# Patient Record
Sex: FEMALE | Race: WHITE | NOT HISPANIC OR LATINO | Employment: UNEMPLOYED | URBAN - METROPOLITAN AREA
[De-identification: names, ages, dates, MRNs, and addresses within clinical notes are randomized per-mention and may not be internally consistent; named-entity substitution may affect disease eponyms.]

---

## 2020-02-02 ENCOUNTER — HOSPITAL ENCOUNTER (OUTPATIENT)
Dept: LABOR AND DELIVERY | Facility: HOSPITAL | Age: 25
Discharge: HOME | End: 2020-02-02
Attending: SPECIALIST

## 2020-02-08 ENCOUNTER — HOSPITAL ENCOUNTER (OUTPATIENT)
Dept: URGENT CARE | Facility: CLINIC | Age: 25
Discharge: HOME | End: 2020-02-08
Attending: PHYSICIAN ASSISTANT

## 2020-02-10 LAB — BACTERIA SPEC AEROBE CULT: NORMAL

## 2020-02-20 ENCOUNTER — HOSPITAL ENCOUNTER (OUTPATIENT)
Dept: LABOR AND DELIVERY | Facility: HOSPITAL | Age: 25
Discharge: HOME | End: 2020-02-20
Attending: OBSTETRICS & GYNECOLOGY

## 2020-02-20 LAB — CONV ALPHA-1-MICROGLOBULIN PLACENTAL (VAGINAL FLUID): NEGATIVE

## 2020-05-11 ENCOUNTER — HOSPITAL ENCOUNTER (OUTPATIENT)
Dept: OTHER | Facility: HOSPITAL | Age: 25
Discharge: HOME | End: 2020-05-11
Attending: INTERNAL MEDICINE

## 2020-05-11 ENCOUNTER — OFFICE VISIT CONVERTED (OUTPATIENT)
Dept: INTERNAL MEDICINE | Facility: CLINIC | Age: 25
End: 2020-05-11
Attending: INTERNAL MEDICINE

## 2020-05-11 LAB
ALBUMIN SERPL-MCNC: 4.3 G/DL (ref 3.5–5)
ALBUMIN/GLOB SERPL: 1.2 {RATIO} (ref 1.4–2.6)
ALP SERPL-CCNC: 261 U/L (ref 42–98)
ALT SERPL-CCNC: 104 U/L (ref 10–40)
ANION GAP SERPL CALC-SCNC: 20 MMOL/L (ref 8–19)
AST SERPL-CCNC: 20 U/L (ref 15–50)
BASOPHILS # BLD AUTO: 0.06 10*3/UL (ref 0–0.2)
BASOPHILS NFR BLD AUTO: 0.6 % (ref 0–3)
BILIRUB SERPL-MCNC: 0.29 MG/DL (ref 0.2–1.3)
BUN SERPL-MCNC: 9 MG/DL (ref 5–25)
BUN/CREAT SERPL: 17 {RATIO} (ref 6–20)
CALCIUM SERPL-MCNC: 9.9 MG/DL (ref 8.7–10.4)
CHLORIDE SERPL-SCNC: 95 MMOL/L (ref 99–111)
CHOLEST SERPL-MCNC: 219 MG/DL (ref 107–200)
CHOLEST/HDLC SERPL: 8.1 {RATIO} (ref 3–6)
CONV ABS IMM GRAN: 0.05 10*3/UL (ref 0–0.2)
CONV CO2: 23 MMOL/L (ref 22–32)
CONV IMMATURE GRAN: 0.5 % (ref 0–1.8)
CONV TOTAL PROTEIN: 7.9 G/DL (ref 6.3–8.2)
CREAT UR-MCNC: 0.53 MG/DL (ref 0.5–0.9)
DEPRECATED RDW RBC AUTO: 38.5 FL (ref 36.4–46.3)
EOSINOPHIL # BLD AUTO: 0.13 10*3/UL (ref 0–0.7)
EOSINOPHIL # BLD AUTO: 1.4 % (ref 0–7)
ERYTHROCYTE [DISTWIDTH] IN BLOOD BY AUTOMATED COUNT: 13.7 % (ref 11.7–14.4)
EST. AVERAGE GLUCOSE BLD GHB EST-MCNC: 192 MG/DL
GFR SERPLBLD BASED ON 1.73 SQ M-ARVRAT: >60 ML/MIN/{1.73_M2}
GLOBULIN UR ELPH-MCNC: 3.6 G/DL (ref 2–3.5)
GLUCOSE SERPL-MCNC: 307 MG/DL (ref 65–99)
HBA1C MFR BLD: 8.3 % (ref 3.5–5.7)
HCT VFR BLD AUTO: 39.6 % (ref 37–47)
HDLC SERPL-MCNC: 27 MG/DL (ref 40–60)
HGB BLD-MCNC: 12.6 G/DL (ref 12–16)
LDLC SERPL CALC-MCNC: 153 MG/DL (ref 70–100)
LYMPHOCYTES # BLD AUTO: 3.48 10*3/UL (ref 1–5)
LYMPHOCYTES NFR BLD AUTO: 36.6 % (ref 20–45)
MCH RBC QN AUTO: 24.7 PG (ref 27–31)
MCHC RBC AUTO-ENTMCNC: 31.8 G/DL (ref 33–37)
MCV RBC AUTO: 77.6 FL (ref 81–99)
MONOCYTES # BLD AUTO: 0.58 10*3/UL (ref 0.2–1.2)
MONOCYTES NFR BLD AUTO: 6.1 % (ref 3–10)
NEUTROPHILS # BLD AUTO: 5.22 10*3/UL (ref 2–8)
NEUTROPHILS NFR BLD AUTO: 54.8 % (ref 30–85)
NRBC CBCN: 0 % (ref 0–0.7)
OSMOLALITY SERPL CALC.SUM OF ELEC: 286 MOSM/KG (ref 273–304)
PLATELET # BLD AUTO: 332 10*3/UL (ref 130–400)
PMV BLD AUTO: 11.5 FL (ref 9.4–12.3)
POTASSIUM SERPL-SCNC: 4.5 MMOL/L (ref 3.5–5.3)
RBC # BLD AUTO: 5.1 10*6/UL (ref 4.2–5.4)
SODIUM SERPL-SCNC: 133 MMOL/L (ref 135–147)
TRIGL SERPL-MCNC: 413 MG/DL (ref 40–150)
WBC # BLD AUTO: 9.52 10*3/UL (ref 4.8–10.8)

## 2020-05-22 ENCOUNTER — HOSPITAL ENCOUNTER (OUTPATIENT)
Dept: GENERAL RADIOLOGY | Facility: HOSPITAL | Age: 25
Discharge: HOME | End: 2020-05-22
Attending: INTERNAL MEDICINE

## 2020-06-04 ENCOUNTER — OFFICE VISIT CONVERTED (OUTPATIENT)
Dept: INTERNAL MEDICINE | Facility: CLINIC | Age: 25
End: 2020-06-04
Attending: PHYSICIAN ASSISTANT

## 2020-06-04 ENCOUNTER — HOSPITAL ENCOUNTER (OUTPATIENT)
Dept: OTHER | Facility: HOSPITAL | Age: 25
Discharge: HOME | End: 2020-06-04
Attending: PHYSICIAN ASSISTANT

## 2020-06-05 LAB
C TRACH RRNA CVX QL NAA+PROBE: NOT DETECTED
CONV HIV-1/ HIV-2: NONREACTIVE
HSV I/II IGM: <0.91 RATIO (ref 0–0.9)
HSV1 IGG SER IA-ACNC: 49.7 INDEX (ref 0–0.9)
HSV2 IGG SER IA-ACNC: <0.91 INDEX (ref 0–0.9)
N GONORRHOEA DNA SPEC QL NAA+PROBE: NOT DETECTED
RPR SER QL: ABNORMAL

## 2020-06-06 LAB — BACTERIA UR CULT: NORMAL

## 2020-06-18 ENCOUNTER — TELEMEDICINE CONVERTED (OUTPATIENT)
Dept: INTERNAL MEDICINE | Facility: CLINIC | Age: 25
End: 2020-06-18
Attending: INTERNAL MEDICINE

## 2020-08-05 ENCOUNTER — TELEMEDICINE CONVERTED (OUTPATIENT)
Dept: INTERNAL MEDICINE | Facility: CLINIC | Age: 25
End: 2020-08-05
Attending: NURSE PRACTITIONER

## 2020-08-06 ENCOUNTER — HOSPITAL ENCOUNTER (OUTPATIENT)
Dept: URGENT CARE | Facility: CLINIC | Age: 25
Discharge: HOME | End: 2020-08-06
Attending: NURSE PRACTITIONER

## 2020-08-07 LAB — SARS-COV-2 RNA SPEC QL NAA+PROBE: DETECTED

## 2020-08-18 ENCOUNTER — TELEMEDICINE CONVERTED (OUTPATIENT)
Dept: INTERNAL MEDICINE | Facility: CLINIC | Age: 25
End: 2020-08-18
Attending: NURSE PRACTITIONER

## 2020-09-15 ENCOUNTER — TELEMEDICINE CONVERTED (OUTPATIENT)
Dept: INTERNAL MEDICINE | Facility: CLINIC | Age: 25
End: 2020-09-15
Attending: INTERNAL MEDICINE

## 2020-10-20 ENCOUNTER — HOSPITAL ENCOUNTER (OUTPATIENT)
Dept: URGENT CARE | Facility: CLINIC | Age: 25
Discharge: HOME | End: 2020-10-20
Attending: EMERGENCY MEDICINE

## 2020-10-22 LAB — BACTERIA UR CULT: NORMAL

## 2020-11-14 ENCOUNTER — HOSPITAL ENCOUNTER (OUTPATIENT)
Dept: URGENT CARE | Facility: CLINIC | Age: 25
Discharge: HOME | End: 2020-11-14
Attending: PHYSICIAN ASSISTANT

## 2020-11-18 LAB
BACTERIA SPEC AEROBE CULT: ABNORMAL
CIPROFLOXACIN SUSC ISLT: <=0.5
CLINDAMYCIN SUSC ISLT: 0.25
DAPTOMYCIN SUSC ISLT: 0.25
DOXYCYCLINE SUSC ISLT: <=0.5
ERYTHROMYCIN SUSC ISLT: 0.5
GENTAMICIN SUSC ISLT: <=0.5
LEVOFLOXACIN SUSC ISLT: <=0.12
OXACILLIN SUSC ISLT: >=4
RIFAMPIN SUSC ISLT: <=0.5
TETRACYCLINE SUSC ISLT: <=1
TIGECYCLINE SUSC ISLT: <=0.12
TMP SMX SUSC ISLT: <=10
VANCOMYCIN SUSC ISLT: <=0.5

## 2020-11-19 ENCOUNTER — TELEMEDICINE CONVERTED (OUTPATIENT)
Dept: INTERNAL MEDICINE | Facility: CLINIC | Age: 25
End: 2020-11-19
Attending: STUDENT IN AN ORGANIZED HEALTH CARE EDUCATION/TRAINING PROGRAM

## 2020-11-24 ENCOUNTER — TELEMEDICINE CONVERTED (OUTPATIENT)
Dept: INTERNAL MEDICINE | Facility: CLINIC | Age: 25
End: 2020-11-24
Attending: INTERNAL MEDICINE

## 2021-01-07 ENCOUNTER — HOSPITAL ENCOUNTER (OUTPATIENT)
Dept: URGENT CARE | Facility: CLINIC | Age: 26
Discharge: HOME | End: 2021-01-07
Attending: PHYSICIAN ASSISTANT

## 2021-01-09 LAB — BACTERIA SPEC AEROBE CULT: ABNORMAL

## 2021-02-24 ENCOUNTER — TELEMEDICINE CONVERTED (OUTPATIENT)
Dept: INTERNAL MEDICINE | Facility: CLINIC | Age: 26
End: 2021-02-24
Attending: INTERNAL MEDICINE

## 2021-05-10 NOTE — H&P
"   History and Physical      Patient Name: Dorinda Sherwood   Patient ID: [id number]   Sex: Female   YOB: 1995    Primary Care Provider: Jess Maria MD   Referring Provider: Helen Thornton MD    Visit Date: May 11, 2020    Provider: Jess Maria MD   Location: [facility]   Location Address: [address] [place] [address], [address]  [place], [place]  [id number]   Location Phone: [phone]          Chief Complaint  · \"im itching everywhere\"  · \"my allergies are terrible\"  · \"I think i have a UTI\"      History Of Present Illness  Dorinda Sherwood is a 24 year old /White female who presents for evaluation and treatment of:      establishing care    is a diabetic  thinks sugars are high  has been off meds since her pregnancy with her son    has two wounds in her  incision that don't want to heal  her brother has been packing it for her and it seems to be getting smaller  however she does still have some pus draining from it  she is also having a little redness of her abd and some pain and significant itching  no fever    also states that she is having some trouble with breathing  she has asthma and thinks it might be acting up  she is using her albuterol from time to time       Past Medical History  Disease Name Date Onset Notes   Allergic Rhinitis --  --    Asthma --  --    Depression, unspecified depression type --  --    Diabetes mellitus --  --    SOB (shortness of breath) --  --          Past Surgical History  Procedure Name Date Notes   Colonoscopy  --    [place] Tooth Extraction --  --          Medication List  Name Date Started Instructions   Cymbalta 60 mg oral capsule,delayed release(DR/EC) 2020 take 1 capsule (60 mg) by oral route once daily         Allergy List  Allergen Name Date Reaction Notes   amoxicillin --  --  --    hydroxyzine HCl --  wheezing/cough --        Allergies Reconciled  Family Medical History  Disease Name Relative/Age Notes   Heart " "Disease Father/  Mother/   aunt/uncle grandparents   - No Family History of Colorectal Cancer  --    Diabetes mellitus, type II  --          Reproductive History  Menstrual   Pregnancy Summary   Total Pregnancies: 1 Full Term: 1 Premature: 0   Ab Induced: 0 Ab Spontaneous: 0 Ectopics: 0   Multiples: 0 Livin         Social History  Finding Status Start/Stop Quantity Notes   Alcohol Never --/-- --  --    Tobacco Never --/-- --  --          Review of Systems  · Constitutional  o Admits  o : fatigue  o Denies  o : fever, weight loss, weight gain  · Cardiovascular  o Denies  o : lower extremity edema, claudication, chest pressure, palpitations  · Respiratory  o Admits  o : shortness of breath, wheezing  o Denies  o : cough, hemoptysis, dyspnea on exertion  · Gastrointestinal  o Admits  o : nausea, vomiting, abdominal pain  o Denies  o : diarrhea, constipation      Vitals  Date Time BP Position Site L\R Cuff Size HR RR TEMP (F) WT  HT  BMI kg/m2 BSA m2 O2 Sat        2017 10:38 /90 Sitting    106 - R   280lbs 0oz 5'  2\" 51.21 2.36     2017 09:12 /78 Sitting    101 - R  97.7 281lbs 6oz 5'  2\" 51.46 2.36 100 %    2020 01:42 /84 Sitting    121 - R 22 97.9 285lbs 0oz 5'  2\" 52.13 2.38 96 %          Physical Examination  · Constitutional  o Appearance  o : morbidly obese, well developed  · Head and Face  o Head  o :   § Inspection  § : atraumatic, normocephalic  · Eyes  o Eyes  o : extraocular movements intact, no scleral icterus, no conjunctival injection  · Respiratory  o Respiratory Effort  o : breathing comfortably, symmetric chest rise  o Auscultation of Lungs  o : clear to asculatation bilaterally, no wheezes, rales, or rhonchii  · Cardiovascular  o Heart  o :   § Auscultation of Heart  § : regular rate and rhythm, no murmurs, rubs, or gallops  o Peripheral Vascular System  o :   § Extremities  § : no edema  · Neurologic  o Mental Status Examination  o :   § Orientation  § : " grossly oriented to person, place and time  o Gait and Station  o :   § Gait Screening  § : normal gait  · Psychiatric  o General  o : normal mood and affect     bilateral edges of  incision are open, right side with packing, both with small amounts of pus  no significant erythema             Results  · In-Office Procedures  o Lab procedure  § IOP - Urinalysis without Microscopy (Clinitek) [facility] ([id number])   § Color Ur: Yellow   § Clarity Ur: Clear   § Glucose Ur Ql Strip: 500 mg/dL   § Bilirub Ur Ql Strip: Negative   § Ketones Ur Ql Strip: Negative   § Sp Gr Ur Qn: 1.020   § Hgb Ur Ql Strip: Negative   § pH Ur-LsCnc: 6.0   § Prot Ur Ql Strip: Negative   § Urobilinogen Ur Strip-mCnc: 0.2 E.U./dL   § Nitrite Ur Ql Strip: Negative   § WBC Est Ur Ql Strip: Negative       Assessment  · Abdominal pain     789.00/R10.9  · Diabetes mellitus, type 2     250.00/E11.9  · Screening for depression     V79.0/Z13.89  · Screening for lipid disorders     V77.91/Z13.220  · Cellulitis     682.9/L03.90  · Abscess     682.9/L02.91  · Vomiting     787.03/R11.10       will start metformin and jardiance (warned to monitor for yeast infections)  will start singulair and allegra for allergies/astham  refill albuterol  hold on maintenance inhaler as she dind't have a lot of reversibility on spirometry    will get ct abd due to concern for worsening inner abscess as she is developing itching although from the outside she looks pretty good    hold on antibiotics for now  continue packing    labs today to assess sugar, wbc, etc       Problems Reconciled  Plan  · Orders  o CBC with Auto Diff [facility] ([id number]) - 789.00/R10.9 - 2020  o CMP [facility] ([id number]) - 789.00/R10.9 - 2020  o Hgb A1c [facility] ([id number]) - 250.00/E11.9 - 2020  o Lipid Panel [facility] ([id number]) - 250.00/E11.9 - 2020  o ACO-39: Current medications updated and reviewed () - - 2020  o CT ABD&PELV with and without contrast ([id number]) - 682.9/L03.90, 682.9/L02.91,  789.00/R10.9 - 05/11/2020  · Medications  o fexofenadine 180 mg oral tablet   SIG: take 1 tablet (180 mg) by oral route once daily   DISP: (90) tablets with 0 refills  Prescribed on 05/11/2020     o metformin 500 mg oral tablet   SIG: take 1 tablet (500 mg) by oral route 2 times per day with morning and evening meals   DISP: (60) tablets with 0 refills  Prescribed on 05/11/2020     o Jardiance 25 mg oral tablet   SIG: take 1 tablet (25 mg) by oral route once daily in the morning   DISP: (90) tablets with 0 refills  Prescribed on 05/11/2020     o Singulair 10 mg oral tablet   SIG: take 1 tablet (10 mg) by oral route once daily in the evening   DISP: (90) tablets with 0 refills  Prescribed on 05/11/2020     o Cymbalta 60 mg oral capsule,delayed release(DR/EC)   SIG: take 1 capsule (60 mg) by oral route once daily   DISP: (90) capsules with 0 refills  Adjusted on 05/11/2020     o Medications have been Reconciled  o Transition of Care or Provider Policy  · Instructions  o Instructed to seek medical attention urgently for new or worsening symptoms.  o Depression Screen completed and scanned into the EMR under the designated folder within the patient's documents.  o Patient was educated/instructed on their diagnosis, treatment and medications prior to discharge from the clinic today.  · Disposition  o 1 Month Follow Up            Electronically Signed by: Jess Maria MD -Author on May 11, 2020 03:17:05 PM

## 2021-05-13 NOTE — PROGRESS NOTES
"   Progress Note      Patient Name: Dorinda Sherwood   Patient ID: [id number]   Sex: Female   YOB: 1995    Primary Care Provider: Jess Maria MD   Referring Provider: Helen Thornton MD    Visit Date: September 15, 2020    Provider: Jess aMria MD   Location: [facility]   Location Address: [address] [place] [address]  [id number]   Location Phone: [phone]          Chief Complaint  · \"I want to discuss depression\"      History Of Present Illness  Video Conferencing Visit  Dorinda Sherwood is a 25 year old /White female who is presenting for evaluation via video conferencing via zoom. Verbal consent obtained before beginning visit.   The following staff were present during this visit: none   Dorinda Sherwood is a 25 year old /White female who presents for evaluation and treatment of:      Chronic issues.    Feeling slightly better than previous however overall still not feeling great  Unsure what her sugars are running  No chest pain  No trouble breathing  Overall does have some body aches  Still quite stressed particularly with her sense of       Past Medical History  Disease Name Date Onset Notes   Allergic Rhinitis --  --    Asthma --  --    Depression, unspecified depression type --  --    Diabetes mellitus --  --    SOB (shortness of breath) --  --          Past Surgical History  Procedure Name Date Notes   Colonoscopy 2017 --    [place] Tooth Extraction --  --          Medication List  Name Date Started Instructions   atorvastatin 10 mg oral tablet 08/27/2020 take 1 tablet (10 mg) by oral route once daily at bedtime for 90 days   Cymbalta 60 mg oral capsule,delayed release(DR/EC) 09/10/2020 take 1 capsule (60 mg) by oral route once daily   fexofenadine 180 mg oral tablet 09/10/2020 take 1 tablet (180 mg) by oral route once daily   metformin 500 mg oral tablet 08/27/2020 take 1 tablet (500 mg) by oral route 2 times per day with " morning and evening meals   Ortho Tri-Cyclen (28) 0.18/0.215/0.25 mg-35 mcg (28) oral tablet 2020 take 1 tablet by oral route once daily for 90 days   Ozempic 0.25 mg or 0.5 mg(2 mg/1.5 mL) subcutaneous pen injector 2020 inject 0.25 mg by subcutaneous route once weekly on the same day of each week, rotating injection sites   Singulair 10 mg oral tablet 2020 take 1 tablet (10 mg) by oral route once daily in the evening   Steglatro 5 mg oral tablet 09/10/2020 take 1 tablet (5 mg) by oral route once daily in the morning         Allergy List  Allergen Name Date Reaction Notes   amoxicillin --  --  --    hydroxyzine HCl --  wheezing/cough --        Allergies Reconciled  Family Medical History  Disease Name Relative/Age Notes   Heart Disease Father/  Mother/   aunt/uncle grandparents   - No Family History of Colorectal Cancer  --    Diabetes Mellitus, Type II  --          Reproductive History  Menstrual   Pregnancy Summary   Total Pregnancies: 1 Full Term: 1 Premature: 0   Ab Induced: 0 Ab Spontaneous: 0 Ectopics: 0   Multiples: 0 Livin         Social History  Finding Status Start/Stop Quantity Notes   Alcohol Never --/-- --  --    Tobacco Never --/-- --  --          Review of Systems  · Constitutional  o Denies  o : fever, fatigue, weight loss, weight gain  · Cardiovascular  o Denies  o : lower extremity edema, claudication, chest pressure, palpitations  · Respiratory  o Denies  o : shortness of breath, wheezing, frequent cough, hemoptysis, dyspnea on exertion  · Gastrointestinal  o Denies  o : nausea, vomiting, diarrhea, constipation, abdominal pain      Physical Examination     Gen: well-nourished, no acute distress,obese  HENT: atraumatic, normocephalic  Eyes: extraocular movements intact, no scleral icterus  Lung: breathing comfortably, no cough  Skin: no visible rash, no lesions  Neuro: grossly oriented to person, place, and time. no facial droop   Psych: normal mood and affect                Assessment  · Depression, unspecified depression type     311/F32.9  · Diabetes mellitus     250.00/E11.9       Continue current medications for now  Will check labs prior to next appointment adjust meds as needed based on results  She will let us know of any new or worrisome symptoms     Problems Reconciled  Plan  · Orders  o ACO-39: Current medications updated and reviewed () - - 09/15/2020  · Medications  o Medications have been Reconciled  o Transition of Care or Provider Policy  · Instructions  o Patient was educated/instructed on their diagnosis, treatment and medications prior to discharge from the clinic today.            Electronically Signed by: Jess Maria MD -Author on October 4, 2020 03:19:49 PM

## 2021-05-13 NOTE — PROGRESS NOTES
"   Progress Note      Patient Name: Dorinda Sherwood   Patient ID: [id number]   Sex: Female   YOB: 1995    Primary Care Provider: Jess Maria MD   Referring Provider: Lucia Leigh MD    Visit Date: November 19, 2020    Provider: Lucia Leigh MD   Location: [facility]   Location Address: [address] [place] [address]  [id number]   Location Phone: [phone]          Chief Complaint  · \"I have huge bumps under my armpit and they hurt really bad.\"      History Of Present Illness  Video Conferencing Visit  Dorinda Sherwood is a 25 year old /White female who is presenting for evaluation via video conferencing via Zoom. Verbal consent obtained before beginning visit.   The following staff were present during this visit: Lucia Leigh MD   Dorinda Sherwood is a 25 year old /White female who presents for evaluation and treatment of:      bumps over right arm:   Noted about 12 days ago, itchy and painful,thought just 'regular bump' but persisted. She was evaluated at an urgent care center 6 days ago, the area was swabbed for culture and she was prescribed mupirocin, triamcinolone cream and Bactrim which she has been using without improvement. Denies fevers or chills. Rash is not worsening but just not getting better. She endorses mild nausea without emesis.       Past Medical History  Disease Name Date Onset Notes   Allergic Rhinitis --  --    Asthma --  --    Depression, unspecified depression type --  --    Diabetes mellitus --  --    SOB (shortness of breath) --  --          Past Surgical History  Procedure Name Date Notes   Colonoscopy 2017 --    [place] Tooth Extraction --  --          Medication List  Name Date Started Instructions   atorvastatin 10 mg oral tablet 08/27/2020 take 1 tablet (10 mg) by oral route once daily at bedtime for 90 days   Cymbalta 60 mg oral capsule,delayed release(DR/EC) 09/10/2020 take 1 capsule (60 mg) by oral " route once daily   fexofenadine 180 mg oral tablet 09/10/2020 take 1 tablet (180 mg) by oral route once daily   metformin 500 mg oral tablet 2020 take 1 tablet (500 mg) by oral route 2 times per day with morning and evening meals   Ortho Tri-Cyclen (28) 0.18/0.215/0.25 mg-35 mcg (28) oral tablet 2020 take 1 tablet by oral route once daily for 90 days   Ozempic 0.25 mg or 0.5 mg(2 mg/1.5 mL) subcutaneous pen injector 2020 inject 0.25 mg by subcutaneous route once weekly on the same day of each week, rotating injection sites   Singulair 10 mg oral tablet 2020 take 1 tablet (10 mg) by oral route once daily in the evening   Steglatro 5 mg oral tablet 09/10/2020 take 1 tablet (5 mg) by oral route once daily in the morning         Allergy List  Allergen Name Date Reaction Notes   amoxicillin --  --  --    hydroxyzine HCl --  wheezing/cough --          Family Medical History  Disease Name Relative/Age Notes   Heart Disease Father/  Mother/   aunt/uncle grandparents   - No Family History of Colorectal Cancer  --    Diabetes Mellitus, Type II  --          Reproductive History  Menstrual   Pregnancy Summary   Total Pregnancies: 1 Full Term: 1 Premature: 0   Ab Induced: 0 Ab Spontaneous: 0 Ectopics: 0   Multiples: 0 Livin         Social History  Finding Status Start/Stop Quantity Notes   Alcohol Never --/-- --  --    Tobacco Never --/-- --  --          Review of Systems  · Constitutional  o Denies  o : fever, fatigue, weight loss, weight gain  · Cardiovascular  o Denies  o : chest pressure, palpitations  · Respiratory  o Denies  o : shortness of breath, wheezing, frequent cough, hemoptysis, dyspnea on exertion  · Gastrointestinal  o Denies  o : vomiting, diarrhea, constipation, abdominal pain  · Integument  o Admits  o : rash, itching, new skin lesions      Physical Examination     Via zoom: multiple patchy erythematous areas noted over the medial aspect of her arm without extension into her axilla.            Assessment  · MRSA cellulitis       Cellulitis, unspecified     682.9/L03.90  Methicillin resistant Staphylococcus aureus infection as the cause of diseases classified elsewhere     682.9/B95.62  Reviewed culture results which showed Staph aureus, resistant to oxacillin alone. Culture showed sensitivity to bactrim although no improvement per patient. Prescription for doxycycline sent to preferred pharmacy. Patient to call and schedule f/u next week for direct visualization.     Problems Reconciled  Plan  · Orders  o ACO-39: Current medications updated and reviewed (, 1159F) - - 11/19/2020  · Medications  o doxycycline hyclate 100 mg oral capsule   SIG: take 1 capsule (100 mg) by oral route 2 times per day for 7 days   DISP: (14) Capsule with 0 refills  Prescribed on 11/19/2020     o Medications have been Reconciled  o Transition of Care or Provider Policy  · Instructions  o Patient was educated/instructed on their diagnosis, treatment and medications prior to discharge from the clinic today.            Electronically Signed by: Lucia Leigh MD -Author on December 4, 2020 01:19:11 AM

## 2021-05-13 NOTE — PROGRESS NOTES
"   Progress Note      Patient Name: Dorinda Sherwood   Patient ID: [id number]   Sex: Female   YOB: 1995    Primary Care Provider: Jess Maria MD   Referring Provider: Helen Thornton MD    Visit Date: November 24, 2020    Provider: Jess Maria MD   [facility]   Location Address: [address] [place] [address]  [id number]   Location Phone: [phone]          Chief Complaint  · \"I am following up for the rash under my right arm\"      History Of Present Illness  Video Conferencing Visit  Dorinda Sherwood is a 25 year old /White female who is presenting for evaluation via video conferencing via zoom. Verbal consent obtained before beginning visit.   The following staff were present during this visit: none   Dorinda Sherwood is a 25 year old /White female who presents for evaluation and treatment of:      Rash is getting better  She is continuing current antibiotics       Past Medical History  Disease Name Date Onset Notes   Allergic Rhinitis --  --    Asthma --  --    Depression, unspecified depression type --  --    Diabetes mellitus --  --    SOB (shortness of breath) --  --          Past Surgical History  Procedure Name Date Notes   Colonoscopy 2017 --    [place] Tooth Extraction --  --          Medication List  Name Date Started Instructions   atorvastatin 10 mg oral tablet 08/27/2020 take 1 tablet (10 mg) by oral route once daily at bedtime for 90 days   Cymbalta 60 mg oral capsule,delayed release(DR/EC) 09/10/2020 take 1 capsule (60 mg) by oral route once daily   doxycycline hyclate 100 mg oral capsule 11/19/2020 take 1 capsule (100 mg) by oral route 2 times per day for 7 days   fexofenadine 180 mg oral tablet 09/10/2020 take 1 tablet (180 mg) by oral route once daily   metformin 500 mg oral tablet 08/27/2020 take 1 tablet (500 mg) by oral route 2 times per day with morning and evening meals   Ortho Tri-Cyclen (28) " 0.18/0.215/0.25 mg-35 mcg (28) oral tablet 2020 take 1 tablet by oral route once daily for 90 days   Ozempic 0.25 mg or 0.5 mg(2 mg/1.5 mL) subcutaneous pen injector 2020 inject 0.25 mg by subcutaneous route once weekly on the same day of each week, rotating injection sites   Singulair 10 mg oral tablet 2020 take 1 tablet (10 mg) by oral route once daily in the evening   Steglatro 5 mg oral tablet 09/10/2020 take 1 tablet (5 mg) by oral route once daily in the morning         Allergy List  Allergen Name Date Reaction Notes   amoxicillin --  --  --    hydroxyzine HCl --  wheezing/cough --          Family Medical History  Disease Name Relative/Age Notes   Heart Disease Father/  Mother/   aunt/uncle grandparents   - No Family History of Colorectal Cancer  --    Diabetes Mellitus, Type II  --          Reproductive History  Menstrual   Pregnancy Summary   Total Pregnancies: 1 Full Term: 1 Premature: 0   Ab Induced: 0 Ab Spontaneous: 0 Ectopics: 0   Multiples: 0 Livin         Social History  Finding Status Start/Stop Quantity Notes   Alcohol Never --/-- --  --    Tobacco Never --/-- --  --          Review of Systems  · Constitutional  o Denies  o : fever, fatigue, weight loss, weight gain  · Cardiovascular  o Denies  o : lower extremity edema, claudication, chest pressure, palpitations  · Respiratory  o Denies  o : shortness of breath, wheezing, frequent cough, hemoptysis, dyspnea on exertion  · Gastrointestinal  o Denies  o : nausea, vomiting, diarrhea, constipation, abdominal pain      Physical Examination     Gen: well-nourished, no acute distress  HENT: atraumatic, normocephalic  Eyes: extraocular movements intact, no scleral icterus  Lung: breathing comfortably, no cough  Skin: Axillary cellulitis  Neuro: grossly oriented to person, place, and time. no facial droop   Psych: normal mood and affect             Assessment  · Rash     782.1/R21  Improved continue current meds    Problems  Reconciled  Plan  · Orders  o ACO-39: Current medications updated and reviewed (, 1159F) - - 11/24/2020  · Medications  o Medications have been Reconciled  o Transition of Care or Provider Policy  · Instructions  o Patient was educated/instructed on their diagnosis, treatment and medications prior to discharge from the clinic today.            Electronically Signed by: Jess Maria MD -Author on December 20, 2020 05:33:01 PM

## 2021-05-13 NOTE — PROGRESS NOTES
"   Progress Note      Patient Name: Dorinda Sherwood   Patient ID: [id number]   Sex: Female   YOB: 1995    Primary Care Provider: Jess Maria MD   Referring Provider: Helen Thornton MD    Visit Date: 2020    Provider: YOKO Cunningham   Location: [facility]   Location Address: [address] [place] [address], [address]  [place], [place]  [id number]   Location Phone: [phone]          Chief Complaint  · \"following up on diabetes\"  · \"following up on COVID\"  · \"I have been nauseated too\"  · \"I have been having pain on my right side from my \"      History Of Present Illness  Video Conferencing Visit  Dorinda Sherwood is a 25 year old /White female who is presenting for evaluation via video conferencing via [employer]. Verbal consent obtained before beginning visit.   The following staff were present during this visit: YOKO Card   Dorinda Sherwood is a 25 year old /White female who presents for evaluation and treatment of:      Informed patient that as visit is being performed as a telehealth visit there will be no opportunity to obtain vital signs or perform physical exam.  Due to this there is unfortunately a possibility that things may be missed that would typically be noticed during a traditionally visit.  Patient is aware of this possibility and agrees to proceed with telehealth.  Patient states there is no other person present for this phone call    Follow-up visit.    Prior to discussing follow-up patient reports that she is having right lower quadrant pain near her  scar.  The  was back in March but she did have to have the wound reopened due to complications.  Patient reports that there is now an out pouching over the scar with hard tissue and she is experiencing severe nausea and pain.  Patient appears to be tearful.    Patient denies fever, diarrhea, constipation.    Follow-up visit was postponed due to the nature of " the pain and patient sent to the ER.       Past Medical History  Disease Name Date Onset Notes   Allergic Rhinitis --  --    Asthma --  --    Depression, unspecified depression type --  --    Diabetes mellitus --  --    SOB (shortness of breath) --  --          Past Surgical History  Procedure Name Date Notes   Colonoscopy  --    [place] Tooth Extraction --  --          Medication List  Name Date Started Instructions   Blood Glucose Monitoring miscellaneous kit 2020 use as directed   blood glucose strip-disp meter 2020 Check bg once daily   Cymbalta 60 mg oral capsule,delayed release(DR/EC) 2020 take 1 capsule (60 mg) by oral route once daily   fexofenadine 180 mg oral tablet 2020 take 1 tablet (180 mg) by oral route once daily   metformin 500 mg oral tablet 2020 take 1 tablet (500 mg) by oral route 2 times per day with morning and evening meals   Ortho Tri-Cyclen (28) 0.18/0.215/0.25 mg-35 mcg (28) oral tablet 2020 take 1 tablet by oral route once daily for 90 days   Ozempic 0.25 mg or 0.5 mg(2 mg/1.5 mL) subcutaneous pen injector 2020 inject 0.25 mg by subcutaneous route once weekly on the same day of each week, rotating injection sites   Singulair 10 mg oral tablet 2020 take 1 tablet (10 mg) by oral route once daily in the evening   Steglatro 5 mg oral tablet 2020 take 1 tablet (5 mg) by oral route once daily in the morning         Allergy List  Allergen Name Date Reaction Notes   amoxicillin --  --  --    hydroxyzine HCl --  wheezing/cough --        Allergies Reconciled  Family Medical History  Disease Name Relative/Age Notes   Heart Disease Father/  Mother/   aunt/uncle grandparents   - No Family History of Colorectal Cancer  --    Diabetes Mellitus, Type II  --          Reproductive History  Menstrual   Pregnancy Summary   Total Pregnancies: 1 Full Term: 1 Premature: 0   Ab Induced: 0 Ab Spontaneous: 0 Ectopics: 0   Multiples: 0 Livin         Social  History  Finding Status Start/Stop Quantity Notes   Alcohol Never --/-- --  --    Tobacco Never --/-- --  --          Review of Systems  · Constitutional  o Denies  o : fever, fatigue, weight loss, weight gain  · Cardiovascular  o Denies  o : lower extremity edema, claudication, chest pressure, palpitations  · Respiratory  o Denies  o : shortness of breath, wheezing, frequent cough, hemoptysis, dyspnea on exertion  · Gastrointestinal  o Admits  o : nausea, abdominal pain  o Denies  o : vomiting, diarrhea, constipation      Physical Examination     Gen: Appears to be in pain  HENT: atraumatic, normocephalic  Eyes: extraocular movements intact, no scleral icterus  Lung: breathing comfortably, no cough  Skin: no visible rash, no lesions  Neuro: grossly oriented to person, place, and time. no facial droop   Psych: Tearful    Patient attempted to show me her abdomen and the site of outpouching but due to telehealth could not visualize well.             Assessment  · Right lower quadrant pain     789.03/R10.31  Due to the nature of the pain and location of the pain patient was sent to the ER at this time. Patient educated on possible etiology that could be related to the pain. Differentials included but not limited to restricted hernia, hernia, appendicitis, bowel obstruction, infection, gas. Patient was reporting to the ER immediately. Patient will reschedule her follow-up visit to discuss her diabetes and chronic management.  · Nausea     787.02/R11.0    Problems Reconciled  Plan  · Orders  o ACO-39: Current medications updated and reviewed () - - 08/18/2020  · Medications  o Medications have been Reconciled  o Transition of Care or Provider Policy  · Instructions  o Patient was educated/instructed on their diagnosis, treatment and medications prior to discharge from the clinic today.  · Disposition  o Sent to ER            Electronically Signed by: Myriam Patterson, APRN -Author on August 18, 2020 04:43:08 PM

## 2021-05-13 NOTE — PROGRESS NOTES
"   Progress Note      Patient Name: Dorinda Sherwood   Patient ID: [id number]   Sex: Female   YOB: 1995    Primary Care Provider: Jess Maria MD   Referring Provider: Helen Thornton MD    Visit Date: June 18, 2020    Provider: Jess Maria MD   Location: [facility]   Location Address: [address] [place] [address]  [place], [place]  [id number]   Location Phone: [phone]          Chief Complaint  · \"i need to be referred to a neurosurgeon\"      History Of Present Illness  Video Conferencing Visit  Dorinda Sherwood is a 24 year old /White female who is presenting for evaluation via video conferencing via zoom. Verbal consent obtained before beginning visit.   The following staff were present during this visit: Jess Maria MD   Dorinda Sherwood is a 24 year old /White female who presents for evaluation and treatment of:      chronic issues    states that when she was at [facility]  she has been told that she has congenital hydrocephalus  and that she should see a neurosurgeon  does have a headache at times  otherwise feels well    has dealt with some learning issues as well    was also told at [facility] that she has some holes in her heart that need to be monitored  she isn't sure what these holes are   no chest pain  no trouble breathing  no leg swelling    states that sugars have been running high  she admits to not eating the best               Past Medical History  Disease Name Date Onset Notes   Allergic Rhinitis --  --    Asthma --  --    Depression, unspecified depression type --  --    Diabetes mellitus --  --    SOB (shortness of breath) --  --          Past Surgical History  Procedure Name Date Notes   Colonoscopy 2017 --    [place] Tooth Extraction --  --          Medication List  Name Date Started Instructions   Blood Glucose Monitoring miscellaneous kit 06/04/2020 use as directed   blood glucose strip-disp meter 06/04/2020 " Check bg once daily   Cymbalta 60 mg oral capsule,delayed release(DR/EC) 2020 take 1 capsule (60 mg) by oral route once daily   fexofenadine 180 mg oral tablet 2020 take 1 tablet (180 mg) by oral route once daily   Jardiance 25 mg oral tablet 2020 take 1 tablet (25 mg) by oral route once daily in the morning   metformin 500 mg oral tablet 2020 take 1 tablet (500 mg) by oral route 2 times per day with morning and evening meals   Ozempic 0.25 mg or 0.5 mg(2 mg/1.5 mL) subcutaneous pen injector 2020 inject 0.25 mg by subcutaneous route once weekly on the same day of each week, rotating injection sites   Singulair 10 mg oral tablet 2020 take 1 tablet (10 mg) by oral route once daily in the evening         Allergy List  Allergen Name Date Reaction Notes   amoxicillin --  --  --    hydroxyzine HCl --  wheezing/cough --        Allergies Reconciled  Family Medical History  Disease Name Relative/Age Notes   Heart Disease Father/  Mother/   aunt/uncle grandparents   - No Family History of Colorectal Cancer  --    Diabetes Mellitus, Type II  --          Reproductive History  Menstrual   Pregnancy Summary   Total Pregnancies: 1 Full Term: 1 Premature: 0   Ab Induced: 0 Ab Spontaneous: 0 Ectopics: 0   Multiples: 0 Livin         Social History  Finding Status Start/Stop Quantity Notes   Alcohol Never --/-- --  --    Tobacco Never --/-- --  --          Review of Systems  · Constitutional  o Denies  o : fever, fatigue, weight loss, weight gain  · Cardiovascular  o Denies  o : lower extremity edema, claudication, chest pressure, palpitations  · Respiratory  o Denies  o : shortness of breath, wheezing, cough, hemoptysis, dyspnea on exertion  · Gastrointestinal  o Denies  o : nausea, vomiting, diarrhea, constipation, abdominal pain      Physical Examination     Gen: well-nourished, no acute distress  HENT: atraumatic, normocephalic  Eyes: extraocular movements intact, no scleral icterus  Lung:  breathing comfortably, no cough  Skin: no visible rash, no lesions  Neuro: grossly oriented to person, place, and time. no facial droop   Psych: normal mood and affect               Assessment  · Diabetes mellitus, type 2     250.00/E11.9  too high  will start ozempic  · Congenital hydrocephalus     742.3/Q03.9  will refer to neurosurgery  · Cardiac abnormality     746.9/Q24.9  will get records to see what the holes in the heart are    Problems Reconciled  Plan  · Orders  o ACO-39: Current medications updated and reviewed () - - 06/18/2020  o CARDIOLOGY CONSULTATION (CARDI) - 746.9/Q24.9 - 06/18/2020   saw Dr. Smith at the hospital in [place]; please request records and recent ECHO from there; please refer to [facility]  o NEUROSURGEON CONSULTATION (NEUSR) - 742.3/Q03.9 - 06/18/2020   saw someone at [place], but she isn't sure who; please get MRI and EEG from there as well  · Medications  o Ozempic 0.25 mg or 0.5 mg(2 mg/1.5 mL) subcutaneous pen injector   SIG: inject 0.2 milliliter (0.25 mg) by subcutaneous route once weekly on the same day of each week, in the abdomen, thighs, or upper arm rotating injection sites   DISP: (1) 1.5 ml syringe with 0 refills  Prescribed on 06/18/2020     o Medications have been Reconciled  o Transition of Care or Provider Policy  · Instructions  o Patient was educated/instructed on their diagnosis, treatment and medications prior to discharge from the clinic today.  · Disposition  o 6 Week Follow Up            Electronically Signed by: Jess Maria MD -Author on June 29, 2020 12:45:43 PM

## 2021-05-13 NOTE — PROGRESS NOTES
Progress Note      Patient Name: Dorinda Sherwood   Patient ID: [id number]   Sex: Female   YOB: 1995    Primary Care Provider: Jess Maria MD   Referring Provider: Helen Thornton MD    Visit Date: June 4, 2020    Provider: Angie Bingham PA-C   Location: [facility]   Location Address: [address] [place] [address], [address]  [place], [place]  [id number]   Location Phone: [phone]          Chief Complaint  · Vaginal itch      History Of Present Illness  Dorinda Sherwood is a 24 year old /White female who presents for evaluation and treatment of:      vaginal itching.   She states she had UTI back to back during pregnancy. Since she delivered baby 2months ago she has been having itching.   She is scratching so much that it is causing bleeding.   She has noticed odor down in vaginal area. Denies seeing discharge.   Denies dysuria, urgency. She is urinating more frequently.   Denies diarrhea. She has been constipated. BM q 3-4 days. She has to strain, stool is hard.   Denies fever, abd pain, nausea, vomiting.   Denies new sex partner. She states last time she had intercourse was in December but partner was cheating on her. She has not been tested for std.  Denies rash or lesions in vaginal area.  Pt has been treated with Diflucan without improvement.    DM: mom has not been checking bg at home because she does not have a monitor.  She has been taking meds as rx.       Past Medical History  Disease Name Date Onset Notes   Allergic Rhinitis --  --    Asthma --  --    Depression, unspecified depression type --  --    Diabetes mellitus --  --    SOB (shortness of breath) --  --          Past Surgical History  Procedure Name Date Notes   Colonoscopy 2017 --    [place] Tooth Extraction --  --          Medication List  Name Date Started Instructions   atorvastatin 10 mg oral tablet 05/12/2020 take 1 tablet (10 mg) by oral route once daily at bedtime for 90 days   Cymbalta 60 mg oral  capsule,delayed release(DR/EC) 2020 take 1 capsule (60 mg) by oral route once daily   Diflucan 100 mg oral tablet 2020 take 1 tablet (100 mg) by oral route once daily for 3 days   fexofenadine 180 mg oral tablet 2020 take 1 tablet (180 mg) by oral route once daily   Jardiance 25 mg oral tablet 2020 take 1 tablet (25 mg) by oral route once daily in the morning   metformin 500 mg oral tablet 2020 take 1 tablet (500 mg) by oral route 2 times per day with morning and evening meals   Singulair 10 mg oral tablet 2020 take 1 tablet (10 mg) by oral route once daily in the evening         Allergy List  Allergen Name Date Reaction Notes   amoxicillin --  --  --    hydroxyzine HCl --  wheezing/cough --        Allergies Reconciled  Family Medical History  Disease Name Relative/Age Notes   Heart Disease Father/  Mother/   aunt/uncle grandparents   - No Family History of Colorectal Cancer  --    Diabetes mellitus, type II  --          Reproductive History  Menstrual   Pregnancy Summary   Total Pregnancies: 1 Full Term: 1 Premature: 0   Ab Induced: 0 Ab Spontaneous: 0 Ectopics: 0   Multiples: 0 Livin         Social History  Finding Status Start/Stop Quantity Notes   Alcohol Never --/-- --  --    Tobacco Never --/-- --  --          Review of Systems  · Constitutional  o Denies  o : fever, fatigue, weight loss, weight gain  · Cardiovascular  o Denies  o : lower extremity edema, claudication, chest pressure, palpitations  · Respiratory  o Denies  o : shortness of breath, wheezing, cough, hemoptysis, dyspnea on exertion  · Gastrointestinal  o Denies  o : nausea, vomiting, diarrhea, constipation, abdominal pain  · Genitourinary  o Admits  o : frequency, vaginal odor, vaginal itching  o Denies  o : urgency, dysuria, hematuria, vaginal discharge  · Integument  o Denies  o : rash      Vitals  Date Time BP Position Site L\R Cuff Size HR RR TEMP (F) WT  HT  BMI kg/m2 BSA m2 O2 Sat HC       2017  "09:12 /78 Sitting    101 - R  97.7 281lbs 6oz 5'  2\" 51.46 2.36 100 %    05/11/2020 01:42 /84 Sitting    121 - R 22 97.9 285lbs 0oz 5'  2\" 52.13 2.38 96 %    06/04/2020 09:45 /72 Sitting     94 97 299lbs 0oz    97 %          Physical Examination  · Constitutional  o Appearance  o : no acute distress, well-nourished  · Head and Face  o Head  o :   § Inspection  § : atraumatic, normocephalic  · Ears, Nose, Mouth and Throat  o Ears  o :   § External Ears  § : normal  o Nose  o :   § Intranasal Exam  § : nares patent  o Oral Cavity  o :   § Oral Mucosa  § : moist mucous membranes  · Respiratory  o Respiratory Effort  o : breathing comfortably, symmetric chest rise  o Auscultation of Lungs  o : clear to asculatation bilaterally, no wheezes, rales, or rhonchii  · Cardiovascular  o Heart  o :   § Auscultation of Heart  § : regular rate and rhythm, no murmurs, rubs, or gallops  o Peripheral Vascular System  o :   § Extremities  § : no edema  · Gastrointestinal  o Abdominal Examination  o : abdomen nontender to palpation, normal bowel sounds, tone normal without rigidity or guarding, no masses present, abdomen scaphoid upon supine  · Lymphatic  o Neck  o : no lymphadenopathy present  o Supraclavicular Nodes  o : no supraclavicular nodes  · Skin and Subcutaneous Tissue  o General Inspection  o : no lesions present, no areas of discoloration, skin turgor normal  · Neurologic  o Mental Status Examination  o :   § Orientation  § : grossly oriented to person, place and time  o Gait and Station  o :   § Gait Screening  § : normal gait          Results  · In-Office Procedures  o Lab procedure  § IOP - Urinalysis without Microscopy (Clinitek) [facility] (82581)   § Color Ur: Yellow   § Clarity Ur: Clear   § Glucose Ur Ql Strip: >=1000 mg/dL   § Bilirub Ur Ql Strip: Negative   § Ketones Ur Ql Strip: Negative   § Sp Gr Ur Qn: 1.015   § Hgb Ur Ql Strip: Negative   § pH Ur-LsCnc: 6.5   § Prot Ur Ql Strip: Negative "   § Urobilinogen Ur Strip-mCnc: 0.2 E.U./dL   § Nitrite Ur Ql Strip: Negative   § WBC Est Ur Ql Strip: Negative       Assessment  · Diabetes mellitus     250.00/E11.9  Discussed recent a1c elevated. Low carb diet. increase exercise. Wgt loss. Discussed that uncontrolled sugars may be causing vaginal sx. Discussed that Jardiance can cause vaginal sx. Will send urine culture, vaginal swab, and std testing today and treat based on results. Discussed importance of monitoring bg at home.  · Vaginitis     616.10/N76.0  · Urinary frequency     788.41/R35.0  · High risk sexual behavior     V69.2/Z72.51  STD testing today  · Glucosuria     791.5/R81  discussed likely from Jardiance, will wait for other results and determine if we need to change to a different class of medication if this is causing symptoms.       Plan  · Orders  o Urine culture (92484, 52343) - 616.10/N76.0, 788.41/R35.0, V69.2/Z72.51, 250.00/E11.9 - 06/04/2020  o ACO-39: Current medications updated and reviewed () - - 06/04/2020  o Vaginal Screen (Candida, Gardnerella & Trichomonas) (49969) - 616.10/N76.0, 788.41/R35.0, V69.2/Z72.51 - 06/04/2020  o STD Panel (HSV 1 and 2 IgG/IgM, HIV, RPR, GC/Chlamydia) [facility] (33889, 77878, 87321, 73838, 69241, CTNGX, ) - 616.10/N76.0, 788.41/R35.0, V69.2/Z72.51 - 06/04/2020  · Medications  o Blood Glucose Monitoring miscellaneous kit   SIG: use as directed   DISP: (1) Kit with 0 refills  Prescribed on 06/04/2020     o blood glucose strip-disp meter   SIG: Check bg once daily   DISP: (100) strips with 1 refills  Prescribed on 06/04/2020     o lancets miscellaneous misc   SIG: use as directed check bg once daily   DISP: (1) 100 ct box with 1 refills  Prescribed on 06/04/2020     o Diflucan 100 mg oral tablet   SIG: take 1 tablet (100 mg) by oral route once daily for 3 days   DISP: (3) tablets with 0 refills  Discontinued on 06/04/2020     o Medications have been Reconciled  o Transition of Care or Provider  Policy  · Instructions  o Patient was educated/instructed on their diagnosis, treatment and medications prior to discharge from the clinic today.  · Disposition  o Call or Return if symptoms worsen or persist.  o Follow up in 2 weeks            Electronically Signed by: RUBY Trujillo-LILLIAN -Author on June 4, 2020 10:52:50 AM  Electronically Co-signed by: Helen Thornton MD -Reviewer on June 4, 2020 11:35:55 AM

## 2021-05-13 NOTE — PROGRESS NOTES
"   Progress Note      Patient Name: Dorinda Sherwood   Patient ID: [id number]   Sex: Female   YOB: 1995    Primary Care Provider: Jess Maria MD   Referring Provider: Helen Thornton MD    Visit Date: August 5, 2020    Provider: YOKO Nair   Location: [facility]   Location Address: [address] [place] [address], [address]  [place], [place]  [id number]   Location Phone: [phone]          Chief Complaint  · \"I started coughing and sneezing yesterday.\"  · \"I woke up with a sore throat and bodyaches this morning.\"  · \"I've been bleeding for two months.\"      History Of Present Illness  Video Conferencing Visit  Dorinda Sherwood is a 25 year old /White female who is presenting for evaluation via video conferencing via zoom. Verbal consent obtained before beginning visit.   The following staff were present during this visit: Caitlyn Robertson NP   Dorinda Sherwood is a 25 year old /White female who presents for evaluation and treatment of:      body aches, chills/sweats, diarrhea, vomiting  took a breathing treatment today after feeling soa, improved after tx  denies recent sick contact    appt with Dr. Valera scheduled next week for vaginal bleeding.  Currently using 2-3 pads per day       Past Medical History  Disease Name Date Onset Notes   Allergic Rhinitis --  --    Asthma --  --    Depression, unspecified depression type --  --    Diabetes mellitus --  --    SOB (shortness of breath) --  --          Past Surgical History  Procedure Name Date Notes   Colonoscopy 2017 --    [place] Tooth Extraction --  --          Medication List  Name Date Started Instructions   Blood Glucose Monitoring miscellaneous kit 06/04/2020 use as directed   blood glucose strip-disp meter 06/04/2020 Check bg once daily   Cymbalta 60 mg oral capsule,delayed release(DR/EC) 05/11/2020 take 1 capsule (60 mg) by oral route once daily   fexofenadine 180 mg oral tablet 05/11/2020 take 1 tablet (180 " mg) by oral route once daily   metformin 500 mg oral tablet 2020 take 1 tablet (500 mg) by oral route 2 times per day with morning and evening meals   Ortho Tri-Cyclen (28) 0.18/0.215/0.25 mg-35 mcg (28) oral tablet 2020 take 1 tablet by oral route once daily for 90 days   Ozempic 0.25 mg or 0.5 mg(2 mg/1.5 mL) subcutaneous pen injector 2020 inject 0.25 mg by subcutaneous route once weekly on the same day of each week, rotating injection sites   Singulair 10 mg oral tablet 2020 take 1 tablet (10 mg) by oral route once daily in the evening   Steglatro 5 mg oral tablet 2020 take 1 tablet (5 mg) by oral route once daily in the morning         Allergy List  Allergen Name Date Reaction Notes   amoxicillin --  --  --    hydroxyzine HCl --  wheezing/cough --          Family Medical History  Disease Name Relative/Age Notes   Heart Disease Father/  Mother/   aunt/uncle grandparents   - No Family History of Colorectal Cancer  --    Diabetes Mellitus, Type II  --          Reproductive History  Menstrual   Pregnancy Summary   Total Pregnancies: 1 Full Term: 1 Premature: 0   Ab Induced: 0 Ab Spontaneous: 0 Ectopics: 0   Multiples: 0 Livin         Social History  Finding Status Start/Stop Quantity Notes   Alcohol Never --/-- --  --    Tobacco Never --/-- --  --          Review of Systems  · Constitutional  o Admits  o : fever, fatigue  o Denies  o : weight loss, weight gain  · Cardiovascular  o Denies  o : lower extremity edema, claudication, chest pressure, palpitations  · Respiratory  o Admits  o : shortness of breath, frequent cough  o Denies  o : wheezing, hemoptysis, dyspnea on exertion  · Gastrointestinal  o Admits  o : nausea, vomiting, diarrhea  o Denies  o : constipation, abdominal pain      Physical Examination     Gen: well-nourished, no acute distress  HENT: atraumatic, normocephalic  Eyes: extraocular movements intact, no scleral icterus  Lung: breathing comfortably, no cough  Skin:  no visible rash, no lesions  Neuro: grossly oriented to person, place, and time. no facial droop   Psych: normal mood and affect             Assessment  · Cough     786.2/R05  Discussed concern for viral infection and possibly COVID-19. Will send for testing today. Discussed need for self quarantine, self-monitoring, and follow-up for worsening symptoms. Discussed supportive care including Tylenol, Motrin use which is controversial, pushing fluids, and rest. Discussed potential course of illness and risk for worsening symptoms. Patient will call or go to the ER urgently with respiratory distress, chest pain, or intractable fever. Patient verbalized understanding. Will send in Zofran for vomiting.  · Body aches     780.96/R52  · Vaginal bleeding     623.8/N93.9  She states that this is unchanged. She will follow-up with Dr. Valera as scheduled. Discussed going to the ER if she is saturating more than 1 pad per hour for more than a 12-hour period.    Problems Reconciled  Plan  · Orders  o ACO-39: Current medications updated and reviewed () - - 08/05/2020  · Medications  o Zofran 4 mg oral tablet   SIG: take 1 tablets (8 mg) by oral route every 8 hours prn   DISP: (12) tablets with 0 refills  Prescribed on 08/05/2020     o Medications have been Reconciled  o Transition of Care or Provider Policy  · Instructions  o Patient was educated/instructed on their diagnosis, treatment and medications prior to discharge from the clinic today.            Electronically Signed by: YOKO Nair -Author on August 5, 2020 05:29:21 PM

## 2021-05-14 NOTE — PROGRESS NOTES
"   Progress Note      Patient Name: Dorinda Sherwood   Patient ID: [id number]   Sex: Female   YOB: 1995    Primary Care Provider: Jess Maria MD   Referring Provider: Helen Thornton MD    Visit Date: February 24, 2021    Provider: Jess Maria MD   [facility]   Location Address: [address] [place] [address]  [id number]   Location Phone: [phone]          Chief Complaint  · \"following up on diabetes\"  · \"I think she wanted to talk about a water pill\"      History Of Present Illness  Video Conferencing Visit  Dorinda Sherwood is a 25 year old /White female who is presenting for evaluation via video conferencing via zoom. Verbal consent obtained before beginning visit.   The following staff were present during this visit: none   Dorinda Sherwood is a 25 year old /White female who presents for evaluation and treatment of:      Chronic issues.    States she is still quite stressed and would like to try something different to help her with stress no chest pain  No trouble breathing  Does get headaches from time to time  Still frustrated with her weight    Admits her sugars are running high and it may partially be due to diet    No chest pain  No trouble breathing  No leg swelling       Past Medical History  Disease Name Date Onset Notes   Allergic Rhinitis --  --    Asthma --  --    Depression, unspecified depression type --  --    Diabetes mellitus --  --    SOB (shortness of breath) --  --          Past Surgical History  Procedure Name Date Notes   Colonoscopy 2017 --    [place] Tooth Extraction --  --          Medication List  Name Date Started Instructions   atorvastatin 10 mg oral tablet 08/27/2020 take 1 tablet (10 mg) by oral route once daily at bedtime for 90 days   Cymbalta 20 mg oral capsule,delayed release(DR/EC) 02/24/2021 take 2 caps po daily x 1 week, then take 1 cap po daily for 1 week   fexofenadine 180 mg oral " tablet 09/10/2020 take 1 tablet (180 mg) by oral route once daily   Glucometer 2021 Use as directed   Lancets,Thin 23 gauge miscellaneous misc 2021 use as directed twice daily   Ortho Tri-Cyclen (28) 0.18/0.215/0.25 mg-35 mcg (28) oral tablet 2020 take 1 tablet by oral route once daily for 90 days   Ozempic 1 mg/dose (2 mg/1.5 mL) subcutaneous pen injector 2021 inject 1 mg by subcutaneous route once weekly on the same day of each week, in the abdomen, thighs, or upper arm rotating injection sites   Singulair 10 mg oral tablet 2020 take 1 tablet (10 mg) by oral route once daily in the evening   Test Strips 2021 Test blood sugar twice daily         Allergy List  Allergen Name Date Reaction Notes   amoxicillin --  --  --    hydroxyzine HCl --  wheezing/cough --        Allergies Reconciled  Family Medical History  Disease Name Relative/Age Notes   Heart Disease Father/  Mother/   aunt/uncle grandparents   - No Family History of Colorectal Cancer  --    Diabetes Mellitus, Type II  --          Reproductive History  Menstrual   Pregnancy Summary   Total Pregnancies: 1 Full Term: 1 Premature: 0   Ab Induced: 0 Ab Spontaneous: 0 Ectopics: 0   Multiples: 0 Livin         Social History  Finding Status Start/Stop Quantity Notes   Alcohol Never --/-- --  --    Tobacco Never --/-- --  --          Physical Examination     Gen: well-nourished, no acute distress  HENT: atraumatic, normocephalic  Eyes: extraocular movements intact, no scleral icterus  Lung: breathing comfortably, no cough  Skin: no visible rash, no lesions  Neuro: grossly oriented to person, place, and time. no facial droop   Psych: normal mood and affect             Assessment  · Depression, unspecified depression type     311/F32.9  Will try switching from Cymbalta to Trintellix and see if this helps her feel better  · Screening for lipid disorders     V77.91/Z13.220  · Diabetes mellitus, type 2     250.00/E11.9  Will try  Topamax for appetite suppression  Will increase Ozempic  · Headache     784.0/R51  Will try Topamax    Problems Reconciled  Plan  · Orders  o Diabetes 2 Panel (Urine Microalbumin, CMP, Lipid, A1c, ) [facility] (45157, 11150, 70188, 18140) - 250.00/E11.9 - 02/24/2021  o CBC with Auto Diff [facility] (46837) - 250.00/E11.9 - 02/24/2021  o ACO-39: Current medications updated and reviewed (1159F, ) - - 02/24/2021  · Medications  o Trintellix 5 mg oral tablet   SIG: take 1 tablet (5 mg) by oral route once daily at the same time each day   DISP: (90) Tablet with 0 refills  Prescribed on 02/24/2021     o Topamax 25 mg oral tablet   SIG: take 1 tablet (25 mg) po bid   DISP: (60) Tablet with 0 refills  Prescribed on 02/24/2021     o Cymbalta 20 mg oral capsule,delayed release(DR/EC)   SIG: take 2 caps po daily x 1 week, then take 1 cap po daily for 1 week   DISP: (21) Capsule with 0 refills  Adjusted on 02/24/2021     o Ozempic 1 mg/dose (2 mg/1.5 mL) subcutaneous pen injector   SIG: inject 1 mg by subcutaneous route once weekly on the same day of each week, in the abdomen, thighs, or upper arm rotating injection sites   DISP: (4) Pen Needle with 2 refills  Adjusted on 02/24/2021     o metformin 500 mg oral tablet   SIG: take 1 tablet (500 mg) by oral route 2 times per day with morning and evening meals   DISP: (60) Tablet with 1 refills  Discontinued on 02/24/2021     o Steglatro 5 mg oral tablet   SIG: take 1 tablet (5 mg) by oral route once daily in the morning   DISP: (30) tablets with 2 refills  Discontinued on 02/24/2021     o Medications have been Reconciled  o Transition of Care or Provider Policy  · Instructions  o Patient was educated/instructed on their diagnosis, treatment and medications prior to discharge from the clinic today.  · Disposition  o 3 Month Follow Up            Electronically Signed by: Jess Maria MD -Author on March 28, 2021 11:28:37 AM

## 2021-05-15 VITALS
OXYGEN SATURATION: 97 % | SYSTOLIC BLOOD PRESSURE: 126 MMHG | WEIGHT: 293 LBS | TEMPERATURE: 97 F | DIASTOLIC BLOOD PRESSURE: 72 MMHG

## 2021-05-15 VITALS
HEIGHT: 62 IN | OXYGEN SATURATION: 96 % | WEIGHT: 285 LBS | BODY MASS INDEX: 52.44 KG/M2 | DIASTOLIC BLOOD PRESSURE: 84 MMHG | RESPIRATION RATE: 22 BRPM | TEMPERATURE: 97.9 F | SYSTOLIC BLOOD PRESSURE: 128 MMHG | HEART RATE: 121 BPM

## 2021-06-15 ENCOUNTER — TELEPHONE (OUTPATIENT)
Dept: INTERNAL MEDICINE | Facility: CLINIC | Age: 26
End: 2021-06-15

## 2021-06-15 NOTE — TELEPHONE ENCOUNTER
Discussed with Dr. Maria sun burn cream from this morning. VVO for ramu from Dr. Maria. Set to pending.

## 2021-06-15 NOTE — TELEPHONE ENCOUNTER
Fortino Pepper MD  [facility]  [facility], [address]  [address], [place] [address]  [place] [address]  Phone: [phone]  Fax: [phone]             Dear Kirk, Sacha TENA M.D.,    I had the pleasure of seeing your patient Rajesh Shore ( - 1953) today in clinic for atrial fibrillation.  As you may recall, he is a pleasant 64 y.o. male whose current medical problems include obesity (BMI 33), dyslipidemia, hyperglycemia, and atrial fibrillation. His [id number] score  1, and he has opted to continue anticoagulation with apixaban at this time.  I have switched his pravastatin to atorvastatin in hopes this will help his myalgias. I will see him back in 6 months to make sure he remains asymptomatic, and if he does develop symptoms, we will consider cardioversion.  I have also referred him to a sleep study to evaluate for sleep apnea, as this is likely the cause of his underlying atrial fibrillation.  Thank you for your consultation, and I look forward to providing your patients with excellent cardiovascular care.  Please feel free to contact me at any time if you have any questions.      Best,  Fortino Pepper MD  [facility]       Caller: Dorinda Swenson    Relationship: Self    Best call back number: [phone]     What is the best time to reach you: ANYTIME     Who are you requesting to speak with (clinical staff, provider,  specific staff member): CLINICAL STAFF     Do you know the name of the person who called: DORINDA SWENSON    What was the call regarding: PATIENT STATED THAT SHE SPOKE WITH DR. DUNCAN THIS MORNING ABOUT PRESCRIBING HER SOME CREAM FOR HER SUNBURN. SHE IS WANTING TO KNOW WHEN CAN SHE GET THE CREAM BECAUSE SHE CAN BARELY PUT CLOTHES ON. PLEASE CALL AND ADVISE.     PATIENT IS IN SEVERE PAIN.     Do you require a callback: YES

## 2021-06-21 RX ORDER — SEMAGLUTIDE 1.34 MG/ML
1 INJECTION, SOLUTION SUBCUTANEOUS WEEKLY
Qty: 12 PEN | Refills: 1 | Status: SHIPPED | OUTPATIENT
Start: 2021-06-21 | End: 2021-07-01 | Stop reason: SDUPTHER

## 2021-06-21 RX ORDER — VORTIOXETINE 5 MG/1
5 TABLET, FILM COATED ORAL
Qty: 30 TABLET | Refills: 1 | Status: SHIPPED | OUTPATIENT
Start: 2021-06-21 | End: 2021-09-10 | Stop reason: SDUPTHER

## 2021-06-21 NOTE — TELEPHONE ENCOUNTER
Patient called and said she can't get in to see you until August, but she is needing her trintellix and her ozempic can you send in these meds for 60 days?

## 2021-06-28 RX ORDER — SACCHAROMYCES BOULARDII 250 MG
250 CAPSULE ORAL 2 TIMES DAILY
COMMUNITY
End: 2021-06-28 | Stop reason: SDUPTHER

## 2021-06-28 RX ORDER — SACCHAROMYCES BOULARDII 250 MG
250 CAPSULE ORAL 2 TIMES DAILY
Qty: 180 CAPSULE | Refills: 1 | Status: SHIPPED | OUTPATIENT
Start: 2021-06-28 | End: 2021-07-01 | Stop reason: SDUPTHER

## 2021-06-29 ENCOUNTER — PRIOR AUTHORIZATION (OUTPATIENT)
Dept: INTERNAL MEDICINE | Facility: CLINIC | Age: 26
End: 2021-06-29

## 2021-07-02 ENCOUNTER — APPOINTMENT (OUTPATIENT)
Dept: CT IMAGING | Facility: HOSPITAL | Age: 26
End: 2021-07-02

## 2021-07-02 ENCOUNTER — HOSPITAL ENCOUNTER (EMERGENCY)
Facility: HOSPITAL | Age: 26
Discharge: HOME | End: 2021-07-02
Attending: EMERGENCY MEDICINE | Admitting: EMERGENCY MEDICINE

## 2021-07-02 VITALS
DIASTOLIC BLOOD PRESSURE: 85 MMHG | TEMPERATURE: 97.9 F | SYSTOLIC BLOOD PRESSURE: 107 MMHG | HEART RATE: 100 BPM | WEIGHT: 293 LBS | OXYGEN SATURATION: 98 % | HEIGHT: 62 IN | RESPIRATION RATE: 16 BRPM | BODY MASS INDEX: 53.92 KG/M2

## 2021-07-02 DIAGNOSIS — K80.20 CALCULUS OF GALLBLADDER WITHOUT CHOLECYSTITIS WITHOUT OBSTRUCTION: Primary | ICD-10-CM

## 2021-07-02 LAB
ALBUMIN SERPL-MCNC: 3.9 G/DL (ref 3.5–5.2)
ALBUMIN/GLOB SERPL: 1.3 G/DL
ALP SERPL-CCNC: 91 U/L (ref 39–117)
ALT SERPL W P-5'-P-CCNC: 16 U/L (ref 1–33)
ANION GAP SERPL CALCULATED.3IONS-SCNC: 9.9 MMOL/L (ref 5–15)
AST SERPL-CCNC: 17 U/L (ref 1–32)
BASOPHILS # BLD AUTO: 0.03 10*3/MM3 (ref 0–0.2)
BASOPHILS NFR BLD AUTO: 0.3 % (ref 0–1.5)
BILIRUB SERPL-MCNC: 0.3 MG/DL (ref 0–1.2)
BILIRUB UR QL STRIP: NEGATIVE
BUN SERPL-MCNC: 9 MG/DL (ref 6–20)
BUN/CREAT SERPL: 18.4 (ref 7–25)
CALCIUM SPEC-SCNC: 8.7 MG/DL (ref 8.6–10.5)
CHLORIDE SERPL-SCNC: 97 MMOL/L (ref 98–107)
CLARITY UR: ABNORMAL
CO2 SERPL-SCNC: 23.1 MMOL/L (ref 22–29)
COLOR UR: ABNORMAL
CREAT SERPL-MCNC: 0.49 MG/DL (ref 0.57–1)
DEPRECATED RDW RBC AUTO: 36.5 FL (ref 37–54)
EOSINOPHIL # BLD AUTO: 0.07 10*3/MM3 (ref 0–0.4)
EOSINOPHIL NFR BLD AUTO: 0.7 % (ref 0.3–6.2)
ERYTHROCYTE [DISTWIDTH] IN BLOOD BY AUTOMATED COUNT: 12.6 % (ref 12.3–15.4)
GFR SERPL CREATININE-BSD FRML MDRD: >150 ML/MIN/1.73
GLOBULIN UR ELPH-MCNC: 3 GM/DL
GLUCOSE SERPL-MCNC: 246 MG/DL (ref 65–99)
GLUCOSE UR STRIP-MCNC: ABNORMAL MG/DL
HCG INTACT+B SERPL-ACNC: <0.5 MIU/ML
HCT VFR BLD AUTO: 41.4 % (ref 34–46.6)
HGB BLD-MCNC: 13.6 G/DL (ref 12–15.9)
HGB UR QL STRIP.AUTO: NEGATIVE
HOLD SPECIMEN: NORMAL
HOLD SPECIMEN: NORMAL
IMM GRANULOCYTES # BLD AUTO: 0.05 10*3/MM3 (ref 0–0.05)
IMM GRANULOCYTES NFR BLD AUTO: 0.5 % (ref 0–0.5)
KETONES UR QL STRIP: NEGATIVE
LEUKOCYTE ESTERASE UR QL STRIP.AUTO: NEGATIVE
LIPASE SERPL-CCNC: 16 U/L (ref 13–60)
LYMPHOCYTES # BLD AUTO: 2.68 10*3/MM3 (ref 0.7–3.1)
LYMPHOCYTES NFR BLD AUTO: 27.3 % (ref 19.6–45.3)
MCH RBC QN AUTO: 26.6 PG (ref 26.6–33)
MCHC RBC AUTO-ENTMCNC: 32.9 G/DL (ref 31.5–35.7)
MCV RBC AUTO: 81 FL (ref 79–97)
MONOCYTES # BLD AUTO: 0.67 10*3/MM3 (ref 0.1–0.9)
MONOCYTES NFR BLD AUTO: 6.8 % (ref 5–12)
NEUTROPHILS NFR BLD AUTO: 6.32 10*3/MM3 (ref 1.7–7)
NEUTROPHILS NFR BLD AUTO: 64.4 % (ref 42.7–76)
NITRITE UR QL STRIP: NEGATIVE
NRBC BLD AUTO-RTO: 0 /100 WBC (ref 0–0.2)
PH UR STRIP.AUTO: 6 [PH] (ref 5–8)
PLATELET # BLD AUTO: 281 10*3/MM3 (ref 140–450)
PMV BLD AUTO: 10.2 FL (ref 6–12)
POTASSIUM SERPL-SCNC: 4 MMOL/L (ref 3.5–5.2)
PROT SERPL-MCNC: 6.9 G/DL (ref 6–8.5)
PROT UR QL STRIP: ABNORMAL
RBC # BLD AUTO: 5.11 10*6/MM3 (ref 3.77–5.28)
SODIUM SERPL-SCNC: 130 MMOL/L (ref 136–145)
SP GR UR STRIP: >1.03 (ref 1–1.03)
UROBILINOGEN UR QL STRIP: ABNORMAL
WBC # BLD AUTO: 9.82 10*3/MM3 (ref 3.4–10.8)
WHOLE BLOOD HOLD SPECIMEN: NORMAL

## 2021-07-02 PROCEDURE — 83690 ASSAY OF LIPASE: CPT

## 2021-07-02 PROCEDURE — 84702 CHORIONIC GONADOTROPIN TEST: CPT

## 2021-07-02 PROCEDURE — 25010000002 ONDANSETRON PER 1 MG: Performed by: EMERGENCY MEDICINE

## 2021-07-02 PROCEDURE — 81003 URINALYSIS AUTO W/O SCOPE: CPT | Performed by: EMERGENCY MEDICINE

## 2021-07-02 PROCEDURE — 96374 THER/PROPH/DIAG INJ IV PUSH: CPT

## 2021-07-02 PROCEDURE — 25010000002 HYDROMORPHONE 1 MG/ML SOLUTION: Performed by: EMERGENCY MEDICINE

## 2021-07-02 PROCEDURE — 0 IOPAMIDOL PER 1 ML: Performed by: EMERGENCY MEDICINE

## 2021-07-02 PROCEDURE — 25010000002 KETOROLAC TROMETHAMINE PER 15 MG: Performed by: EMERGENCY MEDICINE

## 2021-07-02 PROCEDURE — 80053 COMPREHEN METABOLIC PANEL: CPT

## 2021-07-02 PROCEDURE — 96375 TX/PRO/DX INJ NEW DRUG ADDON: CPT

## 2021-07-02 PROCEDURE — 99283 EMERGENCY DEPT VISIT LOW MDM: CPT

## 2021-07-02 PROCEDURE — 36415 COLL VENOUS BLD VENIPUNCTURE: CPT

## 2021-07-02 PROCEDURE — 74177 CT ABD & PELVIS W/CONTRAST: CPT

## 2021-07-02 PROCEDURE — 85025 COMPLETE CBC W/AUTO DIFF WBC: CPT

## 2021-07-02 RX ORDER — DICYCLOMINE HCL 20 MG
20 TABLET ORAL EVERY 6 HOURS
Qty: 20 TABLET | Refills: 0 | Status: SHIPPED | OUTPATIENT
Start: 2021-07-02 | End: 2022-03-28

## 2021-07-02 RX ORDER — SODIUM CHLORIDE 0.9 % (FLUSH) 0.9 %
10 SYRINGE (ML) INJECTION AS NEEDED
Status: DISCONTINUED | OUTPATIENT
Start: 2021-07-02 | End: 2021-07-02 | Stop reason: HOSPADM

## 2021-07-02 RX ORDER — ONDANSETRON 4 MG/1
4 TABLET, ORALLY DISINTEGRATING ORAL EVERY 8 HOURS PRN
Qty: 15 TABLET | Refills: 0 | Status: SHIPPED | OUTPATIENT
Start: 2021-07-02 | End: 2021-09-10

## 2021-07-02 RX ORDER — SEMAGLUTIDE 1.34 MG/ML
1 INJECTION, SOLUTION SUBCUTANEOUS WEEKLY
Qty: 12 PEN | Refills: 1 | Status: SHIPPED | OUTPATIENT
Start: 2021-07-02 | End: 2022-03-28 | Stop reason: SDDI

## 2021-07-02 RX ORDER — ONDANSETRON 2 MG/ML
4 INJECTION INTRAMUSCULAR; INTRAVENOUS ONCE
Status: COMPLETED | OUTPATIENT
Start: 2021-07-02 | End: 2021-07-02

## 2021-07-02 RX ORDER — SACCHAROMYCES BOULARDII 250 MG
250 CAPSULE ORAL 2 TIMES DAILY
Qty: 180 CAPSULE | Refills: 1 | Status: SHIPPED | OUTPATIENT
Start: 2021-07-02 | End: 2021-07-12

## 2021-07-02 RX ORDER — ONDANSETRON 4 MG/1
4 TABLET, ORALLY DISINTEGRATING ORAL EVERY 8 HOURS PRN
Qty: 15 TABLET | Refills: 0 | Status: SHIPPED | OUTPATIENT
Start: 2021-07-02 | End: 2021-07-12

## 2021-07-02 RX ORDER — OXYCODONE HYDROCHLORIDE AND ACETAMINOPHEN 5; 325 MG/1; MG/1
1 TABLET ORAL EVERY 6 HOURS PRN
Qty: 12 TABLET | Refills: 0 | Status: SHIPPED | OUTPATIENT
Start: 2021-07-02 | End: 2021-07-12

## 2021-07-02 RX ORDER — KETOROLAC TROMETHAMINE 15 MG/ML
15 INJECTION, SOLUTION INTRAMUSCULAR; INTRAVENOUS ONCE
Status: COMPLETED | OUTPATIENT
Start: 2021-07-02 | End: 2021-07-02

## 2021-07-02 RX ADMIN — IOPAMIDOL 100 ML: 755 INJECTION, SOLUTION INTRAVENOUS at 13:39

## 2021-07-02 RX ADMIN — KETOROLAC TROMETHAMINE 15 MG: 15 INJECTION, SOLUTION INTRAMUSCULAR; INTRAVENOUS at 12:56

## 2021-07-02 RX ADMIN — ONDANSETRON 4 MG: 2 INJECTION INTRAMUSCULAR; INTRAVENOUS at 14:57

## 2021-07-02 RX ADMIN — HYDROMORPHONE HYDROCHLORIDE 1 MG: 1 INJECTION, SOLUTION INTRAMUSCULAR; INTRAVENOUS; SUBCUTANEOUS at 12:55

## 2021-07-02 RX ADMIN — SODIUM CHLORIDE, PRESERVATIVE FREE 10 ML: 5 INJECTION INTRAVENOUS at 12:55

## 2021-07-02 RX ADMIN — SODIUM CHLORIDE, PRESERVATIVE FREE 10 ML: 5 INJECTION INTRAVENOUS at 12:57

## 2021-07-02 RX ADMIN — SODIUM CHLORIDE, PRESERVATIVE FREE 10 ML: 5 INJECTION INTRAVENOUS at 12:01

## 2021-07-02 NOTE — ED PROVIDER NOTES
"Time: 17:13 EDT  Arrived by: private vehicle   Chief Complaint: pt   History provided by: ABDOMINAL PAIN     History of Present Illness:  Patient is a 25 y.o. year old female that presents to the emergency department with ABDOMINAL PAIN. This started several days ago and is still present and constant. It is gradual in onset and described as moderate when seen in the ED. Nothing improves or worsens pain.     The abdominal pain is located in the lower abdomen (near  scar) and does not radiate. It is described as \"pain\". Pt also complains of diarrhea, nausea, and emesis. No dysuria.     Hx of  in 3/5/2020.       History provided by:  Patient  Abdominal Pain  Pain location: lower abdomen   Pain quality comment:  \"pain\"   Pain radiates to:  Does not radiate  Pain severity:  Moderate  Onset quality:  Gradual  Timing:  Constant  Progression:  Worsening  Relieved by:  Nothing  Worsened by:  Nothing  Ineffective treatments:  None tried  Associated symptoms: diarrhea, nausea and vomiting    Associated symptoms: no chest pain, no chills, no cough, no dysuria, no fever, no shortness of breath and no sore throat            Recently seen: Not recently seen in ED.     Patient Care Team  Primary Care Provider: Jess Maria MD    Past Medical History:     Allergies   Allergen Reactions   • Amoxicillin Shortness Of Breath   • Hydroxyzine Shortness Of Breath     Past Medical History:   Diagnosis Date   • AR (allergic rhinitis)    • Asthma    • Depression    • Diabetes mellitus (CMS/HCC)    • SOB (shortness of breath)      Past Surgical History:   Procedure Laterality Date   •  SECTION     • COLONOSCOPY  2017   • WISDOM TOOTH EXTRACTION       Family History   Problem Relation Age of Onset   • Heart disease Father    • Heart disease Other    • Heart disease Other    • Heart disease Other    • Diabetes type II Other        Home Medications:  Prior to Admission medications    Medication Sig Start Date End " "Date Taking? Authorizing Provider   saccharomyces boulardii (FLORASTOR) 250 MG capsule Take 1 capsule by mouth 2 (Two) Times a Day. 7/2/21   Jess Maria MD   Semaglutide, 1 MG/DOSE, (Ozempic, 1 MG/DOSE,) 2 MG/1.5ML solution pen-injector Inject 1 mg under the skin into the appropriate area as directed 1 (One) Time Per Week. 7/2/21   Jess Maria MD   silver sulfadiazine (Silvadene) 1 % cream Apply  topically to the appropriate area as directed 2 (Two) Times a Day. 7/2/21   Jess Maria MD   Vortioxetine HBr (Trintellix) 5 MG tablet Take 5 mg by mouth Daily With Breakfast. 6/21/21   Jess Maria MD        Social History:   PT  reports that she has never smoked. She does not have any smokeless tobacco history on file. She reports that she does not drink alcohol. No history on file for drug use.    Record Review:  I have reviewed the patient's records in [facility].     Review of Systems  Review of Systems   Constitutional: Negative for chills and fever.   HENT: Negative for congestion, rhinorrhea and sore throat.    Eyes: Negative for pain and visual disturbance.   Respiratory: Negative for apnea, cough, chest tightness and shortness of breath.    Cardiovascular: Negative for chest pain and palpitations.   Gastrointestinal: Positive for abdominal pain, diarrhea, nausea and vomiting.   Genitourinary: Negative for difficulty urinating and dysuria.   Musculoskeletal: Negative for joint swelling and myalgias.   Skin: Negative for color change.   Neurological: Negative for seizures and headaches.   Psychiatric/Behavioral: Negative.    All other systems reviewed and are negative.       Physical Exam  /85   Pulse 100   Temp 97.9 °F (36.6 °C) (Oral)   Resp 16   Ht 157.5 cm (62\")   Wt 134 kg (295 lb 3.1 oz)   LMP 06/15/2021 (Exact Date)   SpO2 98%   Breastfeeding No   BMI 53.99 kg/m²     Physical Exam  Vitals and nursing note reviewed.   Constitutional:       General: She is not in acute " "distress.     Appearance: Normal appearance. She is not toxic-appearing.   HENT:      Head: Normocephalic and atraumatic.      Jaw: There is normal jaw occlusion.   Eyes:      General: Lids are normal.      Extraocular Movements: Extraocular movements intact.      Conjunctiva/sclera: Conjunctivae normal.      Pupils: Pupils are equal, round, and reactive to light.   Cardiovascular:      Rate and Rhythm: Normal rate and regular rhythm.      Pulses: Normal pulses.      Heart sounds: Normal heart sounds.   Pulmonary:      Effort: Pulmonary effort is normal. No respiratory distress.      Breath sounds: Normal breath sounds. No wheezing or rhonchi.   Abdominal:      General: Abdomen is flat.      Palpations: Abdomen is soft.      Tenderness: There is abdominal tenderness in the right lower quadrant. There is no guarding or rebound.   Musculoskeletal:         General: Normal range of motion.      Cervical back: Normal range of motion and neck supple.      Right lower leg: No edema.      Left lower leg: No edema.   Skin:     General: Skin is warm and dry.   Neurological:      Mental Status: She is alert and oriented to person, place, and time. Mental status is at baseline.   Psychiatric:         Mood and Affect: Mood normal.                  ED Course  /85   Pulse 100   Temp 97.9 °F (36.6 °C) (Oral)   Resp 16   Ht 157.5 cm (62\")   Wt 134 kg (295 lb 3.1 oz)   LMP 06/15/2021 (Exact Date)   SpO2 98%   Breastfeeding No   BMI 53.99 kg/m²   Results for orders placed or performed during the hospital encounter of 07/02/21   Comprehensive Metabolic Panel    Specimen: Blood   Result Value Ref Range    Glucose 246 (H) 65 - 99 mg/dL    BUN 9 6 - 20 mg/dL    Creatinine 0.49 (L) 0.57 - 1.00 mg/dL    Sodium 130 (L) 136 - 145 mmol/L    Potassium 4.0 3.5 - 5.2 mmol/L    Chloride 97 (L) 98 - 107 mmol/L    CO2 23.1 22.0 - 29.0 mmol/L    Calcium 8.7 8.6 - 10.5 mg/dL    Total Protein 6.9 6.0 - 8.5 g/dL    Albumin 3.90 3.50 - " 5.20 g/dL    ALT (SGPT) 16 1 - 33 U/L    AST (SGOT) 17 1 - 32 U/L    Alkaline Phosphatase 91 39 - 117 U/L    Total Bilirubin 0.3 0.0 - 1.2 mg/dL    eGFR Non African Amer >150 >60 mL/min/1.73    Globulin 3.0 gm/dL    A/G Ratio 1.3 g/dL    BUN/Creatinine Ratio 18.4 7.0 - 25.0    Anion Gap 9.9 5.0 - 15.0 mmol/L   Lipase    Specimen: Blood   Result Value Ref Range    Lipase 16 13 - 60 U/L   Urinalysis With Microscopic If Indicated (No Culture) - Urine, Clean Catch    Specimen: Urine, Clean Catch   Result Value Ref Range    Color, UA Dark Yellow (A) Yellow, Straw    Appearance, UA Cloudy (A) Clear    pH, UA 6.0 5.0 - 8.0    Specific Gravity, UA >1.030 (H) 1.005 - 1.030    Glucose, UA >=1000 mg/dL (3+) (A) Negative    Ketones, UA Negative Negative    Bilirubin, UA Negative Negative    Blood, UA Negative Negative    Protein, UA Trace (A) Negative    Leuk Esterase, UA Negative Negative    Nitrite, UA Negative Negative    Urobilinogen, UA 1.0 E.U./dL 0.2 - 1.0 E.U./dL   hCG, Quantitative, Pregnancy    Specimen: Blood   Result Value Ref Range    HCG Quantitative <0.50 mIU/mL   CBC Auto Differential    Specimen: Blood   Result Value Ref Range    WBC 9.82 3.40 - 10.80 10*3/mm3    RBC 5.11 3.77 - 5.28 10*6/mm3    Hemoglobin 13.6 12.0 - 15.9 g/dL    Hematocrit 41.4 34.0 - 46.6 %    MCV 81.0 79.0 - 97.0 fL    MCH 26.6 26.6 - 33.0 pg    MCHC 32.9 31.5 - 35.7 g/dL    RDW 12.6 12.3 - 15.4 %    RDW-SD 36.5 (L) 37.0 - 54.0 fl    MPV 10.2 6.0 - 12.0 fL    Platelets 281 140 - 450 10*3/mm3    Neutrophil % 64.4 42.7 - 76.0 %    Lymphocyte % 27.3 19.6 - 45.3 %    Monocyte % 6.8 5.0 - 12.0 %    Eosinophil % 0.7 0.3 - 6.2 %    Basophil % 0.3 0.0 - 1.5 %    Immature Grans % 0.5 0.0 - 0.5 %    Neutrophils, Absolute 6.32 1.70 - 7.00 10*3/mm3    Lymphocytes, Absolute 2.68 0.70 - 3.10 10*3/mm3    Monocytes, Absolute 0.67 0.10 - 0.90 10*3/mm3    Eosinophils, Absolute 0.07 0.00 - 0.40 10*3/mm3    Basophils, Absolute 0.03 0.00 - 0.20 10*3/mm3     Immature Grans, Absolute 0.05 0.00 - 0.05 10*3/mm3    nRBC 0.0 0.0 - 0.2 /100 WBC   Green Top (Gel)   Result Value Ref Range    Extra Tube Hold for add-ons.    Lavender Top   Result Value Ref Range    Extra Tube hold for add-on    Gold Top - SST   Result Value Ref Range    Extra Tube Hold for add-ons.      Medications   HYDROmorphone (DILAUDID) injection 1 mg (1 mg Intravenous Given 7/2/21 1255)   ketorolac (TORADOL) injection 15 mg (15 mg Intravenous Given 7/2/21 1256)   iopamidol (ISOVUE-370) 76 % injection 100 mL (100 mL Intravenous Given 7/2/21 1339)   ondansetron (ZOFRAN) injection 4 mg (4 mg Intravenous Given 7/2/21 9397)     CT Abdomen Pelvis With Contrast    Result Date: 7/2/2021  Narrative: PROCEDURE: CT ABDOMEN PELVIS W CONTRAST  COMPARISON: [facility], CT, ABDOMEN/PELVIS WITH CONTRAST, 8/18/2020, 22:52.  INDICATIONS: abdominal pain  TECHNIQUE: After obtaining the patient's consent, CT images were created with non-ionic intravenous contrast material.   PROTOCOL:   Standard imaging protocol performed    RADIATION:   DLP: 1859.7 mGy*cm   Automated exposure control was utilized to minimize radiation dose. CONTRAST: 100 cc Isovue 370 I.V. LABS:   eGFR: >60 ml/min/1.73m2  FINDINGS:  No consolidations or pleural effusions are seen involving the lung bases.  Diffuse hepatic fatty infiltration is seen.  The liver, spleen, and pancreas are otherwise unremarkable.  Gallstones are observed.  There is no evidence for cholecystitis.  There is no biliary dilatation.  The bilateral adrenal glands are symmetric and unremarkable. The bilateral kidneys are symmetric and unremarkable.  No significant bowel dilatation or obstruction is seen. A normal-appearing air-filled appendix is observed. There is no evidence for acute appendicitis. No abnormal fluid collections are seen. No significant lymphadenopathy is seen throughout the abdomen or pelvis. The bladder is decompressed. The celiac and superior mesenteric  arterial distributions are well opacified without evidence for occlusion.  There is a prominent cyst involving the left ovary measuring up to 3.7 cm. This finding is likely related to a dominant physiologic cyst.  Associated small free fluid is seen in the pelvis.  No acute osseous abnormalities are seen.  CONCLUSION:  1. No evidence for acute abnormality throughout the abdomen or pelvis. 2. Evidence for a dominant likely physiologic cyst involving the left ovary.  Associated small free fluid is noted.  Recommend followup pelvic ultrasound in 6-8 weeks to ensure resolution. 3. Evidence for cholelithiasis without signs of acute cholecystitis.  There is no biliary dilatation.     VERO MURPHY MD       Electronically Signed and Approved By: VERO MURPHY MD on 7/02/2021 at 13:54                   Medical Decision Making:                     MDM  Number of Diagnoses or Management Options  Calculus of gallbladder without cholecystitis without obstruction: new and requires workup  Diagnosis management comments: The patient´s CBC was reviewed and shows no abnormalities of critical concern. Of note, there is no anemia requiring a blood transfusion and the platelet count is acceptable. The patient´s CMP was reviewed and shows no abnormalities of critical concern. Of note, the patient´s sodium and potassium are acceptable. The patient´s liver enzymes are unremarkable. The patient´s renal function (creatinine) is preserved. The patient has a normal anion gap. Urinalysis is negative for bacteriuria. CT scan is consistent with cholelithiasis without acute cholecystitis. There is also a dominant left ovarian cyst. The patient is resting comfortably and feels better, is alert and in no distress. Repeat examination is unremarkable and benign; in particular, there's no discomfort at McBurney's point and there is no pulsatile mass. The history, exam, diagnostic testing, and current condition does not suggest acute appendicitis,  bowel obstruction, acute cholecystitis, bowel perforation, major gastrointestinal bleeding, severe diverticulitis, abdominal aortic aneurysm, mesenteric ischemia, volvulus, sepsis, or other significant pathology that warrants further testing, continued ED treatment, admission, for surgical evaluation at this point. The vital signs have been stable. The patient does not have uncontrollable pain, intractable vomiting, or other significant symptoms. Patient was advised to follow-up with a surgeon in the next 2 to 3 days. The patient's condition is stable and appropriate for discharge from the emergency department.       Amount and/or Complexity of Data Reviewed  Clinical lab tests: reviewed  Tests in the radiology section of CPT®: reviewed  Tests in the medicine section of CPT®: ordered and reviewed  Review and summarize past medical records: yes  Independent visualization of images, tracings, or specimens: yes    Risk of Complications, Morbidity, and/or Mortality  Presenting problems: moderate  Management options: moderate    Patient Progress  Patient progress: improved       Final diagnoses:   Calculus of gallbladder without cholecystitis without obstruction        Disposition:  ED Disposition     ED Disposition Condition Comment    Discharge Stable           Documentation assistance provided by Rosemarie Lee acting as scribe for Ramirez Coley MD. Information recorded by the scribe was done at my direction and has been verified and validated by me.        Rosemarie Lee  07/02/21 1249       Ramirez Coley MD  07/02/21 3402

## 2021-07-05 ENCOUNTER — DOCUMENTATION (OUTPATIENT)
Dept: INTERNAL MEDICINE | Facility: CLINIC | Age: 26
End: 2021-07-05

## 2021-07-06 DIAGNOSIS — K80.20 GALLSTONES: Primary | ICD-10-CM

## 2021-07-06 NOTE — PROGRESS NOTES
Patient called stating she was seen in the ER and having severe pain and vomiting issues she was told that she has severe gallstones she called to say she does not feel significantly better I told her to go back to the ER if things were worsening however we could try to get her seen in clinic this week and also try to get her in with surgery soon for gallstone issues

## 2021-07-07 ENCOUNTER — OFFICE VISIT (OUTPATIENT)
Dept: SURGERY | Facility: CLINIC | Age: 26
End: 2021-07-07

## 2021-07-07 ENCOUNTER — PREP FOR SURGERY (OUTPATIENT)
Dept: OTHER | Facility: HOSPITAL | Age: 26
End: 2021-07-07

## 2021-07-07 VITALS — HEIGHT: 62 IN | HEART RATE: 84 BPM | BODY MASS INDEX: 53.92 KG/M2 | WEIGHT: 293 LBS

## 2021-07-07 DIAGNOSIS — K80.10 CALCULUS OF GALLBLADDER WITH CHRONIC CHOLECYSTITIS WITHOUT OBSTRUCTION: Primary | ICD-10-CM

## 2021-07-07 DIAGNOSIS — K81.9 CHOLECYSTITIS: Primary | ICD-10-CM

## 2021-07-07 PROCEDURE — 99203 OFFICE O/P NEW LOW 30 MIN: CPT | Performed by: SURGERY

## 2021-07-07 RX ORDER — CEFAZOLIN SODIUM 2 G/100ML
2 INJECTION, SOLUTION INTRAVENOUS ONCE
Status: CANCELLED | OUTPATIENT
Start: 2021-07-07 | End: 2021-07-07

## 2021-07-07 RX ORDER — SODIUM CHLORIDE 0.9 % (FLUSH) 0.9 %
10 SYRINGE (ML) INJECTION EVERY 12 HOURS SCHEDULED
Status: CANCELLED | OUTPATIENT
Start: 2021-07-07

## 2021-07-07 RX ORDER — ASPIRIN 81 MG/1
81 TABLET ORAL DAILY
COMMUNITY
Start: 2021-01-02 | End: 2021-12-28

## 2021-07-07 RX ORDER — INDOCYANINE GREEN AND WATER 25 MG
2.5 KIT INJECTION ONCE
Status: CANCELLED | OUTPATIENT
Start: 2021-07-07 | End: 2021-07-07

## 2021-07-07 RX ORDER — SODIUM CHLORIDE, SODIUM LACTATE, POTASSIUM CHLORIDE, CALCIUM CHLORIDE 600; 310; 30; 20 MG/100ML; MG/100ML; MG/100ML; MG/100ML
50 INJECTION, SOLUTION INTRAVENOUS CONTINUOUS
Status: CANCELLED | OUTPATIENT
Start: 2021-07-07

## 2021-07-07 RX ORDER — SODIUM CHLORIDE 0.9 % (FLUSH) 0.9 %
10 SYRINGE (ML) INJECTION AS NEEDED
Status: CANCELLED | OUTPATIENT
Start: 2021-07-07

## 2021-07-07 RX ORDER — MONTELUKAST SODIUM 10 MG/1
10 TABLET ORAL NIGHTLY
COMMUNITY
End: 2022-03-28 | Stop reason: SDUPTHER

## 2021-07-07 RX ORDER — METOPROLOL SUCCINATE 25 MG/1
50 TABLET, EXTENDED RELEASE ORAL NIGHTLY
COMMUNITY
End: 2022-03-28 | Stop reason: SDUPTHER

## 2021-07-07 RX ORDER — TIOTROPIUM BROMIDE INHALATION SPRAY 1.56 UG/1
SPRAY, METERED RESPIRATORY (INHALATION)
COMMUNITY
End: 2021-07-12

## 2021-07-07 RX ORDER — LORATADINE 10 MG/1
10 TABLET ORAL DAILY
COMMUNITY
End: 2021-12-13

## 2021-07-07 RX ORDER — ACETAMINOPHEN 325 MG/1
650 TABLET ORAL ONCE
Status: CANCELLED | OUTPATIENT
Start: 2021-07-07 | End: 2021-07-07

## 2021-07-07 RX ORDER — ONDANSETRON 4 MG/1
TABLET, FILM COATED ORAL EVERY 12 HOURS SCHEDULED
COMMUNITY
End: 2021-07-12

## 2021-07-07 NOTE — H&P (VIEW-ONLY)
General Surgery/Colorectal Surgery Note    Patient Name:  Dorinda Sherwood  YOB: 1995  [id number]    Referring Provider: Jess Maria MD      Patient Care Team:  Jess Maria MD as PCP - General (Internal Medicine)    Chief complaint abdominal pain nausea vomiting    Subjective .     History of present illness: Last Friday she began having some abdominal pain with nausea and vomiting going to the emergency department for further evaluation.  CT abdomen pelvis with gallstones.  No evidence of cholecystitis.  Labs with WBC 9, normal lipase, normal liver function test.  She has continued to have persistent nausea vomiting and generalized abdominal pain 10 out of 10 severity worse with any p.o. intake.  No history of ulcer disease.  No NSAID abuse.  Questionable recent melena.  No alleviating factors.      History:  Past Medical History:   Diagnosis Date   • AR (allergic rhinitis)    • Asthma    • Depression    • Diabetes mellitus (CMS/HCC)    • SOB (shortness of breath)        Past Surgical History:   Procedure Laterality Date   •  SECTION     • COLONOSCOPY  2017   • WISDOM TOOTH EXTRACTION         Family History   Problem Relation Age of Onset   • Heart disease Father    • Heart disease Other    • Heart disease Other    • Heart disease Other    • Diabetes type II Other        Social History     Tobacco Use   • Smoking status: Never Smoker   • Smokeless tobacco: Never Used   Substance Use Topics   • Alcohol use: Never   • Drug use: Not on file       Review of Systems  All systems were reviewed and negative except for:   Review of Systems   Constitutional: Negative for chills, fever and unexpected weight loss.   HENT: Negative for congestion, nosebleeds and voice change.    Eyes: Negative for blurred vision, double vision and discharge.   Respiratory: Negative for apnea, chest tightness and shortness of breath.    Cardiovascular: Negative for chest pain and leg swelling.    Gastrointestinal:        See HPI   Endocrine: Negative for cold intolerance and heat intolerance.   Genitourinary: Negative for dysuria, hematuria and urgency.   Musculoskeletal: Negative for back pain, joint swelling and neck pain.   Skin: Negative for color change and dry skin.   Neurological: Negative for dizziness and confusion.   Hematological: Negative for adenopathy.   Psychiatric/Behavioral: Negative for agitation and behavioral problems.     MEDS:  Prior to Admission medications    Medication Sig Start Date End Date Taking? Authorizing Provider   aspirin (aspirin) 81 MG EC tablet Take 81 mg by mouth Daily. 1/2/21 12/28/21 Yes Brock Govea MD   dicyclomine (BENTYL) 20 MG tablet Take 1 tablet by mouth Every 6 (Six) Hours. 7/2/21  Yes Ramirez Coley MD   loratadine (CLARITIN) 10 MG tablet loratadine 10 mg tablet   Yes Brock Govea MD   metoprolol succinate XL (TOPROL-XL) 25 MG 24 hr tablet metoprolol succinate 25 mg oral tablet extended release 24 hr take 2 tablets (50 mg) by oral route once daily   Suspended   Yes Brock Govea MD   montelukast (SINGULAIR) 10 MG tablet montelukast 10 mg tablet   Yes ProviderBrock MD   ondansetron (ZOFRAN) 4 MG tablet Every 12 (Twelve) Hours.   Yes Brock Govea MD   Tiotropium Bromide Monohydrate (Spiriva Respimat) 1.25 MCG/ACT aerosol solution inhaler Spiriva Respimat 1.25 mcg/actuation inhalation mist inhale 2 puffs (2.5 mcg) by inhalation route once daily   Suspended   Yes Brock Govea MD   Vortioxetine HBr (Trintellix) 5 MG tablet Take 5 mg by mouth Daily With Breakfast. 6/21/21  Yes Jess Maria MD   ondansetron ODT (ZOFRAN-ODT) 4 MG disintegrating tablet Place 1 tablet on the tongue Every 8 (Eight) Hours As Needed for Nausea or Vomiting. 7/2/21   Ramirez Coley MD   ondansetron ODT (ZOFRAN-ODT) 4 MG disintegrating tablet Place 1 tablet on the tongue Every 8 (Eight) Hours As Needed for Nausea or  Vomiting. 7/2/21   Ramirez Coley MD   oxyCODONE-acetaminophen (PERCOCET) 5-325 MG per tablet Take 1 tablet by mouth Every 6 (Six) Hours As Needed for Severe Pain . 7/2/21   Ramirez Coley MD saccharomyces boulardii (FLORASTOR) 250 MG capsule Take 1 capsule by mouth 2 (Two) Times a Day. 7/2/21   Jess Maria MD   Semaglutide, 1 MG/DOSE, (Ozempic, 1 MG/DOSE,) 2 MG/1.5ML solution pen-injector Inject 1 mg under the skin into the appropriate area as directed 1 (One) Time Per Week. 7/2/21   Jess Maria MD   silver sulfadiazine (Silvadene) 1 % cream Apply  topically to the appropriate area as directed 2 (Two) Times a Day. 7/2/21   Jess Maria MD        Allergies:  Amoxicillin, Hydroxyzine, and Hydroxyzine hcl    Objective     Vital Signs   Heart Rate:  [84] 84    Physical Exam:     General Appearance:    Alert, cooperative, in no acute distress   Head:    Normocephalic, without obvious abnormality, atraumatic   Eyes:          Conjunctivae and sclerae normal, no icterus,     Ears:    Ears appear intact with no abnormalities noted   Throat:   No oral lesions, no thrush, oral mucosa moist   Neck:   No adenopathy, supple, trachea midline, no thyromegaly   Back:     No kyphosis present, no scoliosis present, no skin lesions,      erythema or scars, no tenderness to percussion or                   palpation,   range of motion normal   Lungs:     Clear to auscultation,respirations regular, even and                  unlabored    Heart:    Regular rhythm and normal rate, normal S1 and S2, no            murmur, no gallop, no rub, no click   Chest Wall:    No abnormalities observed   Abdomen:     Normal bowel sounds, no masses, no organomegaly, soft        mild generalized tenderness to palpation, obese abdomen, non-distended, no guarding, no rebound  tenderness   Rectal:        Extremities:   Moves all extremities well, no edema, no cyanosis, no             redness   Pulses:   Pulses palpable and  "equal bilaterally   Skin:   No bleeding, bruising or rash   Lymph nodes:   No palpable adenopathy   Neurologic:   A/o x 4 with no deficits       Results Review:   {Results Review:54507::\"I reviewed the patient's new clinical results.\"    LABS/IMAGING:  Results for orders placed or performed during the hospital encounter of 07/02/21   Comprehensive Metabolic Panel    Specimen: Blood   Result Value Ref Range    Glucose 246 (H) 65 - 99 mg/dL    BUN 9 6 - 20 mg/dL    Creatinine 0.49 (L) 0.57 - 1.00 mg/dL    Sodium 130 (L) 136 - 145 mmol/L    Potassium 4.0 3.5 - 5.2 mmol/L    Chloride 97 (L) 98 - 107 mmol/L    CO2 23.1 22.0 - 29.0 mmol/L    Calcium 8.7 8.6 - 10.5 mg/dL    Total Protein 6.9 6.0 - 8.5 g/dL    Albumin 3.90 3.50 - 5.20 g/dL    ALT (SGPT) 16 1 - 33 U/L    AST (SGOT) 17 1 - 32 U/L    Alkaline Phosphatase 91 39 - 117 U/L    Total Bilirubin 0.3 0.0 - 1.2 mg/dL    eGFR Non African Amer >150 >60 mL/min/1.73    Globulin 3.0 gm/dL    A/G Ratio 1.3 g/dL    BUN/Creatinine Ratio 18.4 7.0 - 25.0    Anion Gap 9.9 5.0 - 15.0 mmol/L   Lipase    Specimen: Blood   Result Value Ref Range    Lipase 16 13 - 60 U/L   Urinalysis With Microscopic If Indicated (No Culture) - Urine, Clean Catch    Specimen: Urine, Clean Catch   Result Value Ref Range    Color, UA Dark Yellow (A) Yellow, Straw    Appearance, UA Cloudy (A) Clear    pH, UA 6.0 5.0 - 8.0    Specific Gravity, UA >1.030 (H) 1.005 - 1.030    Glucose, UA >=1000 mg/dL (3+) (A) Negative    Ketones, UA Negative Negative    Bilirubin, UA Negative Negative    Blood, UA Negative Negative    Protein, UA Trace (A) Negative    Leuk Esterase, UA Negative Negative    Nitrite, UA Negative Negative    Urobilinogen, UA 1.0 E.U./dL 0.2 - 1.0 E.U./dL   hCG, Quantitative, Pregnancy    Specimen: Blood   Result Value Ref Range    HCG Quantitative <0.50 mIU/mL   CBC Auto Differential    Specimen: Blood   Result Value Ref Range    WBC 9.82 3.40 - 10.80 10*3/mm3    RBC 5.11 3.77 - 5.28 10*6/mm3 "    Hemoglobin 13.6 12.0 - 15.9 g/dL    Hematocrit 41.4 34.0 - 46.6 %    MCV 81.0 79.0 - 97.0 fL    MCH 26.6 26.6 - 33.0 pg    MCHC 32.9 31.5 - 35.7 g/dL    RDW 12.6 12.3 - 15.4 %    RDW-SD 36.5 (L) 37.0 - 54.0 fl    MPV 10.2 6.0 - 12.0 fL    Platelets 281 140 - 450 10*3/mm3    Neutrophil % 64.4 42.7 - 76.0 %    Lymphocyte % 27.3 19.6 - 45.3 %    Monocyte % 6.8 5.0 - 12.0 %    Eosinophil % 0.7 0.3 - 6.2 %    Basophil % 0.3 0.0 - 1.5 %    Immature Grans % 0.5 0.0 - 0.5 %    Neutrophils, Absolute 6.32 1.70 - 7.00 10*3/mm3    Lymphocytes, Absolute 2.68 0.70 - 3.10 10*3/mm3    Monocytes, Absolute 0.67 0.10 - 0.90 10*3/mm3    Eosinophils, Absolute 0.07 0.00 - 0.40 10*3/mm3    Basophils, Absolute 0.03 0.00 - 0.20 10*3/mm3    Immature Grans, Absolute 0.05 0.00 - 0.05 10*3/mm3    nRBC 0.0 0.0 - 0.2 /100 WBC   Green Top (Gel)   Result Value Ref Range    Extra Tube Hold for add-ons.    Lavender Top   Result Value Ref Range    Extra Tube hold for add-on    Gold Top - SST   Result Value Ref Range    Extra Tube Hold for add-ons.         Result Review :     Assessment/Plan     Abdominal pain with nausea vomiting likely chronic calculus cholecystitis without evidence of obstruction or gangrene  Questionable recent melena    I reviewed the patient's work-up with the results mentioned above.  I reviewed the findings with the patient.  I recommended laparoscopic possible open cholecystectomy.  I explained the procedure and recovery.  Benefits and alternatives discussed.  Risk of procedure including risk of anesthesia, bleeding, infection, conversion open, damage to the common bile duct, bile leak, failure to relieve her pain, heart attack, stroke, blood clot, pneumonia, hernia were discussed.  All questions answered.  She agrees with the plan.  Consent obtained.  Orders for surgery placed by me.  I explained to her if this does not resolve her symptoms then we will proceed with upper and lower endoscopy.  Thank you for this consult.           Robert Araya MD  07/07/21 16:19 EDT

## 2021-07-07 NOTE — PROGRESS NOTES
General Surgery/Colorectal Surgery Note    Patient Name:  Dorinda Sherwood  YOB: 1995  [id number]    Referring Provider: Jess Maria MD      Patient Care Team:  Jess Maria MD as PCP - General (Internal Medicine)    Chief complaint abdominal pain nausea vomiting    Subjective .     History of present illness: Last Friday she began having some abdominal pain with nausea and vomiting going to the emergency department for further evaluation.  CT abdomen pelvis with gallstones.  No evidence of cholecystitis.  Labs with WBC 9, normal lipase, normal liver function test.  She has continued to have persistent nausea vomiting and generalized abdominal pain 10 out of 10 severity worse with any p.o. intake.  No history of ulcer disease.  No NSAID abuse.  Questionable recent melena.  No alleviating factors.      History:  Past Medical History:   Diagnosis Date   • AR (allergic rhinitis)    • Asthma    • Depression    • Diabetes mellitus (CMS/HCC)    • SOB (shortness of breath)        Past Surgical History:   Procedure Laterality Date   •  SECTION     • COLONOSCOPY  2017   • WISDOM TOOTH EXTRACTION         Family History   Problem Relation Age of Onset   • Heart disease Father    • Heart disease Other    • Heart disease Other    • Heart disease Other    • Diabetes type II Other        Social History     Tobacco Use   • Smoking status: Never Smoker   • Smokeless tobacco: Never Used   Substance Use Topics   • Alcohol use: Never   • Drug use: Not on file       Review of Systems  All systems were reviewed and negative except for:   Review of Systems   Constitutional: Negative for chills, fever and unexpected weight loss.   HENT: Negative for congestion, nosebleeds and voice change.    Eyes: Negative for blurred vision, double vision and discharge.   Respiratory: Negative for apnea, chest tightness and shortness of breath.    Cardiovascular: Negative for chest pain and leg swelling.    Gastrointestinal:        See HPI   Endocrine: Negative for cold intolerance and heat intolerance.   Genitourinary: Negative for dysuria, hematuria and urgency.   Musculoskeletal: Negative for back pain, joint swelling and neck pain.   Skin: Negative for color change and dry skin.   Neurological: Negative for dizziness and confusion.   Hematological: Negative for adenopathy.   Psychiatric/Behavioral: Negative for agitation and behavioral problems.     MEDS:  Prior to Admission medications    Medication Sig Start Date End Date Taking? Authorizing Provider   aspirin (aspirin) 81 MG EC tablet Take 81 mg by mouth Daily. 1/2/21 12/28/21 Yes Brock Govea MD   dicyclomine (BENTYL) 20 MG tablet Take 1 tablet by mouth Every 6 (Six) Hours. 7/2/21  Yes Ramirez Coley MD   loratadine (CLARITIN) 10 MG tablet loratadine 10 mg tablet   Yes Brock Govea MD   metoprolol succinate XL (TOPROL-XL) 25 MG 24 hr tablet metoprolol succinate 25 mg oral tablet extended release 24 hr take 2 tablets (50 mg) by oral route once daily   Suspended   Yes Brock Govea MD   montelukast (SINGULAIR) 10 MG tablet montelukast 10 mg tablet   Yes ProviderBrock MD   ondansetron (ZOFRAN) 4 MG tablet Every 12 (Twelve) Hours.   Yes Brock Govea MD   Tiotropium Bromide Monohydrate (Spiriva Respimat) 1.25 MCG/ACT aerosol solution inhaler Spiriva Respimat 1.25 mcg/actuation inhalation mist inhale 2 puffs (2.5 mcg) by inhalation route once daily   Suspended   Yes Brock Govea MD   Vortioxetine HBr (Trintellix) 5 MG tablet Take 5 mg by mouth Daily With Breakfast. 6/21/21  Yes Jess Maria MD   ondansetron ODT (ZOFRAN-ODT) 4 MG disintegrating tablet Place 1 tablet on the tongue Every 8 (Eight) Hours As Needed for Nausea or Vomiting. 7/2/21   Ramirez Coley MD   ondansetron ODT (ZOFRAN-ODT) 4 MG disintegrating tablet Place 1 tablet on the tongue Every 8 (Eight) Hours As Needed for Nausea or  Vomiting. 7/2/21   Ramirez Coley MD   oxyCODONE-acetaminophen (PERCOCET) 5-325 MG per tablet Take 1 tablet by mouth Every 6 (Six) Hours As Needed for Severe Pain . 7/2/21   Ramirez Coley MD saccharomyces boulardii (FLORASTOR) 250 MG capsule Take 1 capsule by mouth 2 (Two) Times a Day. 7/2/21   Jess Maria MD   Semaglutide, 1 MG/DOSE, (Ozempic, 1 MG/DOSE,) 2 MG/1.5ML solution pen-injector Inject 1 mg under the skin into the appropriate area as directed 1 (One) Time Per Week. 7/2/21   Jess Maria MD   silver sulfadiazine (Silvadene) 1 % cream Apply  topically to the appropriate area as directed 2 (Two) Times a Day. 7/2/21   Jess Maria MD        Allergies:  Amoxicillin, Hydroxyzine, and Hydroxyzine hcl    Objective     Vital Signs   Heart Rate:  [84] 84    Physical Exam:     General Appearance:    Alert, cooperative, in no acute distress   Head:    Normocephalic, without obvious abnormality, atraumatic   Eyes:          Conjunctivae and sclerae normal, no icterus,     Ears:    Ears appear intact with no abnormalities noted   Throat:   No oral lesions, no thrush, oral mucosa moist   Neck:   No adenopathy, supple, trachea midline, no thyromegaly   Back:     No kyphosis present, no scoliosis present, no skin lesions,      erythema or scars, no tenderness to percussion or                   palpation,   range of motion normal   Lungs:     Clear to auscultation,respirations regular, even and                  unlabored    Heart:    Regular rhythm and normal rate, normal S1 and S2, no            murmur, no gallop, no rub, no click   Chest Wall:    No abnormalities observed   Abdomen:     Normal bowel sounds, no masses, no organomegaly, soft        mild generalized tenderness to palpation, obese abdomen, non-distended, no guarding, no rebound  tenderness   Rectal:        Extremities:   Moves all extremities well, no edema, no cyanosis, no             redness   Pulses:   Pulses palpable and  "equal bilaterally   Skin:   No bleeding, bruising or rash   Lymph nodes:   No palpable adenopathy   Neurologic:   A/o x 4 with no deficits       Results Review:   {Results Review:32126::\"I reviewed the patient's new clinical results.\"    LABS/IMAGING:  Results for orders placed or performed during the hospital encounter of 07/02/21   Comprehensive Metabolic Panel    Specimen: Blood   Result Value Ref Range    Glucose 246 (H) 65 - 99 mg/dL    BUN 9 6 - 20 mg/dL    Creatinine 0.49 (L) 0.57 - 1.00 mg/dL    Sodium 130 (L) 136 - 145 mmol/L    Potassium 4.0 3.5 - 5.2 mmol/L    Chloride 97 (L) 98 - 107 mmol/L    CO2 23.1 22.0 - 29.0 mmol/L    Calcium 8.7 8.6 - 10.5 mg/dL    Total Protein 6.9 6.0 - 8.5 g/dL    Albumin 3.90 3.50 - 5.20 g/dL    ALT (SGPT) 16 1 - 33 U/L    AST (SGOT) 17 1 - 32 U/L    Alkaline Phosphatase 91 39 - 117 U/L    Total Bilirubin 0.3 0.0 - 1.2 mg/dL    eGFR Non African Amer >150 >60 mL/min/1.73    Globulin 3.0 gm/dL    A/G Ratio 1.3 g/dL    BUN/Creatinine Ratio 18.4 7.0 - 25.0    Anion Gap 9.9 5.0 - 15.0 mmol/L   Lipase    Specimen: Blood   Result Value Ref Range    Lipase 16 13 - 60 U/L   Urinalysis With Microscopic If Indicated (No Culture) - Urine, Clean Catch    Specimen: Urine, Clean Catch   Result Value Ref Range    Color, UA Dark Yellow (A) Yellow, Straw    Appearance, UA Cloudy (A) Clear    pH, UA 6.0 5.0 - 8.0    Specific Gravity, UA >1.030 (H) 1.005 - 1.030    Glucose, UA >=1000 mg/dL (3+) (A) Negative    Ketones, UA Negative Negative    Bilirubin, UA Negative Negative    Blood, UA Negative Negative    Protein, UA Trace (A) Negative    Leuk Esterase, UA Negative Negative    Nitrite, UA Negative Negative    Urobilinogen, UA 1.0 E.U./dL 0.2 - 1.0 E.U./dL   hCG, Quantitative, Pregnancy    Specimen: Blood   Result Value Ref Range    HCG Quantitative <0.50 mIU/mL   CBC Auto Differential    Specimen: Blood   Result Value Ref Range    WBC 9.82 3.40 - 10.80 10*3/mm3    RBC 5.11 3.77 - 5.28 10*6/mm3 "    Hemoglobin 13.6 12.0 - 15.9 g/dL    Hematocrit 41.4 34.0 - 46.6 %    MCV 81.0 79.0 - 97.0 fL    MCH 26.6 26.6 - 33.0 pg    MCHC 32.9 31.5 - 35.7 g/dL    RDW 12.6 12.3 - 15.4 %    RDW-SD 36.5 (L) 37.0 - 54.0 fl    MPV 10.2 6.0 - 12.0 fL    Platelets 281 140 - 450 10*3/mm3    Neutrophil % 64.4 42.7 - 76.0 %    Lymphocyte % 27.3 19.6 - 45.3 %    Monocyte % 6.8 5.0 - 12.0 %    Eosinophil % 0.7 0.3 - 6.2 %    Basophil % 0.3 0.0 - 1.5 %    Immature Grans % 0.5 0.0 - 0.5 %    Neutrophils, Absolute 6.32 1.70 - 7.00 10*3/mm3    Lymphocytes, Absolute 2.68 0.70 - 3.10 10*3/mm3    Monocytes, Absolute 0.67 0.10 - 0.90 10*3/mm3    Eosinophils, Absolute 0.07 0.00 - 0.40 10*3/mm3    Basophils, Absolute 0.03 0.00 - 0.20 10*3/mm3    Immature Grans, Absolute 0.05 0.00 - 0.05 10*3/mm3    nRBC 0.0 0.0 - 0.2 /100 WBC   Green Top (Gel)   Result Value Ref Range    Extra Tube Hold for add-ons.    Lavender Top   Result Value Ref Range    Extra Tube hold for add-on    Gold Top - SST   Result Value Ref Range    Extra Tube Hold for add-ons.         Result Review :     Assessment/Plan     Abdominal pain with nausea vomiting likely chronic calculus cholecystitis without evidence of obstruction or gangrene  Questionable recent melena    I reviewed the patient's work-up with the results mentioned above.  I reviewed the findings with the patient.  I recommended laparoscopic possible open cholecystectomy.  I explained the procedure and recovery.  Benefits and alternatives discussed.  Risk of procedure including risk of anesthesia, bleeding, infection, conversion open, damage to the common bile duct, bile leak, failure to relieve her pain, heart attack, stroke, blood clot, pneumonia, hernia were discussed.  All questions answered.  She agrees with the plan.  Consent obtained.  Orders for surgery placed by me.  I explained to her if this does not resolve her symptoms then we will proceed with upper and lower endoscopy.  Thank you for this consult.           Robert Araya MD  07/07/21 16:19 EDT

## 2021-07-12 ENCOUNTER — PRE-ADMISSION TESTING (OUTPATIENT)
Dept: PREADMISSION TESTING | Facility: HOSPITAL | Age: 26
End: 2021-07-12

## 2021-07-12 VITALS — WEIGHT: 292.33 LBS | BODY MASS INDEX: 53.8 KG/M2 | HEIGHT: 62 IN

## 2021-07-12 DIAGNOSIS — R10.13 EPIGASTRIC PAIN: Primary | ICD-10-CM

## 2021-07-12 LAB
ANION GAP SERPL CALCULATED.3IONS-SCNC: 9.6 MMOL/L (ref 5–15)
BUN SERPL-MCNC: 6 MG/DL (ref 6–20)
BUN/CREAT SERPL: 12 (ref 7–25)
CALCIUM SPEC-SCNC: 9.3 MG/DL (ref 8.6–10.5)
CHLORIDE SERPL-SCNC: 99 MMOL/L (ref 98–107)
CO2 SERPL-SCNC: 26.4 MMOL/L (ref 22–29)
CREAT SERPL-MCNC: 0.5 MG/DL (ref 0.57–1)
GFR SERPL CREATININE-BSD FRML MDRD: 149 ML/MIN/1.73
GLUCOSE SERPL-MCNC: 245 MG/DL (ref 65–99)
POTASSIUM SERPL-SCNC: 4.1 MMOL/L (ref 3.5–5.2)
SODIUM SERPL-SCNC: 135 MMOL/L (ref 136–145)

## 2021-07-12 PROCEDURE — 93005 ELECTROCARDIOGRAM TRACING: CPT

## 2021-07-12 PROCEDURE — 93010 ELECTROCARDIOGRAM REPORT: CPT | Performed by: INTERNAL MEDICINE

## 2021-07-12 PROCEDURE — 80048 BASIC METABOLIC PNL TOTAL CA: CPT

## 2021-07-12 NOTE — DISCHARGE INSTRUCTIONS
IMPORTANT INSTRUCTIONS - PRE-ADMISSION TESTING  1. DO NOT EAT OR CHEW anything after midnight the night before your procedure.    2. You may have CLEAR liquids up to __3____ hours prior to ARRIVAL time.   3. Take the following medications the morning of your procedure with JUST A SIP OF WATER:  _  Trintellex, bentyl, claritin, zofran if needed     4. DO NOT BRING your medications to the hospital with you, UNLESS something has changed since your PRE-Admission Testing appointment.  5. Hold all vitamins, supplements, and NSAIDS (Non- steroidal anti-inflammatory meds) for one week prior to surgery (you MAY take Tylenol or Acetaminophen).  6. If you are diabetic, check your blood sugar the morning of your procedure. If it is less than 70 or if you are feeling symptomatic, call the following number for further instructions: [phone]  7. Use your inhalers/nebulizers as usual, the morning of your procedure. BRING YOUR INHALERS with you.   8. Bring your CPAP or BIPAP to hospital, ONLY IF YOU WILL BE SPENDING THE NIGHT.   9. Make sure you have a ride home and have someone who will stay with you the day of your procedure after you go home.  10. If you have any questions, please call your Pre-Admission Testing Nurse, ____Yisel___________ at [phone]___.   11. Per anesthesia request, do not smoke for 24 hours before your procedure or as instructed by your surgeon.      Surgery date 7-16-21 [facility] A, third floor  Will call arrival time after 1 pm the day prior to surgery  Use surgical soap as directed below       PREOPERATIVE (BEFORE SURGERY)              BATHING INSTRUCTIONS  Instructions:   • You will need to shower 1 separate times utilizing the soap provided; at the times indicated   below:     - 7-15-21  PM        Or   - 7-16-21 AM     • Wash your hair and face with normal shampoo and soap, rinse it well before using the surgical soap.     • In the shower, wet the skin completely with water from  your neck to your feet. Apply the cleanser to your   body ONLY FROM THE NECK TO YOUR FEET.    • Do NOT USE THE CLEANSER ON YOUR FACE, HEAD, OR GENITAL (PRIVATE) AREAS.   Keep it out of your eyes, ears, and mouth because of the risk of injury to those areas.     • Scrub with a clean washcloth for each bath utilizing the soap provided from the top of your body to the   bottom starting at the neck area.     • Pay close attention to your armpits, groin area, and the site of surgery.     • Wash your body gently for 5 minutes. Stand outside the stream or turn off the water while scrubbing your   body. Do NOT wash with your regular soap after the surgical cleanser is used.     • RINSE THE CLEANSER OFF COMPLETELY with plenty of water. Rinse the area again thoroughly.     • Dry off with a clean towel. The surgical soap can cause dryness; however do NOT APPLY LOTION,   CREAM, POWDER, and/or DEODORANT AFTER SHOWERING.    • Be sure to where clean clothes after showering.     • Ensure CLEAN BED LINENS AFTER FIRST wash with the surgical soap.     • NO PETS ALLOWED IN THE BED with you after utilizing the surgical soap.    ••••••Clear Liquid Diet        Find out when you need to start a clear liquid diet.   Think of “clear liquids” as anything you could read a newspaper through. This includes things like water, broth, sports drinks, or tea WITHOUT any kind of milk or cream.           Once you are told to start a clear liquid diet, only drink these things until 2 hours before arrival to the hospital or when the hospital says to stop. Total volume limitation: 8 oz.       Clear liquids you CAN drink:   ; Water   ; Clear broth: beef, chicken, vegetable, or bone broth with nothing in it   ; Gatorade   ; Lemonade or Ric-aid   ; Soda   ; Tea, coffee (NO cream or honey)   ; Jell-O (without fruit)   ; Popsicles (without fruit or cream)   ; Italian ices   ; Juice without pulp: apple, white, grape   ; You may use salt, pepper, and sugar     Do NOT drink:   ; Milk or cream   ; Soy milk, almond milk, coconut milk, or other non-dairy drinks and   creamers   ; Milkshakes or smoothies   ; Tomato juice   ; Orange juice   ; Grapefruit juice   ; Cream soups or any other than broth         Clear Liquid Diet:  ? Do NOT eat any solid food.  ? Do NOT eat or suck on mints or candy.  ? Do NOT chew gum.  ? Do NOT drink thick liquids like milk or juice with pulp in it.  ? Do NOT add milk, cream, or anything like soy milk or almond milk to coffee or tea.

## 2021-07-13 ENCOUNTER — ANESTHESIA EVENT (OUTPATIENT)
Dept: PERIOP | Facility: HOSPITAL | Age: 26
End: 2021-07-13

## 2021-07-16 ENCOUNTER — HOSPITAL ENCOUNTER (OUTPATIENT)
Facility: HOSPITAL | Age: 26
Setting detail: HOSPITAL OUTPATIENT SURGERY
Discharge: HOME | End: 2021-07-16
Attending: SURGERY | Admitting: SURGERY

## 2021-07-16 ENCOUNTER — ANESTHESIA (OUTPATIENT)
Dept: PERIOP | Facility: HOSPITAL | Age: 26
End: 2021-07-16

## 2021-07-16 VITALS
SYSTOLIC BLOOD PRESSURE: 118 MMHG | BODY MASS INDEX: 53.39 KG/M2 | HEIGHT: 62 IN | OXYGEN SATURATION: 98 % | DIASTOLIC BLOOD PRESSURE: 74 MMHG | TEMPERATURE: 97 F | HEART RATE: 98 BPM | RESPIRATION RATE: 16 BRPM | WEIGHT: 290.13 LBS

## 2021-07-16 DIAGNOSIS — K80.10 CALCULUS OF GALLBLADDER WITH CHRONIC CHOLECYSTITIS WITHOUT OBSTRUCTION: ICD-10-CM

## 2021-07-16 LAB
GLUCOSE BLDC GLUCOMTR-MCNC: 174 MG/DL (ref 70–130)
QT INTERVAL: 351 MS

## 2021-07-16 PROCEDURE — 25010000002 FENTANYL CITRATE (PF) 50 MCG/ML SOLUTION: Performed by: ANESTHESIOLOGY

## 2021-07-16 PROCEDURE — 25010000002 BUPRENORPHINE PER 0.1 MG

## 2021-07-16 PROCEDURE — 25010000002 DEXAMETHASONE PER 1 MG: Performed by: ANESTHESIOLOGY

## 2021-07-16 PROCEDURE — 25010000002 KETOROLAC TROMETHAMINE PER 15 MG: Performed by: ANESTHESIOLOGY

## 2021-07-16 PROCEDURE — 25010000002 ONDANSETRON PER 1 MG: Performed by: ANESTHESIOLOGY

## 2021-07-16 PROCEDURE — 82962 GLUCOSE BLOOD TEST: CPT

## 2021-07-16 PROCEDURE — 47563 LAPARO CHOLECYSTECTOMY/GRAPH: CPT | Performed by: SURGERY

## 2021-07-16 PROCEDURE — 25010000002 DEXAMETHASONE PER 1 MG

## 2021-07-16 PROCEDURE — C1889 IMPLANT/INSERT DEVICE, NOC: HCPCS | Performed by: SURGERY

## 2021-07-16 PROCEDURE — 25010000002 HYDROMORPHONE 1 MG/ML SOLUTION: Performed by: ANESTHESIOLOGY

## 2021-07-16 PROCEDURE — 25010000002 MIDAZOLAM PER 1MG: Performed by: ANESTHESIOLOGY

## 2021-07-16 PROCEDURE — 25010000003 MEPERIDINE PER 100 MG: Performed by: ANESTHESIOLOGY

## 2021-07-16 PROCEDURE — 88304 TISSUE EXAM BY PATHOLOGIST: CPT | Performed by: SURGERY

## 2021-07-16 PROCEDURE — 25010000002 PROPOFOL 10 MG/ML EMULSION: Performed by: ANESTHESIOLOGY

## 2021-07-16 DEVICE — LIGAMAX 5 MM ENDOSCOPIC MULTIPLE CLIP APPLIER
Type: IMPLANTABLE DEVICE | Site: ABDOMEN | Status: FUNCTIONAL
Brand: LIGAMAX

## 2021-07-16 RX ORDER — POLYETHYLENE GLYCOL 3350 17 G/17G
17 POWDER, FOR SOLUTION ORAL DAILY
Qty: 5 PACKET | Refills: 0 | Status: SHIPPED | OUTPATIENT
Start: 2021-07-16 | End: 2023-02-15

## 2021-07-16 RX ORDER — PROMETHAZINE HYDROCHLORIDE 12.5 MG/1
25 TABLET ORAL ONCE AS NEEDED
Status: DISCONTINUED | OUTPATIENT
Start: 2021-07-16 | End: 2021-07-16 | Stop reason: HOSPADM

## 2021-07-16 RX ORDER — PROPOFOL 10 MG/ML
VIAL (ML) INTRAVENOUS AS NEEDED
Status: DISCONTINUED | OUTPATIENT
Start: 2021-07-16 | End: 2021-07-16 | Stop reason: SURG

## 2021-07-16 RX ORDER — METOPROLOL TARTRATE 5 MG/5ML
INJECTION INTRAVENOUS AS NEEDED
Status: DISCONTINUED | OUTPATIENT
Start: 2021-07-16 | End: 2021-07-16 | Stop reason: SURG

## 2021-07-16 RX ORDER — DEXAMETHASONE SODIUM PHOSPHATE 4 MG/ML
INJECTION, SOLUTION INTRA-ARTICULAR; INTRALESIONAL; INTRAMUSCULAR; INTRAVENOUS; SOFT TISSUE AS NEEDED
Status: DISCONTINUED | OUTPATIENT
Start: 2021-07-16 | End: 2021-07-16 | Stop reason: SURG

## 2021-07-16 RX ORDER — MEPERIDINE HYDROCHLORIDE 25 MG/ML
12.5 INJECTION INTRAMUSCULAR; INTRAVENOUS; SUBCUTANEOUS
Status: DISCONTINUED | OUTPATIENT
Start: 2021-07-16 | End: 2021-07-16 | Stop reason: HOSPADM

## 2021-07-16 RX ORDER — CLINDAMYCIN PHOSPHATE 600 MG/50ML
600 INJECTION INTRAVENOUS ONCE
Status: COMPLETED | OUTPATIENT
Start: 2021-07-16 | End: 2021-07-16

## 2021-07-16 RX ORDER — SODIUM CHLORIDE 0.9 % (FLUSH) 0.9 %
10 SYRINGE (ML) INJECTION AS NEEDED
Status: DISCONTINUED | OUTPATIENT
Start: 2021-07-16 | End: 2021-07-16 | Stop reason: HOSPADM

## 2021-07-16 RX ORDER — ROCURONIUM BROMIDE 10 MG/ML
INJECTION, SOLUTION INTRAVENOUS AS NEEDED
Status: DISCONTINUED | OUTPATIENT
Start: 2021-07-16 | End: 2021-07-16 | Stop reason: SURG

## 2021-07-16 RX ORDER — SODIUM CHLORIDE 0.9 % (FLUSH) 0.9 %
10 SYRINGE (ML) INJECTION EVERY 12 HOURS SCHEDULED
Status: DISCONTINUED | OUTPATIENT
Start: 2021-07-16 | End: 2021-07-16 | Stop reason: HOSPADM

## 2021-07-16 RX ORDER — MIDAZOLAM HYDROCHLORIDE 1 MG/ML
2 INJECTION INTRAMUSCULAR; INTRAVENOUS ONCE
Status: COMPLETED | OUTPATIENT
Start: 2021-07-16 | End: 2021-07-16

## 2021-07-16 RX ORDER — SODIUM CHLORIDE, SODIUM LACTATE, POTASSIUM CHLORIDE, CALCIUM CHLORIDE 600; 310; 30; 20 MG/100ML; MG/100ML; MG/100ML; MG/100ML
9 INJECTION, SOLUTION INTRAVENOUS CONTINUOUS PRN
Status: DISCONTINUED | OUTPATIENT
Start: 2021-07-16 | End: 2021-07-16 | Stop reason: HOSPADM

## 2021-07-16 RX ORDER — ONDANSETRON 2 MG/ML
INJECTION INTRAMUSCULAR; INTRAVENOUS AS NEEDED
Status: DISCONTINUED | OUTPATIENT
Start: 2021-07-16 | End: 2021-07-16 | Stop reason: SURG

## 2021-07-16 RX ORDER — ONDANSETRON 2 MG/ML
4 INJECTION INTRAMUSCULAR; INTRAVENOUS ONCE AS NEEDED
Status: DISCONTINUED | OUTPATIENT
Start: 2021-07-16 | End: 2021-07-16 | Stop reason: HOSPADM

## 2021-07-16 RX ORDER — OXYCODONE HYDROCHLORIDE AND ACETAMINOPHEN 5; 325 MG/1; MG/1
1 TABLET ORAL EVERY 6 HOURS PRN
Qty: 12 TABLET | Refills: 0 | Status: SHIPPED | OUTPATIENT
Start: 2021-07-16 | End: 2021-12-18

## 2021-07-16 RX ORDER — OXYCODONE HYDROCHLORIDE 5 MG/1
5 TABLET ORAL
Status: DISCONTINUED | OUTPATIENT
Start: 2021-07-16 | End: 2021-07-16 | Stop reason: HOSPADM

## 2021-07-16 RX ORDER — SODIUM CHLORIDE, SODIUM LACTATE, POTASSIUM CHLORIDE, CALCIUM CHLORIDE 600; 310; 30; 20 MG/100ML; MG/100ML; MG/100ML; MG/100ML
50 INJECTION, SOLUTION INTRAVENOUS CONTINUOUS
Status: DISCONTINUED | OUTPATIENT
Start: 2021-07-16 | End: 2021-07-16

## 2021-07-16 RX ORDER — FENTANYL CITRATE 50 UG/ML
INJECTION, SOLUTION INTRAMUSCULAR; INTRAVENOUS AS NEEDED
Status: DISCONTINUED | OUTPATIENT
Start: 2021-07-16 | End: 2021-07-16 | Stop reason: SURG

## 2021-07-16 RX ORDER — CEFAZOLIN SODIUM 2 G/100ML
2 INJECTION, SOLUTION INTRAVENOUS ONCE
Status: DISCONTINUED | OUTPATIENT
Start: 2021-07-16 | End: 2021-07-16

## 2021-07-16 RX ORDER — KETOROLAC TROMETHAMINE 30 MG/ML
INJECTION, SOLUTION INTRAMUSCULAR; INTRAVENOUS AS NEEDED
Status: DISCONTINUED | OUTPATIENT
Start: 2021-07-16 | End: 2021-07-16 | Stop reason: SURG

## 2021-07-16 RX ORDER — ACETAMINOPHEN 325 MG/1
650 TABLET ORAL ONCE
Status: DISCONTINUED | OUTPATIENT
Start: 2021-07-16 | End: 2021-07-16

## 2021-07-16 RX ORDER — MAGNESIUM HYDROXIDE 1200 MG/15ML
LIQUID ORAL AS NEEDED
Status: DISCONTINUED | OUTPATIENT
Start: 2021-07-16 | End: 2021-07-16 | Stop reason: HOSPADM

## 2021-07-16 RX ORDER — PROMETHAZINE HYDROCHLORIDE 25 MG/1
25 SUPPOSITORY RECTAL ONCE AS NEEDED
Status: DISCONTINUED | OUTPATIENT
Start: 2021-07-16 | End: 2021-07-16 | Stop reason: HOSPADM

## 2021-07-16 RX ORDER — LIDOCAINE HYDROCHLORIDE 20 MG/ML
INJECTION, SOLUTION INFILTRATION; PERINEURAL AS NEEDED
Status: DISCONTINUED | OUTPATIENT
Start: 2021-07-16 | End: 2021-07-16 | Stop reason: SURG

## 2021-07-16 RX ORDER — INDOCYANINE GREEN AND WATER 25 MG
2.5 KIT INJECTION ONCE
Status: COMPLETED | OUTPATIENT
Start: 2021-07-16 | End: 2021-07-16

## 2021-07-16 RX ORDER — ACETAMINOPHEN 500 MG
1000 TABLET ORAL ONCE
Status: DISCONTINUED | OUTPATIENT
Start: 2021-07-16 | End: 2021-07-16 | Stop reason: HOSPADM

## 2021-07-16 RX ADMIN — ROCURONIUM BROMIDE 50 MG: 10 INJECTION INTRAVENOUS at 07:32

## 2021-07-16 RX ADMIN — ONDANSETRON 4 MG: 2 INJECTION INTRAMUSCULAR; INTRAVENOUS at 08:49

## 2021-07-16 RX ADMIN — HYDROMORPHONE HYDROCHLORIDE 0.5 MG: 1 INJECTION, SOLUTION INTRAMUSCULAR; INTRAVENOUS; SUBCUTANEOUS at 09:31

## 2021-07-16 RX ADMIN — DEXAMETHASONE SODIUM PHOSPHATE 4 MG: 4 INJECTION INTRA-ARTICULAR; INTRALESIONAL; INTRAMUSCULAR; INTRAVENOUS; SOFT TISSUE at 08:49

## 2021-07-16 RX ADMIN — HYDROMORPHONE HYDROCHLORIDE 0.5 MG: 1 INJECTION, SOLUTION INTRAMUSCULAR; INTRAVENOUS; SUBCUTANEOUS at 09:22

## 2021-07-16 RX ADMIN — METOPROLOL TARTRATE 3 MG: 1 INJECTION, SOLUTION INTRAVENOUS at 07:57

## 2021-07-16 RX ADMIN — MIDAZOLAM HYDROCHLORIDE 2 MG: 1 INJECTION, SOLUTION INTRAMUSCULAR; INTRAVENOUS at 07:01

## 2021-07-16 RX ADMIN — ROCURONIUM BROMIDE 20 MG: 10 INJECTION INTRAVENOUS at 08:27

## 2021-07-16 RX ADMIN — PROPOFOL 200 MG: 10 INJECTION, EMULSION INTRAVENOUS at 07:32

## 2021-07-16 RX ADMIN — MEPERIDINE HYDROCHLORIDE 12.5 MG: 25 INJECTION INTRAMUSCULAR; INTRAVENOUS; SUBCUTANEOUS at 09:20

## 2021-07-16 RX ADMIN — SUGAMMADEX 200 MG: 100 INJECTION, SOLUTION INTRAVENOUS at 08:57

## 2021-07-16 RX ADMIN — FENTANYL CITRATE 100 MCG: 50 INJECTION INTRAMUSCULAR; INTRAVENOUS at 07:32

## 2021-07-16 RX ADMIN — CLINDAMYCIN IN 5 PERCENT DEXTROSE 600 MG: 12 INJECTION, SOLUTION INTRAVENOUS at 07:28

## 2021-07-16 RX ADMIN — INDOCYANINE GREEN AND WATER 2.5 MG: KIT at 07:00

## 2021-07-16 RX ADMIN — PROPOFOL 100 MG: 10 INJECTION, EMULSION INTRAVENOUS at 07:57

## 2021-07-16 RX ADMIN — SODIUM CHLORIDE, POTASSIUM CHLORIDE, SODIUM LACTATE AND CALCIUM CHLORIDE 9 ML/HR: 600; 310; 30; 20 INJECTION, SOLUTION INTRAVENOUS at 07:01

## 2021-07-16 RX ADMIN — LIDOCAINE HYDROCHLORIDE 50 MG: 20 INJECTION, SOLUTION INFILTRATION; PERINEURAL at 07:32

## 2021-07-16 RX ADMIN — KETOROLAC TROMETHAMINE 30 MG: 30 INJECTION, SOLUTION INTRAMUSCULAR; INTRAVENOUS at 08:49

## 2021-07-16 RX ADMIN — OXYCODONE HYDROCHLORIDE 5 MG: 5 TABLET ORAL at 09:47

## 2021-07-16 NOTE — ANESTHESIA PREPROCEDURE EVALUATION
Anesthesia Evaluation     Patient summary reviewed and Nursing notes reviewed   no history of anesthetic complications:  NPO Solid Status: > 8 hours  NPO Liquid Status: > 2 hours           Airway   Mallampati: I  TM distance: >3 FB  Neck ROM: full  No difficulty expected  Dental      Pulmonary - normal exam    breath sounds clear to auscultation  (+) asthma,  Cardiovascular - negative cardio ROS and normal exam  Exercise tolerance: good (4-7 METS)    Rhythm: regular        Neuro/Psych  (+) psychiatric history,     GI/Hepatic/Renal/Endo    (+)   diabetes mellitus,     Musculoskeletal (-) negative ROS    Abdominal    Substance History - negative use     OB/GYN negative ob/gyn ROS         Other - negative ROS                       Anesthesia Plan    ASA 3     general   (Patient understands anesthesia not responsible for dental damage.)  intravenous induction     Anesthetic plan, all risks, benefits, and alternatives have been provided, discussed and informed consent has been obtained with: patient.  Use of blood products discussed with patient .   Plan discussed with CRNA.

## 2021-07-16 NOTE — DISCHARGE INSTRUCTIONS
DISCHARGE INSTRUCTIONS LAPAROSCOPIC CHOLECYSTECTOMY/APPENDECTOMY  (GALL BLADDER)      ? For your surgery you had:  ? General anesthesia (you may have a sore throat for the first 24 hours)  ? IV sedation  ? Local anesthesia  ? Monitored anesthesia care  ? You received a medicated patch for nausea prevention today (behind your ear). It is recommended that you remove it 24-48 hours post-operatively. It must be removed within 72 hours.  ? You received an anesthesia medication today that can cause hormonal forms of birth control to be ineffective. You should use a different form of birth control (to prevent pregnancy) for 7 days.   ? You may experience dizziness, drowsiness, or light-headedness for several hours following surgery.  ? Do not stay alone tonight.  ? Limit your activity for 24 hours.  ? Resume your diet slowly.  Follow whatever special dietary instructions you may have been given by your doctor.  ? You should not drive, operate machinery, drink alcohol, or sign legally binding documents for 24 hours or while you are taking pain medication.  Last dose of pain medication was given at:   .    NOTIFY YOUR DOCTOR IF YOU EXPERIENCE ANY OF THE FOLLOWING:  ? Temperature greater than 101 degrees Fahrenheit  ? Shaking Chills  ? Redness or excessive drainage from incision  ? Nausea, vomiting and/or pain that is not controlled by prescribed medications  ? Increase in bleeding or bleeding that is excessive  ? Unable to urinate in 6 hours after surgery  ? If unable to reach your doctor, please go to the closest Emergency Room ? You may remove Band-Aid/dressing  SUNDAY .  ? You may shower SUNDAY  .  ? Apply an ice pack 24-48 hours.  ? You may experience gas discomfort 24-48 hours after discharge, especially in chest and shoulders.  Changing position frequently may alleviate this discomfort.  ? If you have excessive pain, swelling, redness, drainage or other problems, notify your physician.  ? If unable to urinate in 6  to 8 hours after surgery or urinating frequently in small amounts, notify your doctor or go to the nearest Emergency Room.  ? Medications per physician instructions as indicated on Discharge Medication Information Sheet.  You should see   for follow-up care  on  .  Phone number:      SPECIAL INSTRUCTIONS:                        I have read and received the above instructions.     Patient/Responsible Party's Signature Date/Time     RN Signature Date/Time

## 2021-07-16 NOTE — ANESTHESIA POSTPROCEDURE EVALUATION
Patient: Dorinda Sherwood    Procedure Summary     Date: 07/16/21 Room / Location: [facility]  /  [facility]    Anesthesia Start: 0728 Anesthesia Stop: 0909    Procedure: CHOLECYSTECTOMY LAPAROSCOPIC (N/A Abdomen) Diagnosis:       Calculus of gallbladder with chronic cholecystitis without obstruction      (Calculus of gallbladder with chronic cholecystitis without obstruction [K80.10])    Surgeons: Robert Araya MD Provider: Ethan Schwartz MD    Anesthesia Type: general ASA Status: 3          Anesthesia Type: general    Vitals  Vitals Value Taken Time   /74 07/16/21 0938   Temp     Pulse 92 07/16/21 0940   Resp     SpO2 97 % 07/16/21 0940   Vitals shown include unvalidated device data.        Post Anesthesia Care and Evaluation    Patient location during evaluation: bedside  Patient participation: complete - patient participated  Level of consciousness: awake  Pain score: 7  Pain management: adequate  Airway patency: patent  Anesthetic complications: No anesthetic complications  PONV Status: none  Cardiovascular status: acceptable and stable  Respiratory status: acceptable and room air  Hydration status: acceptable    Comments: An Anesthesiologist personally participated in the most demanding procedures (including induction and emergence if applicable) in the anesthesia plan, monitored the course of anesthesia administration at frequent intervals and remained physically present and available for immediate diagnosis and treatment of emergencies.

## 2021-07-16 NOTE — OP NOTE
OP NOTE  CHOLECYSTECTOMY LAPAROSCOPIC  Procedure Report    Patient Name:  Dorinda Sherwood  YOB: 1995  [id number]    Date of Surgery:  7/16/2021     Indications: See last clinic note for indications, discussion of risk benefits and alternatives    Pre-op Diagnosis:   Calculus of gallbladder with chronic cholecystitis without obstruction [K80.10]       Post-Op Diagnosis Codes:     * Calculus of gallbladder with chronic cholecystitis without obstruction [K80.10]    Procedure/CPT® Codes:      Procedure(s):  CHOLECYSTECTOMY LAPAROSCOPIC with ICG cholangiography    Staff:  Surgeon(s):  Robert Araya MD    Assistant: Lonnie Foy    Anesthesia: General, Local    Estimated Blood Loss: 10 cc    Implants:    Nothing was implanted during the procedure    Specimen:          A: Gallbladder      Findings: Critical view of safety obtained prior to placement of clips with aid of ICG cholangiography.  Cystic duct dilated.  Further ligated with Endoloop.    Complications: None    Description of Procedure: After all questions answered, consent verified.  She was brought the operating room per stretcher placed in supine position arms out all extremities padded.  Bilateral lower extremity SCDs placed.  General tracheal anesthesia induced.  Preoperative IV antibiotics .  Patient's abdomen prepped with ChloraPrep.  We waited 3 minutes.  Draped in usual sterile fashion.  Ioban applied.  Critical timeout taken.  Began the procedure with midline incision above the umbilicus.  I entered the abdomen sharply under direct vision without injury to viscera below.  I placed a 12 balloon trocar.  Obtain pneumoperitoneum with CO2 insufflation.  I then placed the patient in reverse Trendelenburg and rotated to the left.  I then placed 3 additional 5 trocar subxiphoid right upper quadrant right lateral quadrant under direct vision.  The gallbladder was retracted cephalad and lateral.  I performed L-hook and blunt  dissection of the hepatocystic triangle with the aid of ICG cholangiography until I obtained a critical view of safety with only 2 structures seen exiting the gallbladder, anterior posterior window free of fibrous fatty tissue, lower third of the gallbladder taken off the cystic plate, cystic duct skeletonized.  Intraoperative timeout was performed to confirm anatomy.  I then placed two 5 mm hemoclips proximal 1 distal across the cystic artery.  I divided between 2 clips and 1 clip up.  This was repeated for the skeletonized cystic duct.  The cystic duct was dilated and was further ligated with the Endoloop.  No spillage of bile after ligation.  The gallbladder was removed with L-hook electrocautery.  It was placed in a laparoscopic retrieval device.  I infiltrated with local anesthesia.  I removed the trochars and desufflated the abdomen.  I remove the specimen bag.  I closed the umbilical fascia with Vicryl.  I closed the incisions with Vicryl Monocryl and skin glue.  I open the gallbladder on the back table with multiple stones noted.  Only one duct noted exiting the gallbladder.  It was sent to pathology.  At the end of the procedure all counts were correct.  I was present for the procedure.    Robert Araya MD     Date: 7/16/2021  Time: 07:48 EDT

## 2021-07-16 NOTE — ANESTHESIA PROCEDURE NOTES
Airway  Urgency: elective    Date/Time: 7/16/2021 7:35 AM  Airway not difficult    General Information and Staff    Patient location during procedure: OR  CRNA: Lizet Perez CRNA    Indications and Patient Condition  Indications for airway management: airway protection    Preoxygenated: yes  MILS maintained throughout  Mask difficulty assessment: 1 - vent by mask    Final Airway Details  Final airway type: endotracheal airway      Successful airway: ETT  Cuffed: yes   Successful intubation technique: direct laryngoscopy  Facilitating devices/methods: intubating stylet  Endotracheal tube insertion site: oral  Blade: Ana  Blade size: 3  ETT size (mm): 7.0  Cormack-Lehane Classification: grade I - full view of glottis  Placement verified by: chest auscultation and capnometry   Measured from: lips  Number of attempts at approach: 1  Assessment: lips, teeth, and gum same as pre-op and atraumatic intubation

## 2021-07-19 LAB
CYTO UR: NORMAL
LAB AP CASE REPORT: NORMAL
LAB AP CLINICAL INFORMATION: NORMAL
PATH REPORT.FINAL DX SPEC: NORMAL
PATH REPORT.GROSS SPEC: NORMAL

## 2021-07-22 LAB — GLUCOSE BLDC GLUCOMTR-MCNC: 256 MG/DL (ref 70–130)

## 2021-07-26 ENCOUNTER — TELEPHONE (OUTPATIENT)
Dept: SURGERY | Facility: CLINIC | Age: 26
End: 2021-07-26

## 2021-07-26 NOTE — TELEPHONE ENCOUNTER
CHOLECYSTECTOMY LAPAROSCOPIC 07/16.  Incision still looks open and has some thick yellow drainage.  Draining through to underwear. Painful to touch.  She wants to have her appointment moved sooner.

## 2021-07-27 ENCOUNTER — OFFICE VISIT (OUTPATIENT)
Dept: SURGERY | Facility: CLINIC | Age: 26
End: 2021-07-27

## 2021-07-27 VITALS — HEART RATE: 88 BPM | HEIGHT: 62 IN | BODY MASS INDEX: 52.63 KG/M2 | WEIGHT: 286 LBS

## 2021-07-27 DIAGNOSIS — Z90.49 STATUS POST LAPAROSCOPIC CHOLECYSTECTOMY: Primary | ICD-10-CM

## 2021-07-27 PROCEDURE — 99024 POSTOP FOLLOW-UP VISIT: CPT | Performed by: NURSE PRACTITIONER

## 2021-07-27 RX ORDER — ONDANSETRON 4 MG/1
4 TABLET, FILM COATED ORAL EVERY 8 HOURS PRN
Qty: 10 TABLET | Refills: 0 | Status: SHIPPED | OUTPATIENT
Start: 2021-07-27 | End: 2021-11-10 | Stop reason: SDUPTHER

## 2021-07-27 NOTE — PROGRESS NOTES
"Chief Complaint  Post-op Follow-up    Subjective          Dorinda Sherwood presents to [facility] GENERAL SURGERY  Patient is a 26-year-old white/ female the presents to the general surgery office for a follow-up visit.    Patient underwent a laparoscopic cholecystectomy on 7/16/2021 performed by Dr. Robert Araya.    Pathology revealed chronic cholecystitis, cholelithiasis & cholesterolosis.    Patient admits to tolerating diet well without nausea.    Bowel movements without difficulty.  She has experienced some diarrhea occasionally.    Reports last night that she had one episode of vomiting.      Objective   Vital Signs:   Pulse 88   Ht 157.5 cm (62\")   Wt 130 kg (286 lb)   BMI 52.31 kg/m²     Physical Exam  Constitutional:       Appearance: Normal appearance.   Pulmonary:      Effort: Pulmonary effort is normal.   Abdominal:      General: Abdomen is flat.      Palpations: Abdomen is soft.      Comments: All surgical incisions are clean dry and intact without erythema.  Surgical adhesive is present.   Skin:     General: Skin is warm and dry.   Neurological:      General: No focal deficit present.      Mental Status: She is alert and oriented to person, place, and time.   Psychiatric:         Mood and Affect: Mood normal.         Behavior: Behavior normal.        Result Review :                 Assessment and Plan    Diagnoses and all orders for this visit:    1. Status post laparoscopic cholecystectomy (Primary)    Other orders  -     ondansetron (Zofran) 4 MG tablet; Take 1 tablet by mouth Every 8 (Eight) Hours As Needed for Nausea or Vomiting.  Dispense: 10 tablet; Refill: 0        Follow Up   Return if symptoms worsen or fail to improve.       Patient was given instructions and counseling regarding her condition or for health maintenance advice. Please see specific information pulled into the AVS if appropriate.       "

## 2021-07-28 ENCOUNTER — TELEPHONE (OUTPATIENT)
Dept: INTERNAL MEDICINE | Facility: CLINIC | Age: 26
End: 2021-07-28

## 2021-07-28 NOTE — TELEPHONE ENCOUNTER
Caller: Dorinda Sherwood    Relationship: Self    Best call back number: [phone]     What was the call regarding:    PATIENT HAD GALLBLADDER SURGERY 07/16/21 AND HAS BEEN EXPERIENCING HIGH LEVELS OF SUGAR. SHE STATED THAT [facility] GOT HER LAB RESULTS BACK AND HAD THE FOLLOWING RESULTS:    A1C ABOOUT 6.0  GLUCOSE ABOUT 260     PATIENT HAS BEEN TESTING HER OWN SUGAR AND IT -300 AND UP.    WARM TRANSFER TO THE OFFICE FAILED. PLEASE ADVISE ASAP.      Do you require a callback: YES

## 2021-07-30 NOTE — TELEPHONE ENCOUNTER
Patient states that her blood sugar is still elevated but patient has a follow up with Dr. Maria on 8/9. The high blood sugar is a continuing symptoms. Will continue to monitor until appointment with Dr. Medina.

## 2021-08-02 ENCOUNTER — TELEPHONE (OUTPATIENT)
Dept: SURGERY | Facility: CLINIC | Age: 26
End: 2021-08-02

## 2021-08-02 NOTE — TELEPHONE ENCOUNTER
Saw April last week.  Patient said a different incision is red, not closing now.  Draining yellow. Watery with a little odor. Area is warm. Nausea almost every day. She wants to see Dr. Araya, today, if possible. If not today, asap. The other incision is still red and draining and painful.

## 2021-08-03 ENCOUNTER — OFFICE VISIT (OUTPATIENT)
Dept: SURGERY | Facility: CLINIC | Age: 26
End: 2021-08-03

## 2021-08-03 VITALS — BODY MASS INDEX: 52.74 KG/M2 | RESPIRATION RATE: 16 BRPM | WEIGHT: 286.6 LBS | HEIGHT: 62 IN

## 2021-08-03 DIAGNOSIS — K81.9 CHOLECYSTITIS: Primary | ICD-10-CM

## 2021-08-03 PROCEDURE — 99024 POSTOP FOLLOW-UP VISIT: CPT | Performed by: SURGERY

## 2021-08-03 RX ORDER — SULFAMETHOXAZOLE AND TRIMETHOPRIM 800; 160 MG/1; MG/1
1 TABLET ORAL 2 TIMES DAILY
Qty: 6 TABLET | Refills: 0 | Status: SHIPPED | OUTPATIENT
Start: 2021-08-03 | End: 2021-08-08

## 2021-08-04 NOTE — PROGRESS NOTES
General Surgery/Colorectal Surgery Note    Patient Name:  Dorinda Sherwood  YOB: 1995  [id number]    Referring Provider: Jess Maria MD      Patient Care Team:  Jess Maria MD as PCP - General (Internal Medicine)    Chief complaint follow-up after surgery    Subjective .     History of present illness:    History of  abdominal pain with nausea and vomiting going to the emergency department for further evaluation.  CT abdomen pelvis with gallstones.  No evidence of cholecystitis.  Labs with WBC 9, normal lipase, normal liver function test.  She has continued to have persistent nausea vomiting and generalized abdominal pain 10 out of 10 severity worse with any p.o. intake.  No history of ulcer disease.  No NSAID abuse.  Questionable recent melena.  No alleviating factors.    Status post laparoscopic cholecystectomy 2021.  Pathology with cholecystitis, cholelithiasis, cholesterolosis.    She comes in for follow-up.  She has had some erythema around 2 of her incisions.  No fever.  Minimal drainage.  She feels better from her surgery and is pleased with her surgery.      History:  Past Medical History:   Diagnosis Date   • Anesthesia     slow to wake up postop, anxiety postop   • AR (allergic rhinitis)    • ASD (atrial septal defect)     had since birth- asymptomatic- see's dr falk    • Asthma     no inhalers   • Depression    • Diabetes mellitus (CMS/HCC)     type ii- bg runs around 200-300   • Gallstones currently   • Hypertension        Past Surgical History:   Procedure Laterality Date   •  SECTION     • CHOLECYSTECTOMY N/A 2021    Procedure: CHOLECYSTECTOMY LAPAROSCOPIC;  Surgeon: Robert Araya MD;  [place] [facility] [place] [facility] [place]  Service: General;  Laterality: N/A;   • COLONOSCOPY     • TUBAL ABDOMINAL LIGATION     • WISDOM TOOTH EXTRACTION         Family History   Problem Relation Age of Onset   • Heart disease Father    • Heart disease Other    •  Heart disease Other    • Heart disease Other    • Diabetes type II Other        Social History     Tobacco Use   • Smoking status: Never Smoker   • Smokeless tobacco: Never Used   Vaping Use   • Vaping Use: Never used   Substance Use Topics   • Alcohol use: Never   • Drug use: Never       Review of Systems  All systems were reviewed and negative except for:   Review of Systems   Constitutional: Negative for chills, fever and unexpected weight loss.   HENT: Negative for congestion, nosebleeds and voice change.    Eyes: Negative for blurred vision, double vision and discharge.   Respiratory: Negative for apnea, chest tightness and shortness of breath.    Cardiovascular: Negative for chest pain and leg swelling.   Gastrointestinal:        See HPI   Endocrine: Negative for cold intolerance and heat intolerance.   Genitourinary: Negative for dysuria, hematuria and urgency.   Musculoskeletal: Negative for back pain, joint swelling and neck pain.   Skin: Negative for color change and dry skin.   Neurological: Negative for dizziness and confusion.   Hematological: Negative for adenopathy.   Psychiatric/Behavioral: Negative for agitation and behavioral problems.     MEDS:  Prior to Admission medications    Medication Sig Start Date End Date Taking? Authorizing Provider   aspirin (aspirin) 81 MG EC tablet Take 81 mg by mouth Daily. Pt reported per dr crespo can continue asa for surgery, verified with office 1/2/21 12/28/21 Yes Brock Govea MD   dicyclomine (BENTYL) 20 MG tablet Take 1 tablet by mouth Every 6 (Six) Hours. 7/2/21  Yes Ramirez Coley MD   loratadine (CLARITIN) 10 MG tablet Take 10 mg by mouth Daily.   Yes ProviderBrock MD   metoprolol succinate XL (TOPROL-XL) 25 MG 24 hr tablet Take 50 mg by mouth Every Night. Takes 2 tabs   Yes Brock Govea MD   montelukast (SINGULAIR) 10 MG tablet Take 10 mg by mouth Every Night.   Yes Brock Govea MD   ondansetron (Zofran) 4 MG tablet  "Take 1 tablet by mouth Every 8 (Eight) Hours As Needed for Nausea or Vomiting. 7/27/21  Yes Donato April, APRN   Semaglutide, 1 MG/DOSE, (Ozempic, 1 MG/DOSE,) 2 MG/1.5ML solution pen-injector Inject 1 mg under the skin into the appropriate area as directed 1 (One) Time Per Week.  Patient taking differently: Inject 1 mg under the skin into the appropriate area as directed 1 (One) Time Per Week. Instructed per anesthesia standing orders 7/2/21  Yes Jess Maria MD   Vortioxetine HBr (Trintellix) 5 MG tablet Take 5 mg by mouth Daily With Breakfast. 6/21/21  Yes Jess Maria MD   ondansetron ODT (ZOFRAN-ODT) 4 MG disintegrating tablet Place 1 tablet on the tongue Every 8 (Eight) Hours As Needed for Nausea or Vomiting. 7/2/21   Ramirez Coley MD   oxyCODONE-acetaminophen (Percocet) 5-325 MG per tablet Take 1 tablet by mouth Every 6 (Six) Hours As Needed for Moderate Pain . 7/16/21   Robert Araya MD   polyethylene glycol (MIRALAX) 17 g packet Take 17 g by mouth Daily. 7/16/21   Robert Araya MD   sulfamethoxazole-trimethoprim (Bactrim DS) 800-160 MG per tablet Take 1 tablet by mouth 2 (Two) Times a Day for 5 days. 8/3/21 8/8/21  Robert Araya MD        Allergies:  Amoxicillin, Hydroxyzine, and Hydroxyzine hcl    Objective     Vital Signs   Resp:  [16] 16     Physical Exam: Minimal erythema around supraumbilical incision and right lateral trocar incision.  Unable to express any drainage.  No fluctuance.  No crepitus.      Results Review:   {Results Review:55045::\"I reviewed the patient's new clinical results.\"    LABS/IMAGING:  Results for orders placed or performed during the hospital encounter of 07/16/21   POC Glucose Once    Specimen: Blood   Result Value Ref Range    Glucose 174 (H) 70 - 130 mg/dL   POC Glucose Once    Specimen: Blood   Result Value Ref Range    Glucose 256 (H) 70 - 130 mg/dL   Tissue Pathology Exam    Specimen: Gallbladder; Tissue   Result Value Ref " Range    Case Report       Surgical Pathology Report                         Case: [id number]                                  Authorizing Provider:  Robert Araya MD  Collected:           07/16/2021 09:00 AM          Ordering Location:     [facility]  Received:            07/16/2021 12:17 PM                                 OR                                                                           Pathologist:           Abel Amaral MD                                                            Specimen:    Gallbladder                                                                                Clinical Information      Final Diagnosis       Gallbladder, cholecystectomy:    - Chronic cholecystitis    - Cholesterolosis    - Cholelithiasis           Gross Description       Gallbladder: Received in formalin is a previously opened tan gallbladder measuring 6.5 cm in length and 2.5 cm in diameter.  The gallbladder wall measures 0.3 cm in thickness.  Mucosal surface is reddish-tan and velvety without noted ulceration or polyp.  A dark green ovoid stone is found separately within the specimen container measuring 1.7 cm in greatest dimension.  Rep 1A.  CRE      Microscopic Description          Result Review :     Assessment/Plan     Status post laparoscopic cholecystectomy 7/16/2021.  Pathology with cholecystitis, cholelithiasis, cholesterolosis.    I reviewed the pathology with the patient.  She may slowly increase her activity as tolerated.  I will give her 5 days of Bactrim for the erythema.  Order placed.  I encouraged her to contact me if she develops fever or worsening erythema.  Follow-up with me as needed.  All questions answered.  She agrees with the plan.  Thank you for the consult.                This document has been electronically signed by Robert Araya MD  August 4, 2021 07:47 EDT

## 2021-08-06 ENCOUNTER — LAB (OUTPATIENT)
Dept: LAB | Facility: HOSPITAL | Age: 26
End: 2021-08-06

## 2021-08-06 ENCOUNTER — OFFICE VISIT (OUTPATIENT)
Dept: SURGERY | Facility: CLINIC | Age: 26
End: 2021-08-06

## 2021-08-06 VITALS — WEIGHT: 287 LBS | RESPIRATION RATE: 16 BRPM | HEIGHT: 62 IN | BODY MASS INDEX: 52.81 KG/M2

## 2021-08-06 DIAGNOSIS — L24.A9 WOUND DRAINAGE: ICD-10-CM

## 2021-08-06 DIAGNOSIS — Z90.49 S/P LAPAROSCOPIC CHOLECYSTECTOMY: ICD-10-CM

## 2021-08-06 DIAGNOSIS — Z90.49 S/P LAPAROSCOPIC CHOLECYSTECTOMY: Primary | ICD-10-CM

## 2021-08-06 LAB
BASOPHILS # BLD AUTO: 0.07 10*3/MM3 (ref 0–0.2)
BASOPHILS NFR BLD AUTO: 0.8 % (ref 0–1.5)
DEPRECATED RDW RBC AUTO: 37.6 FL (ref 37–54)
EOSINOPHIL # BLD AUTO: 0.14 10*3/MM3 (ref 0–0.4)
EOSINOPHIL NFR BLD AUTO: 1.5 % (ref 0.3–6.2)
ERYTHROCYTE [DISTWIDTH] IN BLOOD BY AUTOMATED COUNT: 12.6 % (ref 12.3–15.4)
HCT VFR BLD AUTO: 41.7 % (ref 34–46.6)
HGB BLD-MCNC: 13.3 G/DL (ref 12–15.9)
IMM GRANULOCYTES # BLD AUTO: 0.06 10*3/MM3 (ref 0–0.05)
IMM GRANULOCYTES NFR BLD AUTO: 0.6 % (ref 0–0.5)
LYMPHOCYTES # BLD AUTO: 2.85 10*3/MM3 (ref 0.7–3.1)
LYMPHOCYTES NFR BLD AUTO: 30.5 % (ref 19.6–45.3)
MCH RBC QN AUTO: 26.2 PG (ref 26.6–33)
MCHC RBC AUTO-ENTMCNC: 31.9 G/DL (ref 31.5–35.7)
MCV RBC AUTO: 82.1 FL (ref 79–97)
MONOCYTES # BLD AUTO: 0.6 10*3/MM3 (ref 0.1–0.9)
MONOCYTES NFR BLD AUTO: 6.4 % (ref 5–12)
NEUTROPHILS NFR BLD AUTO: 5.61 10*3/MM3 (ref 1.7–7)
NEUTROPHILS NFR BLD AUTO: 60.2 % (ref 42.7–76)
NRBC BLD AUTO-RTO: 0 /100 WBC (ref 0–0.2)
PLATELET # BLD AUTO: 332 10*3/MM3 (ref 140–450)
PMV BLD AUTO: 11.1 FL (ref 6–12)
RBC # BLD AUTO: 5.08 10*6/MM3 (ref 3.77–5.28)
WBC # BLD AUTO: 9.33 10*3/MM3 (ref 3.4–10.8)

## 2021-08-06 PROCEDURE — 80053 COMPREHEN METABOLIC PANEL: CPT

## 2021-08-06 PROCEDURE — 36415 COLL VENOUS BLD VENIPUNCTURE: CPT

## 2021-08-06 PROCEDURE — 99024 POSTOP FOLLOW-UP VISIT: CPT | Performed by: NURSE PRACTITIONER

## 2021-08-06 PROCEDURE — 85025 COMPLETE CBC W/AUTO DIFF WBC: CPT

## 2021-08-06 RX ORDER — CLINDAMYCIN HYDROCHLORIDE 150 MG/1
150 CAPSULE ORAL 3 TIMES DAILY
Qty: 40 CAPSULE | Refills: 0 | Status: SHIPPED | OUTPATIENT
Start: 2021-08-06 | End: 2021-08-13

## 2021-08-06 NOTE — PROGRESS NOTES
"Chief Complaint  Follow-up    Subjective          Dorinda Sherwood presents to [facility] GENERAL SURGERY  Patient is a 26-year-old white/ female that presents to general surgery office with concerns following a laparoscopic cholecystectomy.    Patient underwent a laparoscopic cholecystectomy on 7/16/2021 performed by Dr. Robert Araya.    Patient was recently given 3 days of Bactrim for some erythema noted around surgical incisions.  Patient reports that she has finished all the antibiotics.    She reports today that her abdomen is very tender around surgical incisions, upon palpitation of her abdomen serosanguineous drainage was released from posterior incision.    Denies any fever or chills.    She rates her pain currently 8 out of 10.    Admits to tolerating diet well.    Having bowel movements without difficulty.          Objective   Vital Signs:   Resp 16   Ht 157.5 cm (62\")   Wt 130 kg (287 lb)   BMI 52.49 kg/m²     Physical Exam  Constitutional:       Appearance: She is obese.   Abdominal:      Palpations: Abdomen is soft.      Comments: Erythema noted over entire mid abdomen.  Scant amount of drainage from posterior incision that is serosanguineous in color.   Skin:     General: Skin is warm and dry.   Neurological:      General: No focal deficit present.      Mental Status: She is alert and oriented to person, place, and time.   Psychiatric:         Mood and Affect: Mood normal.         Behavior: Behavior normal.        Result Review :                 Assessment and Plan    Diagnoses and all orders for this visit:    1. S/P laparoscopic cholecystectomy (Primary)  -     CBC & Differential; Future  -     Comprehensive Metabolic Panel; Future    2. Wound drainage  -     CBC & Differential; Future  -     Comprehensive Metabolic Panel; Future    Other orders  -     clindamycin (Cleocin) 150 MG capsule; Take 1 capsule by mouth 3 (Three) Times a Day for 7 days.  Dispense: 40 " capsule; Refill: 0        Follow Up   Return for Keep follow-up appointment with Dr. Araya for next Tuesday..     I have instructed the patient when to seek medical emergency services.      Patient was given instructions and counseling regarding her condition or for health maintenance advice. Please see specific information pulled into the AVS if appropriate.

## 2021-08-07 LAB
ALBUMIN SERPL-MCNC: 4.2 G/DL (ref 3.5–5.2)
ALBUMIN/GLOB SERPL: 1.5 G/DL
ALP SERPL-CCNC: 97 U/L (ref 39–117)
ALT SERPL W P-5'-P-CCNC: 12 U/L (ref 1–33)
ANION GAP SERPL CALCULATED.3IONS-SCNC: 10.4 MMOL/L (ref 5–15)
AST SERPL-CCNC: 14 U/L (ref 1–32)
BILIRUB SERPL-MCNC: <0.2 MG/DL (ref 0–1.2)
BUN SERPL-MCNC: 9 MG/DL (ref 6–20)
BUN/CREAT SERPL: 18.4 (ref 7–25)
CALCIUM SPEC-SCNC: 9.6 MG/DL (ref 8.6–10.5)
CHLORIDE SERPL-SCNC: 98 MMOL/L (ref 98–107)
CO2 SERPL-SCNC: 25.6 MMOL/L (ref 22–29)
CREAT SERPL-MCNC: 0.49 MG/DL (ref 0.57–1)
GFR SERPL CREATININE-BSD FRML MDRD: >150 ML/MIN/1.73
GLOBULIN UR ELPH-MCNC: 2.8 GM/DL
GLUCOSE SERPL-MCNC: 272 MG/DL (ref 65–99)
POTASSIUM SERPL-SCNC: 4.3 MMOL/L (ref 3.5–5.2)
PROT SERPL-MCNC: 7 G/DL (ref 6–8.5)
SODIUM SERPL-SCNC: 134 MMOL/L (ref 136–145)

## 2021-08-09 ENCOUNTER — TELEMEDICINE (OUTPATIENT)
Dept: INTERNAL MEDICINE | Facility: CLINIC | Age: 26
End: 2021-08-09

## 2021-08-09 DIAGNOSIS — E11.65 TYPE 2 DIABETES MELLITUS WITH HYPERGLYCEMIA, WITH LONG-TERM CURRENT USE OF INSULIN (HCC): Primary | ICD-10-CM

## 2021-08-09 DIAGNOSIS — Z79.4 TYPE 2 DIABETES MELLITUS WITH HYPERGLYCEMIA, WITH LONG-TERM CURRENT USE OF INSULIN (HCC): Primary | ICD-10-CM

## 2021-08-09 DIAGNOSIS — Z90.49 S/P CHOLECYSTECTOMY: ICD-10-CM

## 2021-08-09 PROBLEM — Q21.11 ATRIAL SEPTAL DEFECT, SECUNDUM: Status: ACTIVE | Noted: 2020-12-15

## 2021-08-09 PROBLEM — J30.9 ALLERGIC RHINITIS: Status: ACTIVE | Noted: 2021-08-09

## 2021-08-09 PROBLEM — F32.A DEPRESSION: Status: ACTIVE | Noted: 2021-08-09

## 2021-08-09 PROBLEM — J45.909 ASTHMA: Status: ACTIVE | Noted: 2021-08-09

## 2021-08-09 PROCEDURE — 99442 PR PHYS/QHP TELEPHONE EVALUATION 11-20 MIN: CPT | Performed by: INTERNAL MEDICINE

## 2021-08-09 RX ORDER — INSULIN DETEMIR 100 [IU]/ML
10 INJECTION, SOLUTION SUBCUTANEOUS NIGHTLY
Qty: 10 ML | Refills: 2 | Status: SHIPPED | OUTPATIENT
Start: 2021-08-09 | End: 2021-09-10 | Stop reason: SDUPTHER

## 2021-08-09 NOTE — PROGRESS NOTES
Chief Complaint  Diabetes    Subjective          Dornida Sherwood presents to [facility] INTERNAL MEDICINE & PEDIATRICS  History of Present Illness    Patient understanding that this is a telehealth visit.  I cannot perform a full physical exam, therefore something might be missed, or patient may need to come into the office for further evaluation.  Patient is understanding and consents to this type of visit.  Done by telephone    Had cholecystectomy about 3 weeks ago  States that she has been eating less since having this surgery  No stomach pain  Currently on antibiotics due to an infection in her stomach    Has been having some sugar trouble, running around the 200's at home often  Has stopped her farxiga because it was causing UTIs  Has stopped her metformin because it was causing nausea  Is currently taking her ozempic, but feels like it wasn't helping as much  When she was pregnant she was on the insulin pump    Objective   Vital Signs:   There were no vitals taken for this visit.    Physical Exam   No exam due to telehealth visit  Result Review :     Common labs    Common Labsle 7/2/21 7/2/21 7/12/21 8/6/21 8/6/21    0938 0938  1300 1300   Glucose  246 (A) 245 (A)  272 (A)   BUN  9 6  9   Creatinine  0.49 (A) 0.50 (A)  0.49 (A)   eGFR Non African Am  >150 149  >150   Sodium  130 (A) 135 (A)  134 (A)   Potassium  4.0 4.1  4.3   Chloride  97 (A) 99  98   Calcium  8.7 9.3  9.6   Albumin  3.90   4.20   Total Bilirubin  0.3   <0.2   Alkaline Phosphatase  91   97   AST (SGOT)  17   14   ALT (SGPT)  16   12   WBC 9.82   9.33    Hemoglobin 13.6   13.3    Hematocrit 41.4   41.7    Platelets 281   332    (A) Abnormal value               Procedures      Assessment and Plan    Diagnoses and all orders for this visit:    1. Type 2 diabetes mellitus with hyperglycemia, with long-term current use of insulin (CMS/[facility]) (Primary)  Assessment & Plan:  Will refer to Arcelia Velázquez for insulin pump discussion  and management    Will also start levemir    Encouraged her to cont with diet and weight loss      2. S/P cholecystectomy  Assessment & Plan:  Healing well, cont to monitor      Other orders  -     insulin detemir (Levemir) 100 UNIT/ML injection; Inject 10 Units under the skin into the appropriate area as directed Every Night.  Dispense: 10 mL; Refill: 2    12 min spent on the phone discussing current medical issues      Follow Up   Return in about 4 weeks (around 9/6/2021).  Patient was given instructions and counseling regarding her condition or for health maintenance advice. Please see specific information pulled into the AVS if appropriate.

## 2021-08-09 NOTE — ASSESSMENT & PLAN NOTE
Will refer to Arcelia Velázquez for insulin pump discussion and management    Will also start levemir    Encouraged her to cont with diet and weight loss

## 2021-08-10 ENCOUNTER — TELEPHONE (OUTPATIENT)
Dept: INTERNAL MEDICINE | Facility: CLINIC | Age: 26
End: 2021-08-10

## 2021-08-10 NOTE — TELEPHONE ENCOUNTER
Caller: Dorinda Sherwood    Relationship: Self    Best call back number: [phone]      What was the call regarding: PATIENT STATES DID A ZOOM VISIT WITH DR DUNCAN YESTERDAY AND SHE WANTS TO FOLLOW UP IN A MONTH. WANTS TO SCHEDULED ZOOM VISIT. HUB UNABLE TO MAKE APPOINTMENT. HUB UNABLE TO WARM TRANSFER. PLEASE ADVISE     Do you require a callback:YES

## 2021-08-10 NOTE — TELEPHONE ENCOUNTER
When is a good time for a f/u zoom appointment with her or can she see someone else? Also she said she needed needles and syringe. Unsure if you have a specific type or size and for what? Please advise.

## 2021-08-17 ENCOUNTER — OFFICE VISIT (OUTPATIENT)
Dept: SURGERY | Facility: CLINIC | Age: 26
End: 2021-08-17

## 2021-08-17 VITALS — WEIGHT: 287.4 LBS | BODY MASS INDEX: 52.89 KG/M2 | HEIGHT: 62 IN | RESPIRATION RATE: 16 BRPM

## 2021-08-17 DIAGNOSIS — K81.9 CHOLECYSTITIS: Primary | ICD-10-CM

## 2021-08-17 PROCEDURE — 99024 POSTOP FOLLOW-UP VISIT: CPT | Performed by: SURGERY

## 2021-08-17 NOTE — PROGRESS NOTES
General Surgery/Colorectal Surgery Note    Patient Name:  Dorinda Sherwood  YOB: 1995  [id number]    Referring Provider: Jess Maria MD      Patient Care Team:  Jess Maria MD as PCP - General (Internal Medicine)    Chief complaint follow-up after surgery    Subjective .     History of present illness:    History of  abdominal pain with nausea and vomiting going to the emergency department for further evaluation.  CT abdomen pelvis with gallstones.  No evidence of cholecystitis.  Labs with WBC 9, normal lipase, normal liver function test.  She has continued to have persistent nausea vomiting and generalized abdominal pain 10 out of 10 severity worse with any p.o. intake.  No history of ulcer disease.  No NSAID abuse.  Questionable recent melena.  No alleviating factors.    Status post laparoscopic cholecystectomy 2021.  Pathology with cholecystitis, cholelithiasis, cholesterolosis.    Abdominal wall cellulitis treated with antibiotics.  She comes in for follow-up.  She is doing very well with no ongoing infection noted.  No fever.  Her blood sugars are better controlled.      History:  Past Medical History:   Diagnosis Date   • Anesthesia     slow to wake up postop, anxiety postop   • AR (allergic rhinitis)    • ASD (atrial septal defect)     had since birth- asymptomatic- see's dr falk    • Asthma     no inhalers   • Depression    • Diabetes mellitus (CMS/HCC)     type ii- bg runs around 200-300   • Gallstones currently   • Hypertension        Past Surgical History:   Procedure Laterality Date   •  SECTION     • CHOLECYSTECTOMY N/A 2021    Procedure: CHOLECYSTECTOMY LAPAROSCOPIC;  Surgeon: Robert Araya MD;  [place] [facility] [place] [facility] [place]  Service: General;  Laterality: N/A;   • COLONOSCOPY     • TUBAL ABDOMINAL LIGATION     • WISDOM TOOTH EXTRACTION         Family History   Problem Relation Age of Onset   • Heart disease Father    • Heart disease Other     • Heart disease Other    • Heart disease Other    • Diabetes type II Other        Social History     Tobacco Use   • Smoking status: Never Smoker   • Smokeless tobacco: Never Used   Vaping Use   • Vaping Use: Never used   Substance Use Topics   • Alcohol use: Never   • Drug use: Never       Review of Systems  All systems were reviewed and negative except for:   Review of Systems   Constitutional: Negative for chills, fever and unexpected weight loss.   HENT: Negative for congestion, nosebleeds and voice change.    Eyes: Negative for blurred vision, double vision and discharge.   Respiratory: Negative for apnea, chest tightness and shortness of breath.    Cardiovascular: Negative for chest pain and leg swelling.   Gastrointestinal:        See HPI   Endocrine: Negative for cold intolerance and heat intolerance.   Genitourinary: Negative for dysuria, hematuria and urgency.   Musculoskeletal: Negative for back pain, joint swelling and neck pain.   Skin: Negative for color change and dry skin.   Neurological: Negative for dizziness and confusion.   Hematological: Negative for adenopathy.   Psychiatric/Behavioral: Negative for agitation and behavioral problems.     MEDS:  Prior to Admission medications    Medication Sig Start Date End Date Taking? Authorizing Provider   aspirin (aspirin) 81 MG EC tablet Take 81 mg by mouth Daily. Pt reported per dr crespo can continue asa for surgery, verified with office 1/2/21 12/28/21 Yes ProviderBrock MD   dicyclomine (BENTYL) 20 MG tablet Take 1 tablet by mouth Every 6 (Six) Hours. 7/2/21  Yes Ramirez Coley MD   insulin detemir (Levemir) 100 UNIT/ML injection Inject 10 Units under the skin into the appropriate area as directed Every Night. 8/9/21  Yes Jess Maria MD   loratadine (CLARITIN) 10 MG tablet Take 10 mg by mouth Daily.   Yes ProviderBrock MD   metoprolol succinate XL (TOPROL-XL) 25 MG 24 hr tablet Take 50 mg by mouth Every Night. Takes 2  "tabs   Yes Brock Govea MD   montelukast (SINGULAIR) 10 MG tablet Take 10 mg by mouth Every Night.   Yes Brock Govea MD   ondansetron (Zofran) 4 MG tablet Take 1 tablet by mouth Every 8 (Eight) Hours As Needed for Nausea or Vomiting. 7/27/21  Yes Татьяна Sewell APRN   polyethylene glycol (MIRALAX) 17 g packet Take 17 g by mouth Daily. 7/16/21  Yes Robert Araya MD   Semaglutide, 1 MG/DOSE, (Ozempic, 1 MG/DOSE,) 2 MG/1.5ML solution pen-injector Inject 1 mg under the skin into the appropriate area as directed 1 (One) Time Per Week.  Patient taking differently: Inject 1 mg under the skin into the appropriate area as directed 1 (One) Time Per Week. Instructed per anesthesia standing orders 7/2/21  Yes Jess Maria MD   Vortioxetine HBr (Trintellix) 5 MG tablet Take 5 mg by mouth Daily With Breakfast. 6/21/21  Yes Jess Maria MD   ondansetron ODT (ZOFRAN-ODT) 4 MG disintegrating tablet Place 1 tablet on the tongue Every 8 (Eight) Hours As Needed for Nausea or Vomiting. 7/2/21   Ramirez Coley MD   oxyCODONE-acetaminophen (Percocet) 5-325 MG per tablet Take 1 tablet by mouth Every 6 (Six) Hours As Needed for Moderate Pain . 7/16/21   Robert Araya MD        Allergies:  Amoxicillin, Hydroxyzine, and Hydroxyzine hcl    Objective     Vital Signs   Resp:  [16] 16    Physical Exam: Incisions are clean dry and intact with evidence of infection, no hernia, soft, nontender  Results Review:   {Results Review:25078::\"I reviewed the patient's new clinical results.\"    LABS/IMAGING:  Results for orders placed or performed in visit on 08/06/21   Comprehensive Metabolic Panel    Specimen: Blood   Result Value Ref Range    Glucose 272 (H) 65 - 99 mg/dL    BUN 9 6 - 20 mg/dL    Creatinine 0.49 (L) 0.57 - 1.00 mg/dL    Sodium 134 (L) 136 - 145 mmol/L    Potassium 4.3 3.5 - 5.2 mmol/L    Chloride 98 98 - 107 mmol/L    CO2 25.6 22.0 - 29.0 mmol/L    Calcium 9.6 8.6 - 10.5 mg/dL    " Total Protein 7.0 6.0 - 8.5 g/dL    Albumin 4.20 3.50 - 5.20 g/dL    ALT (SGPT) 12 1 - 33 U/L    AST (SGOT) 14 1 - 32 U/L    Alkaline Phosphatase 97 39 - 117 U/L    Total Bilirubin <0.2 0.0 - 1.2 mg/dL    eGFR Non African Amer >150 >60 mL/min/1.73    Globulin 2.8 gm/dL    A/G Ratio 1.5 g/dL    BUN/Creatinine Ratio 18.4 7.0 - 25.0    Anion Gap 10.4 5.0 - 15.0 mmol/L   CBC Auto Differential    Specimen: Blood   Result Value Ref Range    WBC 9.33 3.40 - 10.80 10*3/mm3    RBC 5.08 3.77 - 5.28 10*6/mm3    Hemoglobin 13.3 12.0 - 15.9 g/dL    Hematocrit 41.7 34.0 - 46.6 %    MCV 82.1 79.0 - 97.0 fL    MCH 26.2 (L) 26.6 - 33.0 pg    MCHC 31.9 31.5 - 35.7 g/dL    RDW 12.6 12.3 - 15.4 %    RDW-SD 37.6 37.0 - 54.0 fl    MPV 11.1 6.0 - 12.0 fL    Platelets 332 140 - 450 10*3/mm3    Neutrophil % 60.2 42.7 - 76.0 %    Lymphocyte % 30.5 19.6 - 45.3 %    Monocyte % 6.4 5.0 - 12.0 %    Eosinophil % 1.5 0.3 - 6.2 %    Basophil % 0.8 0.0 - 1.5 %    Immature Grans % 0.6 (H) 0.0 - 0.5 %    Neutrophils, Absolute 5.61 1.70 - 7.00 10*3/mm3    Lymphocytes, Absolute 2.85 0.70 - 3.10 10*3/mm3    Monocytes, Absolute 0.60 0.10 - 0.90 10*3/mm3    Eosinophils, Absolute 0.14 0.00 - 0.40 10*3/mm3    Basophils, Absolute 0.07 0.00 - 0.20 10*3/mm3    Immature Grans, Absolute 0.06 (H) 0.00 - 0.05 10*3/mm3    nRBC 0.0 0.0 - 0.2 /100 WBC        Result Review :     Assessment/Plan     Status post laparoscopic cholecystectomy 7/16/2021.  Pathology with cholecystitis, cholelithiasis, cholesterolosis.    Keep blood sugar well controlled.  Increase activity as tolerated.  Follow-up with me as needed.  All questions answered.  She agrees with the plan.  Thank for the consult.              This document has been electronically signed by Robert Araya MD  August 17, 2021 11:23 EDT

## 2021-08-25 ENCOUNTER — TELEPHONE (OUTPATIENT)
Dept: INTERNAL MEDICINE | Facility: CLINIC | Age: 26
End: 2021-08-25

## 2021-08-25 NOTE — TELEPHONE ENCOUNTER
Caller: Dorinda Sherwood    Relationship: Self    Best call back number: [phone]    Medication needed:   Requested Prescriptions      No prescriptions requested or ordered in this encounter   INSULIN NEEDLES    When do you need the refill by: ASAP    What additional details did the patient provide when requesting the medication: PATIENT DOES NOT HAVE ANY NEEDLES FOR HER INSULIN, PLEASE CALL TO ADVISE    Does the patient have less than a 3 day supply:  [x] Yes  [] No    What is the patient's preferred pharmacy: [facility] - [place] - [address] [facility] - [phone]  - [phone] FX

## 2021-09-10 ENCOUNTER — TELEMEDICINE (OUTPATIENT)
Dept: INTERNAL MEDICINE | Facility: CLINIC | Age: 26
End: 2021-09-10

## 2021-09-10 DIAGNOSIS — E11.65 TYPE 2 DIABETES MELLITUS WITH HYPERGLYCEMIA, WITH LONG-TERM CURRENT USE OF INSULIN (HCC): Primary | ICD-10-CM

## 2021-09-10 DIAGNOSIS — Z79.4 TYPE 2 DIABETES MELLITUS WITH HYPERGLYCEMIA, WITH LONG-TERM CURRENT USE OF INSULIN (HCC): Primary | ICD-10-CM

## 2021-09-10 DIAGNOSIS — F33.41 RECURRENT MAJOR DEPRESSIVE DISORDER, IN PARTIAL REMISSION (HCC): ICD-10-CM

## 2021-09-10 DIAGNOSIS — Z30.019 ENCOUNTER FOR INITIAL PRESCRIPTION OF CONTRACEPTIVES, UNSPECIFIED CONTRACEPTIVE: ICD-10-CM

## 2021-09-10 PROCEDURE — 99214 OFFICE O/P EST MOD 30 MIN: CPT | Performed by: INTERNAL MEDICINE

## 2021-09-10 RX ORDER — VORTIOXETINE 5 MG/1
5 TABLET, FILM COATED ORAL
Qty: 30 TABLET | Refills: 1 | Status: SHIPPED | OUTPATIENT
Start: 2021-09-10 | End: 2021-10-26 | Stop reason: SDUPTHER

## 2021-09-10 RX ORDER — INSULIN DETEMIR 100 [IU]/ML
15 INJECTION, SOLUTION SUBCUTANEOUS NIGHTLY
Qty: 10 ML | Refills: 2 | Status: SHIPPED | OUTPATIENT
Start: 2021-09-10 | End: 2021-10-26 | Stop reason: SDUPTHER

## 2021-09-10 RX ORDER — ETONOGESTREL AND ETHINYL ESTRADIOL 11.7; 2.7 MG/1; MG/1
INSERT, EXTENDED RELEASE VAGINAL
Qty: 4 EACH | Refills: 3 | Status: SHIPPED | OUTPATIENT
Start: 2021-09-10 | End: 2021-12-18

## 2021-09-10 RX ORDER — FLASH GLUCOSE SENSOR
KIT MISCELLANEOUS
Qty: 1 EACH | Refills: 1 | Status: SHIPPED | OUTPATIENT
Start: 2021-09-10 | End: 2021-11-11

## 2021-09-10 NOTE — PROGRESS NOTES
Chief Complaint  Follow up for vomiting and sugar issues    Subjective          Dorinda Sherwood presents to [facility]  History of Present Illness  Patient understanding that this is a telehealth visit.  I cannot perform a full physical exam, therefore something might be missed, or patient may need to come into the office for further evaluation.  Patient is understanding and consents to this type of visit.  My Chart    States that she went to [facility]  They took her sugar and it was 385  It has dropped into the 200's since getting different needles  In the morning when she wakes up her sugar is 200's  She has noticed that her eye sight is worsening    Does have chest pain, but this has gone on for years and it isn't changing    Breathing well currently unless she walks too fast    She has cut out her breads, potatoes and sugars  She just switched to this last month    Would like a birth control    Last period was beginning of sept  Objective   Vital Signs:   There were no vitals taken for this visit.    Physical Exam   Result Review :     Gen: well-nourished, no acute distress, obese  HENT: atraumatic, normocephalic  Eyes: extraocular movements intact, no scleral icterus  Lung: breathing comfortably, no cough  Skin: no visible rash, no lesions  Neuro: grossly oriented to person, place, and time. no facial droop   Psych: normal mood and affect    Common labs    Common Labsle 7/2/21 7/2/21 7/12/21 8/6/21 8/6/21    0938 0938  1300 1300   Glucose  246 (A) 245 (A)  272 (A)   BUN  9 6  9   Creatinine  0.49 (A) 0.50 (A)  0.49 (A)   eGFR Non African Am  >150 149  >150   Sodium  130 (A) 135 (A)  134 (A)   Potassium  4.0 4.1  4.3   Chloride  97 (A) 99  98   Calcium  8.7 9.3  9.6   Albumin  3.90   4.20   Total Bilirubin  0.3   <0.2   Alkaline Phosphatase  91   97   AST (SGOT)  17   14   ALT (SGPT)  16   12   WBC 9.82   9.33    Hemoglobin 13.6   13.3    Hematocrit 41.4   41.7     Platelets 281   332    (A) Abnormal value               Procedures      Assessment and Plan    Diagnoses and all orders for this visit:    1. Type 2 diabetes mellitus with hyperglycemia, with long-term current use of insulin (CMS/[facility]) (Primary)  Comments:  will try to get long term monitoring device; will adjust levemir  Orders:  -     Ambulatory Referral to Diabetic Education    2. Encounter for initial prescription of contraceptives, unspecified contraceptive  Comments:  will try nuvaring    3. Recurrent major depressive disorder, in partial remission ([facility])  Assessment & Plan:  Will try trintellix      Other orders  -     insulin detemir (Levemir) 100 UNIT/ML injection; Inject 15 Units under the skin into the appropriate area as directed Every Night.  Dispense: 10 mL; Refill: 2  -     Continuous Blood Gluc Sensor (FreeStyle Fco Sensor System); Every 10 (Ten) Days.  Dispense: 1 each; Refill: 1  -     Continuous Blood Gluc  (FreeStyle Fco 2 [place]) device; 1 each 1 (One) Time for 1 dose.  Dispense: 1 each; Refill: 0  -     Vortioxetine HBr (Trintellix) 5 MG tablet; Take 5 mg by mouth Daily With Breakfast.  Dispense: 30 tablet; Refill: 1  -     etonogestrel-ethinyl estradiol (NuvaRing) 0.12-0.015 MG/24HR vaginal ring; Insert vaginally and leave in place for 3 consecutive weeks, then remove for 1 week.  Dispense: 4 each; Refill: 3            Follow Up   Return in about 1 month (around 10/10/2021).  Patient was given instructions and counseling regarding her condition or for health maintenance advice. Please see specific information pulled into the AVS if appropriate.

## 2021-09-15 RX ORDER — OMEPRAZOLE 40 MG/1
40 CAPSULE, DELAYED RELEASE ORAL DAILY
Qty: 90 CAPSULE | Refills: 1 | Status: SHIPPED | OUTPATIENT
Start: 2021-09-15 | End: 2022-03-28 | Stop reason: SDUPTHER

## 2021-09-15 RX ORDER — ONDANSETRON 4 MG/1
4 TABLET, ORALLY DISINTEGRATING ORAL EVERY 8 HOURS PRN
Qty: 30 TABLET | Refills: 0 | Status: SHIPPED | OUTPATIENT
Start: 2021-09-15 | End: 2021-11-10 | Stop reason: SDUPTHER

## 2021-09-17 ENCOUNTER — TELEPHONE (OUTPATIENT)
Dept: INTERNAL MEDICINE | Facility: CLINIC | Age: 26
End: 2021-09-17

## 2021-09-17 NOTE — TELEPHONE ENCOUNTER
Caller: Dorinda Sherwood    Relationship: Self    Best call back number: [phone]    What is the best time to reach you: ANYTIME    Who are you requesting to speak with (clinical staff, provider,  specific staff member): CLINICAL    What was the call regarding: PATIENT IS VOMITING AND HAVING DIARRHEA AND HAD AN APPOINTMENT SCHEDULED FOR MONDAY, BUT CANNOT MAKE THAT TIME. SHE WOULD LIKE TO DISCUSS WHAT SHE CAN TAKE TO HELP HER FEEL BETTER.     Do you require a callback: YES

## 2021-09-29 DIAGNOSIS — E11.65 TYPE 2 DIABETES MELLITUS WITH HYPERGLYCEMIA, WITH LONG-TERM CURRENT USE OF INSULIN (HCC): Primary | ICD-10-CM

## 2021-09-29 DIAGNOSIS — Z79.4 TYPE 2 DIABETES MELLITUS WITH HYPERGLYCEMIA, WITH LONG-TERM CURRENT USE OF INSULIN (HCC): Primary | ICD-10-CM

## 2021-10-26 ENCOUNTER — TELEMEDICINE (OUTPATIENT)
Dept: INTERNAL MEDICINE | Facility: CLINIC | Age: 26
End: 2021-10-26

## 2021-10-26 DIAGNOSIS — F33.41 RECURRENT MAJOR DEPRESSIVE DISORDER, IN PARTIAL REMISSION (HCC): ICD-10-CM

## 2021-10-26 DIAGNOSIS — Z79.4 TYPE 2 DIABETES MELLITUS WITH HYPERGLYCEMIA, WITH LONG-TERM CURRENT USE OF INSULIN (HCC): Primary | ICD-10-CM

## 2021-10-26 DIAGNOSIS — E11.65 TYPE 2 DIABETES MELLITUS WITH HYPERGLYCEMIA, WITH LONG-TERM CURRENT USE OF INSULIN (HCC): Primary | ICD-10-CM

## 2021-10-26 DIAGNOSIS — B37.9 YEAST INFECTION: ICD-10-CM

## 2021-10-26 PROCEDURE — 99214 OFFICE O/P EST MOD 30 MIN: CPT | Performed by: INTERNAL MEDICINE

## 2021-10-26 RX ORDER — INSULIN DETEMIR 100 [IU]/ML
20 INJECTION, SOLUTION SUBCUTANEOUS NIGHTLY
Qty: 10 ML | Refills: 2 | Status: SHIPPED | OUTPATIENT
Start: 2021-10-26 | End: 2021-12-13 | Stop reason: SDUPTHER

## 2021-10-26 RX ORDER — FLUCONAZOLE 100 MG/1
100 TABLET ORAL DAILY
Qty: 3 TABLET | Refills: 0 | Status: SHIPPED | OUTPATIENT
Start: 2021-10-26 | End: 2021-12-13

## 2021-10-26 RX ORDER — VORTIOXETINE 5 MG/1
10 TABLET, FILM COATED ORAL
Qty: 90 TABLET | Refills: 1 | Status: SHIPPED | OUTPATIENT
Start: 2021-10-26 | End: 2021-11-09

## 2021-10-26 RX ORDER — CALCIUM CARBONATE 200(500)MG
1 TABLET,CHEWABLE ORAL DAILY
Qty: 60 TABLET | Refills: 1 | Status: SHIPPED | OUTPATIENT
Start: 2021-10-26 | End: 2021-12-18

## 2021-10-26 RX ORDER — NYSTATIN 100000 U/G
1 CREAM TOPICAL 2 TIMES DAILY
Qty: 30 G | Refills: 1 | Status: SHIPPED | OUTPATIENT
Start: 2021-10-26 | End: 2021-12-18

## 2021-10-26 NOTE — PROGRESS NOTES
"Chief Complaint  Follow up for diabetes    Subjective          Dorinda Sherwood presents to [facility]  History of Present Illness    Patient understanding that this is a telehealth visit.  I cannot perform a full physical exam, therefore something might be missed, or patient may need to come into the office for further evaluation.  Patient is understanding and consents to this type of visit.  Doximity    States that she would like to increase her depression medicine  States taht she has a hearing coming up in family court  She has been crying and doesn't want to get up and move around  Not eating and then times where she eats a lot  States that the trintellix did help when she first started taking this  However the more \"drama\" she has with her ex   At night she notices some anxiety    Breathing well, uses her rescue inhaler \"once in a blue moon\"  Does get some right sided lower chest upper abd pain when she eats  Otherwise no chest pain    DM II-  States that her private area is starting to itch again  Sugars running about 180-200      Objective   Vital Signs:   There were no vitals taken for this visit.    Physical Exam   Result Review :     Gen: well-nourished, no acute distress  HENT: atraumatic, normocephalic  Eyes: extraocular movements intact, no scleral icterus  Lung: breathing comfortably, no cough  Skin: no visible rash, no lesions  Neuro: grossly oriented to person, place, and time. no facial droop   Psych: normal mood and affect      Common labs    Common Labsle 7/2/21 7/2/21 7/12/21 8/6/21 8/6/21    0938 0938  1300 1300   Glucose  246 (A) 245 (A)  272 (A)   BUN  9 6  9   Creatinine  0.49 (A) 0.50 (A)  0.49 (A)   eGFR Non African Am  >150 149  >150   Sodium  130 (A) 135 (A)  134 (A)   Potassium  4.0 4.1  4.3   Chloride  97 (A) 99  98   Calcium  8.7 9.3  9.6   Albumin  3.90   4.20   Total Bilirubin  0.3   <0.2   Alkaline Phosphatase  91   97   AST (SGOT)  " 17   14   ALT (SGPT)  16   12   WBC 9.82   9.33    Hemoglobin 13.6   13.3    Hematocrit 41.4   41.7    Platelets 281   332    (A) Abnormal value               Procedures      Assessment and Plan    Diagnoses and all orders for this visit:    1. Type 2 diabetes mellitus with hyperglycemia, with long-term current use of insulin (HCC) (Primary)  Assessment & Plan:  Will adjust insulin  Cont to monitor closely      2. Yeast infection  Comments:  diflucan     3. Recurrent major depressive disorder, in partial remission ([facility])  Assessment & Plan:  Will increase trintellix to 10mg daily      Other orders  -     Vortioxetine HBr (Trintellix) 5 MG tablet; Take 10 mg by mouth Daily With Breakfast.  Dispense: 90 tablet; Refill: 1  -     calcium carbonate (Tums) 500 MG chewable tablet; Chew 1 tablet Daily.  Dispense: 60 tablet; Refill: 1  -     insulin detemir (Levemir) 100 UNIT/ML injection; Inject 20 Units under the skin into the appropriate area as directed Every Night.  Dispense: 10 mL; Refill: 2  -     fluconazole (Diflucan) 100 MG tablet; Take 1 tablet by mouth Daily.  Dispense: 3 tablet; Refill: 0  -     nystatin (MYCOSTATIN) 796575 UNIT/GM cream; Apply 1 application topically to the appropriate area as directed 2 (Two) Times a Day.  Dispense: 30 g; Refill: 1            Follow Up   No follow-ups on file.  Patient was given instructions and counseling regarding her condition or for health maintenance advice. Please see specific information pulled into the AVS if appropriate.

## 2021-10-28 ENCOUNTER — CLINICAL SUPPORT (OUTPATIENT)
Dept: INTERNAL MEDICINE | Facility: CLINIC | Age: 26
End: 2021-10-28

## 2021-10-28 DIAGNOSIS — E11.65 TYPE 2 DIABETES MELLITUS WITH HYPERGLYCEMIA, WITH LONG-TERM CURRENT USE OF INSULIN (HCC): ICD-10-CM

## 2021-10-28 DIAGNOSIS — Z79.4 TYPE 2 DIABETES MELLITUS WITH HYPERGLYCEMIA, WITH LONG-TERM CURRENT USE OF INSULIN (HCC): ICD-10-CM

## 2021-10-28 LAB
ALBUMIN SERPL-MCNC: 4.6 G/DL (ref 3.5–5.2)
ALBUMIN/GLOB SERPL: 1.6 G/DL
ALP SERPL-CCNC: 83 U/L (ref 39–117)
ALT SERPL W P-5'-P-CCNC: 20 U/L (ref 1–33)
ANION GAP SERPL CALCULATED.3IONS-SCNC: 10.4 MMOL/L (ref 5–15)
AST SERPL-CCNC: 19 U/L (ref 1–32)
BASOPHILS # BLD AUTO: 0.04 10*3/MM3 (ref 0–0.2)
BASOPHILS NFR BLD AUTO: 0.6 % (ref 0–1.5)
BILIRUB SERPL-MCNC: 0.3 MG/DL (ref 0–1.2)
BUN SERPL-MCNC: 8 MG/DL (ref 6–20)
BUN/CREAT SERPL: 16.3 (ref 7–25)
CALCIUM SPEC-SCNC: 9.1 MG/DL (ref 8.6–10.5)
CHLORIDE SERPL-SCNC: 95 MMOL/L (ref 98–107)
CHOLEST SERPL-MCNC: 187 MG/DL (ref 0–200)
CO2 SERPL-SCNC: 27.6 MMOL/L (ref 22–29)
CREAT SERPL-MCNC: 0.49 MG/DL (ref 0.57–1)
DEPRECATED RDW RBC AUTO: 35.4 FL (ref 37–54)
EOSINOPHIL # BLD AUTO: 0.09 10*3/MM3 (ref 0–0.4)
EOSINOPHIL NFR BLD AUTO: 1.3 % (ref 0.3–6.2)
ERYTHROCYTE [DISTWIDTH] IN BLOOD BY AUTOMATED COUNT: 12.4 % (ref 12.3–15.4)
GFR SERPL CREATININE-BSD FRML MDRD: >150 ML/MIN/1.73
GLOBULIN UR ELPH-MCNC: 2.8 GM/DL
GLUCOSE SERPL-MCNC: 302 MG/DL (ref 65–99)
HBA1C MFR BLD: 10.42 % (ref 4.8–5.6)
HCT VFR BLD AUTO: 42.9 % (ref 34–46.6)
HDLC SERPL-MCNC: 30 MG/DL (ref 40–60)
HGB BLD-MCNC: 14.5 G/DL (ref 12–15.9)
IMM GRANULOCYTES # BLD AUTO: 0.05 10*3/MM3 (ref 0–0.05)
IMM GRANULOCYTES NFR BLD AUTO: 0.7 % (ref 0–0.5)
LDLC SERPL CALC-MCNC: 107 MG/DL (ref 0–100)
LDLC/HDLC SERPL: 3.32 {RATIO}
LYMPHOCYTES # BLD AUTO: 2.34 10*3/MM3 (ref 0.7–3.1)
LYMPHOCYTES NFR BLD AUTO: 32.9 % (ref 19.6–45.3)
MCH RBC QN AUTO: 27.1 PG (ref 26.6–33)
MCHC RBC AUTO-ENTMCNC: 33.8 G/DL (ref 31.5–35.7)
MCV RBC AUTO: 80.2 FL (ref 79–97)
MONOCYTES # BLD AUTO: 0.47 10*3/MM3 (ref 0.1–0.9)
MONOCYTES NFR BLD AUTO: 6.6 % (ref 5–12)
NEUTROPHILS NFR BLD AUTO: 4.12 10*3/MM3 (ref 1.7–7)
NEUTROPHILS NFR BLD AUTO: 57.9 % (ref 42.7–76)
NRBC BLD AUTO-RTO: 0 /100 WBC (ref 0–0.2)
PLATELET # BLD AUTO: 302 10*3/MM3 (ref 140–450)
PMV BLD AUTO: 11.6 FL (ref 6–12)
POTASSIUM SERPL-SCNC: 4.3 MMOL/L (ref 3.5–5.2)
PROT SERPL-MCNC: 7.4 G/DL (ref 6–8.5)
RBC # BLD AUTO: 5.35 10*6/MM3 (ref 3.77–5.28)
SODIUM SERPL-SCNC: 133 MMOL/L (ref 136–145)
TRIGL SERPL-MCNC: 287 MG/DL (ref 0–150)
TSH SERPL DL<=0.05 MIU/L-ACNC: 1.44 UIU/ML (ref 0.27–4.2)
VLDLC SERPL-MCNC: 50 MG/DL (ref 5–40)
WBC # BLD AUTO: 7.11 10*3/MM3 (ref 3.4–10.8)

## 2021-10-28 PROCEDURE — 80053 COMPREHEN METABOLIC PANEL: CPT | Performed by: INTERNAL MEDICINE

## 2021-10-28 PROCEDURE — 36415 COLL VENOUS BLD VENIPUNCTURE: CPT | Performed by: INTERNAL MEDICINE

## 2021-10-28 PROCEDURE — 84443 ASSAY THYROID STIM HORMONE: CPT | Performed by: INTERNAL MEDICINE

## 2021-10-28 PROCEDURE — 80061 LIPID PANEL: CPT | Performed by: INTERNAL MEDICINE

## 2021-10-28 PROCEDURE — 83036 HEMOGLOBIN GLYCOSYLATED A1C: CPT | Performed by: INTERNAL MEDICINE

## 2021-10-28 PROCEDURE — 85025 COMPLETE CBC W/AUTO DIFF WBC: CPT | Performed by: INTERNAL MEDICINE

## 2021-11-09 ENCOUNTER — TELEMEDICINE (OUTPATIENT)
Dept: INTERNAL MEDICINE | Facility: CLINIC | Age: 26
End: 2021-11-09

## 2021-11-09 ENCOUNTER — REFERRAL TRIAGE (OUTPATIENT)
Dept: CASE MANAGEMENT | Facility: OTHER | Age: 26
End: 2021-11-09

## 2021-11-09 ENCOUNTER — TELEPHONE (OUTPATIENT)
Dept: CASE MANAGEMENT | Facility: OTHER | Age: 26
End: 2021-11-09

## 2021-11-09 DIAGNOSIS — E11.43 TYPE 2 DIABETES MELLITUS WITH DIABETIC AUTONOMIC NEUROPATHY, WITH LONG-TERM CURRENT USE OF INSULIN (HCC): ICD-10-CM

## 2021-11-09 DIAGNOSIS — Z79.4 TYPE 2 DIABETES MELLITUS WITH HYPERGLYCEMIA, WITH LONG-TERM CURRENT USE OF INSULIN (HCC): ICD-10-CM

## 2021-11-09 DIAGNOSIS — Z79.4 TYPE 2 DIABETES MELLITUS WITH DIABETIC AUTONOMIC NEUROPATHY, WITH LONG-TERM CURRENT USE OF INSULIN (HCC): ICD-10-CM

## 2021-11-09 DIAGNOSIS — E11.65 TYPE 2 DIABETES MELLITUS WITH HYPERGLYCEMIA, WITH LONG-TERM CURRENT USE OF INSULIN (HCC): ICD-10-CM

## 2021-11-09 DIAGNOSIS — F33.41 RECURRENT MAJOR DEPRESSIVE DISORDER, IN PARTIAL REMISSION (HCC): Primary | ICD-10-CM

## 2021-11-09 DIAGNOSIS — F41.9 ANXIETY: ICD-10-CM

## 2021-11-09 PROCEDURE — 99214 OFFICE O/P EST MOD 30 MIN: CPT | Performed by: NURSE PRACTITIONER

## 2021-11-09 RX ORDER — DESVENLAFAXINE SUCCINATE 50 MG/1
50 TABLET, EXTENDED RELEASE ORAL DAILY
Qty: 30 TABLET | Refills: 1 | Status: SHIPPED | OUTPATIENT
Start: 2021-11-09 | End: 2022-03-04

## 2021-11-09 NOTE — PROGRESS NOTES
Chief Complaint  Anxiety, Depression, and Hand Pain     Patient agrees to participate in a telemedicine evaluation performed by YOKO Sampson.  Patient authorizes the electronic transmission of medical information and/or videoconference session. Patient understands that he/she can still pursue face-to-face consultation if desired. Patient understands that as with any technology, telemedicine does have its limitations. There is no guarantee, therefore, that this telemedicine session will eliminate the need for patient to see a provider in person if the provider feels a physical exams or vital signs are neccessary to care for the patient. Patient understands and would like to proceed with telemedicine visit at this time.    Subjective         Dorinda Sherwood presents to [facility] for Follow-up on anxiety, depression and left thumb pain.    Patient was last seen by Dr. Jess Maria for major depression 4 weeks ago, she was started on Trintellix at the time.  Patient has been taking this as prescribed.  She reports that she feels no difference.  She still feels depressed at times, she feels more anxiety currently.  She is been battling with the custody hearing over her children.  She states she is trying to stay happy for them, making sure the home is taking care of that there is food on the table.  She feels lots of stress currently.  She has been going to counseling and her counselor had mentioned that it might be a good idea for her to be on 2 different medications.  1 for depression and 1 for anxiety.  She states that she has tried several different types of medication and she cannot remember which ones worked in the past and which ones did not.  Previous to Trintellix it does not appear that she was on any other medication for depression or anxiety.    Patient also complaining of left discomfort, she reports that there was a small piece of skin that she tore off around the nail  yesterday.  She described it as a hangnail.  It did bleed immediately after, she cleaned it with peroxide and put a Band-Aid on it.  She reports that it is  today.  She does not report any fever, chills, redness, swelling, or drainage.         Review of Systems   Constitutional: Negative for chills, fatigue and fever.   Respiratory: Positive for cough. Negative for shortness of breath.    Gastrointestinal: Negative for diarrhea, nausea and vomiting.   Psychiatric/Behavioral: Positive for dysphoric mood. Negative for agitation, confusion, self-injury and suicidal ideas. The patient is nervous/anxious. The patient is not hyperactive.        Objective   Vital Signs:   There were no vitals taken for this visit.    Physical Exam   Gen: well-nourished, no acute distress  HENT: atraumatic, normocephalic  Eyes: extraocular movements intact, no scleral icterus  Lung: breathing comfortably, no cough  Skin: no visible rash, no lesions  Neuro: grossly oriented to person, place, and time. no facial droop   Psych: normal mood and affect  Result Review :                   Diagnoses and all orders for this visit:    1. Recurrent major depressive disorder, in partial remission (HCC) (Primary)  Assessment & Plan:  Patient reports no significant changes on Trintellix.  She is wanting to switch.  Did discuss this with Dr. Jess Maria, we will discontinue Trintellix and start Pristiq, 50 mg daily.  We will follow-up in 4 weeks and see how things are going.      2. Anxiety    3. Type 2 diabetes mellitus with diabetic autonomic neuropathy, with long-term current use of insulin ([facility])  -     Ambulatory Referral to Chronic Care Management    4. Type 2 diabetes mellitus with hyperglycemia, with long-term current use of insulin ([facility])  Assessment & Plan:  Patient reports that her blood glucoses have still consistently run over 200s.  Patient had several questions regarding her insulins.  Did not make any changes at this current  point in time, however we will refer to chronic care case management to assist in her diabetic goal.  She was referred to diabetes education, however it is extremely hard for her to get to appointments.  I did however encourage her to reach out to them again and see if they can reschedule her appointment is a do believe it would be extremely beneficial.  She was okay with this plan.      Other orders  -     desvenlafaxine (Pristiq) 50 MG 24 hr tablet; Take 1 tablet by mouth Daily.  Dispense: 30 tablet; Refill: 1        Follow Up   No follow-ups on file.  Patient was given instructions and counseling regarding her condition or for health maintenance advice. Please see specific information pulled into the AVS if appropriate.     Abel Moreno, APRN  11/9/2021  This note was electronically signed.

## 2021-11-09 NOTE — ASSESSMENT & PLAN NOTE
Patient reports that her blood glucoses have still consistently run over 200s.  Patient had several questions regarding her insulins.  Did not make any changes at this current point in time, however we will refer to chronic care case management to assist in her diabetic goal.  She was referred to diabetes education, however it is extremely hard for her to get to appointments.  I did however encourage her to reach out to them again and see if they can reschedule her appointment is a do believe it would be extremely beneficial.  She was okay with this plan.

## 2021-11-09 NOTE — ASSESSMENT & PLAN NOTE
Patient reports no significant changes on Trintellix.  She is wanting to switch.  Did discuss this with Dr. Jess Maria, we will discontinue Trintellix and start Pristiq, 50 mg daily.  We will follow-up in 4 weeks and see how things are going.

## 2021-11-10 ENCOUNTER — TELEPHONE (OUTPATIENT)
Dept: INTERNAL MEDICINE | Facility: CLINIC | Age: 26
End: 2021-11-10

## 2021-11-10 ENCOUNTER — PATIENT OUTREACH (OUTPATIENT)
Dept: CASE MANAGEMENT | Facility: OTHER | Age: 26
End: 2021-11-10

## 2021-11-10 DIAGNOSIS — E11.65 TYPE 2 DIABETES MELLITUS WITH HYPERGLYCEMIA, WITH LONG-TERM CURRENT USE OF INSULIN (HCC): Primary | ICD-10-CM

## 2021-11-10 DIAGNOSIS — Z79.4 TYPE 2 DIABETES MELLITUS WITH HYPERGLYCEMIA, WITH LONG-TERM CURRENT USE OF INSULIN (HCC): Primary | ICD-10-CM

## 2021-11-10 DIAGNOSIS — F33.41 RECURRENT MAJOR DEPRESSIVE DISORDER, IN PARTIAL REMISSION (HCC): ICD-10-CM

## 2021-11-10 PROCEDURE — 99490 CHRNC CARE MGMT STAFF 1ST 20: CPT | Performed by: INTERNAL MEDICINE

## 2021-11-10 PROCEDURE — 99439 CHRNC CARE MGMT STAF EA ADDL: CPT | Performed by: INTERNAL MEDICINE

## 2021-11-10 RX ORDER — ONDANSETRON 4 MG/1
4 TABLET, ORALLY DISINTEGRATING ORAL EVERY 8 HOURS PRN
Qty: 30 TABLET | Refills: 0 | Status: SHIPPED | OUTPATIENT
Start: 2021-11-10 | End: 2022-03-28

## 2021-11-10 NOTE — OUTREACH NOTE
Ambulatory Case Management Note    CCM Interim Update    Pt referred by PCP for uncontrolled A1C. Pt's A1C was 10.42. Pt on two insulins but no short acting insulin or oral meds.    Called pt who agreed to CCM. She agreed to Arcelia BABCOCK consult, f/u with PCP, and I will look into getting her FreeStyle Fco approved. Pt stated her A1C was not up to date, per insurance, to qualify for meter. Pt wants insulin pump in the future too. I called and lmtrc for [place]. Pt given education on s/s high blood sugar and diet.    Refilled zofran for nausea    CCM Interim Update    11/11: Called and made appt with Arcelia Mohan for 11/18 0845  Lmtrc with pharmacy regarding meter.  Called pt and lmtrc these updates    CCM Interim Update    Called pt again and confirmed appt. I also heard back from pharmacy who said pt's insurance does not cover Freestyle but does cover Dexcom 6. She also agreed to use Dexcom 6. I will send in to specialty pharmacy in [place].    SDOH- pt said part of her anxiety/depression is pushing people away. But she does see a counselor (today at 12 via zoom) who helps her with this. Meds managed by PCP. Pt agreed to see if her new med will work and if not, will refer to psych after next pcp appt        Care Plan: Diabetes   Updates made since 11/10/2021 12:00 AM      Problem: DIET MANAGEMENT       Goal: Learn to Manage Calories       Task: Provide resources for diabetic diet    Responsible User: Katharine Pedro RN      Task: Provide resources on counting calories    Responsible User: Katharine Pedro RN      Problem: HBA1C UNCONTROLLED       Goal: Establish Regular Follow-Ups with PCP Completed 11/10/2021      Task: Refer patient to PCP Completed 11/10/2021   Responsible User: Katharine Pedro RN      Task: Determine patient's next PCP visit Completed 11/10/2021   Responsible User: Katharine Pedro RN      Task: Discuss schedule for PCP visits with patient Completed 11/10/2021    Responsible User: Katharine Pedro RN      Goal: Reduce HbA1c levels below 9%       Task: Educate patient on diet and exercise    Responsible User: Katharine Pedro RN      Task: Educate patient on regular BS checks Completed 11/10/2021   Responsible User: Katharine Pedro RN      Task: Discuss diabetes treatment plan with patient Completed 11/10/2021   Responsible User: Katharine Pedro RN      Problem: MEDICATION ADHERENCE       Goal: Consistently take medications as prescribed       Task: Educate patient on frequency and refill details of meds    Responsible User: Katharine Pedro RN      Problem: PATIENT IS INACTIVE       Goal: Exercise at least 20 minutes per day       Task: Discuss barriers to activity with patient. Update SDOH.    Responsible User: Katharine Pedro RN      Task: Develop exercise plan with patient    Responsible User: Katharine Pedro RN      Task: Explore community resources including walking groups, assistance programs, and home videos.    Responsible User: Katharine Pedro RN      Problem: KNOWLEDGE DEFICIT       Goal: Patient able to teach back disease management       Task: Refer to [facility] Certified Diabetic Educators Completed 11/10/2021   Responsible User: Katharine Pedro RN      Task: Provide diabetic educational resources Completed 11/10/2021   Responsible User: Katharine Pedro RN   Note:    Referred to Arcelia Mohan     Task: Educate on hyper/hypo-glycemia signs and symptoms Completed 11/10/2021   Responsible User: Katharine Pedro RN      Task: Educate/provide resource for ADA blood glucose and HbA1c targets    Responsible User: Katharine Pedro RN      Task: Educate on health prevention for diabetes: eye/foot exam, nephropathy screening, and potential statin use    Responsible User: Katharine Pedro RN Kimberly R Cochran, RN  Ambulatory Case Management    11/10/2021, 16:19 EST

## 2021-11-10 NOTE — TELEPHONE ENCOUNTER
Caller: Dorinda Sherwood    Relationship: Self    Best call back number: [phone]     What medications are you currently taking: desvenlafaxine (Pristiq) 50 MG 24 hr tablet  TRINTELLIX    Who prescribed you this medication: DR DUNCAN     What are your concerns: PATIENT IS NEEDING TO KNOW IF SHE CAN TAKE THE TWO MEDICATION LISTED ABOVE TOGETHER

## 2021-11-10 NOTE — TELEPHONE ENCOUNTER
I gave pt instructions on stopping Trintellix and starting Pristiq, per Abel Moreno's chart notes. She verbalized understanding. F/u appt with pcp made for 4 weeks.

## 2021-11-11 DIAGNOSIS — E11.65 TYPE 2 DIABETES MELLITUS WITH HYPERGLYCEMIA, WITH LONG-TERM CURRENT USE OF INSULIN (HCC): Primary | ICD-10-CM

## 2021-11-11 DIAGNOSIS — R73.09 HEMOGLOBIN A1C GREATER THAN 9.0%: ICD-10-CM

## 2021-11-11 DIAGNOSIS — Z79.4 TYPE 2 DIABETES MELLITUS WITH HYPERGLYCEMIA, WITH LONG-TERM CURRENT USE OF INSULIN (HCC): Primary | ICD-10-CM

## 2021-11-11 RX ORDER — PROCHLORPERAZINE 25 MG/1
SUPPOSITORY RECTAL
Qty: 2 EACH | Refills: 1 | Status: SHIPPED | OUTPATIENT
Start: 2021-11-11 | End: 2022-03-28 | Stop reason: SDUPTHER

## 2021-11-11 RX ORDER — PROCHLORPERAZINE 25 MG/1
1 SUPPOSITORY RECTAL
Qty: 1 EACH | Refills: 1 | Status: SHIPPED | OUTPATIENT
Start: 2021-11-11 | End: 2021-11-12

## 2021-11-11 RX ORDER — PROCHLORPERAZINE 25 MG/1
1 SUPPOSITORY RECTAL CONTINUOUS
Qty: 1 EACH | Refills: 0 | Status: SHIPPED | OUTPATIENT
Start: 2021-11-11 | End: 2023-02-15

## 2021-11-17 ENCOUNTER — PATIENT OUTREACH (OUTPATIENT)
Dept: CASE MANAGEMENT | Facility: OTHER | Age: 26
End: 2021-11-17

## 2021-11-17 DIAGNOSIS — E11.65 TYPE 2 DIABETES MELLITUS WITH HYPERGLYCEMIA, WITH LONG-TERM CURRENT USE OF INSULIN (HCC): Primary | ICD-10-CM

## 2021-11-17 DIAGNOSIS — Z79.4 TYPE 2 DIABETES MELLITUS WITH HYPERGLYCEMIA, WITH LONG-TERM CURRENT USE OF INSULIN (HCC): Primary | ICD-10-CM

## 2021-11-17 NOTE — OUTREACH NOTE
Ambulatory Case Management Note    CCM Interim Update    Called and lmtrc reminding pt of Endo appt tomorrow        There are no recently modified care plans to display for this patient.      Katharine Pedro RN  Ambulatory Case Management    11/17/2021, 14:44 EST

## 2021-11-22 ENCOUNTER — TELEPHONE (OUTPATIENT)
Dept: INTERNAL MEDICINE | Facility: CLINIC | Age: 26
End: 2021-11-22

## 2021-11-22 NOTE — TELEPHONE ENCOUNTER
Red rule verified and correct.    Pt needing to get educated on how to use the Dexcom she just received by mail.

## 2021-11-24 ENCOUNTER — CLINICAL SUPPORT (OUTPATIENT)
Dept: INTERNAL MEDICINE | Facility: CLINIC | Age: 26
End: 2021-11-24

## 2021-11-24 NOTE — PROGRESS NOTES
Patient came in for assistance in applying dexcom g6 monitor. Patient tolerated well and left office in stable condition. Patient did not have any questions

## 2021-11-30 ENCOUNTER — PATIENT OUTREACH (OUTPATIENT)
Dept: CASE MANAGEMENT | Facility: OTHER | Age: 26
End: 2021-11-30

## 2021-11-30 DIAGNOSIS — Z79.4 TYPE 2 DIABETES MELLITUS WITH HYPERGLYCEMIA, WITH LONG-TERM CURRENT USE OF INSULIN (HCC): Primary | ICD-10-CM

## 2021-11-30 DIAGNOSIS — F33.41 RECURRENT MAJOR DEPRESSIVE DISORDER, IN PARTIAL REMISSION (HCC): ICD-10-CM

## 2021-11-30 DIAGNOSIS — R73.09 HEMOGLOBIN A1C GREATER THAN 9.0%: ICD-10-CM

## 2021-11-30 DIAGNOSIS — E11.65 TYPE 2 DIABETES MELLITUS WITH HYPERGLYCEMIA, WITH LONG-TERM CURRENT USE OF INSULIN (HCC): Primary | ICD-10-CM

## 2021-11-30 PROCEDURE — 99490 CHRNC CARE MGMT STAFF 1ST 20: CPT | Performed by: INTERNAL MEDICINE

## 2021-11-30 NOTE — OUTREACH NOTE
Ambulatory Case Management Note    [facility] Interim Update    On chart review, pt no-showed to her Arcelia Mohan [facility] appt this month. I called to inquire more. She went to the wrong location and missed the appt. She agreed to remake. I made it for 12-9 1pm. Called her with this info. She agreed to this.        There are no recently modified care plans to display for this patient.      Katharine Pedro RN  Ambulatory Case Management    11/30/2021, 11:01 EST

## 2021-11-30 NOTE — OUTREACH NOTE
[facility] End of Month Documentation    This Chronic Medical Management Care Plan for Dorinda Sherwood, 26 y.o. female, has been established; a new plan of care implemented and a new plan of care implemented for the month of November.  A cumulative time of 74  minutes was spent on this patient record this month, including face to face with provider; phone call with patient; electronic communication with primary care provider; chart review; phone call with other providers.    Regarding the patient's problems: has Calculus of gallbladder with chronic cholecystitis without obstruction; Type 2 diabetes mellitus with hyperglycemia, with long-term current use of insulin ([facility]); S/P cholecystectomy; Allergic rhinitis; Asthma; Atrial septal defect, secundum; Depression; and Anxiety on their problem list., the following items were addressed: medical records; medications; referrals to community service providers, referred to endo and any changes can be found within the plan section of the note.  A detailed listing of time spent for chronic care management is tracked within each outreach encounter.  Current medications include:  has a current medication list which includes the following prescription(s): aspirin, calcium carbonate, dexcom g6 , dexcom g6 sensor, desvenlafaxine, dicyclomine, etonogestrel-ethinyl estradiol, fluconazole, levemir, insulin pen needle, loratadine, metoprolol succinate xl, montelukast, nystatin, omeprazole, ondansetron odt, oxycodone-acetaminophen, polyethylene glycol, and ozempic (1 mg/dose). and the patient is reported to be patient is compliant with medication protocol,  Medications are reported to be non-effective in controlling symptoms and changes have been made to the medication protocol, referred to endo.  Regarding these diagnoses, referrals were made to the following provider(s):  Endo.  All notes on chart for PCP to review.    The patient was monitored remotely for blood glucose;  medications.    The patient's physical needs include:  physician referral; physical healthcare; DME supplies, wants insulin pump.     The patient's mental support needs include:  increased support    The patient's cognitive support needs include:  continued support    The patient's psychosocial support needs include:  continued support    The patient's functional needs include: DME; physical healthcare; physician referral    The patient's environmental needs include:  n/a    Care Plan overall comments:  No data recorded    Refer to previous outreach notes for more information on the areas listed above.    Monthly Billing Diagnoses  (E11.65,  Z79.4) Type 2 diabetes mellitus with hyperglycemia, with long-term current use of insulin (HCC)    (R73.09) Hemoglobin A1c greater than 9.0%    (F33.41) Recurrent major depressive disorder, in partial remission (HCC)    Medications   · Medications have been reconciled    Care Plan progress this month:      Recently Modified Care Plans Updates made since 10/30/2021 12:00 AM     Diabetes         Problem Priority Last Modified     DIET MANAGEMENT --  11/10/2021  4:15 PM by Katharine Pedro RN              Goal Recent Progress Last Modified     Learn to Manage Calories --  11/10/2021  4:15 PM by Katharine Pedro RN              Task Due Date Last Modified     Provide resources for diabetic diet --  11/10/2021  4:15 PM by Katharine Pedro RN        Provide resources on counting calories --  11/10/2021  4:15 PM by Katharine Pedro RN              Problem Priority Last Modified     HBA1C UNCONTROLLED --  11/10/2021  4:15 PM by Katharine Pedro RN              Goal Recent Progress Last Modified     Establish Regular Follow-Ups with PCP --  11/10/2021  4:15 PM by Kathraine Pedro RN              Task Due Date Last Modified     Refer patient to PCP --  11/10/2021  4:15 PM by Katharine Pedro RN        Determine patient's next PCP visit --  11/10/2021  4:15 PM  by Katharine Pedro RN        Discuss schedule for PCP visits with patient --  11/10/2021  4:15 PM by Katharine Pedro RN              Goal Recent Progress Last Modified     Reduce HbA1c levels below 9% --  11/10/2021  4:15 PM by Katharine Pedro RN              Task Due Date Last Modified     Educate patient on diet and exercise --  11/10/2021  4:15 PM by Katharine Pedro RN        Educate patient on regular BS checks --  11/10/2021  4:16 PM by Katharine Pedro RN        Discuss diabetes treatment plan with patient --  11/10/2021  4:16 PM by Katharine Pedro RN              Problem Priority Last Modified     MEDICATION ADHERENCE --  11/10/2021  4:15 PM by Katharine Pedro RN              Goal Recent Progress Last Modified     Consistently take medications as prescribed --  11/10/2021  4:15 PM by Katharine Pedro RN              Task Due Date Last Modified     Educate patient on frequency and refill details of meds --  11/10/2021  4:15 PM by Katharine Pedro RN              Problem Priority Last Modified     PATIENT IS INACTIVE --  11/10/2021  4:15 PM by Katharine Pedro RN              Goal Recent Progress Last Modified     Exercise at least 20 minutes per day --  11/10/2021  4:15 PM by Katharine Pedro RN              Task Due Date Last Modified     Discuss barriers to activity with patient. Update SDOH. --  11/10/2021  4:15 PM by Katharine Pedro RN        Develop exercise plan with patient --  11/10/2021  4:15 PM by Katharine Pedro RN        Explore community resources including walking groups, assistance programs, and home videos. --  11/10/2021  4:15 PM by Katharine Pedro RN              Problem Priority Last Modified     KNOWLEDGE DEFICIT --  11/10/2021  4:15 PM by Katharine Pedro RN              Goal Recent Progress Last Modified     Patient able to teach back disease management --  11/10/2021  4:15 PM by Katharine Pedro RN              Task  Due Date Last Modified     Refer to [facility] Certified Diabetic Educators --  11/10/2021  4:15 PM by Katharine Pedro RN        Provide diabetic educational resources --  11/10/2021  4:16 PM by Katharine Pedro RN     Referred to Arcelia Mohan       Educate on hyper/hypo-glycemia signs and symptoms --  11/10/2021  4:16 PM by Katharine Pedro RN        Educate/provide resource for ADA blood glucose and HbA1c targets --  11/10/2021  4:15 PM by Katharine Pedro RN        Educate on health prevention for diabetes: eye/foot exam, nephropathy screening, and potential statin use --  11/10/2021  4:15 PM by Katharine Pedro RN                      · Current Specialty Plan of Care Status signed by both patient and provider    Instructions   · Patient was provided an electronic copy of care plan  · CCM services were explained and offered and patient has accepted these services.  · Patient has given their written consent to receive CCM services and understands that this includes the authorization of electronic communication of medical information with the other treating providers.  · Patient understands that they may stop CCM services at any time and these changes will be effective at the end of the calendar month and will effectively revocate the agreement of CCM services.  · Patient understands that only one practitioner can furnish and be paid for CCM services during one calendar month.  Patient also understands that there may be co-payment or deductible fees in association with CCM services.  · Patient will continue with at least monthly follow-up calls with the Nurse Navigator.    Katharine Pedro RN  Ambulatory Case Management    11/30/2021, 11:02 EST

## 2021-12-01 ENCOUNTER — TELEPHONE (OUTPATIENT)
Dept: INTERNAL MEDICINE | Facility: CLINIC | Age: 26
End: 2021-12-01

## 2021-12-01 NOTE — TELEPHONE ENCOUNTER
Red rule verified and correct.    Pt woke this morning and Dexcom had come off.  Pt needing assistance.    Consulted GERDA Bassett, advised pt to watch Youtube video on Dexcom meter and use.    Relayed message from GERDA Bassett     Patient verbalized understanding.

## 2021-12-13 ENCOUNTER — TELEPHONE (OUTPATIENT)
Dept: INTERNAL MEDICINE | Facility: CLINIC | Age: 26
End: 2021-12-13

## 2021-12-13 ENCOUNTER — OFFICE VISIT (OUTPATIENT)
Dept: INTERNAL MEDICINE | Facility: CLINIC | Age: 26
End: 2021-12-13

## 2021-12-13 DIAGNOSIS — E11.65 TYPE 2 DIABETES MELLITUS WITH HYPERGLYCEMIA, WITH LONG-TERM CURRENT USE OF INSULIN (HCC): Primary | ICD-10-CM

## 2021-12-13 DIAGNOSIS — Z79.4 TYPE 2 DIABETES MELLITUS WITH HYPERGLYCEMIA, WITH LONG-TERM CURRENT USE OF INSULIN (HCC): Primary | ICD-10-CM

## 2021-12-13 PROCEDURE — 99213 OFFICE O/P EST LOW 20 MIN: CPT | Performed by: INTERNAL MEDICINE

## 2021-12-13 RX ORDER — INSULIN DETEMIR 100 [IU]/ML
30 INJECTION, SOLUTION SUBCUTANEOUS NIGHTLY
Qty: 10 ML | Refills: 2 | Status: SHIPPED | OUTPATIENT
Start: 2021-12-13 | End: 2022-03-28 | Stop reason: SDUPTHER

## 2021-12-13 RX ORDER — INSULIN LISPRO 200 [IU]/ML
5 INJECTION, SOLUTION SUBCUTANEOUS
Qty: 3 ML | Refills: 3 | Status: SHIPPED | OUTPATIENT
Start: 2021-12-13 | End: 2021-12-14

## 2021-12-13 RX ORDER — FEXOFENADINE HCL 180 MG/1
180 TABLET ORAL DAILY
Qty: 90 TABLET | Refills: 1 | Status: SHIPPED | OUTPATIENT
Start: 2021-12-13 | End: 2021-12-18

## 2021-12-13 NOTE — TELEPHONE ENCOUNTER
WARM TRANSFER UNSUCCESSFUL    Caller: Dorinda Sherwood    Relationship to patient: Self    Best call back number: [phone]     Patient is needing: PATIENT CALLED IN AND SAID SHE MISSED A CALL FROM OFFICE. HER APPOINTMENT IS FOR 11:30 TODAY AND SHE IS SHOWING AS CHECKED IN FOR HER VIDEO VISIT. PLEASE CALL PATIENT AND ADVISE.

## 2021-12-14 ENCOUNTER — TELEPHONE (OUTPATIENT)
Dept: INTERNAL MEDICINE | Facility: CLINIC | Age: 26
End: 2021-12-14

## 2021-12-14 ENCOUNTER — PATIENT OUTREACH (OUTPATIENT)
Dept: CASE MANAGEMENT | Facility: OTHER | Age: 26
End: 2021-12-14

## 2021-12-14 DIAGNOSIS — F33.41 RECURRENT MAJOR DEPRESSIVE DISORDER, IN PARTIAL REMISSION (HCC): ICD-10-CM

## 2021-12-14 DIAGNOSIS — Z79.4 TYPE 2 DIABETES MELLITUS WITH HYPERGLYCEMIA, WITH LONG-TERM CURRENT USE OF INSULIN (HCC): Primary | ICD-10-CM

## 2021-12-14 DIAGNOSIS — E11.65 TYPE 2 DIABETES MELLITUS WITH HYPERGLYCEMIA, WITH LONG-TERM CURRENT USE OF INSULIN (HCC): Primary | ICD-10-CM

## 2021-12-14 PROCEDURE — 99439 CHRNC CARE MGMT STAF EA ADDL: CPT | Performed by: INTERNAL MEDICINE

## 2021-12-14 PROCEDURE — 99490 CHRNC CARE MGMT STAFF 1ST 20: CPT | Performed by: INTERNAL MEDICINE

## 2021-12-14 RX ORDER — INSULIN LISPRO 200 [IU]/ML
5 INJECTION, SOLUTION SUBCUTANEOUS
Qty: 6 PEN | Refills: 3 | OUTPATIENT
Start: 2021-12-14 | End: 2021-12-15 | Stop reason: SDUPTHER

## 2021-12-14 NOTE — OUTREACH NOTE
Ambulatory Case Management Note    CCM Interim Update    Called pt per PCP request after her f/u yesterday. Pt said her sugar was 363, then shortly after it was 377. She agreed to do video appt with Arcelia Mohan (no-showed the last two appts.) I arranged 1/13 1 pm. I will call Friday for sugar log, she agreed to keep log.    She c/o ST, aches, stuffy nose, headache, some mild cp, and sick child at home. PCP agreed she could do walk-in covid and flu swab. When I called pt back she did not answer. Called x3, lmtrc.    [facility] Interim Update    12/15: Pt's sugar 369 with old meter, issue with insulin. Refill resolution pending.  She checked it on her meter 400. But not immediately after first reading.i instructed her to check sooner together. Will call for more sugars friday        There are no recently modified care plans to display for this patient.      Katharine Pedro RN  Ambulatory Case Management    12/14/2021, 14:41 EST

## 2021-12-14 NOTE — TELEPHONE ENCOUNTER
Red rule verified and correct.    PENS COME 2 PER BOX.  ASKING IF THEY CAN DISPENSE 6 PENS INSTEAD OF 5.    VO TO DO DISPENSE 6 PENS

## 2021-12-17 ENCOUNTER — TELEPHONE (OUTPATIENT)
Dept: CASE MANAGEMENT | Facility: OTHER | Age: 26
End: 2021-12-17

## 2021-12-17 ENCOUNTER — TELEPHONE (OUTPATIENT)
Dept: INTERNAL MEDICINE | Facility: CLINIC | Age: 26
End: 2021-12-17

## 2021-12-17 DIAGNOSIS — E11.65 TYPE 2 DIABETES MELLITUS WITH HYPERGLYCEMIA, WITH LONG-TERM CURRENT USE OF INSULIN (HCC): Primary | ICD-10-CM

## 2021-12-17 DIAGNOSIS — Z79.4 TYPE 2 DIABETES MELLITUS WITH HYPERGLYCEMIA, WITH LONG-TERM CURRENT USE OF INSULIN (HCC): Primary | ICD-10-CM

## 2021-12-17 DIAGNOSIS — J45.21 MILD INTERMITTENT REACTIVE AIRWAY DISEASE WITH ACUTE EXACERBATION: ICD-10-CM

## 2021-12-17 NOTE — TELEPHONE ENCOUNTER
Red rule verified and correct.    Pt requesting a covid and flu test.    Dk yellow prod cough, chest congestion, achy.    Recommended she be seen.    Work in with [facility].    Offered to pt, she does not have childcare at that time and will go to .

## 2021-12-17 NOTE — TELEPHONE ENCOUNTER
"Pt wanted to know if she is supposed to be taking Ozempic along with her 10 units of humalog per meal and levemir 30 units qday? Sugars have been 268, \"300s\", and over 400 where it would not read on meter. Please advise. Her ozempic dose due tonight  "

## 2021-12-20 NOTE — TELEPHONE ENCOUNTER
Pt went to Urgent Care over weekend. Dx asthma attack and PNA, per pt. Given Prednisone 20mg daily x5 days, still has days left. Sugars have been over 400 all weekend. Please advise.    Also, asking for refill on Albuterol nebs that Urgent Care gave her. She said this greatly helps with her breathing. Please advise.

## 2021-12-21 ENCOUNTER — TELEPHONE (OUTPATIENT)
Dept: OBSTETRICS AND GYNECOLOGY | Facility: CLINIC | Age: 26
End: 2021-12-21

## 2021-12-21 DIAGNOSIS — E28.2 PCOS (POLYCYSTIC OVARIAN SYNDROME): Primary | ICD-10-CM

## 2021-12-21 RX ORDER — MEDROXYPROGESTERONE ACETATE 10 MG/1
10 TABLET ORAL DAILY
Qty: 10 TABLET | Refills: 0 | Status: SHIPPED | OUTPATIENT
Start: 2021-12-21 | End: 2022-03-28 | Stop reason: SDUPTHER

## 2021-12-21 NOTE — TELEPHONE ENCOUNTER
Patient informed of recommendations. She is aware that a script has been sent to her pharmacy. She understands to call if she does not start a cycle within two weeks after she has completed the medication.

## 2021-12-21 NOTE — TELEPHONE ENCOUNTER
Patient called stating she has not had a cycle since October. She is requesting what she needs to do to jump start a cycle. She states if she waits too long before having a cycle then she has horrible bleeding. She has not taken a pregnancy test but states she has had a tubal and has been abstained from intercourse since before her last cycle. She has an IUD placement scheduled with Dr. Osullivan 1/19. Please advise.

## 2021-12-22 ENCOUNTER — TELEPHONE (OUTPATIENT)
Dept: INTERNAL MEDICINE | Facility: CLINIC | Age: 26
End: 2021-12-22

## 2021-12-22 DIAGNOSIS — J45.21 MILD INTERMITTENT REACTIVE AIRWAY DISEASE WITH ACUTE EXACERBATION: ICD-10-CM

## 2021-12-22 NOTE — TELEPHONE ENCOUNTER
Spoke with patient and she is having chest tightness, coughing, and SOB. Patient is currently taking an antibiotics and steroid for pneumonia. She is coughing but nothing coming up. Patient said that she is doing breathing treatments and increasing her fluids. She said that her blood sugars are in the 400s. I advised the patient to go to the ER if she is having chest tightness or pain and patient understood. Patient wanted to know what else she could do for her cough. I told her I was speak with Dr. Maria.

## 2021-12-23 RX ORDER — ALBUTEROL SULFATE 1.25 MG/3ML
1 SOLUTION RESPIRATORY (INHALATION) EVERY 4 HOURS PRN
Qty: 60 ML | Refills: 0 | Status: SHIPPED | OUTPATIENT
Start: 2021-12-23 | End: 2021-12-23 | Stop reason: SDUPTHER

## 2021-12-23 RX ORDER — ALBUTEROL SULFATE 1.25 MG/3ML
1 SOLUTION RESPIRATORY (INHALATION) EVERY 4 HOURS PRN
Qty: 60 ML | Refills: 0 | Status: SHIPPED | OUTPATIENT
Start: 2021-12-23 | End: 2022-01-02

## 2021-12-23 NOTE — TELEPHONE ENCOUNTER
Pt having CP and SOA, instructed to go to urgent care.   Wants refill on Albuterol. Also wants maintainance inhaler.  Pt's sugars in 400. Instructed to increase sugar. Will call monday

## 2021-12-23 NOTE — TELEPHONE ENCOUNTER
Okay to refill albuterol however if she is having to take this albuterol very frequently she needs to be on a maintenance inhaler meaning she just needs to take the albuterol when she is sick.  It is common for her sugars to go up when you have an infection or if you are taking a prednisone and it appears that she has both she just needs to increase the insulin when this happens I would probably go up by 5 to 10 units at mealtimes as well as 5 to 10 units at night depending on how high the sugar is however after she finishes the prednisone it is likely to go down and she will likely need to decrease the insulin at that time.  Can you please just be in touch with her through this time so we can help her increase and decrease her insulin as needed.

## 2021-12-23 NOTE — TELEPHONE ENCOUNTER
I think you already talked to her about going to urgent care, which is appropriate.  But we can send in tessalon perles for her cough if she would like.

## 2021-12-27 ENCOUNTER — PRIOR AUTHORIZATION (OUTPATIENT)
Dept: INTERNAL MEDICINE | Facility: CLINIC | Age: 26
End: 2021-12-27

## 2021-12-27 ENCOUNTER — OFFICE VISIT (OUTPATIENT)
Dept: INTERNAL MEDICINE | Facility: CLINIC | Age: 26
End: 2021-12-27

## 2021-12-27 VITALS — HEART RATE: 101 BPM | TEMPERATURE: 97.2 F | BODY MASS INDEX: 53.96 KG/M2 | OXYGEN SATURATION: 98 % | WEIGHT: 293 LBS

## 2021-12-27 DIAGNOSIS — F41.9 ANXIETY: ICD-10-CM

## 2021-12-27 DIAGNOSIS — R07.9 CHEST PAIN, UNSPECIFIED TYPE: Primary | ICD-10-CM

## 2021-12-27 PROCEDURE — 99214 OFFICE O/P EST MOD 30 MIN: CPT | Performed by: NURSE PRACTITIONER

## 2021-12-27 PROCEDURE — 93000 ELECTROCARDIOGRAM COMPLETE: CPT | Performed by: NURSE PRACTITIONER

## 2021-12-27 NOTE — PROGRESS NOTES
"Chief Complaint  Follow-up (PNEMONIA AND ASTHMA )    Subjective          Dorinda Sherwood presents to [facility]     History of Present Illness  Ms. Dudley presents to the office for follow-up evaluation of pneumonia and asthma.     She reports that she has been experiencing chest pain. She was evaluated at [facility] urgent care, where she was diagnosed with pneumonia and an asthma attack. She was prescribed prednisone, cefdinir, Zithromax, which she continues to take as directed. She has also continued with breathing treatments.  She reports significant improvement and cough.  She denies being short of breath at this time.  However, she last night she experienced centralized chest pain which was stabbing at rest, which radiated into her left arm. She reports having a fight with her \"baby daddy\" prior to onset of pain. The episode lasted 2-3 minutes and then resolved completely. She describes the chest pressure as sharp and stabbing in nature. She denies experiencing any chest pain or shortness of breath at this time. She states her mother has informed her that she has felt more hot than normal. However, she denies any measured fevers, chills, or body aches. The patient does see a cardiologist.     Her blood glucose measured 298 mg/dL, which is normal for her.     Objective      Vital Signs:   Pulse 101   Temp 97.2 °F (36.2 °C)   Wt 134 kg (295 lb)   SpO2 98%   BMI 53.96 kg/m²       Physical Exam  Vitals and nursing note reviewed.   Constitutional:       General: She is not in acute distress.     Appearance: Normal appearance. She is well-developed.   HENT:      Head: Normocephalic and atraumatic.      Right Ear: External ear normal.      Left Ear: External ear normal.      Nose: Nose normal.      Mouth/Throat:      Mouth: Mucous membranes are moist.   Eyes:      Conjunctiva/sclera: Conjunctivae normal.   Cardiovascular:      Rate and Rhythm: Normal rate and " regular rhythm.      Pulses: Normal pulses.      Heart sounds: Normal heart sounds. No murmur heard.  No friction rub. No gallop.       Comments: HR 90  Pulmonary:      Effort: Pulmonary effort is normal. No respiratory distress.      Breath sounds: Normal air entry. No wheezing, rhonchi or rales.   Musculoskeletal:      Cervical back: Neck supple.      Right lower leg: No edema.      Left lower leg: No edema.   Skin:     General: Skin is warm and dry.   Neurological:      General: No focal deficit present.      Mental Status: She is alert and oriented to person, place, and time.      Motor: Motor function is intact.   Psychiatric:         Mood and Affect: Mood normal.         Behavior: Behavior normal.          Result Review :              ECG 12 Lead    Date/Time: 12/27/2021 2:36 PM  Performed by: Caitlyn Robertson APRN  Authorized by: Caitlyn Robertson APRN   Comparison: not compared with previous ECG   Rhythm: sinus rhythm  Rate: normal  BPM: 90  Conduction: conduction normal  ST Segments: ST segments normal  T Waves: T waves normal  QRS axis: normal  Other: no other findings    Clinical impression: normal ECG              Assessment and Plan    Diagnoses and all orders for this visit:    1. Chest pain, unspecified type (Primary)  -     ECG 12 Lead  - We will obtain an EKG for further evaluation. We discussed that although EKG was normal in the office today, this is just a point in time test and only catches about 50-60% of acute myocardial infarctions. We also discussed that if pain returns and is severe, she needs to present to the emergency department to be seen for further evaluation of chest pain to ensure that she is not having acute coronary syndrome. She verbalized complete understanding and states that she will go to the emergency department if pain returns.  She will also follow-up with cardiology as discussed.    2. Anxiety    She has an appointment with Dr. Maria in 1 month and will discuss anxiety more  at this time.             Follow Up   No follow-ups on file.     Patient was given instructions and counseling regarding her condition or for health maintenance advice. Please see specific information pulled into the AVS if appropriate.       Transcribed from ambient dictation for [facility] by Mariola Hodgson.  12/27/21   16:27 EST    Patient verbalized consent to the visit recording.

## 2021-12-27 NOTE — TELEPHONE ENCOUNTER
Pt did not go to urgent care/ER like she said she would and was advised to. Said that her CP (no SOA) only occurs when fighting/talking to her child's father. Last for 5-10 min, she sits down, takes Tylenol and it goes away. 101 chest pain that goes down left arm. Has happened 5 times this past week, all related to anxiety and stress with child's father. I told her that she needed to go to   ER for CP but she related it to anxiety. On Pristiq 50 mg daily. Please advise    ____________________    Spoke to Crow who agreed pt needed to come in and be seen. Appt made with Caitlyn BABCOCK 1pm. Pt agreed

## 2021-12-28 ENCOUNTER — PATIENT OUTREACH (OUTPATIENT)
Dept: CASE MANAGEMENT | Facility: OTHER | Age: 26
End: 2021-12-28

## 2021-12-28 ENCOUNTER — TELEPHONE (OUTPATIENT)
Dept: INTERNAL MEDICINE | Facility: CLINIC | Age: 26
End: 2021-12-28

## 2021-12-28 DIAGNOSIS — Z79.4 TYPE 2 DIABETES MELLITUS WITH HYPERGLYCEMIA, WITH LONG-TERM CURRENT USE OF INSULIN (HCC): Primary | ICD-10-CM

## 2021-12-28 DIAGNOSIS — F33.41 RECURRENT MAJOR DEPRESSIVE DISORDER, IN PARTIAL REMISSION (HCC): ICD-10-CM

## 2021-12-28 DIAGNOSIS — E11.65 TYPE 2 DIABETES MELLITUS WITH HYPERGLYCEMIA, WITH LONG-TERM CURRENT USE OF INSULIN (HCC): Primary | ICD-10-CM

## 2021-12-28 NOTE — OUTREACH NOTE
[facility] End of Month Documentation    This Chronic Medical Management Care Plan for Dorinda Sherwood, 26 y.o. female, has been monitored and managed and a new plan of care implemented for the month of December.  A cumulative time of 73  minutes was spent on this patient record this month, including phone call with patient; face to face with provider; electronic communication with primary care provider; chart review; electronic communication with other providers, face to face with Caitlyn BABCOCK after acute visit.    Regarding the patient's problems: has Calculus of gallbladder with chronic cholecystitis without obstruction; Type 2 diabetes mellitus with hyperglycemia, with long-term current use of insulin (HCC); S/P cholecystectomy; Allergic rhinitis; Asthma; Atrial septal defect, secundum; Depression; Anxiety; and Chest pain on their problem list., the following items were addressed: medical records; medications; referrals to community service providers and any changes can be found within the plan section of the note.  A detailed listing of time spent for chronic care management is tracked within each outreach encounter.  Current medications include:  has a current medication list which includes the following prescription(s): albuterol, aspirin, azithromycin, brompheniramine-pseudoephedrine-dm, budesonide, cefdinir, dexcom g6 , dexcom g6 sensor, desvenlafaxine, dicyclomine, levemir, humalog kwikpen, insulin pen needle, medroxyprogesterone, metoprolol succinate xl, montelukast, omeprazole, ondansetron odt, polyethylene glycol, prednisone, promethazine-dextromethorphan, and ozempic (1 mg/dose). and the patient is reported to be patient is compliant with medication protocol,  Medications are reported to be non-effective in controlling symptoms and changes have been made to the medication protocol, referred to endo.  Regarding these diagnoses, referrals were made to the following provider(s):  endo.  All notes  on chart for PCP to review.    The patient was monitored remotely for blood glucose; medications.    The patient's physical needs include:  physician referral; physical healthcare; DME supplies.     The patient's mental support needs include:  increased support    The patient's cognitive support needs include:  continued support    The patient's psychosocial support needs include:  continued support    The patient's functional needs include: DME; physical healthcare; physician referral    The patient's environmental needs include:  not applicable    Care Plan overall comments:  No data recorded    Refer to previous outreach notes for more information on the areas listed above.    Monthly Billing Diagnoses  (E11.65,  Z79.4) Type 2 diabetes mellitus with hyperglycemia, with long-term current use of insulin (HCC)    (F33.41) Recurrent major depressive disorder, in partial remission (HCC)    Medications   · Medications have been reconciled    Care Plan progress this month:      Recently Modified Care Plans Updates made since 11/27/2021 12:00 AM     Diabetes         Problem Priority Last Modified     MEDICATION ADHERENCE --  12/28/2021 10:22 AM by Katharine Pedro RN              Goal Recent Progress Last Modified     Consistently take medications as prescribed --  12/28/2021 10:22 AM by Katharine Pedro RN              Task Due Date Last Modified     Educate patient on frequency and refill details of meds --  12/28/2021 10:22 AM by Katharine Pedro RN                          Instructions   · Patient was provided an electronic copy of care plan  · CCM services were explained and offered and patient has accepted these services.  · Patient has given their written consent to receive CCM services and understands that this includes the authorization of electronic communication of medical information with the other treating providers.  · Patient understands that they may stop CCM services at any time and these  changes will be effective at the end of the calendar month and will effectively revocate the agreement of CCM services.  · Patient understands that only one practitioner can furnish and be paid for CCM services during one calendar month.  Patient also understands that there may be co-payment or deductible fees in association with CCM services.  · Patient will continue with at least monthly follow-up calls with the Nurse Navigator.    Katharine Pedro RN  Ambulatory Case Management    12/28/2021, 10:23 EST

## 2021-12-29 RX ORDER — INSULIN LISPRO 200 [IU]/ML
5 INJECTION, SOLUTION SUBCUTANEOUS
Qty: 6 PEN | Refills: 3 | OUTPATIENT
Start: 2021-12-29 | End: 2022-03-28 | Stop reason: SDUPTHER

## 2021-12-29 NOTE — TELEPHONE ENCOUNTER
Request denied:     The member had at least a 1-month trial and therapeutic failure, allergy, contraindication or intolerance to 2 preferred agents:     Asmanex Twisthaler, budesonide inhalation suspension (for members 8 years old or younger), Flovent HFAYour doctor told us you have Asthma (a lung of lung disorder).     We do not have information (Chart notes, records, or prescription claims) showing you have tried and failed or have a contraindication (unable to take) at least TWO of these preferred treatments: Asmanex Twisthaler, budesonide inhalation suspension (for members 8 years old or younger), or Flovent HFA. This is why your request is denied.    Please advise

## 2021-12-30 NOTE — TELEPHONE ENCOUNTER
This patient has been instructed on multiple occasions to go to the ER for further work-up if chest pain returns.  If she is having chest pain she needs to go to the ER now

## 2021-12-30 NOTE — TELEPHONE ENCOUNTER
Spoke to pt she stated today that she is not having chest pains and voiced understanding to go to ER if they begin agian

## 2021-12-30 NOTE — TELEPHONE ENCOUNTER
I guess they will only do budesonide nebulized.  As we can do asthmanex twisthaler one puff twice a day

## 2021-12-30 NOTE — TELEPHONE ENCOUNTER
Unsure what to order. The only budesonide inhaler I see is pulmicort which is what we did the PA on.

## 2021-12-30 NOTE — TELEPHONE ENCOUNTER
Caitlyn, can you review the below messages? She had a visit with you on 12/27. Pt reporting chest pain she thinks is due to stress/anxiety. Currently taking pristiq.

## 2022-01-13 ENCOUNTER — APPOINTMENT (OUTPATIENT)
Dept: DIABETES SERVICES | Facility: HOSPITAL | Age: 27
End: 2022-01-13

## 2022-01-14 ENCOUNTER — NURSE TRIAGE (OUTPATIENT)
Dept: CALL CENTER | Facility: HOSPITAL | Age: 27
End: 2022-01-14

## 2022-01-14 ENCOUNTER — TELEPHONE (OUTPATIENT)
Dept: INTERNAL MEDICINE | Facility: CLINIC | Age: 27
End: 2022-01-14

## 2022-01-14 DIAGNOSIS — Z79.4 TYPE 2 DIABETES MELLITUS WITH HYPERGLYCEMIA, WITH LONG-TERM CURRENT USE OF INSULIN: Primary | ICD-10-CM

## 2022-01-14 DIAGNOSIS — E11.65 TYPE 2 DIABETES MELLITUS WITH HYPERGLYCEMIA, WITH LONG-TERM CURRENT USE OF INSULIN: Primary | ICD-10-CM

## 2022-01-14 NOTE — TELEPHONE ENCOUNTER
"Caller had called about her mother who has had chest pain. Then added that her own BG is 300 this morning. Warm transfer to [facility] at Dr. Maria's office.     Reason for Disposition  • [1] Blood glucose > 300 mg/dL (16.7 mmol/L) AND [2] two or more times in a row    Additional Information  • Negative: Unconscious or difficult to awaken  • Negative: Acting confused (e.g., disoriented, slurred speech)  • Negative: Very weak (e.g., can't stand)  • Negative: Sounds like a life-threatening emergency to the triager  • Negative: [1] Vomiting AND [2] signs of dehydration (e.g., very dry mouth, lightheaded, dark urine)  • Negative: [1] Blood glucose > 240 mg/dL (13.3 mmol/L) AND [2] rapid breathing  • Negative: Blood glucose > 500 mg/dL (27.8 mmol/L)  • Negative: [1] Blood glucose > 240 mg/dL (13.3 mmol/L) AND [2] urine ketones moderate-large (or more than 1+)  • Negative: [1] Blood glucose > 240 mg/dL (13.3 mmol/L) AND [2] blood ketones > 1.4 mmol/L  • Negative: [1] Blood glucose > 240 mg/dL (13.3 mmol/L) AND [2] vomiting AND [3] unable to check for ketones (in blood or urine)  • Negative: [1] New-onset diabetes suspected (e.g., frequent urination, weak, weight loss) AND [2] vomiting or rapid breathing  • Negative: Vomiting lasts > 4 hours  • Negative: Patient sounds very sick or weak to the triager  • Negative: Fever > 100.4 F (38.0 C)  • Negative: Blood glucose > 400 mg/dL (22.2 mmol/L)    Answer Assessment - Initial Assessment Questions  1. BLOOD GLUCOSE: \"What is your blood glucose level?\"       300  2. ONSET: \"When did you check the blood glucose?\"      *No Answer*  3. USUAL RANGE: \"What is your glucose level usually?\" (e.g., usual fasting morning value, usual evening value)      *No Answer*  4. KETONES: \"Do you check for ketones (urine or blood test strips)?\" If yes, ask: \"What does the test show now?\"       *No Answer*  5. TYPE 1 or 2:  \"Do you know what type of diabetes you have?\"  (e.g., Type 1, Type 2, Gestational; " "doesn't know)       *No Answer*  6. INSULIN: \"Do you take insulin?\" \"What type of insulin(s) do you use? What is the mode of delivery? (syringe, pen; injection or pump)?\"       *No Answer*  7. DIABETES PILLS: \"Do you take any pills for your diabetes?\" If yes, ask: \"Have you missed taking any pills recently?\"      *No Answer*  8. OTHER SYMPTOMS: \"Do you have any symptoms?\" (e.g., fever, frequent urination, difficulty breathing, dizziness, weakness, vomiting)      *No Answer*  9. PREGNANCY: \"Is there any chance you are pregnant?\" \"When was your last menstrual period?\"      *No Answer*    Protocols used: DIABETES - HIGH BLOOD SUGAR-ADULT-AH      "

## 2022-01-17 ENCOUNTER — PATIENT OUTREACH (OUTPATIENT)
Dept: CASE MANAGEMENT | Facility: OTHER | Age: 27
End: 2022-01-17

## 2022-01-17 DIAGNOSIS — U07.1 COVID-19: ICD-10-CM

## 2022-01-17 DIAGNOSIS — F33.41 RECURRENT MAJOR DEPRESSIVE DISORDER, IN PARTIAL REMISSION: ICD-10-CM

## 2022-01-17 DIAGNOSIS — Z79.4 TYPE 2 DIABETES MELLITUS WITH HYPERGLYCEMIA, WITH LONG-TERM CURRENT USE OF INSULIN: Primary | ICD-10-CM

## 2022-01-17 DIAGNOSIS — E11.65 TYPE 2 DIABETES MELLITUS WITH HYPERGLYCEMIA, WITH LONG-TERM CURRENT USE OF INSULIN: Primary | ICD-10-CM

## 2022-01-17 NOTE — OUTREACH NOTE
Ambulatory Case Management Note    CCM Interim Update    See telephone call for other details    Pt asked general questions about COVID19. I will call on Friday for sugar log.        There are no recently modified care plans to display for this patient.      Katharine Pedro RN  Ambulatory Case Management    1/17/2022, 14:22 EST

## 2022-01-18 ENCOUNTER — APPOINTMENT (OUTPATIENT)
Dept: GENERAL RADIOLOGY | Facility: HOSPITAL | Age: 27
End: 2022-01-18

## 2022-01-18 ENCOUNTER — HOSPITAL ENCOUNTER (EMERGENCY)
Facility: HOSPITAL | Age: 27
Discharge: HOME | End: 2022-01-18
Attending: EMERGENCY MEDICINE | Admitting: EMERGENCY MEDICINE

## 2022-01-18 VITALS
BODY MASS INDEX: 53.51 KG/M2 | OXYGEN SATURATION: 100 % | HEART RATE: 104 BPM | HEIGHT: 62 IN | SYSTOLIC BLOOD PRESSURE: 120 MMHG | TEMPERATURE: 98.5 F | WEIGHT: 290.79 LBS | DIASTOLIC BLOOD PRESSURE: 80 MMHG | RESPIRATION RATE: 19 BRPM

## 2022-01-18 DIAGNOSIS — U07.1 COVID-19: Primary | ICD-10-CM

## 2022-01-18 LAB
ALBUMIN SERPL-MCNC: 4.3 G/DL (ref 3.5–5.2)
ALBUMIN/GLOB SERPL: 1.3 G/DL
ALP SERPL-CCNC: 82 U/L (ref 39–117)
ALT SERPL W P-5'-P-CCNC: 18 U/L (ref 1–33)
ANION GAP SERPL CALCULATED.3IONS-SCNC: 10.6 MMOL/L (ref 5–15)
AST SERPL-CCNC: 18 U/L (ref 1–32)
BASOPHILS # BLD AUTO: 0.05 10*3/MM3 (ref 0–0.2)
BASOPHILS NFR BLD AUTO: 0.6 % (ref 0–1.5)
BILIRUB SERPL-MCNC: 0.2 MG/DL (ref 0–1.2)
BUN SERPL-MCNC: 8 MG/DL (ref 6–20)
BUN/CREAT SERPL: 14.8 (ref 7–25)
CALCIUM SPEC-SCNC: 10 MG/DL (ref 8.6–10.5)
CHLORIDE SERPL-SCNC: 99 MMOL/L (ref 98–107)
CO2 SERPL-SCNC: 23.4 MMOL/L (ref 22–29)
CREAT SERPL-MCNC: 0.54 MG/DL (ref 0.57–1)
DEPRECATED RDW RBC AUTO: 37 FL (ref 37–54)
EOSINOPHIL # BLD AUTO: 0.09 10*3/MM3 (ref 0–0.4)
EOSINOPHIL NFR BLD AUTO: 1 % (ref 0.3–6.2)
ERYTHROCYTE [DISTWIDTH] IN BLOOD BY AUTOMATED COUNT: 12.9 % (ref 12.3–15.4)
GFR SERPL CREATININE-BSD FRML MDRD: 135 ML/MIN/1.73
GLOBULIN UR ELPH-MCNC: 3.2 GM/DL
GLUCOSE BLDC GLUCOMTR-MCNC: 280 MG/DL (ref 70–99)
GLUCOSE SERPL-MCNC: 284 MG/DL (ref 65–99)
HCT VFR BLD AUTO: 41.6 % (ref 34–46.6)
HGB BLD-MCNC: 14.5 G/DL (ref 12–15.9)
HOLD SPECIMEN: NORMAL
HOLD SPECIMEN: NORMAL
IMM GRANULOCYTES # BLD AUTO: 0.07 10*3/MM3 (ref 0–0.05)
IMM GRANULOCYTES NFR BLD AUTO: 0.8 % (ref 0–0.5)
LYMPHOCYTES # BLD AUTO: 3.4 10*3/MM3 (ref 0.7–3.1)
LYMPHOCYTES NFR BLD AUTO: 37.6 % (ref 19.6–45.3)
MAGNESIUM SERPL-MCNC: 1.9 MG/DL (ref 1.6–2.6)
MCH RBC QN AUTO: 28 PG (ref 26.6–33)
MCHC RBC AUTO-ENTMCNC: 34.9 G/DL (ref 31.5–35.7)
MCV RBC AUTO: 80.3 FL (ref 79–97)
MONOCYTES # BLD AUTO: 0.49 10*3/MM3 (ref 0.1–0.9)
MONOCYTES NFR BLD AUTO: 5.4 % (ref 5–12)
NEUTROPHILS NFR BLD AUTO: 4.94 10*3/MM3 (ref 1.7–7)
NEUTROPHILS NFR BLD AUTO: 54.6 % (ref 42.7–76)
NRBC BLD AUTO-RTO: 0 /100 WBC (ref 0–0.2)
PLATELET # BLD AUTO: 271 10*3/MM3 (ref 140–450)
PMV BLD AUTO: 10.5 FL (ref 6–12)
POTASSIUM SERPL-SCNC: 4.4 MMOL/L (ref 3.5–5.2)
PROT SERPL-MCNC: 7.5 G/DL (ref 6–8.5)
RBC # BLD AUTO: 5.18 10*6/MM3 (ref 3.77–5.28)
SODIUM SERPL-SCNC: 133 MMOL/L (ref 136–145)
TROPONIN T SERPL-MCNC: <0.01 NG/ML (ref 0–0.03)
WBC NRBC COR # BLD: 9.04 10*3/MM3 (ref 3.4–10.8)
WHOLE BLOOD HOLD SPECIMEN: NORMAL
WHOLE BLOOD HOLD SPECIMEN: NORMAL

## 2022-01-18 PROCEDURE — 25010000002 METOCLOPRAMIDE PER 10 MG: Performed by: EMERGENCY MEDICINE

## 2022-01-18 PROCEDURE — 99284 EMERGENCY DEPT VISIT MOD MDM: CPT

## 2022-01-18 PROCEDURE — 25010000002 KETOROLAC TROMETHAMINE PER 15 MG: Performed by: EMERGENCY MEDICINE

## 2022-01-18 PROCEDURE — 71045 X-RAY EXAM CHEST 1 VIEW: CPT

## 2022-01-18 PROCEDURE — 96375 TX/PRO/DX INJ NEW DRUG ADDON: CPT

## 2022-01-18 PROCEDURE — 84484 ASSAY OF TROPONIN QUANT: CPT | Performed by: EMERGENCY MEDICINE

## 2022-01-18 PROCEDURE — 82962 GLUCOSE BLOOD TEST: CPT

## 2022-01-18 PROCEDURE — 93005 ELECTROCARDIOGRAM TRACING: CPT

## 2022-01-18 PROCEDURE — 36415 COLL VENOUS BLD VENIPUNCTURE: CPT

## 2022-01-18 PROCEDURE — 93005 ELECTROCARDIOGRAM TRACING: CPT | Performed by: EMERGENCY MEDICINE

## 2022-01-18 PROCEDURE — 85025 COMPLETE CBC W/AUTO DIFF WBC: CPT

## 2022-01-18 PROCEDURE — 96374 THER/PROPH/DIAG INJ IV PUSH: CPT

## 2022-01-18 PROCEDURE — 80053 COMPREHEN METABOLIC PANEL: CPT | Performed by: EMERGENCY MEDICINE

## 2022-01-18 PROCEDURE — 99283 EMERGENCY DEPT VISIT LOW MDM: CPT

## 2022-01-18 PROCEDURE — 83735 ASSAY OF MAGNESIUM: CPT | Performed by: EMERGENCY MEDICINE

## 2022-01-18 RX ORDER — KETOROLAC TROMETHAMINE 30 MG/ML
30 INJECTION, SOLUTION INTRAMUSCULAR; INTRAVENOUS ONCE
Status: COMPLETED | OUTPATIENT
Start: 2022-01-18 | End: 2022-01-18

## 2022-01-18 RX ORDER — FLUTICASONE PROPIONATE 110 UG/1
1 AEROSOL, METERED RESPIRATORY (INHALATION)
Qty: 1 EACH | Refills: 0 | Status: SHIPPED | OUTPATIENT
Start: 2022-01-18 | End: 2022-03-28 | Stop reason: SDUPTHER

## 2022-01-18 RX ORDER — METOCLOPRAMIDE HYDROCHLORIDE 5 MG/ML
10 INJECTION INTRAMUSCULAR; INTRAVENOUS ONCE
Status: COMPLETED | OUTPATIENT
Start: 2022-01-18 | End: 2022-01-18

## 2022-01-18 RX ORDER — SODIUM CHLORIDE 0.9 % (FLUSH) 0.9 %
10 SYRINGE (ML) INJECTION AS NEEDED
Status: DISCONTINUED | OUTPATIENT
Start: 2022-01-18 | End: 2022-01-18 | Stop reason: HOSPADM

## 2022-01-18 RX ADMIN — METOCLOPRAMIDE HYDROCHLORIDE 10 MG: 5 INJECTION INTRAMUSCULAR; INTRAVENOUS at 20:13

## 2022-01-18 RX ADMIN — SODIUM CHLORIDE 1000 ML: 9 INJECTION, SOLUTION INTRAVENOUS at 20:12

## 2022-01-18 RX ADMIN — KETOROLAC TROMETHAMINE 30 MG: 30 INJECTION, SOLUTION INTRAMUSCULAR; INTRAVENOUS at 20:12

## 2022-01-18 NOTE — ED PROVIDER NOTES
Time: 6:32 PM EST  Arrived by: private car  Chief Complaint: Short of breath, COVID-positive  History provided by: Patient  History is limited by: N/A     History of Present Illness:  Patient is a 26 y.o. year old female that presents to the emergency department with complaint of shortness of breath, nausea with vomiting, diarrhea, headache.  Patient tested positive for COVID 2 days ago and has been symptomatic for 4 days.    HPI    Similar Symptoms Previously: no  Recently seen: no      Patient Care Team  Primary Care Provider: Jess Maria MD    Past Medical History:     Allergies   Allergen Reactions   • Amoxicillin Shortness Of Breath   • Hydroxyzine Shortness Of Breath   • Hydroxyzine Hcl Cough     Past Medical History:   Diagnosis Date   • Anesthesia     slow to wake up postop, anxiety postop   • AR (allergic rhinitis)    • ASD (atrial septal defect)     had since birth- asymptomatic- see's dr falk    • Asthma     no inhalers   • Depression    • Diabetes mellitus (HCC)     type ii- bg runs around 200-300   • Gallstones currently   • GERD (gastroesophageal reflux disease)    • Hypertension      Past Surgical History:   Procedure Laterality Date   •  SECTION     • CHOLECYSTECTOMY N/A 2021    Procedure: CHOLECYSTECTOMY LAPAROSCOPIC;  Surgeon: Robert Araya MD;  [place] [facility] [place] [facility] [place]  Service: General;  Laterality: N/A;   • COLONOSCOPY     • TUBAL ABDOMINAL LIGATION     • WISDOM TOOTH EXTRACTION       Family History   Problem Relation Age of Onset   • Heart disease Father    • Heart disease Other    • Heart disease Other    • Heart disease Other    • Diabetes type II Other        Home Medications:  Prior to Admission medications    Medication Sig Start Date End Date Taking? Authorizing Provider   azithromycin (Zithromax Z-Thony) 250 MG tablet Take as directed 21   Jeanne Longo MD   budesonide (PULMICORT) 180 MCG/ACT inhaler Inhale 2 puffs 2 (Two) Times a Day.  12/23/21   Jess Maria MD   Continuous Blood Gluc  (Dexcom G6 ) device 1 each Continuous. icd10- e11.9 11/11/21   Jess Maria MD   Continuous Blood Gluc Sensor (Dexcom G6 Sensor) Every 10 (Ten) Days. icd-10: e11.9 11/11/21   Jess Maria MD   desvenlafaxine (Pristiq) 50 MG 24 hr tablet Take 1 tablet by mouth Daily. 11/9/21   Abel Moreno APRN   dicyclomine (BENTYL) 20 MG tablet Take 1 tablet by mouth Every 6 (Six) Hours. 7/2/21   Ramirez Coley MD   insulin detemir (Levemir) 100 UNIT/ML injection Inject 30 Units under the skin into the appropriate area as directed Every Night. 12/13/21   Jess Maria MD   Insulin Lispro (HumaLOG KwikPen) 200 UNIT/ML solution pen-injector Inject 5 Units under the skin into the appropriate area as directed 3 (Three) Times a Day With Meals. 12/29/21   Jess Maira MD   Insulin Pen Needle 31G X 8 MM misc Use as directed 8/25/21   Jess Maria MD   medroxyPROGESTERone (Provera) 10 MG tablet Take 1 tablet by mouth Daily. 12/21/21   Tala Fitzgerald MD   metoprolol succinate XL (TOPROL-XL) 25 MG 24 hr tablet Take 50 mg by mouth Every Night. Takes 2 tabs    Brock Govea MD   montelukast (SINGULAIR) 10 MG tablet Take 10 mg by mouth Every Night.    Brock Govea MD   omeprazole (priLOSEC) 40 MG capsule Take 1 capsule by mouth Daily. 9/15/21   Jess Maria MD   ondansetron ODT (Zofran ODT) 4 MG disintegrating tablet Place 1 tablet on the tongue Every 8 (Eight) Hours As Needed for Nausea or Vomiting. 11/10/21   Jess Maria MD   polyethylene glycol (MIRALAX) 17 g packet Take 17 g by mouth Daily. 7/16/21   Robert Araya MD   predniSONE (DELTASONE) 20 MG tablet Take 1 tablet twice a day with meals. 12/18/21   Jeanne Longo MD   Semaglutide, 1 MG/DOSE, (Ozempic, 1 MG/DOSE,) 2 MG/1.5ML solution pen-injector Inject 1 mg under the skin into the appropriate area as directed 1 (One) Time  "Per Week.  Patient taking differently: Inject 1 mg under the skin into the appropriate area as directed 1 (One) Time Per Week. Instructed per anesthesia standing orders 7/2/21   Jess Maria MD        Social History:   Social History     Tobacco Use   • Smoking status: Never Smoker   • Smokeless tobacco: Never Used   Vaping Use   • Vaping Use: Never used   Substance Use Topics   • Alcohol use: Never   • Drug use: Never       Review of Systems:  Review of Systems   Constitutional: Positive for appetite change.   HENT: Positive for rhinorrhea.    Eyes: Negative.    Respiratory: Positive for chest tightness.    Cardiovascular: Negative.    Gastrointestinal: Positive for diarrhea, nausea and vomiting.   Endocrine: Negative.    Genitourinary: Negative.    Musculoskeletal: Positive for myalgias.   Skin: Negative.    Allergic/Immunologic: Negative.    Neurological: Negative.    Hematological: Negative.    Psychiatric/Behavioral: Negative.         Physical Exam:  /80   Pulse 104   Temp 98.5 °F (36.9 °C)   Resp 19   Ht 157.5 cm (62\")   Wt 132 kg (290 lb 12.6 oz)   SpO2 100%   BMI 53.19 kg/m²     Physical Exam  Vitals and nursing note reviewed.   Constitutional:       Appearance: She is obese.   HENT:      Head: Normocephalic.   Cardiovascular:      Rate and Rhythm: Normal rate and regular rhythm.      Heart sounds: Normal heart sounds.   Pulmonary:      Effort: Pulmonary effort is normal.      Breath sounds: Normal breath sounds.   Abdominal:      General: Bowel sounds are normal.      Palpations: Abdomen is soft.   Musculoskeletal:         General: Normal range of motion.   Skin:     General: Skin is warm and dry.   Neurological:      General: No focal deficit present.      Mental Status: She is alert and oriented to person, place, and time.   Psychiatric:         Mood and Affect: Mood normal.                Medications in the Emergency Department:  Medications   sodium chloride 0.9 % bolus 1,000 mL (0 " mL Intravenous Stopped 1/18/22 5224)   ketorolac (TORADOL) injection 30 mg (30 mg Intravenous Given 1/18/22 2012)   metoclopramide (REGLAN) injection 10 mg (10 mg Intravenous Given 1/18/22 2013)        Labs  Lab Results (last 24 hours)     ** No results found for the last 24 hours. **           Imaging:  No Radiology Exams Resulted Within Past 24 Hours    Procedures:  Procedures    Progress                            Medical Decision Making:  MDM   patient is in no respiratory distress and has stable vital signs.  Patient was previously diagnosed with COVID-19 and is stable for discharge.      Final diagnoses:   COVID-19        Disposition:  ED Disposition     ED Disposition Condition Comment    Discharge Stable           Documentation assistance provided by Jez Colon DO acting as scribe for Jez Colon DO. Information recorded by the scribe was done at my direction and has been verified and validated by me.        Jez Colon DO  01/20/22 0012

## 2022-01-19 LAB — QT INTERVAL: 328 MS

## 2022-01-20 ENCOUNTER — TELEPHONE (OUTPATIENT)
Dept: INTERNAL MEDICINE | Facility: CLINIC | Age: 27
End: 2022-01-20

## 2022-01-20 DIAGNOSIS — N89.8 VAGINAL ITCHING: Primary | ICD-10-CM

## 2022-01-20 RX ORDER — FLUCONAZOLE 100 MG/1
100 TABLET ORAL DAILY
Qty: 5 TABLET | Refills: 0 | Status: SHIPPED | OUTPATIENT
Start: 2022-01-20 | End: 2022-01-25

## 2022-01-21 ENCOUNTER — PATIENT OUTREACH (OUTPATIENT)
Dept: CASE MANAGEMENT | Facility: OTHER | Age: 27
End: 2022-01-21

## 2022-01-21 ENCOUNTER — TELEPHONE (OUTPATIENT)
Dept: CASE MANAGEMENT | Facility: OTHER | Age: 27
End: 2022-01-21

## 2022-01-21 DIAGNOSIS — Z79.4 TYPE 2 DIABETES MELLITUS WITH HYPERGLYCEMIA, WITH LONG-TERM CURRENT USE OF INSULIN: Primary | ICD-10-CM

## 2022-01-21 DIAGNOSIS — E11.65 TYPE 2 DIABETES MELLITUS WITH HYPERGLYCEMIA, WITH LONG-TERM CURRENT USE OF INSULIN: Primary | ICD-10-CM

## 2022-01-21 NOTE — TELEPHONE ENCOUNTER
"Dr. Maria, I am filling in for Bonnie this morning.    I spoke to Dorinda Sherwood regarding her blood glucose log, and her covid progress.    Blood sugars remain elevated.  Wednesday 01/19/2022 they were \"above 400 all day, they were just reading off the charts\"    Thursday 1/20/2022  Fasting 299  Before lunch 329  Before dinner 389    Today 1/21/22  Fasting over 400      Her covid symptoms are improving. She states that she would like a refill of albuterol sulfate for her nebulizer, but I do not see this on her medication list. She is also using a Flovent inhaler which she got from the ER, and this is helping.    She received Trintellix from the pharmacy on the 19th, I called the pharmacy to see when this was written, and it was written on 10/26/2021. Pharmacy is not sure why it was filled this time. She is currently taking Pristiq. Was Trintellix supposed to be filled, that you are aware of? Patient feels as though she is doing well on Pristiq.     "

## 2022-01-21 NOTE — OUTREACH NOTE
"Ambulatory Case Management Note    CCM Interim Update    lmtcb regarding blood sugar log and covid symptom update.      Spoke to patient:      01/21/2022 AM fasting, over 400    1/20 before dinner 389  1/20 before lunch lunch 329  1/20 fasting 299    Wednesday all day, numbers were in the 400's, \"reading off the chart\".    Covid- she is getting better, needs some more Albuterol for neubulizer, also taking flovent from the ER that is helping.    Patient picked up medications from pharmacy, and Trintellix was in there. She states that she takes Pristiq, not Trintellix and wants to know why this was sent in. I do not see this on her medication list    There are no recently modified care plans to display for this patient.      Angle Bejarano RN  Ambulatory Case Management    1/21/2022, 08:46 EST    "

## 2022-01-24 RX ORDER — ALBUTEROL SULFATE 1.25 MG/3ML
1 SOLUTION RESPIRATORY (INHALATION) EVERY 4 HOURS PRN
Qty: 60 ML | Refills: 0 | Status: SHIPPED | OUTPATIENT
Start: 2022-01-24 | End: 2022-03-28 | Stop reason: SDUPTHER

## 2022-01-24 NOTE — TELEPHONE ENCOUNTER
Sugars over 400 over weekend, please advise.   Arcelia Mohan- she tried to do telehealth but could not get it to work. Marques said for this reason, and her complexity, needs to be seen in-person. appt made.    ER doctor told her to take the Ipratropium neb and albuterol, but albuterol fell off list. I will refill albuterol neb.

## 2022-01-24 NOTE — TELEPHONE ENCOUNTER
No stick with pristiq.  Can cancel trintellix and please tell her not to take it.  Please call and ask how sugars are doing now.  I REALLY want her to work with Arcelia Velázquez as her sugars are very difficult to control.  Would she do telehealth visits with her or something like that?  Ok to refill albuterol if she has been taking it.  Ensure she understands the flovent is maintenance and should only be used once or twice a day, not more.

## 2022-01-28 ENCOUNTER — PATIENT OUTREACH (OUTPATIENT)
Dept: CASE MANAGEMENT | Facility: OTHER | Age: 27
End: 2022-01-28

## 2022-01-28 DIAGNOSIS — E11.65 TYPE 2 DIABETES MELLITUS WITH HYPERGLYCEMIA, WITH LONG-TERM CURRENT USE OF INSULIN: Primary | ICD-10-CM

## 2022-01-28 DIAGNOSIS — Z79.4 TYPE 2 DIABETES MELLITUS WITH HYPERGLYCEMIA, WITH LONG-TERM CURRENT USE OF INSULIN: Primary | ICD-10-CM

## 2022-01-28 DIAGNOSIS — F33.41 RECURRENT MAJOR DEPRESSIVE DISORDER, IN PARTIAL REMISSION: ICD-10-CM

## 2022-01-28 NOTE — OUTREACH NOTE
[facility] End of Month Documentation    This Chronic Medical Management Care Plan for Dorinda Sherwood, 26 y.o. female, has been monitored and managed and a new plan of care implemented for the month of January.  A cumulative time of 90  minutes was spent on this patient record this month, including electronic communication with primary care provider; chart review; electronic communication with other providers; phone call with patient.    Regarding the patient's problems: has Calculus of gallbladder with chronic cholecystitis without obstruction; Type 2 diabetes mellitus with hyperglycemia, with long-term current use of insulin ([facility]); S/P cholecystectomy; Allergic rhinitis; Asthma; Atrial septal defect, secundum; Depression; Anxiety; and Chest pain on their problem list., the following items were addressed: medical records; medications, acute covid19 infection and any changes can be found within the plan section of the note.  A detailed listing of time spent for chronic care management is tracked within each outreach encounter.  Current medications include:  has a current medication list which includes the following prescription(s): albuterol, azithromycin, budesonide, dexcom g6 , dexcom g6 sensor, desvenlafaxine, dicyclomine, fluticasone, levemir, humalog kwikpen, insulin pen needle, ipratropium, medroxyprogesterone, metoprolol succinate xl, montelukast, omeprazole, ondansetron odt, polyethylene glycol, prednisone, and ozempic (1 mg/dose). and the patient is reported to be patient is compliant with medication protocol,  Medications are reported to be non-effective in controlling symptoms and changes have been made to the medication protocol, referred to endo.  Regarding these diagnoses, referrals were made to the following provider(s):  Endo (pt keeps missing appt).  All notes on chart for PCP to review.    The patient was monitored remotely for blood glucose; medications, acute virus.    The patient's physical  needs include:  physical healthcare.     The patient's mental support needs include:  increased support    The patient's cognitive support needs include:  continued support    The patient's psychosocial support needs include:  continued support    The patient's functional needs include: DME; physical healthcare; physician referral    The patient's environmental needs include:  not applicable    Care Plan overall comments:  No data recorded    Refer to previous outreach notes for more information on the areas listed above.    Monthly Billing Diagnoses  (E11.65,  Z79.4) Type 2 diabetes mellitus with hyperglycemia, with long-term current use of insulin (HCC)    (F33.41) Recurrent major depressive disorder, in partial remission (HCC)    Medications   · Medications have been reconciled    Care Plan progress this month:      Recently Modified Care Plans Updates made since 12/28/2021 12:00 AM     Diabetes         Problem Priority Last Modified     MEDICATION ADHERENCE --  12/28/2021 10:22 AM by Katharine Pedro RN              Goal Recent Progress Last Modified     Consistently take medications as prescribed --  12/28/2021 10:22 AM by Katharine Pedro RN              Task Due Date Last Modified     Educate patient on frequency and refill details of meds --  12/28/2021 10:22 AM by Katharine Pedro RN                      Instructions   · Patient was provided an electronic copy of care plan  · CCM services were explained and offered and patient has accepted these services.  · Patient has given their written consent to receive CCM services and understands that this includes the authorization of electronic communication of medical information with the other treating providers.  · Patient understands that they may stop CCM services at any time and these changes will be effective at the end of the calendar month and will effectively revocate the agreement of CCM services.  · Patient understands that only one  practitioner can furnish and be paid for CCM services during one calendar month.  Patient also understands that there may be co-payment or deductible fees in association with CCM services.  · Patient will continue with at least monthly follow-up calls with the Nurse Navigator.    Katharine Pedro RN  Ambulatory Case Management    1/28/2022, 12:25 EST

## 2022-02-16 ENCOUNTER — PRIOR AUTHORIZATION (OUTPATIENT)
Dept: INTERNAL MEDICINE | Facility: CLINIC | Age: 27
End: 2022-02-16

## 2022-02-18 ENCOUNTER — APPOINTMENT (OUTPATIENT)
Dept: DIABETES SERVICES | Facility: HOSPITAL | Age: 27
End: 2022-02-18

## 2022-02-25 NOTE — TELEPHONE ENCOUNTER
The request has been approved. The authorization is effective for a maximum of 12 fills from 02/16/2022 to 02/15/2023, as long as the member is enrolled in their current health plan. A written notification letter will follow with additional details.

## 2022-03-04 RX ORDER — DESVENLAFAXINE SUCCINATE 50 MG/1
50 TABLET, EXTENDED RELEASE ORAL DAILY
Qty: 30 TABLET | Refills: 1 | Status: SHIPPED | OUTPATIENT
Start: 2022-03-04 | End: 2022-04-22 | Stop reason: DRUGHIGH

## 2022-03-28 ENCOUNTER — TELEMEDICINE (OUTPATIENT)
Dept: INTERNAL MEDICINE | Facility: CLINIC | Age: 27
End: 2022-03-28

## 2022-03-28 ENCOUNTER — PATIENT OUTREACH (OUTPATIENT)
Dept: CASE MANAGEMENT | Facility: OTHER | Age: 27
End: 2022-03-28

## 2022-03-28 DIAGNOSIS — E11.65 TYPE 2 DIABETES MELLITUS WITH HYPERGLYCEMIA, WITH LONG-TERM CURRENT USE OF INSULIN: Primary | ICD-10-CM

## 2022-03-28 DIAGNOSIS — E28.2 PCOS (POLYCYSTIC OVARIAN SYNDROME): ICD-10-CM

## 2022-03-28 DIAGNOSIS — N63.0 BREAST LUMP: ICD-10-CM

## 2022-03-28 DIAGNOSIS — R51.9 CHRONIC INTRACTABLE HEADACHE, UNSPECIFIED HEADACHE TYPE: ICD-10-CM

## 2022-03-28 DIAGNOSIS — R73.09 HEMOGLOBIN A1C GREATER THAN 9.0%: ICD-10-CM

## 2022-03-28 DIAGNOSIS — G89.29 CHRONIC INTRACTABLE HEADACHE, UNSPECIFIED HEADACHE TYPE: ICD-10-CM

## 2022-03-28 DIAGNOSIS — F33.41 RECURRENT MAJOR DEPRESSIVE DISORDER, IN PARTIAL REMISSION: ICD-10-CM

## 2022-03-28 DIAGNOSIS — Z79.4 TYPE 2 DIABETES MELLITUS WITH HYPERGLYCEMIA, WITH LONG-TERM CURRENT USE OF INSULIN: Primary | ICD-10-CM

## 2022-03-28 DIAGNOSIS — K13.0 LIP LESION: ICD-10-CM

## 2022-03-28 PROCEDURE — 99490 CHRNC CARE MGMT STAFF 1ST 20: CPT | Performed by: INTERNAL MEDICINE

## 2022-03-28 PROCEDURE — 99214 OFFICE O/P EST MOD 30 MIN: CPT | Performed by: INTERNAL MEDICINE

## 2022-03-28 PROCEDURE — 99439 CHRNC CARE MGMT STAF EA ADDL: CPT | Performed by: INTERNAL MEDICINE

## 2022-03-28 RX ORDER — FLUCONAZOLE 150 MG/1
150 TABLET ORAL ONCE
Qty: 1 TABLET | Refills: 0 | Status: SHIPPED | OUTPATIENT
Start: 2022-03-28 | End: 2022-03-28

## 2022-03-28 RX ORDER — SEMAGLUTIDE 1.34 MG/ML
1 INJECTION, SOLUTION SUBCUTANEOUS WEEKLY
Qty: 3 ML | Refills: 1 | Status: SHIPPED | OUTPATIENT
Start: 2022-03-28 | End: 2022-05-11 | Stop reason: SDUPTHER

## 2022-03-28 RX ORDER — FEXOFENADINE HYDROCHLORIDE 180 MG/1
180 TABLET, FILM COATED ORAL DAILY
COMMUNITY
Start: 2022-03-03 | End: 2022-05-11 | Stop reason: SDUPTHER

## 2022-03-28 RX ORDER — NYSTATIN 100000 U/G
30 CREAM TOPICAL AS NEEDED
Qty: 30 G | Refills: 1 | Status: SHIPPED | OUTPATIENT
Start: 2022-03-28 | End: 2022-08-09

## 2022-03-28 RX ORDER — MONTELUKAST SODIUM 10 MG/1
10 TABLET ORAL NIGHTLY
Qty: 90 TABLET | Refills: 1 | Status: SHIPPED | OUTPATIENT
Start: 2022-03-28 | End: 2022-05-11 | Stop reason: SDUPTHER

## 2022-03-28 RX ORDER — DOCOSANOL 100 MG/G
1 CREAM TOPICAL
Qty: 2 G | Refills: 1 | Status: SHIPPED | OUTPATIENT
Start: 2022-03-28 | End: 2022-05-11 | Stop reason: SDUPTHER

## 2022-03-28 RX ORDER — OMEPRAZOLE 40 MG/1
40 CAPSULE, DELAYED RELEASE ORAL DAILY
Qty: 90 CAPSULE | Refills: 1 | Status: SHIPPED | OUTPATIENT
Start: 2022-03-28 | End: 2022-05-11 | Stop reason: SDUPTHER

## 2022-03-28 RX ORDER — INSULIN DETEMIR 100 [IU]/ML
30 INJECTION, SOLUTION SUBCUTANEOUS NIGHTLY
Qty: 10 ML | Refills: 2 | Status: SHIPPED | OUTPATIENT
Start: 2022-03-28 | End: 2022-04-13 | Stop reason: SDUPTHER

## 2022-03-28 RX ORDER — ALBUTEROL SULFATE 1.25 MG/3ML
1 SOLUTION RESPIRATORY (INHALATION) EVERY 4 HOURS PRN
Qty: 60 ML | Refills: 0 | Status: SHIPPED | OUTPATIENT
Start: 2022-03-28 | End: 2022-10-18

## 2022-03-28 RX ORDER — CALCIUM CARBONATE 500 MG/1
500 TABLET, CHEWABLE ORAL AS NEEDED
COMMUNITY
Start: 2022-03-03 | End: 2022-05-11 | Stop reason: SDUPTHER

## 2022-03-28 RX ORDER — GLIPIZIDE 5 MG/1
5 TABLET ORAL
Qty: 60 TABLET | Refills: 2 | Status: SHIPPED | OUTPATIENT
Start: 2022-03-28 | End: 2022-05-11 | Stop reason: SDUPTHER

## 2022-03-28 RX ORDER — PROCHLORPERAZINE 25 MG/1
1 SUPPOSITORY RECTAL
Qty: 1 EACH | Refills: 1 | Status: SHIPPED | OUTPATIENT
Start: 2022-03-28 | End: 2022-08-16

## 2022-03-28 RX ORDER — NYSTATIN 100000 U/G
30 CREAM TOPICAL AS NEEDED
COMMUNITY
Start: 2022-01-19 | End: 2022-03-28 | Stop reason: SDUPTHER

## 2022-03-28 RX ORDER — SEMAGLUTIDE 1.34 MG/ML
1 INJECTION, SOLUTION SUBCUTANEOUS WEEKLY
COMMUNITY
Start: 2022-03-03 | End: 2022-03-28 | Stop reason: SDUPTHER

## 2022-03-28 RX ORDER — FLUTICASONE PROPIONATE 110 UG/1
1 AEROSOL, METERED RESPIRATORY (INHALATION)
Qty: 1 EACH | Refills: 0 | Status: SHIPPED | OUTPATIENT
Start: 2022-03-28 | End: 2022-03-30 | Stop reason: SDUPTHER

## 2022-03-28 RX ORDER — METOPROLOL SUCCINATE 25 MG/1
50 TABLET, EXTENDED RELEASE ORAL NIGHTLY
Qty: 90 TABLET | Refills: 1 | Status: SHIPPED | OUTPATIENT
Start: 2022-03-28 | End: 2022-10-18

## 2022-03-28 RX ORDER — PROCHLORPERAZINE 25 MG/1
SUPPOSITORY RECTAL
Qty: 3 EACH | Refills: 1 | Status: SHIPPED | OUTPATIENT
Start: 2022-03-28 | End: 2022-06-02

## 2022-03-28 RX ORDER — MEDROXYPROGESTERONE ACETATE 10 MG/1
10 TABLET ORAL DAILY
Qty: 10 TABLET | Refills: 0 | Status: SHIPPED | OUTPATIENT
Start: 2022-03-28 | End: 2022-05-11

## 2022-03-28 RX ORDER — INSULIN LISPRO 200 [IU]/ML
5 INJECTION, SOLUTION SUBCUTANEOUS
Qty: 6 PEN | Refills: 3 | OUTPATIENT
Start: 2022-03-28 | End: 2022-04-13 | Stop reason: SDUPTHER

## 2022-03-28 NOTE — OUTREACH NOTE
AMBULATORY CASE MANAGEMENT NOTE    Name and Relationship of Patient/Support Person: Dorinda Sherwood M - Self    Adult Patient Profile  Questions/Answers    Flowsheet Row Most Recent Value   Symptoms/Conditions Managed at Home diabetes, type 2   Barriers to Managing Health financial resources, lack of transportation, social support   Diabetes Management Strategies medication therapy, routine screenings, diet modification, insulin therapy, blood glucose testing   Frequency of Blood Glucose Testing continuous   A1C Target < 6   Usual Blood Glucose 300-400 +   Last A1C Result 10.42          CCM Interim Update    After telehealth pcp visit, pcp and I discussed needs- issue with Dexcom supplies and pt not testing sugars as ordered. I called pt who stated she was [facility] I sent them in. Pt stated her sugars have been 300-400+ at times. Meds refilled by pcp per chart review.    Pt c/o vaginal itching and discomfort, followed by some blood when urinating. Pt also c/o urinary incontinence when coughing, increased urgency, and not being able to control bladder. Has been told in past that it was due to weight and/or diabetes. I agreed to discuss with PCP. PCP ordered Diflucan (I sent in). Pt stated she had been using feminine wash internally, I educated on external use only. She agreed. Will call next week for sugar log and f/u on vaginal issues.    I agreed to remake appt with Arcelia Mohan. Appt for May. Pt agreed to attend via Zoom. Referral also placed by PCP for Dr Fitzgerald for GYN issues, pending.            BULL SNYDER  Ambulatory Case Management    3/28/2022, 14:56 EDT

## 2022-03-28 NOTE — PROGRESS NOTES
TELEHEALTH VISIT  Mode of Visit: Video  Location of patient: home  You have chosen to receive care through a telehealth visit.  Patient understanding that this is a telehealth visit.  I cannot perform a full physical exam, therefore something might be missed, or patient may need to come into the office for further evaluation.  Patient is understanding and consents to this type of visit.  The visit included audio and video interaction.     Chief Complaint  Diabetes and medication    Subjective          Dorinda Sherwood presents to [facility]  History of Present Illness     DM II-  States that she still doesn't have her dexcom equipment  She has been taking her ozempic weekly  She is doing her insulin every day  She can't do metformin due to diarrhea  Can't do SGLT-2 inhibitors due to yeast  Has been out of her meal time insulin    Has a lump on her right breast that is becoming more painful  She states that Dr. Fitzgerald in the past told her that she     She is still frustrated with her weight  Doesn't notice any appetite suppression with the ozempic    Right lip with a lesion that won't heal  She has been biting at it some  She has tried popping it    Having more headaches  They are daily  Having to sit in the dark regularly  She has been vomiting with these as well  She has seen neurology for this in the past  They were following with MRI every 6 month  They had her on a medicine and it did help        Objective   Physical Exam   Constitutional: She appears well-developed and well-nourished.   HENT:   Head: Normocephalic and atraumatic.   Nose: Nose normal.   Eyes: Pupils are equal, round, and reactive to light. EOM are normal.   Neck: Neck normal appearance.  Pulmonary/Chest: Effort normal.   Neurological: She is alert.   Psychiatric: She has a normal mood and affect.     Result Review :       Common labs    Common Labsle 8/6/21 8/6/21 10/28/21 10/28/21 10/28/21  10/28/21 1/18/22 1/18/22    1300 1300 0934 0934 0934 0934 1735 1739   Glucose  272 (A)    302 (A)  284 (A)   BUN  9    8  8   Creatinine  0.49 (A)    0.49 (A)  0.54 (A)   eGFR Non African Am  >150    >150  135   Sodium  134 (A)    133 (A)  133 (A)   Potassium  4.3    4.3  4.4   Chloride  98    95 (A)  99   Calcium  9.6    9.1  10.0   Albumin  4.20    4.60  4.30   Total Bilirubin  <0.2    0.3  0.2   Alkaline Phosphatase  97    83  82   AST (SGOT)  14    19  18   ALT (SGPT)  12    20  18   WBC 9.33  7.11    9.04    Hemoglobin 13.3  14.5    14.5    Hematocrit 41.7  42.9    41.6    Platelets 332  302    271    Total Cholesterol     187      Triglycerides     287 (A)      HDL Cholesterol     30 (A)      LDL Cholesterol      107 (A)      Hemoglobin A1C    10.42 (A)       (A) Abnormal value       Comments are available for some flowsheets but are not being displayed.                    Procedures        Assessment and Plan    Diagnoses and all orders for this visit:    1. Type 2 diabetes mellitus with hyperglycemia, with long-term current use of insulin (HCC) (Primary)  Assessment & Plan:  Will try adding glipizide for now and will have Bonnie work with her for the dexcom      2. PCOS (polycystic ovarian syndrome)  Comments:  doing well on current meds, would like a refill  Orders:  -     medroxyPROGESTERone (Provera) 10 MG tablet; Take 1 tablet by mouth Daily.  Dispense: 10 tablet; Refill: 0    3. Breast lump  Comments:  due to see Dr. Fitzgerald any way for birth control discussion; but stressed the importance of getting in ASAP for breast lesion as this is telehealth today.  Orders:  -     Ambulatory Referral to Gynecology    4. Lip lesion  Comments:  looks more like chronic irritation rather than hsv; will treat with abreeva and aquaphor and told her to try not to chew on it    5. Chronic intractable headache, unspecified headache type  Comments:  will get records from neurology for MRI and prior headache meds    Other  orders  -     albuterol (ACCUNEB) 1.25 MG/3ML nebulizer solution; Take 3 mL by nebulization Every 4 (Four) Hours As Needed for Wheezing.  Dispense: 60 mL; Refill: 0  -     budesonide (PULMICORT) 180 MCG/ACT inhaler; Inhale 2 puffs 2 (Two) Times a Day.  Dispense: 1 each; Refill: 2  -     fluticasone (FLOVENT HFA) 110 MCG/ACT inhaler; Inhale 1 puff 2 (Two) Times a Day.  Dispense: 1 each; Refill: 0  -     insulin detemir (Levemir) 100 UNIT/ML injection; Inject 30 Units under the skin into the appropriate area as directed Every Night.  Dispense: 10 mL; Refill: 2  -     Insulin Pen Needle 31G X 8 MM misc; Use as directed  Dispense: 12 each; Refill: 1  -     Insulin Lispro (HumaLOG KwikPen) 200 UNIT/ML solution pen-injector; Inject 5 Units under the skin into the appropriate area as directed 3 (Three) Times a Day With Meals.  Dispense: 6 pen; Refill: 3  -     ipratropium (ATROVENT) 0.02 % nebulizer solution; Take 2.5 mL by nebulization 4 (Four) Times a Day. As needed  Dispense: 50 mL; Refill: 0  -     metoprolol succinate XL (TOPROL-XL) 25 MG 24 hr tablet; Take 2 tablets by mouth Every Night. Takes 2 tabs  Dispense: 90 tablet; Refill: 1  -     montelukast (SINGULAIR) 10 MG tablet; Take 1 tablet by mouth Every Night.  Dispense: 90 tablet; Refill: 1  -     nystatin (MYCOSTATIN) 623931 UNIT/GM cream; Apply 30 application topically to the appropriate area as directed As Needed (use as needed).  Dispense: 30 g; Refill: 1  -     omeprazole (priLOSEC) 40 MG capsule; Take 1 capsule by mouth Daily.  Dispense: 90 capsule; Refill: 1  -     glipizide (GLUCOTROL) 5 MG tablet; Take 1 tablet by mouth 2 (Two) Times a Day Before Meals.  Dispense: 60 tablet; Refill: 2  -     Ozempic, 1 MG/DOSE, 4 MG/3ML solution pen-injector; Inject 1 mg under the skin into the appropriate area as directed 1 (One) Time Per Week.  Dispense: 3 mL; Refill: 1  -     docosanol (Abreva) 10 % cream cream; Apply 1 application topically to the appropriate area as  directed 5 (Five) Times a Day.  Dispense: 2 g; Refill: 1  -     mineral oil-hydrophilic petrolatum (AQUAPHOR) ointment; Apply 1 application topically to the appropriate area as directed As Needed for Dry Skin.  Dispense: 50 g; Refill: 1              Follow Up   Return in about 6 weeks (around 5/9/2022).  Patient was given instructions and counseling regarding her condition or for health maintenance advice. Please see specific information pulled into the AVS if appropriate.

## 2022-03-29 ENCOUNTER — PRIOR AUTHORIZATION (OUTPATIENT)
Dept: INTERNAL MEDICINE | Facility: CLINIC | Age: 27
End: 2022-03-29

## 2022-03-30 ENCOUNTER — PATIENT OUTREACH (OUTPATIENT)
Dept: CASE MANAGEMENT | Facility: OTHER | Age: 27
End: 2022-03-30

## 2022-03-30 DIAGNOSIS — F33.41 RECURRENT MAJOR DEPRESSIVE DISORDER, IN PARTIAL REMISSION: ICD-10-CM

## 2022-03-30 DIAGNOSIS — Z79.4 TYPE 2 DIABETES MELLITUS WITH HYPERGLYCEMIA, WITH LONG-TERM CURRENT USE OF INSULIN: Primary | ICD-10-CM

## 2022-03-30 DIAGNOSIS — E11.65 TYPE 2 DIABETES MELLITUS WITH HYPERGLYCEMIA, WITH LONG-TERM CURRENT USE OF INSULIN: Primary | ICD-10-CM

## 2022-03-30 RX ORDER — FLUTICASONE PROPIONATE 110 UG/1
2 AEROSOL, METERED RESPIRATORY (INHALATION)
Qty: 1 EACH | Refills: 0 | Status: SHIPPED | OUTPATIENT
Start: 2022-03-30 | End: 2022-05-11 | Stop reason: SDUPTHER

## 2022-03-30 NOTE — TELEPHONE ENCOUNTER
"Pa denied    \"The member had at least a 1-month trial and therapeutic failure [drug did not work], allergy, contraindication [harmful for] (including potential drug-drug interactions with other medications) or intolerance [side effect] to 2 preferred agents: Asmanex Twisthaler, budesonide inhalation suspension (for members 8 years old or younger), Flovent HFA. Your doctor told us that you have a diagnosis of asthma (a type of lung condition). It is noted that you have tried Flovent HFA, however, we do not have information that you have tried one (1) additional preferred agent (listed above).\"  "

## 2022-03-30 NOTE — OUTREACH NOTE
[facility] End of Month Documentation    This Chronic Medical Management Care Plan for Dorinda Sherwood, 26 y.o. female, has been monitored and managed and a new plan of care implemented for the month of March.  A cumulative time of 57  minutes was spent on this patient record this month, including chart review; phone call with patient; phone call with pharmacist; face to face with provider; phone call with other providers.    Regarding the patient's problems: has Calculus of gallbladder with chronic cholecystitis without obstruction; Type 2 diabetes mellitus with hyperglycemia, with long-term current use of insulin ([facility]); S/P cholecystectomy; Allergic rhinitis; Asthma; Atrial septal defect, secundum; Depression; Anxiety; Chest pain; and Chronic intractable headache on their problem list., the following items were addressed: medical records; medications; referrals to community service providers, referral to obgyn and any changes can be found within the plan section of the note.  A detailed listing of time spent for chronic care management is tracked within each outreach encounter.  Current medications include:  has a current medication list which includes the following prescription(s): albuterol, allergy relief, antacid calcium, dexcom g6 , dexcom g6 sensor, dexcom g6 transmitter, desvenlafaxine, docosanol, fluticasone, glipizide, levemir, humalog kwikpen, insulin pen needle, ipratropium, medroxyprogesterone, metoprolol succinate xl, mineral oil-hydrophilic petrolatum, montelukast, nystatin, omeprazole, ozempic (1 mg/dose), and polyethylene glycol. and the patient is reported to be patient is noncompliant with medication protocol, not checking sugar, dexcom supplies reordered,  Medications are reported to be non-effective in controlling symptoms and changes have been made to the medication protocol, referred to [facility] again.  Regarding these diagnoses, referrals were made to the following provider(s):  [facility].   All notes on chart for PCP to review.    The patient was monitored remotely for weight; blood glucose; mood & behavior; medications; pain, perineal/vaginal concerns.    The patient's physical needs include:  physician referral; physical healthcare; DME supplies.     The patient's mental support needs include:  increased support    The patient's cognitive support needs include:  continued support    The patient's psychosocial support needs include:  continued support    The patient's functional needs include: DME; physical healthcare; physician referral    The patient's environmental needs include:  no access to transportation, pt does not drive, relies on family for rides. appts made via telehealth    Care Plan overall comments:  No data recorded    Refer to previous outreach notes for more information on the areas listed above.    Monthly Billing Diagnoses  (E11.65,  Z79.4) Type 2 diabetes mellitus with hyperglycemia, with long-term current use of insulin (HCC)    (F33.41) Recurrent major depressive disorder, in partial remission (HCC)    Medications   · Medications have been reconciled    Care Plan progress this month:      Recently Modified Care Plans Updates made since 2/27/2022 12:00 AM     Diabetes Type 2 (Adult)         Problem Priority Last Modified     ELS GLYCEMIC MANAGEMENT (DIABETES, TYPE 2) (ADULT) --  3/28/2022  2:43 PM by Katharine Pedro RN              Goal Recent Progress Last Modified     Glycemic Management Optimized --  3/28/2022  2:43 PM by Katharine Pedro RN     Evidence-based guidance:   Anticipate A1C testing (point-of-care) every 3 to 6 months based on goal attainment.   Review mutually-set A1C goal or target range.   Anticipate use of antihyperglycemic with or without insulin and periodic adjustments; consider active involvement of pharmacist.   Provide medical nutrition therapy and development of individualized eating.   Compare self-reported symptoms of hypo or hyperglycemia to  blood glucose levels, diet and fluid intake, current medications, psychosocial and physiologic stressors, change in activity and barriers to care adherence.   Promote self-monitoring of blood glucose levels.   Assess and address barriers to management plan, such as food insecurity, age, developmental ability, depression, anxiety, fear of hypoglycemia or weight gain, as well as medication cost, side effects and complicated regimen.   Consider referral to community-based diabetes education program, visiting nurse, community health worker or health .   Encourage regular dental care for treatment of periodontal disease; refer to dental provider when needed.    Notes:              Task Due Date Last Modified     Alleviate Barriers to Glycemic Management --  3/28/2022  2:53 PM by Katharine Pedro RN     Care Management Activities:      - barriers to adherence to treatment plan identified  - blood glucose monitoring encouraged  - blood glucose readings reviewed  - encourage participation in diabetes education classes  - use of blood glucose monitoring log promoted      Notes:                Problem Priority Last Modified     ELS DISEASE PROGRESSION (DIABETES, TYPE 2) (ADULT) --  3/28/2022  2:43 PM by Katharine Pedro RN              Goal Recent Progress Last Modified     Disease Progression Prevented or Minimized --  3/28/2022  2:43 PM by Katharine Pedro RN     Evidence-based guidance:   Prepare patient for laboratory and diagnostic exams based on risk and presentation.   Encourage lifestyle changes, such as increased intake of plant-based foods, stress reduction, consistent physical activity and smoking cessation to prevent long-term complications and chronic disease.    Individualize activity and exercise recommendations while considering potential limitations, such as neuropathy, retinopathy or the ability to prevent hyperglycemia or hypoglycemia.    Prepare patient for use of pharmacologic therapy  that may include antihypertensive, analgesic, prostaglandin E1 with periodic adjustments, based on presenting chronic condition and laboratory results.   Assess signs/symptoms and risk factors for hypertension, sleep-disordered breathing, neuropathy (including changes in gait and balance), retinopathy, nephropathy and sexual dysfunction.   Address pregnancy planning and contraceptive choice, especially when prescribing antihypertensive or statin.   Ensure completion of annual comprehensive foot exam and dilated eye exam.    Implement additional individualized goals and interventions based on identified risk factors.   Prepare patient for consultation or referral for specialist care, such as ophthalmology, neurology, cardiology, podiatry, nephrology or perinatology.    Notes:              Task Due Date Last Modified     Monitor and Manage Follow-up for Comorbidities --  3/28/2022  2:53 PM by Katharine Pedro RN     Care Management Activities:      - healthy lifestyle promoted      Notes:                        Instructions   · Patient was provided an electronic copy of care plan  · CCM services were explained and offered and patient has accepted these services.  · Patient has given their written consent to receive CCM services and understands that this includes the authorization of electronic communication of medical information with the other treating providers.  · Patient understands that they may stop CCM services at any time and these changes will be effective at the end of the calendar month and will effectively revocate the agreement of CCM services.  · Patient understands that only one practitioner can furnish and be paid for CCM services during one calendar month.  Patient also understands that there may be co-payment or deductible fees in association with CCM services.  · Patient will continue with at least monthly follow-up calls with the Nurse Navigator.    KATHARINE SNYDER  Ambulatory Case  Management    3/30/2022, 15:18 EDT

## 2022-04-04 ENCOUNTER — PATIENT OUTREACH (OUTPATIENT)
Dept: CASE MANAGEMENT | Facility: OTHER | Age: 27
End: 2022-04-04

## 2022-04-04 DIAGNOSIS — F33.41 RECURRENT MAJOR DEPRESSIVE DISORDER, IN PARTIAL REMISSION: ICD-10-CM

## 2022-04-04 DIAGNOSIS — E11.65 TYPE 2 DIABETES MELLITUS WITH HYPERGLYCEMIA, WITH LONG-TERM CURRENT USE OF INSULIN: Primary | ICD-10-CM

## 2022-04-04 DIAGNOSIS — Z79.4 TYPE 2 DIABETES MELLITUS WITH HYPERGLYCEMIA, WITH LONG-TERM CURRENT USE OF INSULIN: Primary | ICD-10-CM

## 2022-04-05 NOTE — OUTREACH NOTE
AMBULATORY CASE MANAGEMENT NOTE    Name and Relationship of Patient/Support Person: Dorinda Sherwood M - Self    CCM Interim Update    Sugars:   225  300 (was stressed)  199   165  Will call next week for update log    Reviewed appts  Declined anymore vaginal/perineal irritation since taking Diflucan and stopping vaginal washes        BULL SNYDER  Ambulatory Case Management    4/5/2022, 07:38 EDT

## 2022-04-12 ENCOUNTER — PATIENT OUTREACH (OUTPATIENT)
Dept: CASE MANAGEMENT | Facility: OTHER | Age: 27
End: 2022-04-12

## 2022-04-12 DIAGNOSIS — Z79.4 TYPE 2 DIABETES MELLITUS WITH HYPERGLYCEMIA, WITH LONG-TERM CURRENT USE OF INSULIN: Primary | ICD-10-CM

## 2022-04-12 DIAGNOSIS — E11.65 TYPE 2 DIABETES MELLITUS WITH HYPERGLYCEMIA, WITH LONG-TERM CURRENT USE OF INSULIN: Primary | ICD-10-CM

## 2022-04-12 NOTE — OUTREACH NOTE
"AMBULATORY CASE MANAGEMENT NOTE    Name and Relationship of Patient/Support Person: Dorinda Sherwood M - Self    Called pt to discuss sugars. No answer, left message.    [facility] Interim Update    4/13: Sugars by day:  258  368  258  360 (was stressed)  268  And  \"high\" then 268  259  190  322  358  398  Then \"high\" then 400  378  268  328  ___  368  320    Notified pcp that sugars running gabriela 300 but pt also not taking her Humalog TID. Pt stated refill not sent in. checked with pharmacy who did not have it. Refilled.    Education Documentation  Carbohydrate-Containing Foods, taught by Katharine Pedro, RN at 4/13/2022  9:33 AM.  Learner: Patient  Readiness: Acceptance  Method: Explanation  Response: Verbalizes Understanding          KATHARINE SNYDER  Ambulatory Case Management    4/13/2022, 09:33 EDT  "

## 2022-04-13 DIAGNOSIS — E11.65 TYPE 2 DIABETES MELLITUS WITH HYPERGLYCEMIA, WITH LONG-TERM CURRENT USE OF INSULIN: Primary | ICD-10-CM

## 2022-04-13 DIAGNOSIS — Z79.4 TYPE 2 DIABETES MELLITUS WITH HYPERGLYCEMIA, WITH LONG-TERM CURRENT USE OF INSULIN: Primary | ICD-10-CM

## 2022-04-13 RX ORDER — INSULIN LISPRO 200 [IU]/ML
5 INJECTION, SOLUTION SUBCUTANEOUS
Qty: 6 PEN | Refills: 3 | OUTPATIENT
Start: 2022-04-13 | End: 2022-04-21 | Stop reason: SDUPTHER

## 2022-04-21 ENCOUNTER — PATIENT OUTREACH (OUTPATIENT)
Dept: CASE MANAGEMENT | Facility: OTHER | Age: 27
End: 2022-04-21

## 2022-04-21 ENCOUNTER — TELEPHONE (OUTPATIENT)
Dept: CASE MANAGEMENT | Facility: OTHER | Age: 27
End: 2022-04-21

## 2022-04-21 DIAGNOSIS — F33.41 RECURRENT MAJOR DEPRESSIVE DISORDER, IN PARTIAL REMISSION: Primary | ICD-10-CM

## 2022-04-21 DIAGNOSIS — E11.65 TYPE 2 DIABETES MELLITUS WITH HYPERGLYCEMIA, WITH LONG-TERM CURRENT USE OF INSULIN: Primary | ICD-10-CM

## 2022-04-21 DIAGNOSIS — Z79.4 TYPE 2 DIABETES MELLITUS WITH HYPERGLYCEMIA, WITH LONG-TERM CURRENT USE OF INSULIN: Primary | ICD-10-CM

## 2022-04-21 RX ORDER — INSULIN LISPRO 200 [IU]/ML
5 INJECTION, SOLUTION SUBCUTANEOUS
Qty: 6 PEN | Refills: 3 | Status: SHIPPED | OUTPATIENT
Start: 2022-04-21 | End: 2022-05-11 | Stop reason: SDUPTHER

## 2022-04-21 NOTE — OUTREACH NOTE
AMBULATORY CASE MANAGEMENT NOTE    Name and Relationship of Patient/Support Person: Dorinda Sherwood M - Self    Called to check on sugar, no answer.      [facility] Interim Update    Called again, no answer, tr.        BULL SNYDER  Ambulatory Case Management    4/21/2022, 11:00 EDT

## 2022-04-21 NOTE — TELEPHONE ENCOUNTER
During CCM call, pt c/o worsening anxiety. She is having body image struggles, putting herself down, feels like the walls ore closing in. She is currently only on Pristiq. Interested in increasing med or adding another med. Denies suicidal ideas or plans to harm herself and others. Has mother checking on her for support. Is tearful. Voices that these emotions are from her anxiety/depression. Please advise.

## 2022-04-22 RX ORDER — DESVENLAFAXINE 100 MG/1
100 TABLET, EXTENDED RELEASE ORAL DAILY
Qty: 90 TABLET | Refills: 0 | Status: SHIPPED | OUTPATIENT
Start: 2022-04-22 | End: 2022-05-11 | Stop reason: SDUPTHER

## 2022-04-22 NOTE — TELEPHONE ENCOUNTER
Pt stated she has tried counseling and psych in past and it did not work. Open to increasing meds. Agreed to send in increased dose. I told pt we would revisit conversation on psych provider in a few weeks.

## 2022-04-25 ENCOUNTER — PATIENT OUTREACH (OUTPATIENT)
Dept: CASE MANAGEMENT | Facility: OTHER | Age: 27
End: 2022-04-25

## 2022-04-25 DIAGNOSIS — E11.65 TYPE 2 DIABETES MELLITUS WITH HYPERGLYCEMIA, WITH LONG-TERM CURRENT USE OF INSULIN: ICD-10-CM

## 2022-04-25 DIAGNOSIS — Z79.4 TYPE 2 DIABETES MELLITUS WITH HYPERGLYCEMIA, WITH LONG-TERM CURRENT USE OF INSULIN: ICD-10-CM

## 2022-04-25 DIAGNOSIS — F33.41 RECURRENT MAJOR DEPRESSIVE DISORDER, IN PARTIAL REMISSION: Primary | ICD-10-CM

## 2022-04-25 PROCEDURE — 99490 CHRNC CARE MGMT STAFF 1ST 20: CPT | Performed by: INTERNAL MEDICINE

## 2022-04-25 PROCEDURE — 99439 CHRNC CARE MGMT STAF EA ADDL: CPT | Performed by: INTERNAL MEDICINE

## 2022-04-25 NOTE — OUTREACH NOTE
"AMBULATORY CASE MANAGEMENT NOTE    Name and Relationship of Patient/Support Person: Dorinda Sherwood M - Self    Attempted to call pt, no answer, left message.    [facility] Interim Update    4/26: Spoke to pt, she was at her son's doctor's appt. Will call me back after.    [facility] Interim Update    4/27: Spoke to pt. She has only had a few readings on her glucometer that have read \"high\", fasting sugar this AM was 249. I educated her that HIGH on her meter could be dangerously high and that 249 was still too high for a fasting sugar. She was about to take her child to a doctor appt, I agreed to call her later for detailed sugar log.        BULL SNYDER  Ambulatory Case Management    4/25/2022, 10:04 EDT  "

## 2022-04-27 ENCOUNTER — TELEPHONE (OUTPATIENT)
Dept: CASE MANAGEMENT | Facility: OTHER | Age: 27
End: 2022-04-27

## 2022-04-27 DIAGNOSIS — E11.65 TYPE 2 DIABETES MELLITUS WITH HYPERGLYCEMIA, WITH LONG-TERM CURRENT USE OF INSULIN: Primary | ICD-10-CM

## 2022-04-27 DIAGNOSIS — Z79.4 TYPE 2 DIABETES MELLITUS WITH HYPERGLYCEMIA, WITH LONG-TERM CURRENT USE OF INSULIN: Primary | ICD-10-CM

## 2022-04-27 NOTE — TELEPHONE ENCOUNTER
Spoke to pt who gave me sugars, by day:  214  270  HIGH/360  320  250  270  216  190  269  HIGH/329  216  268  310  277 180  230  333  240  222  150  60  (did not eat anything all day, per pt)  220 today    She does not see PCP until 5/11 and endo untl 5/24. Please advise

## 2022-04-28 ENCOUNTER — PATIENT OUTREACH (OUTPATIENT)
Dept: CASE MANAGEMENT | Facility: OTHER | Age: 27
End: 2022-04-28

## 2022-04-28 DIAGNOSIS — Z79.4 TYPE 2 DIABETES MELLITUS WITH HYPERGLYCEMIA, WITH LONG-TERM CURRENT USE OF INSULIN: Primary | ICD-10-CM

## 2022-04-28 DIAGNOSIS — E11.65 TYPE 2 DIABETES MELLITUS WITH HYPERGLYCEMIA, WITH LONG-TERM CURRENT USE OF INSULIN: Primary | ICD-10-CM

## 2022-04-28 DIAGNOSIS — F33.41 RECURRENT MAJOR DEPRESSIVE DISORDER, IN PARTIAL REMISSION: ICD-10-CM

## 2022-04-28 NOTE — OUTREACH NOTE
[facility] End of Month Documentation    This Chronic Medical Management Care Plan for Dorinda Sherwood, 26 y.o. female, has been monitored and managed and a new plan of care implemented for the month of April.  A cumulative time of 68  minutes was spent on this patient record this month, including chart review; electronic communication with primary care provider; phone call with patient.    Regarding the patient's problems: has Calculus of gallbladder with chronic cholecystitis without obstruction; Type 2 diabetes mellitus with hyperglycemia, with long-term current use of insulin ([facility]); S/P cholecystectomy; Allergic rhinitis; Asthma; Atrial septal defect, secundum; Depression; Anxiety; Chest pain; and Chronic intractable headache on their problem list., the following items were addressed: medical records; medications, appts and any changes can be found within the plan section of the note.  A detailed listing of time spent for chronic care management is tracked within each outreach encounter.  Current medications include:  has a current medication list which includes the following prescription(s): albuterol, allergy relief, antacid calcium, dexcom g6 , dexcom g6 sensor, dexcom g6 transmitter, desvenlafaxine, docosanol, fluticasone, glipizide, insulin detemir, humalog kwikpen, insulin pen needle, ipratropium, medroxyprogesterone, metoprolol succinate xl, mineral oil-hydrophilic petrolatum, montelukast, nystatin, omeprazole, ozempic (1 mg/dose), and polyethylene glycol. and the patient is reported to be patient is compliant with medication protocol,  Medications are reported to be effective, referred to [facility] again.  Regarding these diagnoses, referrals were made to the following provider(s):  none.  All notes on chart for PCP to review.    The patient was monitored remotely for blood glucose; medications; pain, diet, DM compliance.    The patient's physical needs include:  physical healthcare.     The patient's  mental support needs include:  increased support    The patient's cognitive support needs include:  continued support    The patient's psychosocial support needs include:  continued support    The patient's functional needs include: physical healthcare    The patient's environmental needs include:  no access to transportation, pt does not drive, relies on family for rides. appts made via telehealth    Care Plan overall comments:  No data recorded    Refer to previous outreach notes for more information on the areas listed above.    Monthly Billing Diagnoses  (E11.65,  Z79.4) Type 2 diabetes mellitus with hyperglycemia, with long-term current use of insulin (HCC)    (F33.41) Recurrent major depressive disorder, in partial remission (HCC)    Medications   · Medications have been reconciled    Care Plan progress this month:      Recently Modified Care Plans Updates made since 3/28/2022 12:00 AM     Diabetes Type 2 (Adult)         Problem Priority Last Modified     ELS GLYCEMIC MANAGEMENT (DIABETES, TYPE 2) (ADULT) --  3/28/2022  2:43 PM by Katharine Pedro RN              Goal Recent Progress Last Modified     Glycemic Management Optimized --  3/28/2022  2:43 PM by Katharine Pedro RN     Evidence-based guidance:   Anticipate A1C testing (point-of-care) every 3 to 6 months based on goal attainment.   Review mutually-set A1C goal or target range.   Anticipate use of antihyperglycemic with or without insulin and periodic adjustments; consider active involvement of pharmacist.   Provide medical nutrition therapy and development of individualized eating.   Compare self-reported symptoms of hypo or hyperglycemia to blood glucose levels, diet and fluid intake, current medications, psychosocial and physiologic stressors, change in activity and barriers to care adherence.   Promote self-monitoring of blood glucose levels.   Assess and address barriers to management plan, such as food insecurity, age, developmental  ability, depression, anxiety, fear of hypoglycemia or weight gain, as well as medication cost, side effects and complicated regimen.   Consider referral to community-based diabetes education program, visiting nurse, community health worker or health .   Encourage regular dental care for treatment of periodontal disease; refer to dental provider when needed.    Notes:              Task Due Date Last Modified     Alleviate Barriers to Glycemic Management --  3/28/2022  2:53 PM by Katharine Pedro RN     Care Management Activities:      - barriers to adherence to treatment plan identified  - blood glucose monitoring encouraged  - blood glucose readings reviewed  - encourage participation in diabetes education classes  - use of blood glucose monitoring log promoted      Notes:                Problem Priority Last Modified     ELS DISEASE PROGRESSION (DIABETES, TYPE 2) (ADULT) --  3/28/2022  2:43 PM by Katharine Pedro RN              Goal Recent Progress Last Modified     Disease Progression Prevented or Minimized --  3/28/2022  2:43 PM by Katharine Pedro RN     Evidence-based guidance:   Prepare patient for laboratory and diagnostic exams based on risk and presentation.   Encourage lifestyle changes, such as increased intake of plant-based foods, stress reduction, consistent physical activity and smoking cessation to prevent long-term complications and chronic disease.    Individualize activity and exercise recommendations while considering potential limitations, such as neuropathy, retinopathy or the ability to prevent hyperglycemia or hypoglycemia.    Prepare patient for use of pharmacologic therapy that may include antihypertensive, analgesic, prostaglandin E1 with periodic adjustments, based on presenting chronic condition and laboratory results.   Assess signs/symptoms and risk factors for hypertension, sleep-disordered breathing, neuropathy (including changes in gait and balance), retinopathy,  nephropathy and sexual dysfunction.   Address pregnancy planning and contraceptive choice, especially when prescribing antihypertensive or statin.   Ensure completion of annual comprehensive foot exam and dilated eye exam.    Implement additional individualized goals and interventions based on identified risk factors.   Prepare patient for consultation or referral for specialist care, such as ophthalmology, neurology, cardiology, podiatry, nephrology or perinatology.    Notes:              Task Due Date Last Modified     Monitor and Manage Follow-up for Comorbidities --  3/28/2022  2:53 PM by Katharine Pedro RN     Care Management Activities:      - healthy lifestyle promoted      Notes:                            Instructions   · Patient was provided an electronic copy of care plan  · CCM services were explained and offered and patient has accepted these services.  · Patient has given their written consent to receive CCM services and understands that this includes the authorization of electronic communication of medical information with the other treating providers.  · Patient understands that they may stop CCM services at any time and these changes will be effective at the end of the calendar month and will effectively revocate the agreement of CCM services.  · Patient understands that only one practitioner can furnish and be paid for CCM services during one calendar month.  Patient also understands that there may be co-payment or deductible fees in association with CCM services.  · Patient will continue with at least monthly follow-up calls with the Nurse Navigator.    KATHARINE SNYDER  Ambulatory Case Management    4/28/2022, 14:58 EDT

## 2022-05-09 ENCOUNTER — PATIENT OUTREACH (OUTPATIENT)
Dept: CASE MANAGEMENT | Facility: OTHER | Age: 27
End: 2022-05-09

## 2022-05-09 DIAGNOSIS — Z79.4 TYPE 2 DIABETES MELLITUS WITH HYPERGLYCEMIA, WITH LONG-TERM CURRENT USE OF INSULIN: Primary | ICD-10-CM

## 2022-05-09 DIAGNOSIS — F33.41 RECURRENT MAJOR DEPRESSIVE DISORDER, IN PARTIAL REMISSION: ICD-10-CM

## 2022-05-09 DIAGNOSIS — E11.65 TYPE 2 DIABETES MELLITUS WITH HYPERGLYCEMIA, WITH LONG-TERM CURRENT USE OF INSULIN: Primary | ICD-10-CM

## 2022-05-09 DIAGNOSIS — Z91.199 NONCOMPLIANCE: ICD-10-CM

## 2022-05-09 PROCEDURE — 99439 CHRNC CARE MGMT STAF EA ADDL: CPT | Performed by: INTERNAL MEDICINE

## 2022-05-09 PROCEDURE — 99490 CHRNC CARE MGMT STAFF 1ST 20: CPT | Performed by: INTERNAL MEDICINE

## 2022-05-09 NOTE — OUTREACH NOTE
AMBULATORY CASE MANAGEMENT NOTE    Name and Relationship of Patient/Support Person: Dorinda Sherwood M - Self    Called pt to discuss [facility] trip, appt schedule, missed appt today with Dr Fitzgerald, and sugar update. No answer, left message.     CCM Interim Update    Pt doing well since having stomach virus. She is able to eat solid food this week. Was only taking 1 tab of ODT zofran, instructions stated she can take 2 at a time. Asked for refill, pended to PCP.  Pt said [facility] stated they would call her to remake appt. Will make note to call her later this month to see if appt remade.  Reminded pt of pcp appt tomorrow.  She stated her sugars have been better, in 200s, few in 300s. She said there is a way to look at log on her meter but she is unsure. Offered pt to call me later at her convienence and we could walk through it together over the phone.        BULL SNYDER  Ambulatory Case Management    5/9/2022, 14:47 EDT

## 2022-05-10 DIAGNOSIS — A08.4 VIRAL GASTROENTERITIS: ICD-10-CM

## 2022-05-10 RX ORDER — ONDANSETRON 4 MG/1
8 TABLET, ORALLY DISINTEGRATING ORAL EVERY 8 HOURS PRN
Qty: 10 TABLET | Refills: 0 | Status: SHIPPED | OUTPATIENT
Start: 2022-05-10 | End: 2022-05-11 | Stop reason: SDUPTHER

## 2022-05-11 ENCOUNTER — OFFICE VISIT (OUTPATIENT)
Dept: INTERNAL MEDICINE | Facility: CLINIC | Age: 27
End: 2022-05-11

## 2022-05-11 DIAGNOSIS — A08.4 VIRAL GASTROENTERITIS: ICD-10-CM

## 2022-05-11 DIAGNOSIS — Z79.4 TYPE 2 DIABETES MELLITUS WITH HYPERGLYCEMIA, WITH LONG-TERM CURRENT USE OF INSULIN: Primary | ICD-10-CM

## 2022-05-11 DIAGNOSIS — F33.41 RECURRENT MAJOR DEPRESSIVE DISORDER, IN PARTIAL REMISSION: ICD-10-CM

## 2022-05-11 DIAGNOSIS — E11.65 TYPE 2 DIABETES MELLITUS WITH HYPERGLYCEMIA, WITH LONG-TERM CURRENT USE OF INSULIN: Primary | ICD-10-CM

## 2022-05-11 DIAGNOSIS — N92.0 MENORRHAGIA WITH REGULAR CYCLE: ICD-10-CM

## 2022-05-11 PROCEDURE — 99214 OFFICE O/P EST MOD 30 MIN: CPT | Performed by: INTERNAL MEDICINE

## 2022-05-11 RX ORDER — OMEPRAZOLE 40 MG/1
40 CAPSULE, DELAYED RELEASE ORAL DAILY
Qty: 90 CAPSULE | Refills: 1 | Status: SHIPPED | OUTPATIENT
Start: 2022-05-11 | End: 2022-11-14 | Stop reason: SDUPTHER

## 2022-05-11 RX ORDER — ONDANSETRON 4 MG/1
8 TABLET, ORALLY DISINTEGRATING ORAL EVERY 8 HOURS PRN
Qty: 10 TABLET | Refills: 0 | Status: SHIPPED | OUTPATIENT
Start: 2022-05-11 | End: 2022-10-18

## 2022-05-11 RX ORDER — NORETHINDRONE ACETATE AND ETHINYL ESTRADIOL 1.5-30(21)
1 KIT ORAL DAILY
Qty: 28 TABLET | Refills: 12 | Status: SHIPPED | OUTPATIENT
Start: 2022-05-11 | End: 2022-08-09

## 2022-05-11 RX ORDER — FLUTICASONE PROPIONATE 110 UG/1
2 AEROSOL, METERED RESPIRATORY (INHALATION)
Qty: 1 EACH | Refills: 0 | Status: SHIPPED | OUTPATIENT
Start: 2022-05-11 | End: 2022-10-18

## 2022-05-11 RX ORDER — CALCIUM CARBONATE 500 MG/1
1 TABLET, CHEWABLE ORAL AS NEEDED
Qty: 90 TABLET | Refills: 1 | Status: SHIPPED | OUTPATIENT
Start: 2022-05-11 | End: 2023-02-15

## 2022-05-11 RX ORDER — FEXOFENADINE HYDROCHLORIDE 180 MG/1
180 TABLET, FILM COATED ORAL DAILY
Qty: 90 TABLET | Refills: 1 | Status: SHIPPED | OUTPATIENT
Start: 2022-05-11 | End: 2022-10-18

## 2022-05-11 RX ORDER — DOCOSANOL 100 MG/G
1 CREAM TOPICAL
Qty: 2 G | Refills: 1 | Status: SHIPPED | OUTPATIENT
Start: 2022-05-11 | End: 2022-10-18

## 2022-05-11 RX ORDER — DESVENLAFAXINE 100 MG/1
100 TABLET, EXTENDED RELEASE ORAL DAILY
Qty: 90 TABLET | Refills: 0 | Status: SHIPPED | OUTPATIENT
Start: 2022-05-11 | End: 2022-08-09

## 2022-05-11 RX ORDER — GLIPIZIDE 5 MG/1
5 TABLET ORAL
Qty: 60 TABLET | Refills: 2 | Status: SHIPPED | OUTPATIENT
Start: 2022-05-11 | End: 2022-05-24

## 2022-05-11 RX ORDER — SEMAGLUTIDE 1.34 MG/ML
1 INJECTION, SOLUTION SUBCUTANEOUS WEEKLY
Qty: 3 ML | Refills: 1 | Status: SHIPPED | OUTPATIENT
Start: 2022-05-11 | End: 2022-05-26

## 2022-05-11 RX ORDER — INSULIN LISPRO 200 [IU]/ML
5 INJECTION, SOLUTION SUBCUTANEOUS
Qty: 6 PEN | Refills: 3 | Status: SHIPPED | OUTPATIENT
Start: 2022-05-11 | End: 2022-05-26

## 2022-05-11 RX ORDER — MONTELUKAST SODIUM 10 MG/1
10 TABLET ORAL NIGHTLY
Qty: 90 TABLET | Refills: 1 | Status: SHIPPED | OUTPATIENT
Start: 2022-05-11 | End: 2022-10-18

## 2022-05-11 NOTE — PROGRESS NOTES
TELEHEALTH VISIT  Mode of Visit: Video  Location of patient: home  You have chosen to receive care through a telehealth visit.  Patient understanding that this is a telehealth visit.  I cannot perform a full physical exam, therefore something might be missed, or patient may need to come into the office for further evaluation.  Patient is understanding and consents to this type of visit.  The visit included audio and video interaction.     Chief Complaint  Diabetes    Subjective          Dorinda Sherwood presents to [facility]  History of Present Illness     DM II-  States that her sugar has been around 170's  Has lost 10lbs recently  She is trying to walk regularly   She is using a long term glucose monitor and she is checking it more regularly and she is trying to do better with monitoring this    She has decreased her intake of chips  She is doing more water and trying to be healither  She is trying to do fruits    The highest was 304 when she was sick with a virus    No chest pain  No trouble breathing    Recently sick with GI bug, but doing much better now    Depression is doing really well right now  No SI    Would like to restart birth control as she has been having heavy periods    Objective   Physical Exam   Constitutional: She appears well-developed and well-nourished.   obese   HENT:   Head: Normocephalic and atraumatic.   Nose: Nose normal.   Eyes: Pupils are equal, round, and reactive to light. EOM are normal.   Neck: Neck normal appearance.  Pulmonary/Chest: Effort normal.   Neurological: She is alert.   Psychiatric: She has a normal mood and affect.     Result Review :       Common labs    Common Labsle 8/6/21 8/6/21 10/28/21 10/28/21 10/28/21 10/28/21 1/18/22 1/18/22    1300 1300 0934 0934 0934 0934 1739 1739   Glucose  272 (A)    302 (A)  284 (A)   BUN  9    8  8   Creatinine  0.49 (A)    0.49 (A)  0.54 (A)   eGFR Non African Am  >150    >150  135    Sodium  134 (A)    133 (A)  133 (A)   Potassium  4.3    4.3  4.4   Chloride  98    95 (A)  99   Calcium  9.6    9.1  10.0   Albumin  4.20    4.60  4.30   Total Bilirubin  <0.2    0.3  0.2   Alkaline Phosphatase  97    83  82   AST (SGOT)  14    19  18   ALT (SGPT)  12    20  18   WBC 9.33  7.11    9.04    Hemoglobin 13.3  14.5    14.5    Hematocrit 41.7  42.9    41.6    Platelets 332  302    271    Total Cholesterol     187      Triglycerides     287 (A)      HDL Cholesterol     30 (A)      LDL Cholesterol      107 (A)      Hemoglobin A1C    10.42 (A)       (A) Abnormal value       Comments are available for some flowsheets but are not being displayed.             Results for orders placed or performed during the hospital encounter of 05/03/22   POCT SARS-CoV-2 Antigen KARLA   ([facility])    Specimen: Swab   Result Value Ref Range    SARS Antigen Not Detected Not Detected, Presumptive Negative    Internal Control Passed Passed    Lot Number [id number]     Expiration Date 32,923    POCT Influenza A/B    Specimen: Swab   Result Value Ref Range    Rapid Influenza A Ag Negative Negative    Rapid Influenza B Ag Negative Negative    Internal Control Passed Passed    Lot Number [id number]     Expiration Date 53,023             Procedures        Assessment and Plan    Diagnoses and all orders for this visit:    1. Type 2 diabetes mellitus with hyperglycemia, with long-term current use of insulin (HCC) (Primary)  Comments:  doing great with glucose monitor, keep trending sugars and eating better and walking  Orders:  -     CBC & Differential  -     Comprehensive Metabolic Panel  -     Lipid Panel  -     TSH  -     MicroAlbumin, Urine, Random - Urine, Clean Catch  -     Hemoglobin A1c    2. Viral gastroenteritis  Comments:  doing much better, cont to montior  Orders:  -     ondansetron ODT (Zofran ODT) 4 MG disintegrating tablet; Place 2 tablets on the tongue Every 8 (Eight) Hours As Needed for Nausea or Vomiting  for up to 10 doses.  Dispense: 10 tablet; Refill: 0    3. Recurrent major depressive disorder, in partial remission (HCC)  Comments:  doing really well on current meds, cont to monitor for now.  Orders:  -     CBC & Differential  -     Comprehensive Metabolic Panel  -     Lipid Panel  -     TSH  -     MicroAlbumin, Urine, Random - Urine, Clean Catch  -     Hemoglobin A1c    4. Menorrhagia with regular cycle  Comments:  will start loestrin, warned of side effects    Other orders  -     Allergy Relief 180 MG tablet; Take 1 tablet by mouth Daily.  Dispense: 90 tablet; Refill: 1  -     Antacid Calcium 500 MG chewable tablet; Chew 1 tablet As Needed for Heartburn.  Dispense: 90 tablet; Refill: 1  -     desvenlafaxine (Pristiq) 100 MG 24 hr tablet; Take 1 tablet by mouth Daily.  Dispense: 90 tablet; Refill: 0  -     docosanol (Abreva) 10 % cream cream; Apply 1 application topically to the appropriate area as directed 5 (Five) Times a Day.  Dispense: 2 g; Refill: 1  -     fluticasone (FLOVENT HFA) 110 MCG/ACT inhaler; Inhale 2 puffs 2 (Two) Times a Day.  Dispense: 1 each; Refill: 0  -     glipizide (GLUCOTROL) 5 MG tablet; Take 1 tablet by mouth 2 (Two) Times a Day Before Meals.  Dispense: 60 tablet; Refill: 2  -     insulin detemir (LEVEMIR) 100 UNIT/ML injection; Inject 30 Units under the skin into the appropriate area as directed Every Night. DISPENSE THE PEN LEVEMIR, NOT VIAL icd-10: e11.9  Dispense: 3 pen; Refill: 3  -     Insulin Lispro (HumaLOG KwikPen) 200 UNIT/ML solution pen-injector; Inject 5 Units under the skin into the appropriate area as directed 3 (Three) Times a Day With Meals. ICD-10: e11.9  Dispense: 6 pen; Refill: 3  -     omeprazole (priLOSEC) 40 MG capsule; Take 1 capsule by mouth Daily.  Dispense: 90 capsule; Refill: 1  -     Ozempic, 1 MG/DOSE, 4 MG/3ML solution pen-injector; Inject 1 mg under the skin into the appropriate area as directed 1 (One) Time Per Week.  Dispense: 3 mL; Refill: 1  -      montelukast (SINGULAIR) 10 MG tablet; Take 1 tablet by mouth Every Night.  Dispense: 90 tablet; Refill: 1  -     norethindrone-ethinyl estradiol-iron (Loestrin Fe 1.5/30) 1.5-30 MG-MCG tablet; Take 1 tablet by mouth Daily.  Dispense: 28 tablet; Refill: 12                Follow Up   Return in about 5 months (around 10/11/2022).  Patient was given instructions and counseling regarding her condition or for health maintenance advice. Please see specific information pulled into the AVS if appropriate.

## 2022-05-13 ENCOUNTER — TELEPHONE (OUTPATIENT)
Dept: INTERNAL MEDICINE | Facility: CLINIC | Age: 27
End: 2022-05-13

## 2022-05-13 DIAGNOSIS — E16.2 HYPOGLYCEMIA: Primary | ICD-10-CM

## 2022-05-13 NOTE — TELEPHONE ENCOUNTER
Blood sugar was 60 at 12:53. Please advise patient and give her a call back. She is trying to reach Bonnie.    I advised patient to call us back at 3:00-3:00 if nobody has reached back out to her.

## 2022-05-13 NOTE — TELEPHONE ENCOUNTER
Spoke to pt on phone.  Yesterday, sugars: 120  140  then was walking around a lot and it dropped to 60 and had to drink juice, almost passed out  Today was 130 then she cleaned her house and was up moving around a lot, felt dizzy and weak, sugar dropped to 60 then 55, blurred vision, chills, drank juice and got sugar back to 167.  Confirmed doses of Humalog, glipizide, Ozempic and Levemir on chart. She has been cutting out cards/sugar- candy, soda, bread, junk food.    Will discuss with pcp in person.

## 2022-05-15 ENCOUNTER — DOCUMENTATION (OUTPATIENT)
Dept: INTERNAL MEDICINE | Facility: CLINIC | Age: 27
End: 2022-05-15

## 2022-05-16 ENCOUNTER — TELEPHONE (OUTPATIENT)
Dept: CASE MANAGEMENT | Facility: OTHER | Age: 27
End: 2022-05-16

## 2022-05-16 DIAGNOSIS — Z79.4 TYPE 2 DIABETES MELLITUS WITH HYPERGLYCEMIA, WITH LONG-TERM CURRENT USE OF INSULIN: Primary | ICD-10-CM

## 2022-05-16 DIAGNOSIS — E11.65 TYPE 2 DIABETES MELLITUS WITH HYPERGLYCEMIA, WITH LONG-TERM CURRENT USE OF INSULIN: Primary | ICD-10-CM

## 2022-05-16 NOTE — PROGRESS NOTES
Patient called stating that her sugar had dropped into the 60s she drank some orange juice and it came back up into the 200s however she is wondering what she should do. I told her to stop all of her insulin at this point and continue to monitor her sugars and let us know what it is doing. The next step would be decreasing her glipizide. Encouraged her that she is continuing to do a great job with her diet and being active.

## 2022-05-16 NOTE — TELEPHONE ENCOUNTER
Chart review- pt had anoher low sugar of 60 over weekend. PCP advised pt to stop all insulins. Only on Glipizide. Pt's sugar today was 140 and 111. Will call later this week for update. Will update pcp.

## 2022-05-18 ENCOUNTER — PATIENT OUTREACH (OUTPATIENT)
Dept: CASE MANAGEMENT | Facility: OTHER | Age: 27
End: 2022-05-18

## 2022-05-18 ENCOUNTER — CLINICAL SUPPORT (OUTPATIENT)
Dept: INTERNAL MEDICINE | Facility: CLINIC | Age: 27
End: 2022-05-18

## 2022-05-18 DIAGNOSIS — Z79.4 TYPE 2 DIABETES MELLITUS WITH HYPERGLYCEMIA, WITH LONG-TERM CURRENT USE OF INSULIN: Primary | ICD-10-CM

## 2022-05-18 DIAGNOSIS — E11.65 TYPE 2 DIABETES MELLITUS WITH HYPERGLYCEMIA, WITH LONG-TERM CURRENT USE OF INSULIN: ICD-10-CM

## 2022-05-18 DIAGNOSIS — E11.65 TYPE 2 DIABETES MELLITUS WITH HYPERGLYCEMIA, WITH LONG-TERM CURRENT USE OF INSULIN: Primary | ICD-10-CM

## 2022-05-18 DIAGNOSIS — Z79.4 TYPE 2 DIABETES MELLITUS WITH HYPERGLYCEMIA, WITH LONG-TERM CURRENT USE OF INSULIN: ICD-10-CM

## 2022-05-18 LAB
ALBUMIN SERPL-MCNC: 4.3 G/DL (ref 3.5–5.2)
ALBUMIN UR-MCNC: 2.5 MG/DL
ALBUMIN/GLOB SERPL: 1.4 G/DL
ALP SERPL-CCNC: 76 U/L (ref 39–117)
ALT SERPL W P-5'-P-CCNC: 19 U/L (ref 1–33)
ANION GAP SERPL CALCULATED.3IONS-SCNC: 13 MMOL/L (ref 5–15)
AST SERPL-CCNC: 17 U/L (ref 1–32)
BASOPHILS # BLD AUTO: 0.06 10*3/MM3 (ref 0–0.2)
BASOPHILS NFR BLD AUTO: 0.7 % (ref 0–1.5)
BILIRUB SERPL-MCNC: 0.2 MG/DL (ref 0–1.2)
BUN SERPL-MCNC: 9 MG/DL (ref 6–20)
BUN/CREAT SERPL: 20 (ref 7–25)
CALCIUM SPEC-SCNC: 9.2 MG/DL (ref 8.6–10.5)
CHLORIDE SERPL-SCNC: 99 MMOL/L (ref 98–107)
CHOLEST SERPL-MCNC: 164 MG/DL (ref 0–200)
CO2 SERPL-SCNC: 25 MMOL/L (ref 22–29)
CREAT SERPL-MCNC: 0.45 MG/DL (ref 0.57–1)
DEPRECATED RDW RBC AUTO: 35.8 FL (ref 37–54)
EGFRCR SERPLBLD CKD-EPI 2021: 135.4 ML/MIN/1.73
EOSINOPHIL # BLD AUTO: 0.13 10*3/MM3 (ref 0–0.4)
EOSINOPHIL NFR BLD AUTO: 1.5 % (ref 0.3–6.2)
ERYTHROCYTE [DISTWIDTH] IN BLOOD BY AUTOMATED COUNT: 12.3 % (ref 12.3–15.4)
GLOBULIN UR ELPH-MCNC: 3 GM/DL
GLUCOSE SERPL-MCNC: 158 MG/DL (ref 65–99)
HBA1C MFR BLD: 9.9 % (ref 4.8–5.6)
HCT VFR BLD AUTO: 41 % (ref 34–46.6)
HDLC SERPL-MCNC: 40 MG/DL (ref 40–60)
HGB BLD-MCNC: 13.5 G/DL (ref 12–15.9)
IMM GRANULOCYTES # BLD AUTO: 0.06 10*3/MM3 (ref 0–0.05)
IMM GRANULOCYTES NFR BLD AUTO: 0.7 % (ref 0–0.5)
LDLC SERPL CALC-MCNC: 105 MG/DL (ref 0–100)
LDLC/HDLC SERPL: 2.59 {RATIO}
LYMPHOCYTES # BLD AUTO: 2.46 10*3/MM3 (ref 0.7–3.1)
LYMPHOCYTES NFR BLD AUTO: 27.7 % (ref 19.6–45.3)
MCH RBC QN AUTO: 26.7 PG (ref 26.6–33)
MCHC RBC AUTO-ENTMCNC: 32.9 G/DL (ref 31.5–35.7)
MCV RBC AUTO: 81 FL (ref 79–97)
MONOCYTES # BLD AUTO: 0.59 10*3/MM3 (ref 0.1–0.9)
MONOCYTES NFR BLD AUTO: 6.6 % (ref 5–12)
NEUTROPHILS NFR BLD AUTO: 5.59 10*3/MM3 (ref 1.7–7)
NEUTROPHILS NFR BLD AUTO: 62.8 % (ref 42.7–76)
NRBC BLD AUTO-RTO: 0 /100 WBC (ref 0–0.2)
PLATELET # BLD AUTO: 316 10*3/MM3 (ref 140–450)
PMV BLD AUTO: 10.7 FL (ref 6–12)
POTASSIUM SERPL-SCNC: 4.4 MMOL/L (ref 3.5–5.2)
PROT SERPL-MCNC: 7.3 G/DL (ref 6–8.5)
RBC # BLD AUTO: 5.06 10*6/MM3 (ref 3.77–5.28)
SODIUM SERPL-SCNC: 137 MMOL/L (ref 136–145)
TRIGL SERPL-MCNC: 102 MG/DL (ref 0–150)
TSH SERPL DL<=0.05 MIU/L-ACNC: 2.34 UIU/ML (ref 0.27–4.2)
VLDLC SERPL-MCNC: 19 MG/DL (ref 5–40)
WBC NRBC COR # BLD: 8.89 10*3/MM3 (ref 3.4–10.8)

## 2022-05-18 PROCEDURE — 80061 LIPID PANEL: CPT | Performed by: INTERNAL MEDICINE

## 2022-05-18 PROCEDURE — 82043 UR ALBUMIN QUANTITATIVE: CPT | Performed by: INTERNAL MEDICINE

## 2022-05-18 PROCEDURE — 85025 COMPLETE CBC W/AUTO DIFF WBC: CPT | Performed by: INTERNAL MEDICINE

## 2022-05-18 PROCEDURE — 83036 HEMOGLOBIN GLYCOSYLATED A1C: CPT | Performed by: INTERNAL MEDICINE

## 2022-05-18 PROCEDURE — 84443 ASSAY THYROID STIM HORMONE: CPT | Performed by: INTERNAL MEDICINE

## 2022-05-18 PROCEDURE — 36415 COLL VENOUS BLD VENIPUNCTURE: CPT | Performed by: INTERNAL MEDICINE

## 2022-05-18 PROCEDURE — 80053 COMPREHEN METABOLIC PANEL: CPT | Performed by: INTERNAL MEDICINE

## 2022-05-18 NOTE — OUTREACH NOTE
AMBULATORY CASE MANAGEMENT NOTE    Name and Relationship of Patient/Support Person:  -     Called pt for sugar update, no answer, left message.  Pt got labs done today. Pending results.    [facility] Interim Update    Spoke to pt who had labs done at PCP office after taking son to appt and talking to pt about herself as well. Sugars tend to drop quickly when they are in the normal range. Labs still pending. Sugar this AM was 150 and now it is 120. She just got home and agreed to eat something and continue to check sugars. Will call tomorrow or later this week when labs are reviewed.    [facility] Interim Update    5/19: Gave pt her updated lab results. Will continue to call pt every 1-2 weeks for update on sugar log.  Pt's sugar this AM was 141 and 161        BULL SNYDER  Ambulatory Case Management    5/18/2022, 11:19 EDT

## 2022-05-24 ENCOUNTER — TELEMEDICINE (OUTPATIENT)
Dept: DIABETES SERVICES | Facility: HOSPITAL | Age: 27
End: 2022-05-24

## 2022-05-24 DIAGNOSIS — E11.65 UNCONTROLLED TYPE 2 DIABETES MELLITUS WITH HYPERGLYCEMIA: Primary | ICD-10-CM

## 2022-05-24 DIAGNOSIS — E66.01 SEVERE OBESITY (BMI >= 40): ICD-10-CM

## 2022-05-24 DIAGNOSIS — E11.649 HYPOGLYCEMIA DUE TO TYPE 2 DIABETES MELLITUS: ICD-10-CM

## 2022-05-24 PROCEDURE — 99204 OFFICE O/P NEW MOD 45 MIN: CPT | Performed by: NURSE PRACTITIONER

## 2022-05-24 RX ORDER — GLIPIZIDE 2.5 MG/1
2.5 TABLET, EXTENDED RELEASE ORAL
Qty: 60 TABLET | Refills: 3 | Status: SHIPPED | OUTPATIENT
Start: 2022-05-24 | End: 2022-05-26 | Stop reason: DRUGHIGH

## 2022-05-24 NOTE — PROGRESS NOTES
Chief Complaint  Diabetes    Referred By: Jess Maria MD    Subjective          Mode of visit: Video  Location of patient: Home  You have chosen to receive care through a telehealth visit.  Does the patient consent to use of video/audio connection for medical care today: Yes  The visit includes audio and video interaction.  No technical issues occurred during this visit.    Dorinda Sherwood presents to [facility] for diabetes medication management    History of Present Illness    Visit type:  an initial evaluation  Diabetes type:  Type 2  Age at time of diagnosis/Number of years: She states that she had gestational diabetes with her pregnancies and then after her second son in 2020 she was diagnosed with diabetes  Family History of Diabetes: Mother, brother, maternal grandparents  Current diabetes status/concerns/issues: She has been referred to our office by her primary care provider for assistance in managing her diabetes.  She states initially her blood sugars were in the 200s on most occasions but she has made some dietary changes and now is having much lower glucose levels  Other health concerns: No other health concerns  Diabetes symptoms:    Polyuria: No   Polydipsia: Yes   Polyphagia: No   Blurred vision: No   Excessive fatigue: Yes  Diabetes complications:  Neuro: None  Renal: None  Eyes: None  Amputation/Wounds: None  GI: None  Cardiovascular: Hypertension  ED: N/A  Other: None  Hospitalizations/ED/911 secondary to DM?  No  Hypoglycemia:  Level 1 hypoglycemia (54 mg/dL - 70 mg/dL); Frequency - She is having frequent episodes of hypoglycemia in the 60s.  She is only been treating with 1 glucose tablet which has been ineffective.  Her primary care provider discontinued her insulin because of the frequency of the hypoglycemia.  Most events were occurring during the overnight hours.  She has had 2 events where she had loss of consciousness due to low glucose.  Her  insulin was stopped 2 weeks ago but she continues to have problematic hypoglycemia.  Hypoglycemia Symptoms:  shaking/tremors, mood/behavior change and vision changes  Prior diabetes treatment: She was previously taking Lantus 35 units and Humalog 15 units with each meal; this was stopped approximately 2 weeks ago  Diabetes treatment: She is taking glipizide 5 mg twice a day before meals  Blood glucose device:  Dexcom CGM  Blood glucose monitoring frequency:  Continuous per CGM  Blood glucose range/average:   per patient report  Diet:  She has made significant dietary changes by minimizing junk food and elimination of sugary drinks.  She states she was previously drinking Mountain Dew.  She implemented these changes 2 to 3 weeks ago  Activity/Exercise:  Walking    Past Medical History:   Diagnosis Date   • Anesthesia     slow to wake up postop, anxiety postop   • AR (allergic rhinitis)    • ASD (atrial septal defect)     had since birth- asymptomatic- see's dr falk    • Asthma     no inhalers   • Depression    • Diabetes mellitus (HCC)     type ii- bg runs around 200-300   • Gallstones currently   • GERD (gastroesophageal reflux disease)    • Hypertension      Past Surgical History:   Procedure Laterality Date   •  SECTION     • CHOLECYSTECTOMY N/A 2021    Procedure: CHOLECYSTECTOMY LAPAROSCOPIC;  Surgeon: Robert Araya MD;  Location: [facility] [place];  Service: General;  Laterality: N/A;   • COLONOSCOPY     • TUBAL ABDOMINAL LIGATION     • WISDOM TOOTH EXTRACTION       Family History   Problem Relation Age of Onset   • Heart disease Father    • Heart disease Other    • Heart disease Other    • Heart disease Other    • Diabetes type II Other      Social History     Socioeconomic History   • Marital status:    • Number of children: 2   Tobacco Use   • Smoking status: Never Smoker   • Smokeless tobacco: Never Used   Vaping Use   • Vaping Use: Never used   Substance and Sexual  Activity   • Alcohol use: Never   • Drug use: Never   • Sexual activity: Defer     Allergies   Allergen Reactions   • Amoxicillin Shortness Of Breath   • Hydroxyzine Shortness Of Breath   • Hydroxyzine Hcl Cough       Current Outpatient Medications:   •  albuterol (ACCUNEB) 1.25 MG/3ML nebulizer solution, Take 3 mL by nebulization Every 4 (Four) Hours As Needed for Wheezing., Disp: 60 mL, Rfl: 0  •  Allergy Relief 180 MG tablet, Take 1 tablet by mouth Daily., Disp: 90 tablet, Rfl: 1  •  Antacid Calcium 500 MG chewable tablet, Chew 1 tablet As Needed for Heartburn., Disp: 90 tablet, Rfl: 1  •  Continuous Blood Gluc  (Dexcom G6 ) device, 1 each Continuous. icd10- e11.9, Disp: 1 each, Rfl: 0  •  Continuous Blood Gluc Sensor (Dexcom G6 Sensor), Every 10 (Ten) Days. icd-10: e11.9, Disp: 3 each, Rfl: 1  •  Continuous Blood Gluc Transmit (Dexcom G6 Transmitter) misc, 1 Device Every 3 (Three) Months. icd-10: e11.9, Disp: 1 each, Rfl: 1  •  desvenlafaxine (Pristiq) 100 MG 24 hr tablet, Take 1 tablet by mouth Daily., Disp: 90 tablet, Rfl: 0  •  docosanol (Abreva) 10 % cream cream, Apply 1 application topically to the appropriate area as directed 5 (Five) Times a Day., Disp: 2 g, Rfl: 1  •  fluticasone (FLOVENT HFA) 110 MCG/ACT inhaler, Inhale 2 puffs 2 (Two) Times a Day., Disp: 1 each, Rfl: 0  •  glipizide (GLUCOTROL XL) 2.5 MG 24 hr tablet, Take 1 tablet by mouth Daily With Breakfast & Dinner., Disp: 60 tablet, Rfl: 3  •  insulin detemir (LEVEMIR) 100 UNIT/ML injection, Inject 30 Units under the skin into the appropriate area as directed Every Night. DISPENSE THE PEN LEVEMIR, NOT VIAL icd-10: e11.9, Disp: 3 pen, Rfl: 3  •  Insulin Lispro (HumaLOG KwikPen) 200 UNIT/ML solution pen-injector, Inject 5 Units under the skin into the appropriate area as directed 3 (Three) Times a Day With Meals. ICD-10: e11.9, Disp: 6 pen, Rfl: 3  •  Insulin Pen Needle 31G X 8 MM misc, Inject insulin QID subcutaneously. Dispense  #100 count boxes, dispense 3 boxes   ICD-10: e11.9, Disp: 3 each, Rfl: 1  •  ipratropium (ATROVENT) 0.02 % nebulizer solution, Take 2.5 mL by nebulization 4 (Four) Times a Day. As needed, Disp: 50 mL, Rfl: 0  •  loperamide (IMODIUM) 2 MG capsule, Take 1 capsule by mouth 4 (Four) Times a Day As Needed for Diarrhea., Disp: 10 capsule, Rfl: 0  •  metoprolol succinate XL (TOPROL-XL) 25 MG 24 hr tablet, Take 2 tablets by mouth Every Night. Takes 2 tabs, Disp: 90 tablet, Rfl: 1  •  mineral oil-hydrophilic petrolatum (AQUAPHOR) ointment, Apply 1 application topically to the appropriate area as directed As Needed for Dry Skin., Disp: 50 g, Rfl: 1  •  montelukast (SINGULAIR) 10 MG tablet, Take 1 tablet by mouth Every Night., Disp: 90 tablet, Rfl: 1  •  norethindrone-ethinyl estradiol-iron (Loestrin Fe 1.5/30) 1.5-30 MG-MCG tablet, Take 1 tablet by mouth Daily., Disp: 28 tablet, Rfl: 12  •  Nutritional Supplements (Glucose Management) tablet, Take 1 tablet by mouth As Needed (blood sugar below 60)., Disp: 30 tablet, Rfl: 0  •  nystatin (MYCOSTATIN) 507902 UNIT/GM cream, Apply 30 application topically to the appropriate area as directed As Needed (use as needed)., Disp: 30 g, Rfl: 1  •  omeprazole (priLOSEC) 40 MG capsule, Take 1 capsule by mouth Daily., Disp: 90 capsule, Rfl: 1  •  ondansetron ODT (Zofran ODT) 4 MG disintegrating tablet, Place 2 tablets on the tongue Every 8 (Eight) Hours As Needed for Nausea or Vomiting for up to 10 doses., Disp: 10 tablet, Rfl: 0  •  Ozempic, 1 MG/DOSE, 4 MG/3ML solution pen-injector, Inject 1 mg under the skin into the appropriate area as directed 1 (One) Time Per Week., Disp: 3 mL, Rfl: 1  •  polyethylene glycol (MIRALAX) 17 g packet, Take 17 g by mouth Daily., Disp: 5 packet, Rfl: 0    Review of Systems   Constitutional: Positive for activity change (She has increased her physical activity) and fatigue. Negative for appetite change, fever, unexpected weight gain and unexpected weight  loss.   HENT: Negative for congestion, ear pain, facial swelling, hearing loss, sore throat and tinnitus.    Eyes: Negative for blurred vision, double vision, redness and visual disturbance.   Respiratory: Negative for cough, shortness of breath and wheezing.    Cardiovascular: Negative for chest pain, palpitations and leg swelling.   Gastrointestinal: Negative for abdominal distention, constipation, diarrhea, nausea, vomiting, GERD and indigestion.   Endocrine: Positive for polydipsia. Negative for polyphagia and polyuria.   Genitourinary: Negative for difficulty urinating, frequency and urgency.   Musculoskeletal: Positive for back pain. Negative for gait problem and myalgias.   Skin: Negative for rash, skin lesions and wound.   Neurological: Negative for seizures, speech difficulty, weakness, headache and confusion.   Psychiatric/Behavioral: Positive for depressed mood and stress. Negative for sleep disturbance. The patient is not nervous/anxious.         Objective     There were no vitals filed for this visit.  There is no height or weight on file to calculate BMI.    Physical Exam  Constitutional:       Appearance: Normal appearance. She is obese.      Comments: Severe obesity with BMI of 52.86   HENT:      Head: Normocephalic and atraumatic.      Right Ear: External ear normal.      Left Ear: External ear normal.      Nose: Nose normal.   Eyes:      Extraocular Movements: Extraocular movements intact.      Conjunctiva/sclera: Conjunctivae normal.   Pulmonary:      Effort: Pulmonary effort is normal.   Musculoskeletal:         General: Normal range of motion.      Cervical back: Normal range of motion.   Skin:     General: Skin is warm and dry.   Neurological:      General: No focal deficit present.      Mental Status: She is alert and oriented to person, place, and time. Mental status is at baseline.   Psychiatric:         Mood and Affect: Mood normal.         Behavior: Behavior normal.         Thought  Content: Thought content normal.         Judgment: Judgment normal.         Result Review :   The following data was reviewed by: YOKO Madrigal on 05/24/2022:    Labs collected on 5/18/2022 show A1c of 9.9% indicating uncontrolled type 2 diabetes.  This is down from her prior result of 10.42 collected in October 2021    Most Recent A1C    HGBA1C Most Recent 5/18/22   Hemoglobin A1C 9.90 (A)   (A) Abnormal value              A1C Last 3 Results    HGBA1C Last 3 Results 10/28/21 5/18/22   Hemoglobin A1C 10.42 (A) 9.90 (A)   (A) Abnormal value              Creatinine   Date Value Ref Range Status   05/18/2022 0.45 (L) 0.57 - 1.00 mg/dL Final   01/18/2022 0.54 (L) 0.57 - 1.00 mg/dL Final       eGFR Non  Amer   Date Value Ref Range Status   01/18/2022 135 >60 mL/min/1.73 Final     eGFR   Date Value Ref Range Status   05/18/2022 135.4 >60.0 mL/min/1.73 Final     Comment:     National Kidney Foundation and American Society of Nephrology (ASN) Task Force recommended calculation based on the Chronic Kidney Disease Epidemiology Collaboration (CKD-EPI) equation refit without adjustment for race.       Microalbumin, Urine   Date Value Ref Range Status   05/18/2022 2.5 mg/dL Final       Labs collected on 5/18/2022 show normal renal function without microalbuminuria          Assessment: The patient has been struggling with hyperglycemia for an extended period of time.  She did implement significant dietary changes by eliminating sugary drinks and high carbohydrate snacks from her diet.  Since that time she has been struggling with problematic hypoglycemia.  Her insulin was discontinued by her primary care provider.  The patient indicates she is not taking the Ozempic as prescribed by her PCP.  Despite this she continues to struggle with hypoglycemia occurring frequently.  Unfortunately because this was a video visit we were unable to upload her continuous glucose sensor to evaluate glucose levels more  closely.      Diagnoses and all orders for this visit:    1. Uncontrolled type 2 diabetes mellitus with hyperglycemia (HCC) (Primary)    2. Severe obesity (BMI >= 40) (HCC)    3. Hypoglycemia due to type 2 diabetes mellitus (HCC)    Other orders  -     glipizide (GLUCOTROL XL) 2.5 MG 24 hr tablet; Take 1 tablet by mouth Daily With Breakfast & Dinner.  Dispense: 60 tablet; Refill: 3        Plan: Because of the hypoglycemia the patient was instructed to decrease her glipizide to 2.5 mg twice a day.  She will call or message the office in approximately 2 weeks to advise of her glucose levels.  If she notes a persistent increase in glucose levels she will call the office sooner.    The patient will monitor her blood glucose levels using her continuous glucose sensor.  If she develops problematic hyperglycemia or hypoglycemia or adverse drug reactions, she will contact the office for further instructions.          Follow Up     Return in about 2 months (around 7/24/2022) for Video visit, Medication Management.    Patient was given instructions and counseling regarding her condition or for health maintenance advice. Please see specific information pulled into the AVS if appropriate.     Arcelia Mohan, APRN  05/24/2022

## 2022-05-24 NOTE — PROGRESS NOTES
When I spoke with the patient she told me she was only taking the glipizide.  I asked her a couple of times if she was taking the Ozempic and she said no.  I will amend my note to indicate it was not discontinued by you, but instead by the patient.  I will call her tomorrow to confirm once more that she not taking it.  Thanks for the information.

## 2022-05-24 NOTE — PROGRESS NOTES
Arcelia CHOUDHARY- we did stop her insulin, but didn't stop her ozempic as I believe this is partially why she is successful with her diet as it is curbing her appetite.  We did plan to decrease and stop her glipizide next to help with hypoglycemia if it is still occurring.  It looks like you had her go ahead and decrease it which I totally agree with.  I'd rather stay on ozempic and stop glipize if possible.    Bonnie- the next time you speak with her can you confirm above?  I know she gets really confused about her meds a lot.

## 2022-05-26 ENCOUNTER — TELEPHONE (OUTPATIENT)
Dept: CASE MANAGEMENT | Facility: OTHER | Age: 27
End: 2022-05-26

## 2022-05-26 DIAGNOSIS — Z79.4 TYPE 2 DIABETES MELLITUS WITH HYPERGLYCEMIA, WITH LONG-TERM CURRENT USE OF INSULIN: Primary | ICD-10-CM

## 2022-05-26 DIAGNOSIS — E11.65 TYPE 2 DIABETES MELLITUS WITH HYPERGLYCEMIA, WITH LONG-TERM CURRENT USE OF INSULIN: Primary | ICD-10-CM

## 2022-05-26 RX ORDER — GLIPIZIDE 5 MG/1
5 TABLET ORAL
Qty: 180 TABLET | Refills: 0 | Status: SHIPPED | OUTPATIENT
Start: 2022-05-26 | End: 2022-06-15

## 2022-05-26 NOTE — TELEPHONE ENCOUNTER
Pt had been off of long and short acting insulin, and Ozempic for about 10 days. She was on 5mg Glipizide BID and doing well. Since lowering to 2.5mg BID, per pt, she has been having higher sugars (200+), tired, thirsty and having diarrhea. Dr Maria would prefer pt going back on Ozempic, stopping Glipizide. However, pt adamant on not taking any injectable med. Will increase back to Glipizide 5mg BID, per Dr Maria.  I will call pt back next week for sugar log.

## 2022-05-27 ENCOUNTER — PATIENT OUTREACH (OUTPATIENT)
Dept: CASE MANAGEMENT | Facility: OTHER | Age: 27
End: 2022-05-27

## 2022-05-27 DIAGNOSIS — Z79.4 TYPE 2 DIABETES MELLITUS WITH HYPERGLYCEMIA, WITH LONG-TERM CURRENT USE OF INSULIN: Primary | ICD-10-CM

## 2022-05-27 DIAGNOSIS — F33.41 RECURRENT MAJOR DEPRESSIVE DISORDER, IN PARTIAL REMISSION: ICD-10-CM

## 2022-05-27 DIAGNOSIS — E11.65 TYPE 2 DIABETES MELLITUS WITH HYPERGLYCEMIA, WITH LONG-TERM CURRENT USE OF INSULIN: Primary | ICD-10-CM

## 2022-05-27 NOTE — OUTREACH NOTE
[facility] End of Month Documentation    This Chronic Medical Management Care Plan for Dorinda Sherwood, 26 y.o. female, has been monitored and managed and a new plan of care implemented for the month of May.  A cumulative time of 810  minutes was spent on this patient record this month, including chart review; electronic communication with primary care provider; phone call with patient.    Regarding the patient's problems: has Calculus of gallbladder with chronic cholecystitis without obstruction; Type 2 diabetes mellitus with hyperglycemia, with long-term current use of insulin ([facility]); S/P cholecystectomy; Allergic rhinitis; Asthma; Atrial septal defect, secundum; Depression; Anxiety; Chest pain; and Chronic intractable headache on their problem list., the following items were addressed: medical records; medications; referrals to community service providers, needs to see obgyn and any changes can be found within the plan section of the note.  A detailed listing of time spent for chronic care management is tracked within each outreach encounter.  Current medications include:  has a current medication list which includes the following prescription(s): albuterol, allergy relief, antacid calcium, dexcom g6 , dexcom g6 sensor, dexcom g6 transmitter, desvenlafaxine, docosanol, fluticasone, glipizide, insulin detemir, insulin pen needle, ipratropium, loperamide, metoprolol succinate xl, mineral oil-hydrophilic petrolatum, montelukast, norethindrone-ethinyl estradiol-iron, glucose management, nystatin, omeprazole, ondansetron odt, and polyethylene glycol. and the patient is reported to be patient is compliant with medication protocol,  Medications are reported to be effective.  Regarding these diagnoses, referrals were made to the following provider(s):  OBGYN (still has not established).  All notes on chart for PCP to review.    The patient was monitored remotely for blood glucose; medications; pain, diet, DM  compliance.    The patient's physical needs include:  physical healthcare; physician referral.     The patient's mental support needs include:  increased support    The patient's cognitive support needs include:  continued support    The patient's psychosocial support needs include:  continued support    The patient's functional needs include: physical healthcare; physician referral, pt has not established care with obgyn yet    The patient's environmental needs include:  no access to transportation, pt does not drive, relies on family for rides. appts made via telehealth    Care Plan overall comments:  No data recorded    Refer to previous outreach notes for more information on the areas listed above.    Monthly Billing Diagnoses  (E11.65,  Z79.4) Type 2 diabetes mellitus with hyperglycemia, with long-term current use of insulin (HCC)    (F33.41) Recurrent major depressive disorder, in partial remission (HCC)    Medications   · Medications have been reconciled    Care Plan progress this month:      Recently Modified Care Plans Updates made since 4/26/2022 12:00 AM    No recently modified care plans.              Instructions   · Patient was provided an electronic copy of care plan  · CCM services were explained and offered and patient has accepted these services.  · Patient has given their written consent to receive CCM services and understands that this includes the authorization of electronic communication of medical information with the other treating providers.  · Patient understands that they may stop CCM services at any time and these changes will be effective at the end of the calendar month and will effectively revocate the agreement of CCM services.  · Patient understands that only one practitioner can furnish and be paid for CCM services during one calendar month.  Patient also understands that there may be co-payment or deductible fees in association with CCM services.  · Patient will continue with at  least monthly follow-up calls with the Nurse Navigator.    BULL SNYDER  Ambulatory Case Management    5/27/2022, 10:13 EDT

## 2022-06-01 ENCOUNTER — PATIENT ROUNDING (BHMG ONLY) (OUTPATIENT)
Dept: DIABETES SERVICES | Facility: HOSPITAL | Age: 27
End: 2022-06-01

## 2022-06-01 NOTE — PROGRESS NOTES
June 1, 2022    Hello, may I speak with Dorinda Sherwood?    My name is Siena Og      I am  with [facility] [address] [place]  [facility] [address] [phone].    Before we get started may I verify your date of birth? 1995    I am calling to officially welcome you to our practice and ask about your recent visit. Is this a good time to talk? no    Left message per script    Thank you, and have a great day.

## 2022-06-02 DIAGNOSIS — E11.65 TYPE 2 DIABETES MELLITUS WITH HYPERGLYCEMIA, WITH LONG-TERM CURRENT USE OF INSULIN: ICD-10-CM

## 2022-06-02 DIAGNOSIS — R73.09 HEMOGLOBIN A1C GREATER THAN 9.0%: ICD-10-CM

## 2022-06-02 DIAGNOSIS — Z79.4 TYPE 2 DIABETES MELLITUS WITH HYPERGLYCEMIA, WITH LONG-TERM CURRENT USE OF INSULIN: ICD-10-CM

## 2022-06-02 RX ORDER — PROCHLORPERAZINE 25 MG/1
SUPPOSITORY RECTAL
Qty: 3 EACH | Refills: 3 | Status: SHIPPED | OUTPATIENT
Start: 2022-06-02 | End: 2022-12-30 | Stop reason: SDUPTHER

## 2022-06-03 ENCOUNTER — PATIENT OUTREACH (OUTPATIENT)
Dept: CASE MANAGEMENT | Facility: OTHER | Age: 27
End: 2022-06-03

## 2022-06-03 DIAGNOSIS — E11.65 TYPE 2 DIABETES MELLITUS WITH HYPERGLYCEMIA, WITH LONG-TERM CURRENT USE OF INSULIN: Primary | ICD-10-CM

## 2022-06-03 DIAGNOSIS — Z79.4 TYPE 2 DIABETES MELLITUS WITH HYPERGLYCEMIA, WITH LONG-TERM CURRENT USE OF INSULIN: Primary | ICD-10-CM

## 2022-06-03 NOTE — OUTREACH NOTE
AMBULATORY CASE MANAGEMENT NOTE    Name and Relationship of Patient/Support Person: Dorinda Sherwood M - Self    Called for sugar log. She said she was busy at the moment. No acute needs. Will call back on Monday.    Per chart, diabetic educator called and did not talk to pt either earlier this week.      BULL SNYDER  Ambulatory Case Management    6/3/2022, 13:01 EDT

## 2022-06-06 ENCOUNTER — TELEPHONE (OUTPATIENT)
Dept: CASE MANAGEMENT | Facility: OTHER | Age: 27
End: 2022-06-06

## 2022-06-06 DIAGNOSIS — Z79.4 TYPE 2 DIABETES MELLITUS WITH HYPERGLYCEMIA, WITH LONG-TERM CURRENT USE OF INSULIN: Primary | ICD-10-CM

## 2022-06-06 DIAGNOSIS — E11.65 TYPE 2 DIABETES MELLITUS WITH HYPERGLYCEMIA, WITH LONG-TERM CURRENT USE OF INSULIN: Primary | ICD-10-CM

## 2022-06-06 NOTE — TELEPHONE ENCOUNTER
Per conversation with pcp, pt needs to resume Ozempic. Called pt and left detailed message. Will FWD to endo as well.    Pt called back, adamant that she will not take Ozempic. Stated that it will not lower her sugar enough and it makes her hungry all the time. I educated pt on the difference in insulin/med between Ozempic and the insulin that goes in a pump (that she is insisting on). She still does not want Ozempic. Now, asking for increased dose of Glipizide- asking for 10 mg BID. Please advise

## 2022-06-09 NOTE — TELEPHONE ENCOUNTER
Ok that's fine I understand.  I did think that perhaps rybelsus could be a good switch for her if she wants a pill.  Bonnie for some reason it isn't letting me add Arcelia to this string. Will you let her know about the rybelsus?

## 2022-06-09 NOTE — TELEPHONE ENCOUNTER
Thank you so much for the update. I will let PCP know.    Dr Maria- pt was seen by Renay BABCOCK in [place] and established care there and got med changed, per [place]. I called pt to confirm., no answer, will keep calling until I confirm

## 2022-06-29 ENCOUNTER — PATIENT OUTREACH (OUTPATIENT)
Dept: CASE MANAGEMENT | Facility: OTHER | Age: 27
End: 2022-06-29

## 2022-06-29 DIAGNOSIS — Z79.4 TYPE 2 DIABETES MELLITUS WITH HYPERGLYCEMIA, WITH LONG-TERM CURRENT USE OF INSULIN: Primary | ICD-10-CM

## 2022-06-29 DIAGNOSIS — E11.65 TYPE 2 DIABETES MELLITUS WITH HYPERGLYCEMIA, WITH LONG-TERM CURRENT USE OF INSULIN: Primary | ICD-10-CM

## 2022-06-29 DIAGNOSIS — F33.41 RECURRENT MAJOR DEPRESSIVE DISORDER, IN PARTIAL REMISSION: ICD-10-CM

## 2022-06-29 NOTE — OUTREACH NOTE
[facility] End of Month Documentation    This Chronic Medical Management Care Plan for Dorinda Sherwood, 26 y.o. female, has been monitored and managed and a new plan of care implemented for the month of June.  A cumulative time of 33  minutes was spent on this patient record this month, including chart review; electronic communication with primary care provider; phone call with patient; face to face with provider; electronic communication with other providers.    Regarding the patient's problems: has Calculus of gallbladder with chronic cholecystitis without obstruction; Type 2 diabetes mellitus with hyperglycemia, with long-term current use of insulin ([facility]); S/P cholecystectomy; Allergic rhinitis; Asthma; Atrial septal defect, secundum; Depression; Anxiety; Chest pain; and Chronic intractable headache on their problem list., the following items were addressed: medical records; medications, needs to see obgyn and any changes can be found within the plan section of the note.  A detailed listing of time spent for chronic care management is tracked within each outreach encounter.  Current medications include:  has a current medication list which includes the following prescription(s): albuterol, albuterol, allergy relief, antacid calcium, dexcom g6 , dexcom g6 sensor, dexcom g6 transmitter, desvenlafaxine, docosanol, fluticasone, guaifenesin-dextromethorphan, ipratropium, loperamide, metoprolol succinate xl, mineral oil-hydrophilic petrolatum, montelukast, norethindrone-ethinyl estradiol-iron, glucose management, nystatin, omeprazole, ondansetron odt, and polyethylene glycol. and the patient is reported to be patient is compliant with medication protocol,  Medications are reported to be effective.  Regarding these diagnoses, referrals were made to the following provider(s):  none.  All notes on chart for PCP to review.    The patient was monitored remotely for blood glucose; medications; pain, diet, DM  compliance.    The patient's physical needs include:  physical healthcare.     The patient's mental support needs include:  increased support    The patient's cognitive support needs include:  continued support    The patient's psychosocial support needs include:  continued support    The patient's functional needs include: physical healthcare, pt has not established care with obgyn yet    The patient's environmental needs include:  no access to transportation, pt does not drive, relies on family for rides. appts made via telehealth    Care Plan overall comments:  No data recorded    Refer to previous outreach notes for more information on the areas listed above.    Monthly Billing Diagnoses  (E11.65,  Z79.4) Type 2 diabetes mellitus with hyperglycemia, with long-term current use of insulin (HCC)    (F33.41) Recurrent major depressive disorder, in partial remission (HCC)    Medications   · Medications have been reconciled    Care Plan progress this month:      Recently Modified Care Plans Updates made since 5/29/2022 12:00 AM    No recently modified care plans.          Instructions   · Patient was provided an electronic copy of care plan  · CCM services were explained and offered and patient has accepted these services.  · Patient has given their written consent to receive CCM services and understands that this includes the authorization of electronic communication of medical information with the other treating providers.  · Patient understands that they may stop CCM services at any time and these changes will be effective at the end of the calendar month and will effectively revocate the agreement of CCM services.  · Patient understands that only one practitioner can furnish and be paid for CCM services during one calendar month.  Patient also understands that there may be co-payment or deductible fees in association with CCM services.  · Patient will continue with at least monthly follow-up calls with the Nurse  Navigator.    BULL SNYDER  Ambulatory Case Management    6/29/2022, 13:14 EDT

## 2022-08-10 ENCOUNTER — HOSPITAL ENCOUNTER (EMERGENCY)
Facility: HOSPITAL | Age: 27
Discharge: HOME | End: 2022-08-10
Attending: EMERGENCY MEDICINE | Admitting: EMERGENCY MEDICINE

## 2022-08-10 VITALS
BODY MASS INDEX: 51.91 KG/M2 | TEMPERATURE: 98.7 F | SYSTOLIC BLOOD PRESSURE: 97 MMHG | OXYGEN SATURATION: 97 % | HEIGHT: 63 IN | HEART RATE: 83 BPM | DIASTOLIC BLOOD PRESSURE: 65 MMHG | WEIGHT: 293 LBS | RESPIRATION RATE: 20 BRPM

## 2022-08-10 DIAGNOSIS — R73.9 HYPERGLYCEMIA: Primary | ICD-10-CM

## 2022-08-10 DIAGNOSIS — K52.9 ACUTE GASTROENTERITIS: ICD-10-CM

## 2022-08-10 LAB
ALBUMIN SERPL-MCNC: 3.6 G/DL (ref 3.5–5.2)
ALBUMIN/GLOB SERPL: 1.5 G/DL
ALP SERPL-CCNC: 65 U/L (ref 39–117)
ALT SERPL W P-5'-P-CCNC: 21 U/L (ref 1–33)
ANION GAP SERPL CALCULATED.3IONS-SCNC: 8.2 MMOL/L (ref 5–15)
AST SERPL-CCNC: 20 U/L (ref 1–32)
BASOPHILS # BLD AUTO: 0.04 10*3/MM3 (ref 0–0.2)
BASOPHILS NFR BLD AUTO: 0.6 % (ref 0–1.5)
BILIRUB SERPL-MCNC: <0.2 MG/DL (ref 0–1.2)
BILIRUB UR QL STRIP: NEGATIVE
BUN SERPL-MCNC: 6 MG/DL (ref 6–20)
BUN/CREAT SERPL: 14 (ref 7–25)
CALCIUM SPEC-SCNC: 8.2 MG/DL (ref 8.6–10.5)
CHLORIDE SERPL-SCNC: 104 MMOL/L (ref 98–107)
CLARITY UR: CLEAR
CO2 SERPL-SCNC: 24.8 MMOL/L (ref 22–29)
COLOR UR: YELLOW
CREAT SERPL-MCNC: 0.43 MG/DL (ref 0.57–1)
DEPRECATED RDW RBC AUTO: 36.4 FL (ref 37–54)
EGFRCR SERPLBLD CKD-EPI 2021: 136.1 ML/MIN/1.73
EOSINOPHIL # BLD AUTO: 0.1 10*3/MM3 (ref 0–0.4)
EOSINOPHIL NFR BLD AUTO: 1.6 % (ref 0.3–6.2)
ERYTHROCYTE [DISTWIDTH] IN BLOOD BY AUTOMATED COUNT: 12.7 % (ref 12.3–15.4)
GLOBULIN UR ELPH-MCNC: 2.4 GM/DL
GLUCOSE BLDC GLUCOMTR-MCNC: 204 MG/DL (ref 70–99)
GLUCOSE BLDC GLUCOMTR-MCNC: 283 MG/DL (ref 70–99)
GLUCOSE SERPL-MCNC: 284 MG/DL (ref 65–99)
GLUCOSE UR STRIP-MCNC: ABNORMAL MG/DL
HCT VFR BLD AUTO: 36.5 % (ref 34–46.6)
HGB BLD-MCNC: 12.3 G/DL (ref 12–15.9)
HGB UR QL STRIP.AUTO: NEGATIVE
HOLD SPECIMEN: NORMAL
HOLD SPECIMEN: NORMAL
IMM GRANULOCYTES # BLD AUTO: 0.06 10*3/MM3 (ref 0–0.05)
IMM GRANULOCYTES NFR BLD AUTO: 0.9 % (ref 0–0.5)
KETONES UR QL STRIP: ABNORMAL
LEUKOCYTE ESTERASE UR QL STRIP.AUTO: NEGATIVE
LYMPHOCYTES # BLD AUTO: 2.4 10*3/MM3 (ref 0.7–3.1)
LYMPHOCYTES NFR BLD AUTO: 37.9 % (ref 19.6–45.3)
MCH RBC QN AUTO: 27.2 PG (ref 26.6–33)
MCHC RBC AUTO-ENTMCNC: 33.7 G/DL (ref 31.5–35.7)
MCV RBC AUTO: 80.8 FL (ref 79–97)
MONOCYTES # BLD AUTO: 0.53 10*3/MM3 (ref 0.1–0.9)
MONOCYTES NFR BLD AUTO: 8.4 % (ref 5–12)
NEUTROPHILS NFR BLD AUTO: 3.21 10*3/MM3 (ref 1.7–7)
NEUTROPHILS NFR BLD AUTO: 50.6 % (ref 42.7–76)
NITRITE UR QL STRIP: NEGATIVE
NRBC BLD AUTO-RTO: 0 /100 WBC (ref 0–0.2)
PH UR STRIP.AUTO: 6 [PH] (ref 5–8)
PLATELET # BLD AUTO: 229 10*3/MM3 (ref 140–450)
PMV BLD AUTO: 10.2 FL (ref 6–12)
POTASSIUM SERPL-SCNC: 3.8 MMOL/L (ref 3.5–5.2)
PROT SERPL-MCNC: 6 G/DL (ref 6–8.5)
PROT UR QL STRIP: NEGATIVE
RBC # BLD AUTO: 4.52 10*6/MM3 (ref 3.77–5.28)
SODIUM SERPL-SCNC: 137 MMOL/L (ref 136–145)
SP GR UR STRIP: >1.03 (ref 1–1.03)
UROBILINOGEN UR QL STRIP: ABNORMAL
WBC NRBC COR # BLD: 6.34 10*3/MM3 (ref 3.4–10.8)
WHOLE BLOOD HOLD COAG: NORMAL
WHOLE BLOOD HOLD SPECIMEN: NORMAL

## 2022-08-10 PROCEDURE — 96375 TX/PRO/DX INJ NEW DRUG ADDON: CPT

## 2022-08-10 PROCEDURE — 25010000002 KETOROLAC TROMETHAMINE PER 15 MG: Performed by: EMERGENCY MEDICINE

## 2022-08-10 PROCEDURE — 99284 EMERGENCY DEPT VISIT MOD MDM: CPT

## 2022-08-10 PROCEDURE — 82962 GLUCOSE BLOOD TEST: CPT

## 2022-08-10 PROCEDURE — 81003 URINALYSIS AUTO W/O SCOPE: CPT | Performed by: EMERGENCY MEDICINE

## 2022-08-10 PROCEDURE — 36415 COLL VENOUS BLD VENIPUNCTURE: CPT

## 2022-08-10 PROCEDURE — 25010000002 DROPERIDOL PER 5 MG: Performed by: EMERGENCY MEDICINE

## 2022-08-10 PROCEDURE — 96374 THER/PROPH/DIAG INJ IV PUSH: CPT

## 2022-08-10 PROCEDURE — 80053 COMPREHEN METABOLIC PANEL: CPT

## 2022-08-10 PROCEDURE — 85025 COMPLETE CBC W/AUTO DIFF WBC: CPT

## 2022-08-10 PROCEDURE — 25010000002 ONDANSETRON PER 1 MG: Performed by: EMERGENCY MEDICINE

## 2022-08-10 PROCEDURE — 25010000002 DIPHENHYDRAMINE PER 50 MG: Performed by: EMERGENCY MEDICINE

## 2022-08-10 RX ORDER — ONDANSETRON 4 MG/1
4 TABLET, ORALLY DISINTEGRATING ORAL EVERY 8 HOURS PRN
Qty: 12 TABLET | Refills: 0 | Status: SHIPPED | OUTPATIENT
Start: 2022-08-10 | End: 2022-10-18

## 2022-08-10 RX ORDER — DROPERIDOL 2.5 MG/ML
2.5 INJECTION, SOLUTION INTRAMUSCULAR; INTRAVENOUS ONCE
Status: COMPLETED | OUTPATIENT
Start: 2022-08-10 | End: 2022-08-10

## 2022-08-10 RX ORDER — KETOROLAC TROMETHAMINE 30 MG/ML
30 INJECTION, SOLUTION INTRAMUSCULAR; INTRAVENOUS ONCE
Status: COMPLETED | OUTPATIENT
Start: 2022-08-10 | End: 2022-08-10

## 2022-08-10 RX ORDER — SODIUM CHLORIDE 0.9 % (FLUSH) 0.9 %
10 SYRINGE (ML) INJECTION AS NEEDED
Status: DISCONTINUED | OUTPATIENT
Start: 2022-08-10 | End: 2022-08-10 | Stop reason: HOSPADM

## 2022-08-10 RX ORDER — DIPHENHYDRAMINE HYDROCHLORIDE 50 MG/ML
25 INJECTION INTRAMUSCULAR; INTRAVENOUS ONCE
Status: COMPLETED | OUTPATIENT
Start: 2022-08-10 | End: 2022-08-10

## 2022-08-10 RX ORDER — ONDANSETRON 2 MG/ML
4 INJECTION INTRAMUSCULAR; INTRAVENOUS ONCE
Status: COMPLETED | OUTPATIENT
Start: 2022-08-10 | End: 2022-08-10

## 2022-08-10 RX ADMIN — SODIUM CHLORIDE 1000 ML: 9 INJECTION, SOLUTION INTRAVENOUS at 14:15

## 2022-08-10 RX ADMIN — DIPHENHYDRAMINE HYDROCHLORIDE 25 MG: 50 INJECTION, SOLUTION INTRAMUSCULAR; INTRAVENOUS at 14:55

## 2022-08-10 RX ADMIN — KETOROLAC TROMETHAMINE 30 MG: 30 INJECTION, SOLUTION INTRAMUSCULAR; INTRAVENOUS at 14:16

## 2022-08-10 RX ADMIN — ONDANSETRON 4 MG: 2 INJECTION INTRAMUSCULAR; INTRAVENOUS at 14:16

## 2022-08-10 RX ADMIN — DROPERIDOL 2.5 MG: 2.5 INJECTION, SOLUTION INTRAMUSCULAR; INTRAVENOUS at 14:55

## 2022-08-10 NOTE — ED PROVIDER NOTES
Time: 1:51 PM EDT  Arrived by: private car  Chief Complaint: Vomiting, diarrhea  History provided by: Patient  History is limited by: N/A     History of Present Illness:  Patient is a 27 y.o. year old female who presents to the emergency department with vomiting and diarrhea.Pt has h/o DM. Pt reports onset of chills, nausea, vomiting and diarrhea last night, denies fever. Pt notes last loose BM was today prior to arrival, notes mild epigastric pain. Pt states her sugar was in the 400's prior to arrival and it typically stays between 200-300's. Pt denies being pregnant. Pt tested positive for COVID 2 weeks ago.      History provided by:  Patient   used: No        Similar Symptoms Previously: No  Recently seen: No      Patient Care Team  Primary Care Provider: Jess Maria MD    Past Medical History:     Allergies   Allergen Reactions   • Amoxicillin Shortness Of Breath   • Hydroxyzine Shortness Of Breath   • Hydroxyzine Hcl Cough     Past Medical History:   Diagnosis Date   • Anesthesia     slow to wake up postop, anxiety postop   • AR (allergic rhinitis)    • ASD (atrial septal defect)     had since birth- asymptomatic- see's dr falk    • Asthma     no inhalers   • Depression    • Diabetes mellitus (HCC)     type ii- bg runs around 200-300   • Gallstones currently   • GERD (gastroesophageal reflux disease)    • Hypertension      Past Surgical History:   Procedure Laterality Date   •  SECTION     • CHOLECYSTECTOMY N/A 2021    Procedure: CHOLECYSTECTOMY LAPAROSCOPIC;  Surgeon: Robert Araya MD;  Location: [facility] OR [facility] [place];  Service: General;  Laterality: N/A;   • COLONOSCOPY     • TUBAL ABDOMINAL LIGATION     • WISDOM TOOTH EXTRACTION       Family History   Problem Relation Age of Onset   • Heart disease Father    • Heart disease Other    • Heart disease Other    • Heart disease Other    • Diabetes type II Other        Home Medications:  Prior to Admission  medications    Medication Sig Start Date End Date Taking? Authorizing Provider   albuterol (ACCUNEB) 1.25 MG/3ML nebulizer solution Take 3 mL by nebulization Every 4 (Four) Hours As Needed for Wheezing. 3/28/22   Jess Maria MD   albuterol (ACCUNEB) 1.25 MG/3ML nebulizer solution Take 3 mL by nebulization Every 6 (Six) Hours As Needed for Wheezing. 6/15/22   Kingsley Bose DO   albuterol (ACCUNEB) 1.25 MG/3ML nebulizer solution Inhale 1.25 mg. 3/28/22   Emergency, Nurse Edison, RN   Allergy Relief 180 MG tablet Take 1 tablet by mouth Daily. 5/11/22   Jess Maria MD   Antacid Calcium 500 MG chewable tablet Chew 1 tablet As Needed for Heartburn. 5/11/22   Jess Maria MD   calcium carbonate (OS-HESHAM) 1250 (500 Ca) MG chewable tablet Chew 1 tablet. 5/11/22   Emergency, Nurse Edison, RN   ciprofloxacin (CIPRO) 500 MG tablet TAKE ONE TABLET BY MOUTH EVERY 12 Hours FOR FIVE DAYS 6/24/22   Brock Govea MD   Continuous Blood Gluc  (Dexcom G6 ) device 1 each Continuous. icd10- e11.9 11/11/21   Jess Maria MD   Continuous Blood Gluc Sensor (Dexcom G6 Sensor) CHANGE TO CHECK BLOOD SUGAR EVERY TEN DAYS 6/2/22   Jess Maria MD   Continuous Blood Gluc Transmit (Dexcom G6 Transmitter) misc 1 Device Every 3 (Three) Months. icd-10: e11.9 3/28/22   Jess Maria MD   Cyanocobalamin (Vitamin B-12 ER) 1000 MCG tablet controlled-release Take 1 tablet by mouth Daily. 7/8/22   Brock Govea MD   docosanol (Abreva) 10 % cream cream Apply 1 application topically to the appropriate area as directed 5 (Five) Times a Day. 5/11/22   Jess Maria MD   fluticasone (FLOVENT HFA) 110 MCG/ACT inhaler Inhale 2 puffs 2 (Two) Times a Day. 5/11/22   Jess Maria MD   fluticasone (FLOVENT HFA) 110 MCG/ACT inhaler Inhale 2 puffs. 5/11/22   Emergency, Nurse Epic, RN   glipizide (GLUCOTROL XL) 10 MG 24 hr tablet Take 10 mg by mouth Daily. 7/8/22   Provider, Brock,  MD   hyoscyamine (LEVSIN) 0.125 MG SL tablet Take 1 tablet by mouth Every 6 (Six) Hours As Needed for Cramping for up to 4 days. 8/9/22 8/13/22  Taqui, Humera, MD   ibuprofen (ADVIL,MOTRIN) 800 MG tablet TAKE ONE TABLET BY MOUTH THREE TIMES DAILY AS NEEDED FOR PAIN FOR 10 DAYS 6/8/22   Brock Govea MD   ipratropium (ATROVENT) 0.02 % nebulizer solution Take 2.5 mL by nebulization 4 (Four) Times a Day. As needed 3/28/22   Jess Maria MD   ipratropium (ATROVENT) 0.02 % nebulizer solution Inhale 0.5 mg 4 (Four) Times a Day. 3/28/22   Emergency, Nurse Edison RN   metFORMIN (GLUCOPHAGE) 500 MG tablet Take 500 mg by mouth 2 (Two) Times a Day Before Meals. 7/8/22   Brock Govea MD   metFORMIN (GLUCOPHAGE) 500 MG tablet Take 500 mg by mouth. 7/8/22   Emergency, Nurse Edison RN   metoprolol succinate XL (TOPROL-XL) 25 MG 24 hr tablet Take 2 tablets by mouth Every Night. Takes 2 tabs 3/28/22   Jess Maria MD   metoprolol succinate XL (TOPROL-XL) 25 MG 24 hr tablet Take 50 mg by mouth Daily. 3/28/22   Emergency, Nurse Edison, RN   mineral oil-hydrophilic petrolatum (AQUAPHOR) ointment Apply 1 application topically to the appropriate area as directed As Needed for Dry Skin. 3/28/22   Jess Maria MD   montelukast (SINGULAIR) 10 MG tablet Take 1 tablet by mouth Every Night. 5/11/22   Jess Maria MD   montelukast (SINGULAIR) 10 MG tablet Take 10 mg by mouth Daily. 5/11/22   Emergency, Nurse Edison RN   Nutritional Supplements (Glucose Management) tablet Take 1 tablet by mouth As Needed (blood sugar below 60). 5/13/22   Jess Maria MD   Omega-3 Fatty Acids (fish oil) 500 MG capsule capsule Take 2 capsules by mouth Daily. 7/8/22   Brock Govea MD   omeprazole (priLOSEC) 40 MG capsule Take 1 capsule by mouth Daily. 5/11/22   Jess Maria MD   ondansetron ODT (Zofran ODT) 4 MG disintegrating tablet Place 2 tablets on the tongue Every 8 (Eight) Hours As Needed for  Nausea or Vomiting for up to 10 doses. 5/11/22   Jess Maria MD   ondansetron ODT (ZOFRAN-ODT) 4 MG disintegrating tablet Place 1 tablet on the tongue Every 8 (Eight) Hours As Needed for Nausea or Vomiting for up to 4 days. 8/9/22 8/13/22  Taqui, Humera, MD   polyethylene glycol (MIRALAX) 17 g packet Take 17 g by mouth Daily. 7/16/21   Robert Araya MD   ProAir  (90 Base) MCG/ACT inhaler Inhale 2 puffs into the lungs every 4 (four) hours as needed for Shortness of Air. 7/27/22   Emergency, Nurse Edison, RN   vitamin D (ERGOCALCIFEROL) 1.25 MG (74700 UT) capsule capsule TAKE ONE CAPSULE BY MOUTH ONCE A WEEK for 90 DAYS 7/8/22   Provider, MD Brock        Social History:   Social History     Tobacco Use   • Smoking status: Never Smoker   • Smokeless tobacco: Never Used   Vaping Use   • Vaping Use: Never used   Substance Use Topics   • Alcohol use: Never   • Drug use: Never         Review of Systems:  Review of Systems   Constitutional: Positive for chills. Negative for activity change, fatigue and unexpected weight change.   HENT: Negative for congestion, sinus pressure, sore throat and trouble swallowing.    Eyes: Negative for pain, discharge, redness and visual disturbance.   Respiratory: Negative for cough, chest tightness, shortness of breath and wheezing.    Cardiovascular: Negative for chest pain and palpitations.   Gastrointestinal: Positive for diarrhea, nausea and vomiting. Negative for abdominal pain.   Endocrine: Negative for cold intolerance and polydipsia.   Genitourinary: Negative for dysuria, frequency, hematuria and urgency.   Musculoskeletal: Negative for arthralgias, joint swelling, neck pain and neck stiffness.   Skin: Negative for color change and rash.   Allergic/Immunologic: Negative for environmental allergies and immunocompromised state.   Neurological: Negative for dizziness, weakness and light-headedness.   Hematological: Does not bruise/bleed easily.  "  Psychiatric/Behavioral: Negative for agitation, confusion, dysphoric mood and suicidal ideas.        Physical Exam:  BP 97/65   Pulse 83   Temp 98.7 °F (37.1 °C) (Oral)   Resp 20   Ht 160 cm (63\")   Wt 136 kg (299 lb 13.2 oz)   LMP 07/20/2022   SpO2 97%   BMI 53.11 kg/m²     Physical Exam  Vitals and nursing note reviewed.   Constitutional:       General: She is not in acute distress.     Appearance: Normal appearance. She is not toxic-appearing.   HENT:      Head: Normocephalic and atraumatic.      Jaw: There is normal jaw occlusion.   Eyes:      General: Lids are normal.      Extraocular Movements: Extraocular movements intact.      Conjunctiva/sclera: Conjunctivae normal.      Pupils: Pupils are equal, round, and reactive to light.   Cardiovascular:      Rate and Rhythm: Normal rate and regular rhythm.      Pulses: Normal pulses.      Heart sounds: Normal heart sounds.   Pulmonary:      Effort: Pulmonary effort is normal. No respiratory distress.      Breath sounds: Normal breath sounds. No wheezing or rhonchi.   Abdominal:      General: Abdomen is flat.      Palpations: Abdomen is soft.      Tenderness: There is abdominal tenderness (mild) in the epigastric area. There is no guarding or rebound.   Musculoskeletal:         General: Normal range of motion.      Cervical back: Normal range of motion and neck supple.      Right lower leg: No edema.      Left lower leg: No edema.   Skin:     General: Skin is warm and dry.   Neurological:      Mental Status: She is alert and oriented to person, place, and time. Mental status is at baseline.   Psychiatric:         Mood and Affect: Mood normal.                Medications in the Emergency Department:  Medications   sodium chloride 0.9 % bolus 1,000 mL (0 mL Intravenous Stopped 8/10/22 1523)   ketorolac (TORADOL) injection 30 mg (30 mg Intravenous Given 8/10/22 1416)   ondansetron (ZOFRAN) injection 4 mg (4 mg Intravenous Given 8/10/22 1416)   diphenhydrAMINE " (BENADRYL) injection 25 mg (25 mg Intravenous Given 8/10/22 1455)   droperidol (INAPSINE) injection 2.5 mg (2.5 mg Intravenous Given 8/10/22 1455)        Labs  Lab Results (last 24 hours)     Procedure Component Value Units Date/Time    POC Glucose Once [[id number]]  (Abnormal) Collected: 08/10/22 1220    Specimen: Blood Updated: 08/10/22 2154     Glucose 283 mg/dL      Comment: Serial Number: [id number]:  [id number]       CBC & Differential [[id number]]  (Abnormal) Collected: 08/10/22 1230    Specimen: Blood Updated: 08/10/22 1252    Narrative:      The following orders were created for panel order CBC & Differential.  Procedure                               Abnormality         Status                     ---------                               -----------         ------                     CBC Auto Differential[[id number]]        Abnormal            Final result                 Please view results for these tests on the individual orders.    Comprehensive Metabolic Panel [[id number]]  (Abnormal) Collected: 08/10/22 1230    Specimen: Blood Updated: 08/10/22 1315     Glucose 284 mg/dL      BUN 6 mg/dL      Creatinine 0.43 mg/dL      Sodium 137 mmol/L      Potassium 3.8 mmol/L      Chloride 104 mmol/L      CO2 24.8 mmol/L      Calcium 8.2 mg/dL      Total Protein 6.0 g/dL      Albumin 3.60 g/dL      ALT (SGPT) 21 U/L      AST (SGOT) 20 U/L      Alkaline Phosphatase 65 U/L      Total Bilirubin <0.2 mg/dL      Globulin 2.4 gm/dL      A/G Ratio 1.5 g/dL      BUN/Creatinine Ratio 14.0     Anion Gap 8.2 mmol/L      eGFR 136.1 mL/min/1.73      Comment: National Kidney Foundation and American Society of Nephrology (ASN) Task Force recommended calculation based on the Chronic Kidney Disease Epidemiology Collaboration (CKD-EPI) equation refit without adjustment for race.       Narrative:      GFR Normal >60  Chronic Kidney Disease <60  Kidney Failure <15      CBC Auto Differential [[id number]]  (Abnormal) Collected: 08/10/22  1230    Specimen: Blood Updated: 08/10/22 1252     WBC 6.34 10*3/mm3      RBC 4.52 10*6/mm3      Hemoglobin 12.3 g/dL      Hematocrit 36.5 %      MCV 80.8 fL      MCH 27.2 pg      MCHC 33.7 g/dL      RDW 12.7 %      RDW-SD 36.4 fl      MPV 10.2 fL      Platelets 229 10*3/mm3      Neutrophil % 50.6 %      Lymphocyte % 37.9 %      Monocyte % 8.4 %      Eosinophil % 1.6 %      Basophil % 0.6 %      Immature Grans % 0.9 %      Neutrophils, Absolute 3.21 10*3/mm3      Lymphocytes, Absolute 2.40 10*3/mm3      Monocytes, Absolute 0.53 10*3/mm3      Eosinophils, Absolute 0.10 10*3/mm3      Basophils, Absolute 0.04 10*3/mm3      Immature Grans, Absolute 0.06 10*3/mm3      nRBC 0.0 /100 WBC     POC Glucose Once [[id number]]  (Abnormal) Collected: 08/10/22 1339    Specimen: Blood Updated: 08/10/22 1340     Glucose 204 mg/dL      Comment: Serial Number: [id number]:  [id number]       Urinalysis With Microscopic If Indicated (No Culture) - Urine, Clean Catch [[id number]]  (Abnormal) Collected: 08/10/22 1405    Specimen: Urine, Clean Catch Updated: 08/10/22 1419     Color, UA Yellow     Appearance, UA Clear     pH, UA 6.0     Specific Gravity, UA >1.030     Glucose, UA >=1000 mg/dL (3+)     Ketones, UA Trace     Bilirubin, UA Negative     Blood, UA Negative     Protein, UA Negative     Leuk Esterase, UA Negative     Nitrite, UA Negative     Urobilinogen, UA 0.2 E.U./dL    Narrative:      Urine microscopic not indicated.           Imaging:XR Chest 2Vw    Result Date: 2022  Narrative: REVIEWING YOUR TEST RESULTS IN [facility] IS NOT A SUBSTITUTE FOR DISCUSSING THOSE RESULTS WITH YOUR HEALTH CARE PROVIDER. PLEASE CONTACT YOUR PROVIDER VIA [email or web address] TO DISCUSS ANY QUESTIONS OR CONCERNS YOU MAY HAVE REGARDING THESE TEST RESULTS.  RADIOLOGY REPORT FACILITY:  [facility] UNIT/AGE/GENDER: P.IIHRT  OP      AGE:27 Y          SEX:F PATIENT NAME/:  ANAMARIA MELENDREZ    1995 UNIT NUMBER:  [id number]  ACCOUNT NUMBER:  [id number] ACCESSION NUMBER:  [id number] PA and lateral chest HISTORY: cough, fever     COMPARISON: None FINDINGS: PA and lateral radiographs of the chest demonstrate a normal cardiomediastinal silhouette.   The pulmonary vasculature is of normal size .  The lungs are clear.      The bones are normal.  The visualized abdomen is normal.     IMPRESSION: No evidence of acute cardiothoracic abnormality.    Dictated by: Prabhu Welch M.D. Images and Report reviewed and interpreted by: Prabhu Welch M.D. <PS><Electronically signed by: Prabhu Welch M.D.> 07/26/2022 1917 D: 07/26/2022 1917 T: 07/26/2022 1917      No Radiology Exams Resulted Within Past 24 Hours    Procedures:  Procedures    Progress                            Medical Decision Making:  MDM  Number of Diagnoses or Management Options  Acute gastroenteritis  Hyperglycemia  Diagnosis management comments: The patient is symptomatically improved after treatment in the emergency department her blood sugar is decreased in the 200 mg/dL range.  The patient feels much better and she will be discharged home with appropriate medication.       Amount and/or Complexity of Data Reviewed  Clinical lab tests: reviewed  Decide to obtain previous medical records or to obtain history from someone other than the patient: yes  Obtain history from someone other than the patient: yes  Review and summarize past medical records: yes  Independent visualization of images, tracings, or specimens: yes         Final diagnoses:   Hyperglycemia   Acute gastroenteritis        Disposition:  ED Disposition     ED Disposition   Discharge    Condition   Stable    Comment   Pt deemed medically stable for discharge. Pt is alert and oriented x4. Discharge instructions were discussed with pt. Pt verbalizes understanding. Needs met at discharge. IV removed               This medical record created using voice recognition software and a virtual scribe.    Documentation  assistance provided by Belinda Reich acting as scribe for Jim Paul DO. Information recorded by the scribe was done at my direction and has been verified and validated by me.          Belinda Reich  08/10/22 1417       Jim Paul DO  08/11/22 0803

## 2022-08-10 NOTE — ED TRIAGE NOTES
Pt presents with complaints of F/v/n/dizziness that started last night. Pt states her sugar was in the 400's prior to arriving to ED and it typically stayes between 200-300's. Pt bs in ED was 283.

## 2022-08-10 NOTE — DISCHARGE INSTRUCTIONS
Start your diet with clear liquids and advance slowly.  Take Zofran as needed for nausea or vomiting.  Take Imodium over-the-counter as needed for diarrhea.  Return for worsening symptoms.  Follow-up with your doctor in 2 days if no better.  Check your blood sugars twice daily.

## 2022-08-12 DIAGNOSIS — Z79.4 TYPE 2 DIABETES MELLITUS WITH HYPERGLYCEMIA, WITH LONG-TERM CURRENT USE OF INSULIN: ICD-10-CM

## 2022-08-12 DIAGNOSIS — E11.65 TYPE 2 DIABETES MELLITUS WITH HYPERGLYCEMIA, WITH LONG-TERM CURRENT USE OF INSULIN: ICD-10-CM

## 2022-08-12 DIAGNOSIS — R73.09 HEMOGLOBIN A1C GREATER THAN 9.0%: ICD-10-CM

## 2022-08-16 RX ORDER — PROCHLORPERAZINE 25 MG/1
SUPPOSITORY RECTAL
Qty: 1 EACH | Refills: 1 | Status: SHIPPED | OUTPATIENT
Start: 2022-08-16 | End: 2023-02-15

## 2022-08-18 ENCOUNTER — PRIOR AUTHORIZATION (OUTPATIENT)
Dept: INTERNAL MEDICINE | Facility: CLINIC | Age: 27
End: 2022-08-18

## 2022-09-09 ENCOUNTER — OFFICE VISIT (OUTPATIENT)
Dept: OBSTETRICS AND GYNECOLOGY | Facility: CLINIC | Age: 27
End: 2022-09-09

## 2022-09-09 VITALS
DIASTOLIC BLOOD PRESSURE: 84 MMHG | SYSTOLIC BLOOD PRESSURE: 116 MMHG | HEART RATE: 114 BPM | WEIGHT: 293 LBS | BODY MASS INDEX: 51.91 KG/M2 | HEIGHT: 63 IN

## 2022-09-09 DIAGNOSIS — N93.9 ABNORMAL UTERINE BLEEDING (AUB): ICD-10-CM

## 2022-09-09 DIAGNOSIS — N89.8 VAGINAL IRRITATION: Primary | ICD-10-CM

## 2022-09-09 DIAGNOSIS — E66.01 MORBID OBESITY WITH BMI OF 50.0-59.9, ADULT: ICD-10-CM

## 2022-09-09 LAB
CANDIDA SPECIES: POSITIVE
DEPRECATED RDW RBC AUTO: 39.6 FL (ref 37–54)
ERYTHROCYTE [DISTWIDTH] IN BLOOD BY AUTOMATED COUNT: 13 % (ref 12.3–15.4)
GARDNERELLA VAGINALIS: POSITIVE
HBA1C MFR BLD: 10.3 % (ref 4.8–5.6)
HCG INTACT+B SERPL-ACNC: <1 MIU/ML
HCT VFR BLD AUTO: 42.2 % (ref 34–46.6)
HGB BLD-MCNC: 13.5 G/DL (ref 12–15.9)
MCH RBC QN AUTO: 26.9 PG (ref 26.6–33)
MCHC RBC AUTO-ENTMCNC: 32 G/DL (ref 31.5–35.7)
MCV RBC AUTO: 84.2 FL (ref 79–97)
PLATELET # BLD AUTO: 326 10*3/MM3 (ref 140–450)
PMV BLD AUTO: 10.8 FL (ref 6–12)
RBC # BLD AUTO: 5.01 10*6/MM3 (ref 3.77–5.28)
T VAGINALIS DNA VAG QL PROBE+SIG AMP: NEGATIVE
TSH SERPL DL<=0.05 MIU/L-ACNC: 1.72 UIU/ML (ref 0.27–4.2)
WBC NRBC COR # BLD: 12.53 10*3/MM3 (ref 3.4–10.8)

## 2022-09-09 PROCEDURE — 83036 HEMOGLOBIN GLYCOSYLATED A1C: CPT | Performed by: STUDENT IN AN ORGANIZED HEALTH CARE EDUCATION/TRAINING PROGRAM

## 2022-09-09 PROCEDURE — 99214 OFFICE O/P EST MOD 30 MIN: CPT | Performed by: STUDENT IN AN ORGANIZED HEALTH CARE EDUCATION/TRAINING PROGRAM

## 2022-09-09 PROCEDURE — 85027 COMPLETE CBC AUTOMATED: CPT | Performed by: STUDENT IN AN ORGANIZED HEALTH CARE EDUCATION/TRAINING PROGRAM

## 2022-09-09 PROCEDURE — 87480 CANDIDA DNA DIR PROBE: CPT | Performed by: STUDENT IN AN ORGANIZED HEALTH CARE EDUCATION/TRAINING PROGRAM

## 2022-09-09 PROCEDURE — 87510 GARDNER VAG DNA DIR PROBE: CPT | Performed by: STUDENT IN AN ORGANIZED HEALTH CARE EDUCATION/TRAINING PROGRAM

## 2022-09-09 PROCEDURE — 84443 ASSAY THYROID STIM HORMONE: CPT | Performed by: STUDENT IN AN ORGANIZED HEALTH CARE EDUCATION/TRAINING PROGRAM

## 2022-09-09 PROCEDURE — 84702 CHORIONIC GONADOTROPIN TEST: CPT | Performed by: STUDENT IN AN ORGANIZED HEALTH CARE EDUCATION/TRAINING PROGRAM

## 2022-09-09 PROCEDURE — 87660 TRICHOMONAS VAGIN DIR PROBE: CPT | Performed by: STUDENT IN AN ORGANIZED HEALTH CARE EDUCATION/TRAINING PROGRAM

## 2022-09-09 RX ORDER — AZITHROMYCIN 250 MG/1
TABLET, FILM COATED ORAL
COMMUNITY
Start: 2022-08-26 | End: 2022-10-18

## 2022-09-09 RX ORDER — INSULIN GLARGINE 300 U/ML
INJECTION, SOLUTION SUBCUTANEOUS EVERY 24 HOURS
COMMUNITY
Start: 2022-08-12 | End: 2022-11-14 | Stop reason: SDUPTHER

## 2022-09-09 RX ORDER — MEDROXYPROGESTERONE ACETATE 150 MG/ML
150 INJECTION, SUSPENSION INTRAMUSCULAR
Qty: 1 ML | Refills: 3 | Status: SHIPPED | OUTPATIENT
Start: 2022-09-09 | End: 2023-02-15

## 2022-09-09 RX ORDER — ASCORBIC ACID 500 MG
TABLET ORAL
COMMUNITY
Start: 2022-07-29 | End: 2022-11-14 | Stop reason: SDUPTHER

## 2022-09-09 RX ORDER — DESVENLAFAXINE 100 MG/1
100 TABLET, EXTENDED RELEASE ORAL DAILY
COMMUNITY
Start: 2022-08-12 | End: 2022-11-14 | Stop reason: SDUPTHER

## 2022-09-09 RX ORDER — FLUCONAZOLE 150 MG/1
TABLET ORAL
COMMUNITY
Start: 2022-08-26 | End: 2022-09-10

## 2022-09-09 RX ORDER — INSULIN LISPRO 100 [IU]/ML
INJECTION, SOLUTION INTRAVENOUS; SUBCUTANEOUS
COMMUNITY
Start: 2022-08-22 | End: 2022-11-14 | Stop reason: SDUPTHER

## 2022-09-09 RX ORDER — ZINC GLUCONATE 50 MG
TABLET ORAL
COMMUNITY
Start: 2022-07-29 | End: 2022-11-14 | Stop reason: SDUPTHER

## 2022-09-09 RX ORDER — BENZONATATE 100 MG/1
CAPSULE ORAL
COMMUNITY
Start: 2022-07-29 | End: 2022-10-18

## 2022-09-09 RX ORDER — BUPROPION HYDROCHLORIDE 150 MG/1
TABLET ORAL
COMMUNITY
Start: 2022-08-12 | End: 2022-11-14

## 2022-09-09 NOTE — PROGRESS NOTES
"GYN Problem/Follow Up Visit    Chief Complaint   Patient presents with   • aub           HPI  Dorinda Sherwood is a 27 y.o. female, , who presents for abnormal uterine bleeding.  Patient had been seen by Dr. Fitzgerald and 2021 has not had follow-up since that point time.  Reports that she has been having abnormal bleeding for the last 4 years since 2018 and the birth of her daughter.  Ports that she has been on pills and has had the IUD in the past but the IUD expulsed spontaneously.  She reports that she has had ongoing heavy bleeding as well as vaginal irritation and odor.  She is seeking hysterectomy.  Reports that she has type 2 diabetes and her last hemoglobin A1c was 9.9.  She does have a continuous glucose monitor system.  Does endorse urinary incontinence with laugh cough sneeze..       Additional OB/GYN History   Patient's last menstrual period was 2022.  Current contraception: contraceptive methods: Abstinence  Desires to: change to depo-preovera contraception  Allergies : Amoxicillin, Hydroxyzine, and Hydroxyzine hcl     The additional following portions of the patient's history were reviewed and updated as appropriate: allergies, current medications, past family history, past medical history, past social history, past surgical history and problem list.    Review of Systems   Constitutional: Negative for fatigue and fever.   Respiratory: Negative for cough and shortness of breath.    Cardiovascular: Negative for chest pain.   Gastrointestinal: Negative for abdominal distention and abdominal pain.   Genitourinary: Positive for menstrual problem, urinary incontinence and vaginal discharge. Negative for difficulty urinating and dysuria.       I have reviewed and agree with the HPI, ROS, and historical information as entered above. Bradly Osullivan MD    Objective   /84   Pulse 114   Ht 160 cm (63\")   Wt 135 kg (297 lb)   LMP 2022 Comment: Bleeding for 4 months  Breastfeeding No "   BMI 52.61 kg/m²     Physical Exam  Vitals and nursing note reviewed. Exam conducted with a chaperone present.   Constitutional:       General: She is not in acute distress.     Appearance: Normal appearance. She is obese. She is not toxic-appearing.   HENT:      Head: Normocephalic and atraumatic.   Eyes:      Extraocular Movements: Extraocular movements intact.      Conjunctiva/sclera: Conjunctivae normal.   Cardiovascular:      Pulses: Normal pulses.   Pulmonary:      Effort: Pulmonary effort is normal.   Abdominal:      General: There is no distension.      Palpations: Abdomen is soft.      Tenderness: There is no abdominal tenderness.   Genitourinary:     General: Normal vulva.      Labia:         Right: No tenderness, lesion or injury.         Left: No tenderness, lesion or injury.       Vagina: Normal.      Cervix: Normal.      Uterus: Normal.       Adnexa: Right adnexa normal and left adnexa normal.      Comments: Urinary incontinence with swab placement for vaginitis panel  Musculoskeletal:      Cervical back: Normal range of motion.   Skin:     General: Skin is warm and dry.   Neurological:      Mental Status: She is alert and oriented to person, place, and time.   Psychiatric:         Mood and Affect: Mood normal.         Behavior: Behavior normal.         Thought Content: Thought content normal.            Assessment and Plan  Dorinda Sherwood is a 27 y.o.  presenting for follow-up for concerns for menstrual cycle.  Patient with multiple comorbidities including morbid obesity with a BMI of 52, and type 2 diabetes.  Hemoglobin A1c approximate 3 months ago was 9.9.  Discussed with patient she is not currently a surgical candidate.  Would recommend long-acting reversible contraception in the form of progesterone for control of menstrual cycle.  Review of notation from Dr. Fitzgerald who saw patient back and 2021 with suggestion for IUD to be placed.  I agree that IUD would be best for this  patient.  In interim would recommend getting a dose of Depo to see if this helps with her bleeding profile.  Also discussed with patient that a reduction in her weight and control of diabetes may improve her bleeding profile.  Vaginitis panel was collected and resulted with bacterial vaginosis and Candida.  Flagyl and Diflucan was sent to pharmacy.  Discussed that its been over a year since she has had any imaging performed we will do a transvaginal ultrasound as well as collect a CBC and a TSH to avoid other metabolic disturbances as well as to determine if she is having acute blood loss anemia or chronic blood loss anemia from menstrual bleeding.  Patient to follow-up in 3 months.  IUD recommended.    Diagnoses and all orders for this visit:    1. Vaginal irritation (Primary)  -     Gardnerella vaginalis, Trichomonas vaginalis, Candida albicans, DNA - Swab, Vagina; Future  -     Gardnerella vaginalis, Trichomonas vaginalis, Candida albicans, DNA - Swab, Vagina  -     fluconazole (Diflucan) 150 MG tablet; Take 1 tablet by mouth Daily.  Dispense: 1 tablet; Refill: 0  -     metroNIDAZOLE (Flagyl) 500 MG tablet; Take 1 tablet by mouth 2 (Two) Times a Day for 7 days.  Dispense: 14 tablet; Refill: 0    2. Abnormal uterine bleeding (AUB)  -     hCG, Quantitative, Pregnancy; Future  -     TSH; Future  -     CBC (No Diff); Future  -     US Non-ob Transvaginal; Future  -     Hemoglobin A1c; Future  -     hCG, Quantitative, Pregnancy  -     TSH  -     CBC (No Diff)  -     Hemoglobin A1c    3. Morbid obesity with BMI of 50.0-59.9, adult (HCC)  -     Ambulatory Referral to Physical Therapy Aquatics, Evaluate and treat    Other orders  -     medroxyPROGESTERone (DEPO-PROVERA) 150 MG/ML injection; Inject 1 mL into the appropriate muscle as directed by prescriber Every 3 (Three) Months.  Dispense: 1 mL; Refill: 3            She understands the importance of having the above orders performed in a timely fashion.  The risks of not  performing them include, but are not limited to, cancer and/or subsequent increase in morbidity and/or mortality.  She is encouraged to review her results online and/or contact or office if she has questions.     Follow Up:  Return in about 3 months (around 12/9/2022) for Next scheduled follow up.      Bradly Osullivan MD  09/09/2022

## 2022-09-10 RX ORDER — FLUCONAZOLE 150 MG/1
150 TABLET ORAL DAILY
Qty: 1 TABLET | Refills: 0 | Status: SHIPPED | OUTPATIENT
Start: 2022-09-10 | End: 2022-10-18

## 2022-09-10 RX ORDER — METRONIDAZOLE 500 MG/1
500 TABLET ORAL 2 TIMES DAILY
Qty: 14 TABLET | Refills: 0 | Status: SHIPPED | OUTPATIENT
Start: 2022-09-10 | End: 2022-09-17

## 2022-09-26 ENCOUNTER — TELEPHONE (OUTPATIENT)
Dept: INTERNAL MEDICINE | Facility: CLINIC | Age: 27
End: 2022-09-26

## 2022-10-06 ENCOUNTER — HOSPITAL ENCOUNTER (OUTPATIENT)
Dept: ULTRASOUND IMAGING | Facility: HOSPITAL | Age: 27
Discharge: HOME | End: 2022-10-06
Admitting: STUDENT IN AN ORGANIZED HEALTH CARE EDUCATION/TRAINING PROGRAM

## 2022-10-06 DIAGNOSIS — N93.9 ABNORMAL UTERINE BLEEDING (AUB): ICD-10-CM

## 2022-10-06 PROCEDURE — 76830 TRANSVAGINAL US NON-OB: CPT

## 2022-10-18 PROCEDURE — 82962 GLUCOSE BLOOD TEST: CPT

## 2022-10-18 PROCEDURE — 87086 URINE CULTURE/COLONY COUNT: CPT | Performed by: NURSE PRACTITIONER

## 2022-10-18 PROCEDURE — 87660 TRICHOMONAS VAGIN DIR PROBE: CPT | Performed by: NURSE PRACTITIONER

## 2022-10-18 PROCEDURE — 87510 GARDNER VAG DNA DIR PROBE: CPT | Performed by: NURSE PRACTITIONER

## 2022-10-18 PROCEDURE — 87480 CANDIDA DNA DIR PROBE: CPT | Performed by: NURSE PRACTITIONER

## 2022-10-21 ENCOUNTER — TELEPHONE (OUTPATIENT)
Dept: INTERNAL MEDICINE | Facility: CLINIC | Age: 27
End: 2022-10-21

## 2022-10-21 NOTE — TELEPHONE ENCOUNTER
Pt has been experiencing itchiness and was tested for BV but would like a callback from the nurse due to some side effects.

## 2022-10-21 NOTE — TELEPHONE ENCOUNTER
Red rule verified and correct.    Seen at [facility] and dx with yeast and BV per pt.    Advised she was neg for BV, and only positive for yeast.    She was treated with diflucan and topical nystatin for skin yeast.      Recommended she use Vagisil after using the restroom to help with topical itching until the diflucan can work.    Patient verbalized understanding.

## 2022-10-25 ENCOUNTER — APPOINTMENT (OUTPATIENT)
Dept: CT IMAGING | Facility: HOSPITAL | Age: 27
End: 2022-10-25

## 2022-10-25 ENCOUNTER — HOSPITAL ENCOUNTER (EMERGENCY)
Facility: HOSPITAL | Age: 27
Discharge: HOME | End: 2022-10-25
Attending: EMERGENCY MEDICINE | Admitting: EMERGENCY MEDICINE

## 2022-10-25 ENCOUNTER — APPOINTMENT (OUTPATIENT)
Dept: GENERAL RADIOLOGY | Facility: HOSPITAL | Age: 27
End: 2022-10-25

## 2022-10-25 VITALS
BODY MASS INDEX: 53.92 KG/M2 | WEIGHT: 293 LBS | SYSTOLIC BLOOD PRESSURE: 158 MMHG | DIASTOLIC BLOOD PRESSURE: 96 MMHG | RESPIRATION RATE: 20 BRPM | HEART RATE: 120 BPM | HEIGHT: 62 IN | OXYGEN SATURATION: 98 % | TEMPERATURE: 97.9 F
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
## 2022-10-25 DIAGNOSIS — S00.03XA CONTUSION OF SCALP, INITIAL ENCOUNTER: ICD-10-CM

## 2022-10-25 DIAGNOSIS — S50.02XA CONTUSION OF LEFT ELBOW, INITIAL ENCOUNTER: ICD-10-CM

## 2022-10-25 DIAGNOSIS — S63.501A SPRAIN OF RIGHT WRIST, INITIAL ENCOUNTER: ICD-10-CM

## 2022-10-25 DIAGNOSIS — S20.212A CONTUSION OF LEFT CHEST WALL, INITIAL ENCOUNTER: ICD-10-CM

## 2022-10-25 PROCEDURE — 71101 X-RAY EXAM UNILAT RIBS/CHEST: CPT

## 2022-10-25 PROCEDURE — 73080 X-RAY EXAM OF ELBOW: CPT

## 2022-10-25 PROCEDURE — 70450 CT HEAD/BRAIN W/O DYE: CPT

## 2022-10-25 PROCEDURE — 96372 THER/PROPH/DIAG INJ SC/IM: CPT

## 2022-10-25 PROCEDURE — 73110 X-RAY EXAM OF WRIST: CPT

## 2022-10-25 PROCEDURE — 99282 EMERGENCY DEPT VISIT SF MDM: CPT

## 2022-10-25 PROCEDURE — 25010000002 KETOROLAC TROMETHAMINE PER 15 MG: Performed by: EMERGENCY MEDICINE

## 2022-10-25 RX ORDER — KETOROLAC TROMETHAMINE 10 MG/1
10 TABLET, FILM COATED ORAL EVERY 6 HOURS PRN
Qty: 15 TABLET | Refills: 0 | Status: SHIPPED | OUTPATIENT
Start: 2022-10-25 | End: 2023-02-15

## 2022-10-25 RX ORDER — KETOROLAC TROMETHAMINE 30 MG/ML
60 INJECTION, SOLUTION INTRAMUSCULAR; INTRAVENOUS ONCE
Status: COMPLETED | OUTPATIENT
Start: 2022-10-25 | End: 2022-10-25

## 2022-10-25 RX ADMIN — KETOROLAC TROMETHAMINE 60 MG: 30 INJECTION, SOLUTION INTRAMUSCULAR at 19:25

## 2022-10-25 NOTE — DISCHARGE INSTRUCTIONS
Take Toradol as needed for pain.  Apply ice to affected areas 20 minutes at a time 4 times daily.  Turn for worsening symptoms.  Follow-up with your doctor in 2 days no better.
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
## 2022-10-28 ENCOUNTER — OFFICE VISIT (OUTPATIENT)
Dept: INTERNAL MEDICINE | Facility: CLINIC | Age: 27
End: 2022-10-28

## 2022-10-28 VITALS
TEMPERATURE: 98.8 F | WEIGHT: 293 LBS | HEART RATE: 102 BPM | BODY MASS INDEX: 54.14 KG/M2 | OXYGEN SATURATION: 98 % | RESPIRATION RATE: 16 BRPM | SYSTOLIC BLOOD PRESSURE: 132 MMHG | DIASTOLIC BLOOD PRESSURE: 88 MMHG
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
## 2022-10-28 DIAGNOSIS — F41.1 GENERALIZED ANXIETY DISORDER: ICD-10-CM

## 2022-10-28 DIAGNOSIS — M25.522 LEFT ELBOW PAIN: ICD-10-CM

## 2022-10-28 DIAGNOSIS — M25.531 RIGHT WRIST PAIN: ICD-10-CM

## 2022-10-28 DIAGNOSIS — R07.81 RIB PAIN: ICD-10-CM

## 2022-10-28 PROCEDURE — 99214 OFFICE O/P EST MOD 30 MIN: CPT | Performed by: PHYSICIAN ASSISTANT

## 2022-10-28 RX ORDER — BUSPIRONE HYDROCHLORIDE 5 MG/1
5 TABLET ORAL 3 TIMES DAILY
Qty: 90 TABLET | Refills: 0 | Status: SHIPPED | OUTPATIENT
Start: 2022-10-28 | End: 2022-11-14 | Stop reason: SDUPTHER
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
## 2022-10-31 PROBLEM — F41.1 GENERALIZED ANXIETY DISORDER: Status: ACTIVE | Noted: 2022-10-31

## 2022-10-31 NOTE — ASSESSMENT & PLAN NOTE
Will cont current anxiety/depression meds and add buspar. Handout given for local counselors and referral placed to social work. Discussed acute anxiety symptoms. We will start buspar as well to help with acute symptoms. Discussed that it can be taken up to 3 times per day and used as needed. Due to severe anxiety symptoms, I would recommend using once daily until acute symptoms improve, then moving to as needed basis. Discussed possible side effects of dizziness, confusion, headache. Patient understands and agrees.

## 2022-11-01 ENCOUNTER — REFERRAL TRIAGE (OUTPATIENT)
Dept: CASE MANAGEMENT | Facility: CLINIC | Age: 27
End: 2022-11-01

## 2022-11-01 ENCOUNTER — PATIENT OUTREACH (OUTPATIENT)
Dept: CASE MANAGEMENT | Facility: OTHER | Age: 27
End: 2022-11-01

## 2022-11-01 DIAGNOSIS — Z79.4 TYPE 2 DIABETES MELLITUS WITH HYPERGLYCEMIA, WITH LONG-TERM CURRENT USE OF INSULIN: Primary | ICD-10-CM

## 2022-11-01 DIAGNOSIS — E11.65 TYPE 2 DIABETES MELLITUS WITH HYPERGLYCEMIA, WITH LONG-TERM CURRENT USE OF INSULIN: Primary | ICD-10-CM
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
## 2022-11-02 ENCOUNTER — PATIENT OUTREACH (OUTPATIENT)
Dept: CASE MANAGEMENT | Facility: OTHER | Age: 27
End: 2022-11-02
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
## 2022-11-11 ENCOUNTER — TELEPHONE (OUTPATIENT)
Dept: OBSTETRICS AND GYNECOLOGY | Facility: CLINIC | Age: 27
End: 2022-11-11
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
## 2022-11-14 ENCOUNTER — TELEMEDICINE (OUTPATIENT)
Dept: INTERNAL MEDICINE | Facility: CLINIC | Age: 27
End: 2022-11-14

## 2022-11-14 DIAGNOSIS — Z79.4 TYPE 2 DIABETES MELLITUS WITH HYPERGLYCEMIA, WITH LONG-TERM CURRENT USE OF INSULIN: ICD-10-CM

## 2022-11-14 DIAGNOSIS — F43.10 PTSD (POST-TRAUMATIC STRESS DISORDER): ICD-10-CM

## 2022-11-14 DIAGNOSIS — E11.65 TYPE 2 DIABETES MELLITUS WITH HYPERGLYCEMIA, WITH LONG-TERM CURRENT USE OF INSULIN: ICD-10-CM

## 2022-11-14 DIAGNOSIS — F41.9 ANXIETY: Primary | ICD-10-CM

## 2022-11-14 PROCEDURE — 99214 OFFICE O/P EST MOD 30 MIN: CPT | Performed by: INTERNAL MEDICINE

## 2022-11-14 RX ORDER — ZINC GLUCONATE 50 MG
50 TABLET ORAL DAILY
Qty: 90 TABLET | Refills: 1 | Status: SHIPPED | OUTPATIENT
Start: 2022-11-14 | End: 2023-02-15

## 2022-11-14 RX ORDER — FLUTICASONE PROPIONATE 220 UG/1
1 AEROSOL, METERED RESPIRATORY (INHALATION)
Qty: 12 G | Refills: 2 | Status: SHIPPED | OUTPATIENT
Start: 2022-11-14 | End: 2023-02-15

## 2022-11-14 RX ORDER — OMEPRAZOLE 40 MG/1
40 CAPSULE, DELAYED RELEASE ORAL DAILY
Qty: 90 CAPSULE | Refills: 1 | Status: SHIPPED | OUTPATIENT
Start: 2022-11-14 | End: 2023-02-15

## 2022-11-14 RX ORDER — GLIPIZIDE 10 MG/1
10 TABLET, FILM COATED, EXTENDED RELEASE ORAL DAILY
Qty: 90 TABLET | Refills: 1 | Status: SHIPPED | OUTPATIENT
Start: 2022-11-14 | End: 2023-02-15

## 2022-11-14 RX ORDER — OMEGA-3/DHA/EPA/FISH OIL 60 MG-90MG
2 CAPSULE ORAL DAILY
Qty: 90 CAPSULE | Refills: 1 | Status: SHIPPED | OUTPATIENT
Start: 2022-11-14 | End: 2023-02-15

## 2022-11-14 RX ORDER — INSULIN LISPRO 100 [IU]/ML
INJECTION, SOLUTION INTRAVENOUS; SUBCUTANEOUS
Qty: 5 ML | Refills: 1 | Status: SHIPPED | OUTPATIENT
Start: 2022-11-14 | End: 2022-11-21 | Stop reason: SDUPTHER

## 2022-11-14 RX ORDER — MONTELUKAST SODIUM 10 MG/1
10 TABLET ORAL DAILY
Qty: 90 TABLET | Refills: 1 | Status: SHIPPED | OUTPATIENT
Start: 2022-11-14 | End: 2023-02-15

## 2022-11-14 RX ORDER — TIRZEPATIDE 2.5 MG/.5ML
2.5 INJECTION, SOLUTION SUBCUTANEOUS WEEKLY
Qty: 2.5 ML | Refills: 1 | Status: SHIPPED | OUTPATIENT
Start: 2022-11-14 | End: 2022-12-20

## 2022-11-14 RX ORDER — PRAZOSIN HYDROCHLORIDE 1 MG/1
1 CAPSULE ORAL NIGHTLY
Qty: 90 CAPSULE | Refills: 1 | Status: SHIPPED | OUTPATIENT
Start: 2022-11-14 | End: 2023-02-15

## 2022-11-14 RX ORDER — DESVENLAFAXINE 100 MG/1
100 TABLET, EXTENDED RELEASE ORAL DAILY
Qty: 90 TABLET | Refills: 1 | Status: SHIPPED | OUTPATIENT
Start: 2022-11-14 | End: 2023-02-15

## 2022-11-14 RX ORDER — BUSPIRONE HYDROCHLORIDE 5 MG/1
5 TABLET ORAL 3 TIMES DAILY
Qty: 90 TABLET | Refills: 0 | Status: SHIPPED | OUTPATIENT
Start: 2022-11-14 | End: 2023-01-24 | Stop reason: SDUPTHER

## 2022-11-14 RX ORDER — INSULIN DETEMIR 100 [IU]/ML
15 INJECTION, SOLUTION SUBCUTANEOUS NIGHTLY
Qty: 5 ML | Refills: 1 | Status: SHIPPED | OUTPATIENT
Start: 2022-11-14 | End: 2022-11-17 | Stop reason: SDUPTHER

## 2022-11-14 RX ORDER — ASCORBIC ACID 500 MG
500 TABLET ORAL DAILY
Qty: 90 TABLET | Refills: 1 | Status: SHIPPED | OUTPATIENT
Start: 2022-11-14 | End: 2023-02-15
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
## 2022-11-16 ENCOUNTER — TELEPHONE (OUTPATIENT)
Dept: INTERNAL MEDICINE | Facility: CLINIC | Age: 27
End: 2022-11-16

## 2022-11-16 NOTE — TELEPHONE ENCOUNTER
Pt is taking Bupropion, Buspirone, Desvbnlafaxine and it was making her a little sick. She wants to know out of theses proscriptions for depression, anxiety meds what should she be taking? Dr. Maria said she should be taking the buspirone and desvenlafaxine, She told her to stop the bupropion. I called the PT back and let her know

## 2022-11-16 NOTE — TELEPHONE ENCOUNTER
Caller: Dorinda Sherwood    Relationship: Self    Best call back number: [phone]     What medications are you currently taking:   Current Outpatient Medications on File Prior to Visit   Medication Sig Dispense Refill   • Antacid Calcium 500 MG chewable tablet Chew 1 tablet As Needed for Heartburn. 90 tablet 1   • ascorbic acid (VITAMIN C) 500 MG tablet Take 1 tablet by mouth Daily. 90 tablet 1   • atenolol (TENORMIN) 25 MG tablet TAKE ONE tablet (25 MG total) by MOUTH ONE TIME A DAY     • busPIRone (BUSPAR) 5 MG tablet Take 1 tablet by mouth 3 (Three) Times a Day. 90 tablet 0   • calcium carbonate (OS-HESHAM) 1250 (500 Ca) MG tablet Take 1 tablet by mouth Daily.     • Continuous Blood Gluc  (Dexcom G6 ) device 1 each Continuous. icd10- e11.9 1 each 0   • Continuous Blood Gluc Sensor (Dexcom G6 Sensor) CHANGE TO CHECK BLOOD SUGAR EVERY TEN DAYS 3 each 3   • Continuous Blood Gluc Transmit (Dexcom G6 Transmitter) misc CHANGE device EVERY 3 MONTHS 1 each 1   • Cyanocobalamin (Vitamin B-12 ER) 1000 MCG tablet controlled-release Take 1 tablet by mouth Daily.     • desvenlafaxine (PRISTIQ) 100 MG 24 hr tablet Take 1 tablet by mouth Daily. 90 tablet 1   • fluticasone (Flovent HFA) 220 MCG/ACT inhaler Inhale 1 puff 2 (Two) Times a Day. 12 g 2   • glipizide (GLUCOTROL XL) 10 MG 24 hr tablet Take 1 tablet by mouth Daily. 90 tablet 1   • ibuprofen (ADVIL,MOTRIN) 800 MG tablet TAKE ONE TABLET BY MOUTH THREE TIMES DAILY AS NEEDED FOR PAIN FOR 10 DAYS     • insulin detemir (Levemir) 100 UNIT/ML injection Inject 15 Units under the skin into the appropriate area as directed Every Night. 5 mL 1   • Insulin Lispro (humaLOG) 100 UNIT/ML injection Per sliding scale no more than 15 units at a time 5 mL 1   • Insulin Pen Needle 31G X 5 MM misc -     • ipratropium (ATROVENT) 0.02 % nebulizer solution Take 2.5 mL by nebulization 4 (Four) Times a Day. As needed 50 mL 0   • ketorolac (TORADOL) 10 MG tablet Take 1 tablet by  mouth Every 6 (Six) Hours As Needed for Moderate Pain. 15 tablet 0   • medroxyPROGESTERone (DEPO-PROVERA) 150 MG/ML injection Inject 1 mL into the appropriate muscle as directed by prescriber Every 3 (Three) Months. 1 mL 3   • metFORMIN (GLUCOPHAGE) 850 MG tablet Take 1 tablet by mouth 2 (Two) Times a Day Before Meals. 90 tablet 1   • montelukast (SINGULAIR) 10 MG tablet Take 1 tablet by mouth Daily. 90 tablet 1   • Nutritional Supplements (Glucose Management) tablet Take 1 tablet by mouth As Needed (blood sugar below 60). 30 tablet 0   • Omega-3 Fatty Acids (fish oil) 500 MG capsule capsule Take 2 capsules by mouth Daily. 90 capsule 1   • omeprazole (priLOSEC) 40 MG capsule Take 1 capsule by mouth Daily. 90 capsule 1   • polyethylene glycol (MIRALAX) 17 g packet Take 17 g by mouth Daily. 5 packet 0   • prazosin (MINIPRESS) 1 MG capsule Take 1 capsule by mouth Every Night. 90 capsule 1   • ProAir  (90 Base) MCG/ACT inhaler Inhale 2 puffs into the lungs every 4 (four) hours as needed for Shortness of Air.     • Tirzepatide (Mounjaro) 2.5 MG/0.5ML solution pen-injector Inject 2.5 mg under the skin into the appropriate area as directed 1 (One) Time Per Week. 2.5 mL 1   • vitamin B-12 (CYANOCOBALAMIN) 1000 MCG tablet Take 1 tablet by mouth Daily.     • vitamin D (ERGOCALCIFEROL) 1.25 MG (23911 UT) capsule capsule TAKE ONE CAPSULE BY MOUTH ONCE A WEEK for 90 DAYS     • Zinc 50 MG tablet Take 1 tablet by mouth Daily. 90 tablet 1     No current facility-administered medications on file prior to visit.          When did you start taking these medications:     Which medication are you concerned about: DEPRESSION    Who prescribed you this medication: DR. DUNCAN    What are your concerns: PATIENT STATES SHE HAS 3 DIFFERENT MEDICATION FOR DEPRESSION AND WANTS TO KNOW WHICH MEDICATION DR. DUNCAN TOOK HER OFF OF, OR IS SHE SUPPOSE TO TAKE ALL OF THEM.    PLEASE CALL AND ADVISE    How long have you had these concerns:

## 2022-11-17 ENCOUNTER — PATIENT OUTREACH (OUTPATIENT)
Dept: CASE MANAGEMENT | Facility: OTHER | Age: 27
End: 2022-11-17

## 2022-11-17 ENCOUNTER — TELEPHONE (OUTPATIENT)
Dept: CASE MANAGEMENT | Facility: OTHER | Age: 27
End: 2022-11-17

## 2022-11-17 DIAGNOSIS — E11.65 TYPE 2 DIABETES MELLITUS WITH HYPERGLYCEMIA, WITH LONG-TERM CURRENT USE OF INSULIN: Primary | ICD-10-CM

## 2022-11-17 DIAGNOSIS — Z79.4 TYPE 2 DIABETES MELLITUS WITH HYPERGLYCEMIA, WITH LONG-TERM CURRENT USE OF INSULIN: Primary | ICD-10-CM

## 2022-11-17 DIAGNOSIS — F33.41 RECURRENT MAJOR DEPRESSIVE DISORDER, IN PARTIAL REMISSION: ICD-10-CM

## 2022-11-17 RX ORDER — INSULIN DETEMIR 100 [IU]/ML
15 INJECTION, SOLUTION SUBCUTANEOUS NIGHTLY
Qty: 5 ML | Refills: 1 | Status: SHIPPED | OUTPATIENT
Start: 2022-11-17 | End: 2022-12-19
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
## 2022-11-17 NOTE — TELEPHONE ENCOUNTER
Agree with Dr. Thornton on not taking medicines if she is not symptomatic.  I would do the insulin 3 times daily 5 units for sugars greater then 140 prior to meals, 10 units for sugars greater than 200 prior to meals.  If she takes insulin prior to the meal she needs to be sure she eats right away.

## 2022-11-17 NOTE — TELEPHONE ENCOUNTER
1. Per chart, pt to be on Humalog per sliding scale. Rx is not specific if it is TID or not, and pt does not have sliding scale. Please advise on Rx and s/s.    2. Also, per chart, she was prescribed Diflucan and Flagyl in Sept 9, 2022 by OBGYN. She was pos for  GARDNERELLA VAGINALIS and CANDIDA SPECIES. Never took 2 meds, was not contacted via telephone call. Mr [employer] message was sent. She is not having abnormal vaginal/private-area s/s or issues. Does she still need to take these meds? She has these at home with her.     3. Levemir- going to send in pens and pen needles. Pt got vial Levemir but no supplies.

## 2022-11-17 NOTE — TELEPHONE ENCOUNTER
If she is not having vaginal symptoms at this point, then I would say that she does not need to take the Diflucan and Flagyl.

## 2022-11-17 NOTE — TELEPHONE ENCOUNTER
LM for patient to return call regarding sliding scale for humalog. Also that Katharine is handwriting these directions and will be available to  tomorrow in office.

## 2022-11-18 ENCOUNTER — TELEPHONE (OUTPATIENT)
Dept: INTERNAL MEDICINE | Facility: CLINIC | Age: 27
End: 2022-11-18

## 2022-11-18 RX ORDER — SYRINGE-NEEDLE,INSULIN,0.5 ML 27GX1/2"
1 SYRINGE, EMPTY DISPOSABLE MISCELLANEOUS
Qty: 3 EACH | Refills: 3 | Status: SHIPPED | OUTPATIENT
Start: 2022-11-18 | End: 2023-02-15

## 2022-11-18 NOTE — TELEPHONE ENCOUNTER
Pharmacy Name: [facility] - [place], [place] - [address] [facility] - [phone]  - [phone]      Pharmacy representative name: RAVINDRA    Pharmacy representative phone number: [phone]    What medication are you calling in regards to: Insulin Lispro (humaLOG) 100 UNIT/ML injection    What question does the pharmacy have: THE PHARMACY STATED THEY NEED A NEW PRESCRIPTION WITH CLARIFIED DIRECTIONS AND DOSAGE SENT ASAP. PATIENT STATED DOSAGE SHOULD BE 80ML    Who is the provider that prescribed the medication: DR. DUNCAN

## 2022-11-21 RX ORDER — INSULIN LISPRO 100 [IU]/ML
INJECTION, SOLUTION INTRAVENOUS; SUBCUTANEOUS
Qty: 5 ML | Refills: 1 | Status: SHIPPED | OUTPATIENT
Start: 2022-11-21 | End: 2022-12-19 | Stop reason: SDUPTHER

## 2022-11-28 ENCOUNTER — TELEPHONE (OUTPATIENT)
Dept: CASE MANAGEMENT | Facility: OTHER | Age: 27
End: 2022-11-28

## 2022-11-28 NOTE — TELEPHONE ENCOUNTER
Sugar log TID, before meals.    (Pt pos flu)  180  *43  150  130  250  180  230  180  170  119  230  130    125  260  160  206  153    Educated on s/s hypoglycemia.

## 2022-11-29 ENCOUNTER — PATIENT OUTREACH (OUTPATIENT)
Dept: CASE MANAGEMENT | Facility: OTHER | Age: 27
End: 2022-11-29

## 2022-11-29 DIAGNOSIS — F33.41 RECURRENT MAJOR DEPRESSIVE DISORDER, IN PARTIAL REMISSION: ICD-10-CM

## 2022-11-29 DIAGNOSIS — E11.65 TYPE 2 DIABETES MELLITUS WITH HYPERGLYCEMIA, WITH LONG-TERM CURRENT USE OF INSULIN: Primary | ICD-10-CM

## 2022-11-29 DIAGNOSIS — Z79.4 TYPE 2 DIABETES MELLITUS WITH HYPERGLYCEMIA, WITH LONG-TERM CURRENT USE OF INSULIN: Primary | ICD-10-CM

## 2022-11-29 PROCEDURE — 99439 CHRNC CARE MGMT STAF EA ADDL: CPT | Performed by: INTERNAL MEDICINE

## 2022-11-29 PROCEDURE — 99490 CHRNC CARE MGMT STAFF 1ST 20: CPT | Performed by: INTERNAL MEDICINE

## 2022-11-29 NOTE — OUTREACH NOTE
[facility] End of Month Documentation    This Chronic Medical Management Care Plan for Dorinda Sherwood, 27 y.o. female, has been monitored and managed; reviewed and a new plan of care implemented for the month of November.  A cumulative time of 84  minutes was spent on this patient record this month, including phone call with patient; chart review; electronic communication with other providers; electronic communication with primary care provider.    Regarding the patient's problems: has Calculus of gallbladder with chronic cholecystitis without obstruction; Type 2 diabetes mellitus with hyperglycemia, with long-term current use of insulin (HCC); S/P cholecystectomy; Allergic rhinitis; Asthma; Atrial septal defect, secundum; Depression; Anxiety; Chest pain; Chronic intractable headache; Morbid obesity with BMI of 50.0-59.9, adult (HCC); Abnormal uterine bleeding (AUB); Vaginal irritation; and Generalized anxiety disorder on their problem list., the following items were addressed: medical records; medications and any changes can be found within the plan section of the note.  A detailed listing of time spent for chronic care management is tracked within each outreach encounter.  Current medications include:  has a current medication list which includes the following prescription(s): antacid calcium, ascorbic acid, atenolol, buspirone, calcium carbonate, dexcom g6 , dexcom g6 sensor, dexcom g6 transmitter, vitamin b-12 er, desvenlafaxine, fluticasone, glipizide, ibuprofen, levemir, insulin lispro, insulin pen needle, insulin syringe-needle u-100, ipratropium, ketorolac, medroxyprogesterone, metformin, montelukast, glucose management, fish oil, omeprazole, polyethylene glycol, prazosin, proair hfa, mounjaro, vitamin b-12, vitamin d, and zinc. and the patient is reported to be patient is compliant with medication protocol, not checking sugar, insulin and supplies reordered,  Medications are reported to be non-effective  in controlling symptoms and changes have been made to the medication protocol, syl Starr in 2023.  Regarding these diagnoses, referrals were made to the following provider(s):  none.  All notes on chart for PCP to review.    The patient was monitored remotely for blood glucose; medications; mood & behavior, DM compliance.    The patient's physical needs include:  physical healthcare.     The patient's mental support needs include:  increased support    The patient's cognitive support needs include:  continued support    The patient's psychosocial support needs include:  continued support    The patient's functional needs include: physical healthcare    The patient's environmental needs include:  no access to transportation, pt does not drive, relies on family for rides. appts made via telehealth    Care Plan overall comments:  No data recorded    Refer to previous outreach notes for more information on the areas listed above.    Monthly Billing Diagnoses  (E11.65,  Z79.4) Type 2 diabetes mellitus with hyperglycemia, with long-term current use of insulin (HCC)    (F33.41) Recurrent major depressive disorder, in partial remission (HCC)    Medications   · Medications have been reconciled    Care Plan progress this month:      Recently Modified Care Plans Updates made since 10/29/2022 12:00 AM    No recently modified care plans.            Instructions   · Patient was provided an electronic copy of care plan  · CCM services were explained and offered and patient has accepted these services.  · Patient has given their written consent to receive CCM services and understands that this includes the authorization of electronic communication of medical information with the other treating providers.  · Patient understands that they may stop CCM services at any time and these changes will be effective at the end of the calendar month and will effectively revocate the agreement of CCM services.  · Patient understands that only  one practitioner can furnish and be paid for CCM services during one calendar month.  Patient also understands that there may be co-payment or deductible fees in association with CCM services.  · Patient will continue with at least monthly follow-up calls with the Ambulatory .    BULL SNYDER  Ambulatory Case Management    11/29/2022, 11:51 EST

## 2022-12-06 ENCOUNTER — TELEPHONE (OUTPATIENT)
Dept: INTERNAL MEDICINE | Facility: CLINIC | Age: 27
End: 2022-12-06

## 2022-12-06 LAB
ALBUMIN SERPL-MCNC: 4 G/DL (ref 3.5–5.2)
ALBUMIN/GLOB SERPL: 1.4 G/DL
ALP SERPL-CCNC: 91 U/L (ref 39–117)
ALT SERPL W P-5'-P-CCNC: 17 U/L (ref 1–33)
ANION GAP SERPL CALCULATED.3IONS-SCNC: 11.4 MMOL/L (ref 5–15)
AST SERPL-CCNC: 11 U/L (ref 1–32)
BACTERIA UR QL AUTO: ABNORMAL /HPF
BASOPHILS # BLD AUTO: 0.06 10*3/MM3 (ref 0–0.2)
BASOPHILS NFR BLD AUTO: 0.5 % (ref 0–1.5)
BILIRUB SERPL-MCNC: <0.2 MG/DL (ref 0–1.2)
BILIRUB UR QL STRIP: NEGATIVE
BUN SERPL-MCNC: 10 MG/DL (ref 6–20)
BUN/CREAT SERPL: 19.2 (ref 7–25)
CALCIUM SPEC-SCNC: 9 MG/DL (ref 8.6–10.5)
CHLORIDE SERPL-SCNC: 100 MMOL/L (ref 98–107)
CLARITY UR: CLEAR
CO2 SERPL-SCNC: 24.6 MMOL/L (ref 22–29)
COLOR UR: YELLOW
CREAT SERPL-MCNC: 0.52 MG/DL (ref 0.57–1)
DEPRECATED RDW RBC AUTO: 34.7 FL (ref 37–54)
EGFRCR SERPLBLD CKD-EPI 2021: 130 ML/MIN/1.73
EOSINOPHIL # BLD AUTO: 0.12 10*3/MM3 (ref 0–0.4)
EOSINOPHIL NFR BLD AUTO: 1 % (ref 0.3–6.2)
ERYTHROCYTE [DISTWIDTH] IN BLOOD BY AUTOMATED COUNT: 12 % (ref 12.3–15.4)
FLUAV AG NPH QL: NEGATIVE
FLUBV AG NPH QL IA: NEGATIVE
GLOBULIN UR ELPH-MCNC: 2.9 GM/DL
GLUCOSE BLDC GLUCOMTR-MCNC: 191 MG/DL (ref 70–99)
GLUCOSE SERPL-MCNC: 222 MG/DL (ref 65–99)
GLUCOSE UR STRIP-MCNC: ABNORMAL MG/DL
HCT VFR BLD AUTO: 36.9 % (ref 34–46.6)
HGB BLD-MCNC: 12.4 G/DL (ref 12–15.9)
HGB UR QL STRIP.AUTO: ABNORMAL
HOLD SPECIMEN: NORMAL
HOLD SPECIMEN: NORMAL
HYALINE CASTS UR QL AUTO: ABNORMAL /LPF
IMM GRANULOCYTES # BLD AUTO: 0.12 10*3/MM3 (ref 0–0.05)
IMM GRANULOCYTES NFR BLD AUTO: 1 % (ref 0–0.5)
KETONES UR QL STRIP: NEGATIVE
LEUKOCYTE ESTERASE UR QL STRIP.AUTO: ABNORMAL
LYMPHOCYTES # BLD AUTO: 4.21 10*3/MM3 (ref 0.7–3.1)
LYMPHOCYTES NFR BLD AUTO: 34.6 % (ref 19.6–45.3)
MCH RBC QN AUTO: 27 PG (ref 26.6–33)
MCHC RBC AUTO-ENTMCNC: 33.6 G/DL (ref 31.5–35.7)
MCV RBC AUTO: 80.2 FL (ref 79–97)
MONOCYTES # BLD AUTO: 0.66 10*3/MM3 (ref 0.1–0.9)
MONOCYTES NFR BLD AUTO: 5.4 % (ref 5–12)
NEUTROPHILS NFR BLD AUTO: 57.5 % (ref 42.7–76)
NEUTROPHILS NFR BLD AUTO: 6.99 10*3/MM3 (ref 1.7–7)
NITRITE UR QL STRIP: NEGATIVE
NRBC BLD AUTO-RTO: 0 /100 WBC (ref 0–0.2)
PH UR STRIP.AUTO: 6 [PH] (ref 5–8)
PLATELET # BLD AUTO: 286 10*3/MM3 (ref 140–450)
PMV BLD AUTO: 9.6 FL (ref 6–12)
POTASSIUM SERPL-SCNC: 4.1 MMOL/L (ref 3.5–5.2)
PROT SERPL-MCNC: 6.9 G/DL (ref 6–8.5)
PROT UR QL STRIP: NEGATIVE
RBC # BLD AUTO: 4.6 10*6/MM3 (ref 3.77–5.28)
RBC # UR STRIP: ABNORMAL /HPF
REF LAB TEST METHOD: ABNORMAL
SODIUM SERPL-SCNC: 136 MMOL/L (ref 136–145)
SP GR UR STRIP: 1.02 (ref 1–1.03)
SQUAMOUS #/AREA URNS HPF: ABNORMAL /HPF
UROBILINOGEN UR QL STRIP: ABNORMAL
WBC # UR STRIP: ABNORMAL /HPF
WBC NRBC COR # BLD: 12.16 10*3/MM3 (ref 3.4–10.8)
WHOLE BLOOD HOLD COAG: NORMAL
WHOLE BLOOD HOLD SPECIMEN: NORMAL

## 2022-12-06 PROCEDURE — C9803 HOPD COVID-19 SPEC COLLECT: HCPCS

## 2022-12-06 PROCEDURE — 80053 COMPREHEN METABOLIC PANEL: CPT

## 2022-12-06 PROCEDURE — 81001 URINALYSIS AUTO W/SCOPE: CPT

## 2022-12-06 PROCEDURE — 93005 ELECTROCARDIOGRAM TRACING: CPT | Performed by: EMERGENCY MEDICINE

## 2022-12-06 PROCEDURE — 85025 COMPLETE CBC W/AUTO DIFF WBC: CPT

## 2022-12-06 PROCEDURE — U0004 COV-19 TEST NON-CDC HGH THRU: HCPCS

## 2022-12-06 PROCEDURE — 93010 ELECTROCARDIOGRAM REPORT: CPT | Performed by: SPECIALIST

## 2022-12-06 PROCEDURE — 87804 INFLUENZA ASSAY W/OPTIC: CPT

## 2022-12-06 PROCEDURE — 36415 COLL VENOUS BLD VENIPUNCTURE: CPT

## 2022-12-06 PROCEDURE — 99284 EMERGENCY DEPT VISIT MOD MDM: CPT

## 2022-12-06 PROCEDURE — 93005 ELECTROCARDIOGRAM TRACING: CPT

## 2022-12-06 PROCEDURE — 82962 GLUCOSE BLOOD TEST: CPT

## 2022-12-06 RX ORDER — SODIUM CHLORIDE 0.9 % (FLUSH) 0.9 %
10 SYRINGE (ML) INJECTION AS NEEDED
Status: DISCONTINUED | OUTPATIENT
Start: 2022-12-06 | End: 2022-12-07 | Stop reason: HOSPADM

## 2022-12-06 NOTE — TELEPHONE ENCOUNTER
Pt is calling in looking for Bonnie Pedro, she was her Nurse . Not sure who her new one would be.  Diabeties is dropping low and she is passing out. Nothing is helping it and she is really tired. Please advise

## 2022-12-07 ENCOUNTER — HOSPITAL ENCOUNTER (EMERGENCY)
Facility: HOSPITAL | Age: 27
Discharge: HOME | End: 2022-12-07
Attending: EMERGENCY MEDICINE | Admitting: EMERGENCY MEDICINE

## 2022-12-07 VITALS
BODY MASS INDEX: 53.92 KG/M2 | HEART RATE: 98 BPM | SYSTOLIC BLOOD PRESSURE: 131 MMHG | TEMPERATURE: 98.7 F | OXYGEN SATURATION: 96 % | HEIGHT: 62 IN | RESPIRATION RATE: 18 BRPM | WEIGHT: 293 LBS | DIASTOLIC BLOOD PRESSURE: 90 MMHG

## 2022-12-07 DIAGNOSIS — R73.9 HYPERGLYCEMIA: ICD-10-CM

## 2022-12-07 DIAGNOSIS — R51.9 NONINTRACTABLE HEADACHE, UNSPECIFIED CHRONICITY PATTERN, UNSPECIFIED HEADACHE TYPE: ICD-10-CM

## 2022-12-07 DIAGNOSIS — N39.0 URINARY TRACT INFECTION WITHOUT HEMATURIA, SITE UNSPECIFIED: Primary | ICD-10-CM

## 2022-12-07 LAB
GLUCOSE BLDC GLUCOMTR-MCNC: 194 MG/DL (ref 70–99)
GLUCOSE BLDC GLUCOMTR-MCNC: 243 MG/DL (ref 70–99)
QT INTERVAL: 312 MS
SARS-COV-2 RNA PNL SPEC NAA+PROBE: NOT DETECTED

## 2022-12-07 PROCEDURE — 96374 THER/PROPH/DIAG INJ IV PUSH: CPT

## 2022-12-07 PROCEDURE — 82962 GLUCOSE BLOOD TEST: CPT

## 2022-12-07 PROCEDURE — 25010000002 KETOROLAC TROMETHAMINE PER 15 MG: Performed by: EMERGENCY MEDICINE

## 2022-12-07 PROCEDURE — 25010000002 DIPHENHYDRAMINE PER 50 MG: Performed by: EMERGENCY MEDICINE

## 2022-12-07 PROCEDURE — 25010000002 METOCLOPRAMIDE PER 10 MG: Performed by: EMERGENCY MEDICINE

## 2022-12-07 PROCEDURE — 96375 TX/PRO/DX INJ NEW DRUG ADDON: CPT

## 2022-12-07 RX ORDER — METOCLOPRAMIDE HYDROCHLORIDE 5 MG/ML
10 INJECTION INTRAMUSCULAR; INTRAVENOUS ONCE
Status: COMPLETED | OUTPATIENT
Start: 2022-12-07 | End: 2022-12-07

## 2022-12-07 RX ORDER — NITROFURANTOIN 25; 75 MG/1; MG/1
100 CAPSULE ORAL 2 TIMES DAILY
Qty: 10 CAPSULE | Refills: 0 | Status: SHIPPED | OUTPATIENT
Start: 2022-12-07 | End: 2022-12-12

## 2022-12-07 RX ORDER — DIPHENHYDRAMINE HYDROCHLORIDE 50 MG/ML
25 INJECTION INTRAMUSCULAR; INTRAVENOUS ONCE
Status: COMPLETED | OUTPATIENT
Start: 2022-12-07 | End: 2022-12-07

## 2022-12-07 RX ORDER — KETOROLAC TROMETHAMINE 15 MG/ML
15 INJECTION, SOLUTION INTRAMUSCULAR; INTRAVENOUS ONCE
Status: COMPLETED | OUTPATIENT
Start: 2022-12-07 | End: 2022-12-07

## 2022-12-07 RX ADMIN — SODIUM CHLORIDE 1000 ML: 9 INJECTION, SOLUTION INTRAVENOUS at 03:39

## 2022-12-07 RX ADMIN — KETOROLAC TROMETHAMINE 15 MG: 15 INJECTION, SOLUTION INTRAMUSCULAR; INTRAVENOUS at 03:44

## 2022-12-07 RX ADMIN — DIPHENHYDRAMINE HYDROCHLORIDE 25 MG: 50 INJECTION, SOLUTION INTRAMUSCULAR; INTRAVENOUS at 03:41

## 2022-12-07 RX ADMIN — METOCLOPRAMIDE HYDROCHLORIDE 10 MG: 5 INJECTION INTRAMUSCULAR; INTRAVENOUS at 03:44

## 2022-12-07 NOTE — ED PROVIDER NOTES
Time: 3:08 AM EST  Chief Complaint:   Chief Complaint   Patient presents with   • Hypoglycemia     Pt reports several episodes of hypoglycemia and passing out.      History of Present Illness:    Patient is a 27 y.o. year old female who presents to the emergency department with complaints of multiple episodes of hypoglycemia and syncopal episode at home today.  Patient reports that her blood glucose got as low as 43.  She states she tried drinking multiple cups of orange juice without any increase in her glucose.  Patient does state that she had a syncopal episode and was awakened by her brother pushing on her to wake her up.  She is a type II diabetic.  She does report that she takes insulin at home and has had no recent changes to her medications. Pt reports headaches, nausea, fatigue, and diarrhea. Pt denies urinary symptoms and vomiting. Pt states she has been eating 3 meals a day. Pt states she takes short and long-acting insulin.       History provided by:  Patient   used: No            Patient Care Team  Primary Care Provider: Jess Maria MD    Past Medical History:     Allergies   Allergen Reactions   • Amoxicillin Shortness Of Breath   • Hydroxyzine Shortness Of Breath     Past Medical History:   Diagnosis Date   • Anesthesia     slow to wake up postop, anxiety postop   • Anxiety    • AR (allergic rhinitis)    • ASD (atrial septal defect)     had since birth- asymptomatic- see's dr falk    • Asthma     no inhalers   • Bipolar disorder (HCC)    • Depression    • Diabetes mellitus (HCC)     type ii- bg runs around 200-300   • Gallstones currently   • GERD (gastroesophageal reflux disease)    • Hypertension    • Migraine    • Ovarian cyst    • Trauma      Past Surgical History:   Procedure Laterality Date   •  SECTION     • CHOLECYSTECTOMY N/A 2021    Procedure: CHOLECYSTECTOMY LAPAROSCOPIC;  Surgeon: Robert Araya MD;  Location: [facility] [place];  Service:  General;  Laterality: N/A;   • COLONOSCOPY  2017   • TUBAL ABDOMINAL LIGATION     • WISDOM TOOTH EXTRACTION       Family History   Problem Relation Age of Onset   • Heart disease Father    • Heart disease Other    • Heart disease Other    • Heart disease Other    • Diabetes type II Other        Home Medications:  Prior to Admission medications    Medication Sig Start Date End Date Taking? Authorizing Provider   Antacid Calcium 500 MG chewable tablet Chew 1 tablet As Needed for Heartburn. 5/11/22   Jess Maria MD   ascorbic acid (VITAMIN C) 500 MG tablet Take 1 tablet by mouth Daily. 11/14/22   Jess Maria MD   atenolol (TENORMIN) 25 MG tablet TAKE ONE tablet (25 MG total) by MOUTH ONE TIME A DAY 10/17/22   Brock Govea MD   busPIRone (BUSPAR) 5 MG tablet Take 1 tablet by mouth 3 (Three) Times a Day. 11/14/22   Jess Maria MD   calcium carbonate (OS-HESHAM) 1250 (500 Ca) MG tablet Take 1 tablet by mouth Daily. 10/17/22   Brock Govea MD   Continuous Blood Gluc  (Dexcom G6 ) device 1 each Continuous. icd10- e11.9 11/11/21   Jess Maria MD   Continuous Blood Gluc Sensor (Dexcom G6 Sensor) CHANGE TO CHECK BLOOD SUGAR EVERY TEN DAYS 6/2/22   Jess Maria MD   Continuous Blood Gluc Transmit (Dexcom G6 Transmitter) misc CHANGE device EVERY 3 MONTHS 8/16/22   Elda Colmenares APRN   Cyanocobalamin (Vitamin B-12 ER) 1000 MCG tablet controlled-release Take 1 tablet by mouth Daily. 7/8/22   Brock Govea MD   desvenlafaxine (PRISTIQ) 100 MG 24 hr tablet Take 1 tablet by mouth Daily. 11/14/22   Jess Maria MD   fluticasone (Flovent HFA) 220 MCG/ACT inhaler Inhale 1 puff 2 (Two) Times a Day. 11/14/22   Jess Maria MD   glipizide (GLUCOTROL XL) 10 MG 24 hr tablet Take 1 tablet by mouth Daily. 11/14/22   Jess Maria MD   ibuprofen (ADVIL,MOTRIN) 800 MG tablet TAKE ONE TABLET BY MOUTH THREE TIMES DAILY AS NEEDED FOR PAIN FOR 10  "DAYS 6/8/22   ProviderBrock MD   insulin detemir (Levemir) 100 UNIT/ML injection Inject 15 Units under the skin into the appropriate area as directed Every Night. 11/17/22   Jess Maria MD   Insulin Lispro (humaLOG) 100 UNIT/ML injection Administer insulin TID with meals per sliding scale, no more than 15 units at a time.  e11.9 11/21/22   Jess Maria MD   Insulin Pen Needle 31G X 5 MM misc Inject 1 Device under the skin into the appropriate area as directed Every Night. Dispense in #100 count boxes, 1 box at a time. ICD-10: e11.9 11/17/22   Jess Maria MD   Insulin Syringe-Needle U-100 31G X 5/16\" 1 ML misc 1 Device 3 (Three) Times a Day Before Meals. Give insulin TID before  Meals per sliding scale. Dispense in #100 count boxes - 3 boxes at a time.   icd-10: e11.9 11/18/22   Jess Maria MD   ipratropium (ATROVENT) 0.02 % nebulizer solution Take 2.5 mL by nebulization 4 (Four) Times a Day. As needed 3/28/22   Jess Maria MD   ketorolac (TORADOL) 10 MG tablet Take 1 tablet by mouth Every 6 (Six) Hours As Needed for Moderate Pain. 10/25/22   Jim Paul DO   medroxyPROGESTERone (DEPO-PROVERA) 150 MG/ML injection Inject 1 mL into the appropriate muscle as directed by prescriber Every 3 (Three) Months. 9/9/22   Bradly Osullivan MD   metFORMIN (GLUCOPHAGE) 850 MG tablet Take 1 tablet by mouth 2 (Two) Times a Day Before Meals. 11/14/22   Jess Maria MD   montelukast (SINGULAIR) 10 MG tablet Take 1 tablet by mouth Daily. 11/14/22   Jess Maria MD   Nutritional Supplements (Glucose Management) tablet Take 1 tablet by mouth As Needed (blood sugar below 60). 5/13/22   Jess Maria MD   Omega-3 Fatty Acids (fish oil) 500 MG capsule capsule Take 2 capsules by mouth Daily. 11/14/22   Jess Maria MD   omeprazole (priLOSEC) 40 MG capsule Take 1 capsule by mouth Daily. 11/14/22   Jess Maria MD   polyethylene glycol (MIRALAX) 17 g packet " "Take 17 g by mouth Daily. 7/16/21   Robert Araya MD   prazosin (MINIPRESS) 1 MG capsule Take 1 capsule by mouth Every Night. 11/14/22   Jess Maria MD   ProAir  (90 Base) MCG/ACT inhaler Inhale 2 puffs into the lungs every 4 (four) hours as needed for Shortness of Air. 7/27/22   Emergency, Nurse Edison, RN   Tirzepatide (Mounjaro) 2.5 MG/0.5ML solution pen-injector Inject 2.5 mg under the skin into the appropriate area as directed 1 (One) Time Per Week. 11/14/22   Jess Maria MD   vitamin B-12 (CYANOCOBALAMIN) 1000 MCG tablet Take 1 tablet by mouth Daily. 9/29/22   Brock Govea MD   vitamin D (ERGOCALCIFEROL) 1.25 MG (01348 UT) capsule capsule TAKE ONE CAPSULE BY MOUTH ONCE A WEEK for 90 DAYS 7/8/22   ProviderBrock MD   Zinc 50 MG tablet Take 1 tablet by mouth Daily. 11/14/22   Jess Maria MD        Social History:   Social History     Tobacco Use   • Smoking status: Never   • Smokeless tobacco: Never   Vaping Use   • Vaping Use: Never used   Substance Use Topics   • Alcohol use: Never   • Drug use: Never         Review of Systems:  Review of Systems   Constitutional: Positive for fatigue. Negative for chills and fever.   HENT: Negative for congestion, ear pain and sore throat.    Eyes: Negative for pain.   Respiratory: Negative for cough, chest tightness and shortness of breath.    Cardiovascular: Negative for chest pain.   Gastrointestinal: Positive for diarrhea and nausea. Negative for abdominal pain and vomiting.   Endocrine:        Hypoglycemia   Genitourinary: Negative for dysuria, flank pain and hematuria.   Musculoskeletal: Negative for joint swelling.   Skin: Negative for pallor.   Neurological: Positive for syncope and headaches. Negative for seizures.        Physical Exam:  /90 (BP Location: Left arm, Patient Position: Lying)   Pulse 98   Temp 98.7 °F (37.1 °C) (Oral)   Resp 18   Ht 157.5 cm (62\")   Wt (!) 138 kg (303 lb 9.2 oz)   SpO2 " 96%   BMI 55.52 kg/m²     Physical Exam  Vitals and nursing note reviewed.   Constitutional:       General: She is not in acute distress.     Appearance: Normal appearance. She is not toxic-appearing.   HENT:      Head: Normocephalic and atraumatic.      Nose: Nose normal.      Mouth/Throat:      Mouth: Mucous membranes are moist.   Eyes:      General: No scleral icterus.     Extraocular Movements: Extraocular movements intact.      Conjunctiva/sclera: Conjunctivae normal.      Pupils: Pupils are equal, round, and reactive to light.   Cardiovascular:      Rate and Rhythm: Normal rate and regular rhythm.      Pulses: Normal pulses.      Heart sounds: Normal heart sounds.   Pulmonary:      Effort: Pulmonary effort is normal. No respiratory distress.      Breath sounds: Normal breath sounds.   Abdominal:      General: Abdomen is flat. There is no distension.      Palpations: Abdomen is soft.      Tenderness: There is no abdominal tenderness.   Musculoskeletal:         General: Normal range of motion.      Cervical back: Normal range of motion and neck supple.   Skin:     General: Skin is warm and dry.      Capillary Refill: Capillary refill takes less than 2 seconds.   Neurological:      General: No focal deficit present.      Mental Status: She is alert and oriented to person, place, and time. Mental status is at baseline.   Psychiatric:         Mood and Affect: Mood normal.         Behavior: Behavior normal.                Medications in the Emergency Department:  Medications   sodium chloride 0.9 % bolus 1,000 mL (0 mL Intravenous Stopped 12/7/22 0452)   metoclopramide (REGLAN) injection 10 mg (10 mg Intravenous Given 12/7/22 0344)   diphenhydrAMINE (BENADRYL) injection 25 mg (25 mg Intravenous Given 12/7/22 0341)   ketorolac (TORADOL) injection 15 mg (15 mg Intravenous Given 12/7/22 0344)        Labs  Lab Results (last 24 hours)     Procedure Component Value Units Date/Time    POC Glucose Once [[id number]]   (Abnormal) Collected: 12/06/22 2109    Specimen: Blood Updated: 12/06/22 2111     Glucose 191 mg/dL      Comment: Serial Number: [id number]:  [id number]       CBC & Differential [[id number]]  (Abnormal) Collected: 12/06/22 2131    Specimen: Blood Updated: 12/06/22 2143    Narrative:      The following orders were created for panel order CBC & Differential.  Procedure                               Abnormality         Status                     ---------                               -----------         ------                     CBC Auto Differential[[id number]]        Abnormal            Final result                 Please view results for these tests on the individual orders.    Comprehensive Metabolic Panel [[id number]]  (Abnormal) Collected: 12/06/22 2131    Specimen: Blood Updated: 12/06/22 2209     Glucose 222 mg/dL      BUN 10 mg/dL      Creatinine 0.52 mg/dL      Sodium 136 mmol/L      Potassium 4.1 mmol/L      Chloride 100 mmol/L      CO2 24.6 mmol/L      Calcium 9.0 mg/dL      Total Protein 6.9 g/dL      Albumin 4.00 g/dL      ALT (SGPT) 17 U/L      AST (SGOT) 11 U/L      Alkaline Phosphatase 91 U/L      Total Bilirubin <0.2 mg/dL      Globulin 2.9 gm/dL      A/G Ratio 1.4 g/dL      BUN/Creatinine Ratio 19.2     Anion Gap 11.4 mmol/L      eGFR 130.0 mL/min/1.73      Comment: National Kidney Foundation and American Society of Nephrology (ASN) Task Force recommended calculation based on the Chronic Kidney Disease Epidemiology Collaboration (CKD-EPI) equation refit without adjustment for race.       Narrative:      GFR Normal >60  Chronic Kidney Disease <60  Kidney Failure <15      CBC Auto Differential [[id number]]  (Abnormal) Collected: 12/06/22 2131    Specimen: Blood Updated: 12/06/22 2143     WBC 12.16 10*3/mm3      RBC 4.60 10*6/mm3      Hemoglobin 12.4 g/dL      Hematocrit 36.9 %      MCV 80.2 fL      MCH 27.0 pg      MCHC 33.6 g/dL      RDW 12.0 %      RDW-SD 34.7 fl      MPV 9.6 fL      Platelets  286 10*3/mm3      Neutrophil % 57.5 %      Lymphocyte % 34.6 %      Monocyte % 5.4 %      Eosinophil % 1.0 %      Basophil % 0.5 %      Immature Grans % 1.0 %      Neutrophils, Absolute 6.99 10*3/mm3      Lymphocytes, Absolute 4.21 10*3/mm3      Monocytes, Absolute 0.66 10*3/mm3      Eosinophils, Absolute 0.12 10*3/mm3      Basophils, Absolute 0.06 10*3/mm3      Immature Grans, Absolute 0.12 10*3/mm3      nRBC 0.0 /100 WBC     Influenza Antigen, Rapid - Swab, Nasopharynx [[id number]]  (Normal) Collected: 12/06/22 2144    Specimen: Swab from Nasopharynx Updated: 12/06/22 2217     Influenza A Ag, EIA Negative     Influenza B Ag, EIA Negative    COVID-19,APTIMA PANTHER(GAVINO),[facility]/ [facility], NP/OP SWAB IN UTM/VTM/SALINE TRANSPORT MEDIA,24 HR TAT - Swab, Nasal Cavity [[id number]]  (Normal) Collected: 12/06/22 2144    Specimen: Swab from Nasal Cavity Updated: 12/07/22 0612     COVID19 Not Detected    Narrative:      Fact sheet for providers: https://www.fda.gov/media/059847/download     Fact sheet for patients: https://www.fda.gov/media/961136/download    Test performed by RT PCR.    Urinalysis With Microscopic If Indicated (No Culture) - Urine, Clean Catch [[id number]]  (Abnormal) Collected: 12/06/22 2155    Specimen: Urine, Clean Catch Updated: 12/06/22 2247     Color, UA Yellow     Appearance, UA Clear     pH, UA 6.0     Specific Gravity, UA 1.019     Glucose,  mg/dL (Trace)     Ketones, UA Negative     Bilirubin, UA Negative     Blood, UA Moderate (2+)     Protein, UA Negative     Leuk Esterase, UA Small (1+)     Nitrite, UA Negative     Urobilinogen, UA 0.2 E.U./dL    Urinalysis, Microscopic Only - Urine, Clean Catch [[id number]]  (Abnormal) Collected: 12/06/22 2155    Specimen: Urine, Clean Catch Updated: 12/06/22 2332     RBC, UA 0-2 /HPF      WBC, UA 3-5 /HPF      Bacteria, UA Trace /HPF      Squamous Epithelial Cells, UA 3-6 /HPF      Hyaline Casts, UA 0-2 /LPF      Methodology Manual Light Microscopy    POC  Glucose Once [[id number]]  (Abnormal) Collected: 12/07/22 0024    Specimen: Blood Updated: 12/07/22 0025     Glucose 243 mg/dL      Comment: Serial Number: [id number]:  [id number]       POC Glucose Once [[id number]]  (Abnormal) Collected: 12/07/22 0437    Specimen: Blood Updated: 12/07/22 0441     Glucose 194 mg/dL      Comment: Serial Number: [id number]:  [id number]              Imaging:  No Radiology Exams Resulted Within Past 24 Hours    Procedures:  Procedures    Progress  ED Course as of 12/07/22 1609   Tue Dec 06, 2022   2116 --- PROVIDER IN TRIAGE NOTE ---    The patient was evaluated in triage by Omayra loza. Orders were placed and the patient is currently awaiting disposition. [AR]   Wed Dec 07, 2022   0205 Sinus tachycardia with rate of 109. QRS normal. ID interval normal. QTc interval is normal. No ST elevation or depression. No T wave abnormalities. No change when compared to eder. This EKG was interpreted by me.  [LD]      ED Course User Index  [AR] Omayra Scott, APRN  [LD] Chad Hull MD                            The patient was initially evaluated in the triage area where orders were placed. The patient was later dispositioned by Chad Hull MD.      The patient was advised to stay for completion of workup which includes but is not limited to communication of labs and radiological results, reassessment and plan. The patient was advised that leaving prior to disposition by a provider could result in critical findings that are not communicated to the patient.     Medical Decision Making:  MDM  Number of Diagnoses or Management Options  Hyperglycemia  Nonintractable headache, unspecified chronicity pattern, unspecified headache type  Urinary tract infection without hematuria, site unspecified  Diagnosis management comments: Patient is afebrile and nontoxic-appearing.  Initial vitals remarkable for tachycardia.  This improved.  Patient reports headache.  She  was given pain medication with IV fluids.  This improved.  Glucose is elevated 222.  UA concerning for possible urinary tract infection.  Glucose remained stable while in the emergency department.  Recommend close follow-up with her primary physician.  Discussed return precautions, discharge instructions and answered all her questions.       Amount and/or Complexity of Data Reviewed  Clinical lab tests: ordered and reviewed  Review and summarize past medical records: yes  Independent visualization of images, tracings, or specimens: yes    Risk of Complications, Morbidity, and/or Mortality  Presenting problems: low  Management options: low    Patient Progress  Patient progress: stable           The following orders were placed after triage and evaluation:  Orders Placed This Encounter   Procedures   • Influenza Antigen, Rapid - Swab, Nasopharynx   • COVID-19,APTIMA PANTHER(GAVINO),[facility], NP/OP SWAB IN UTM/VTM/SALINE TRANSPORT MEDIA,24 HR TAT - Swab, Nasal Cavity   • [place] Draw   • Comprehensive Metabolic Panel   • Urinalysis With Microscopic If Indicated (No Culture) - Urine, Clean Catch   • CBC Auto Differential   • Urinalysis, Microscopic Only - Urine, Clean Catch   • Undress & Gown   • Continuous Pulse Oximetry   • Vital Signs   • POC Glucose Once   • POC Glucose Once   • POC Glucose Once   • POC Glucose Once   • ECG 12 Lead ED Triage Standing Order; Weak / Dizzy / AMS   • CBC & Differential   • Green Top (Gel)   • Lavender Top   • Gold Top - SST   • Light Blue Top       Final diagnoses:   Urinary tract infection without hematuria, site unspecified   Nonintractable headache, unspecified chronicity pattern, unspecified headache type   Hyperglycemia          Disposition:  ED Disposition     ED Disposition   Discharge    Condition   Stable    Comment   --             This medical record created using voice recognition software.    IShe, provided documentation assistance for and in the presence of  Dr. Hull.         She Storey  12/07/22 0312       Chad Hull MD  12/07/22 0998

## 2022-12-08 ENCOUNTER — OFFICE VISIT (OUTPATIENT)
Dept: INTERNAL MEDICINE | Facility: CLINIC | Age: 27
End: 2022-12-08

## 2022-12-08 DIAGNOSIS — J01.90 ACUTE BACTERIAL RHINOSINUSITIS: Primary | ICD-10-CM

## 2022-12-08 DIAGNOSIS — E66.01 MORBID OBESITY WITH BMI OF 50.0-59.9, ADULT: ICD-10-CM

## 2022-12-08 DIAGNOSIS — B96.89 ACUTE BACTERIAL RHINOSINUSITIS: Primary | ICD-10-CM

## 2022-12-08 DIAGNOSIS — Z79.4 TYPE 2 DIABETES MELLITUS WITH HYPERGLYCEMIA, WITH LONG-TERM CURRENT USE OF INSULIN: ICD-10-CM

## 2022-12-08 DIAGNOSIS — Z09 HOSPITAL DISCHARGE FOLLOW-UP: ICD-10-CM

## 2022-12-08 DIAGNOSIS — E11.65 TYPE 2 DIABETES MELLITUS WITH HYPERGLYCEMIA, WITH LONG-TERM CURRENT USE OF INSULIN: ICD-10-CM

## 2022-12-08 PROCEDURE — 99214 OFFICE O/P EST MOD 30 MIN: CPT | Performed by: NURSE PRACTITIONER

## 2022-12-08 RX ORDER — AZITHROMYCIN 250 MG/1
TABLET, FILM COATED ORAL
Qty: 6 TABLET | Refills: 0 | Status: SHIPPED | OUTPATIENT
Start: 2022-12-08 | End: 2022-12-13

## 2022-12-08 RX ORDER — AZITHROMYCIN 250 MG/1
TABLET, FILM COATED ORAL
Status: CANCELLED | OUTPATIENT
Start: 2022-12-08

## 2022-12-09 ENCOUNTER — TELEPHONE (OUTPATIENT)
Dept: CASE MANAGEMENT | Facility: OTHER | Age: 27
End: 2022-12-09

## 2022-12-09 NOTE — TELEPHONE ENCOUNTER
HUB TO READ    Called pt for sugar log and f/u on uti  Per chart, pt no showed to [facility] today  trc

## 2022-12-12 ENCOUNTER — TELEPHONE (OUTPATIENT)
Dept: OBSTETRICS AND GYNECOLOGY | Facility: CLINIC | Age: 27
End: 2022-12-12

## 2022-12-13 ENCOUNTER — TELEPHONE (OUTPATIENT)
Dept: CASE MANAGEMENT | Facility: OTHER | Age: 27
End: 2022-12-13

## 2022-12-13 NOTE — TELEPHONE ENCOUNTER
1. Pt said that at there appt with Abel Moreno on 12/8, he mentioned getting her hormones checked due to perfuse sweating. Pt's sugars are wnl during these episodes- 130 or 140. She said it happens all the time, morning, afternoon and at night. Soaking wet with sweat. Last TSH in Sept 2022, per chart, was wnl. Please advise if hormones need to be checked and what labs. Pt will be in the office on 12-16 for her son's appt.      2. Sugars- Pt is on Metformin 850 mg BID, Glipizide 10 mg daily (takes her oral meds together in AM), levemir 15 units nightly, humalog sliding scale (if sugar < 150, take 5 units, if sugar 150-199, take 10 units, if over 200, take 15 units), and Mounjaro weekly. Her log the last few days:    203  150 before lunch, but during lunch her sugar dropped to 60  After lunch 230  After dinner 160    240  170  150  70 (after dinner) drank some orange juice    245  70 (did not eat anything until 1 pm, drank a coke, sugar increased to 150)    190 this AM

## 2022-12-14 NOTE — TELEPHONE ENCOUNTER
No hormone check at this time as we need to get sugar levels adjusted appropriately first; recommend decreasing insulin by stopping sliding scale totally and decreasing levemir to 10 units.  I think this is just a sign the mounjaro is working well for her and we need to keep decreasing other diabetic meds, will start with decreasing insulin and then decreasing glipizide as glipizde can often cause sweating etc, but would like to go slowly enough that we still keep sugar under control

## 2022-12-19 ENCOUNTER — PATIENT OUTREACH (OUTPATIENT)
Dept: CASE MANAGEMENT | Facility: OTHER | Age: 27
End: 2022-12-19

## 2022-12-19 ENCOUNTER — TELEPHONE (OUTPATIENT)
Dept: CASE MANAGEMENT | Facility: OTHER | Age: 27
End: 2022-12-19

## 2022-12-19 DIAGNOSIS — E11.65 TYPE 2 DIABETES MELLITUS WITH HYPERGLYCEMIA, WITH LONG-TERM CURRENT USE OF INSULIN: ICD-10-CM

## 2022-12-19 DIAGNOSIS — Z79.4 TYPE 2 DIABETES MELLITUS WITH HYPERGLYCEMIA, WITH LONG-TERM CURRENT USE OF INSULIN: ICD-10-CM

## 2022-12-19 DIAGNOSIS — E11.65 TYPE 2 DIABETES MELLITUS WITH HYPERGLYCEMIA, WITH LONG-TERM CURRENT USE OF INSULIN: Primary | ICD-10-CM

## 2022-12-19 DIAGNOSIS — R82.90 ABNORMAL URINE ODOR: Primary | ICD-10-CM

## 2022-12-19 DIAGNOSIS — Z79.4 TYPE 2 DIABETES MELLITUS WITH HYPERGLYCEMIA, WITH LONG-TERM CURRENT USE OF INSULIN: Primary | ICD-10-CM

## 2022-12-19 PROCEDURE — 99439 CHRNC CARE MGMT STAF EA ADDL: CPT | Performed by: INTERNAL MEDICINE

## 2022-12-19 PROCEDURE — 99490 CHRNC CARE MGMT STAFF 1ST 20: CPT | Performed by: INTERNAL MEDICINE

## 2022-12-19 NOTE — TELEPHONE ENCOUNTER
Due to pt spilling her insulin, she cannot get Passport to pay for her Levemir or Humalog for multiple weeks. She can ay out of pocket but cannot afford that. She will be out of Humalog soon and out of Levemir tomorrow. Can anyone please see if there is anything in the office we can give her samples on (short and long acting)? She will be out and about tomorrow and could pick it up, thanks!

## 2022-12-19 NOTE — TELEPHONE ENCOUNTER
As of last week, pt was to hold her short acting TID and decrease Levemir to 10 units at night. Over weekend, sugars were upper 200s, almost 300, every time. Pt increased her short acting to 5 units TID on Saturday.     Sunday's sugars: 248  150  280  248  Today: 160  73    Educated pt not to take her 5 units of fast acting at lunch if she was not eating much and sugar was under 100 until I talked to pcp.    Current regimen: Metformin 850 mg BID, Glipizide 10 mg daily, levemir 10 units, and Humalog 5 units TID (she added it back herself). Please advise. Thanks!
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
## 2022-12-19 NOTE — TELEPHONE ENCOUNTER
"Pt c/o frequent urination, left back/side pain, \"an odor\" in private area and belly (pt gets rash and moisture in this area, has tried OTC fungal creams). Pt also c/o having soaked underwear, having to change her clothes up to 4x a day. She denies urinary incontinence, stated she is unsure if its sweat (which she has been having a lot of) or excessive clear vaginal discharge. Pt cannot come into PCP office for urine today. Has OBGYN appt tomorrow. It is possible OBGYN can do urine tomorrow. I will pass on to PCP and OBGYN.    (also c/o large clots on Depo, and pain. Interested in alternative contraceptive. Will discuss with OBGYN tomorrow.)  "

## 2022-12-20 ENCOUNTER — TELEPHONE (OUTPATIENT)
Dept: CASE MANAGEMENT | Facility: OTHER | Age: 27
End: 2022-12-20

## 2022-12-20 RX ORDER — INSULIN DETEMIR 100 [IU]/ML
10 INJECTION, SOLUTION SUBCUTANEOUS NIGHTLY
Qty: 5 ML | Refills: 1 | Status: SHIPPED | OUTPATIENT
Start: 2022-12-20 | End: 2022-12-22 | Stop reason: SDUPTHER

## 2022-12-20 RX ORDER — INSULIN LISPRO 100 [IU]/ML
INJECTION, SOLUTION INTRAVENOUS; SUBCUTANEOUS
Qty: 5 ML | Refills: 1 | OUTPATIENT
Start: 2022-12-20 | End: 2022-12-30

## 2022-12-20 RX ORDER — HYDROXYZINE HYDROCHLORIDE 25 MG/1
25 TABLET, FILM COATED ORAL EVERY 8 HOURS PRN
Qty: 90 TABLET | Refills: 1 | Status: SHIPPED | OUTPATIENT
Start: 2022-12-20 | End: 2023-02-15

## 2022-12-20 RX ORDER — TIRZEPATIDE 5 MG/.5ML
5 INJECTION, SOLUTION SUBCUTANEOUS WEEKLY
Qty: 2 ML | Refills: 1 | Status: SHIPPED | OUTPATIENT
Start: 2022-12-20 | End: 2023-02-15

## 2022-12-20 NOTE — TELEPHONE ENCOUNTER
Clarification- was not needing refill son these meds. Needing to know if there were samples. please see note and call pt, thank you!
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
## 2022-12-20 NOTE — TELEPHONE ENCOUNTER
I actually just sent in some insulin for her as her insurance should cover it I rather not give her samples and save those for people who cannot get it covered under insurance.  If she is having trouble with insurance covering it let me know.  But ultimately were trying to get her off of the insulin anyway.   show

## 2022-12-20 NOTE — TELEPHONE ENCOUNTER
Is she still on the monjauro?  It is probably time to increase that to the next level as long as she is not having significant nausea.  I would still like to come off of the Humalog as we increase the mounjaro.

## 2022-12-20 NOTE — TELEPHONE ENCOUNTER
Urine order signed.    I really don't want her to do insulin at all right now.  You shouldn't be doing meal time insulin unless you are on basal insulin and I feel we can get sugars under control without insulin, so lets try increasing tirzepatide (Mounjaro) first.    Increase mounjaro to 5mg weekly which should bring sugars down well.  I already sent in for her.    As long as most sugars are in the 100-200 range it's ok if she has a few over 200 while we are doing this.

## 2022-12-20 NOTE — TELEPHONE ENCOUNTER
URINE:  Pt unable to make OBGYN appt today, unable to get urine. Urine order attached, pt to come in tomorrow for urine. (OBGYN appt made for Feb 2023)    SUGAR:   (See other conversation for details. But summary- pcp to increase Mounjaro and ultimately decrease/wean off Humalog.  Spoke to pt. She is taking 2.5 Mounjaro weekly. Tolerating well. Sugar log yesterday and today:  70  250 (5 units Humalog)  160  214 (5 units Humalog)  150   214  204 (5 units humalog)  90  192    Would you like to have pt taking 5 units with meals? Would you like to increase Mounjaro? What to? Please advise.

## 2022-12-21 ENCOUNTER — PATIENT OUTREACH (OUTPATIENT)
Dept: CASE MANAGEMENT | Facility: OTHER | Age: 27
End: 2022-12-21

## 2022-12-21 NOTE — TELEPHONE ENCOUNTER
Pt cannot come into office for urine (cristino has UTI, see other note) due to n/v/d. She took her insulins together last night- 5 units of Humalog (was instructed to hold but took it because sugar was 290) with dinner, then before bed took her Mounjaro 2.5 and Levemir 10. She normally does not take these together but did last night. She then began throwing up and n/v. Sugars dropped on and off from 40-60. 142 now. Educated, again, on not taking Humalog at all. If sugars stay above 300, she is to call and we can discuss with pcp about fast acting. Hold humalog indefinitely.     Pt asking for Zofran and imodium. Please send in if agreeable.
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
## 2022-12-22 ENCOUNTER — OFFICE VISIT (OUTPATIENT)
Dept: INTERNAL MEDICINE | Facility: CLINIC | Age: 27
End: 2022-12-22

## 2022-12-22 ENCOUNTER — TELEPHONE (OUTPATIENT)
Dept: CASE MANAGEMENT | Facility: OTHER | Age: 27
End: 2022-12-22

## 2022-12-22 VITALS
BODY MASS INDEX: 56.22 KG/M2 | HEART RATE: 122 BPM | DIASTOLIC BLOOD PRESSURE: 78 MMHG | SYSTOLIC BLOOD PRESSURE: 120 MMHG | OXYGEN SATURATION: 99 % | WEIGHT: 293 LBS | TEMPERATURE: 97.4 F

## 2022-12-22 DIAGNOSIS — R19.7 DIARRHEA, UNSPECIFIED TYPE: ICD-10-CM

## 2022-12-22 DIAGNOSIS — E11.65 TYPE 2 DIABETES MELLITUS WITH HYPERGLYCEMIA, WITH LONG-TERM CURRENT USE OF INSULIN: ICD-10-CM

## 2022-12-22 DIAGNOSIS — R11.0 NAUSEA: ICD-10-CM

## 2022-12-22 DIAGNOSIS — N89.8 VAGINAL ODOR: Primary | ICD-10-CM

## 2022-12-22 DIAGNOSIS — Z79.4 TYPE 2 DIABETES MELLITUS WITH HYPERGLYCEMIA, WITH LONG-TERM CURRENT USE OF INSULIN: ICD-10-CM

## 2022-12-22 LAB
CANDIDA SPECIES: NEGATIVE
EXPIRATION DATE: NORMAL
EXPIRATION DATE: NORMAL
FLUAV AG NPH QL: NEGATIVE
FLUBV AG NPH QL: NEGATIVE
GARDNERELLA VAGINALIS: NEGATIVE
INTERNAL CONTROL: NORMAL
INTERNAL CONTROL: NORMAL
Lab: NORMAL
Lab: NORMAL
SARS-COV-2 AG UPPER RESP QL IA.RAPID: NOT DETECTED
T VAGINALIS DNA VAG QL PROBE+SIG AMP: NEGATIVE

## 2022-12-22 PROCEDURE — 87480 CANDIDA DNA DIR PROBE: CPT | Performed by: NURSE PRACTITIONER

## 2022-12-22 PROCEDURE — 99213 OFFICE O/P EST LOW 20 MIN: CPT | Performed by: NURSE PRACTITIONER

## 2022-12-22 PROCEDURE — 87510 GARDNER VAG DNA DIR PROBE: CPT | Performed by: NURSE PRACTITIONER

## 2022-12-22 PROCEDURE — 87426 SARSCOV CORONAVIRUS AG IA: CPT | Performed by: NURSE PRACTITIONER

## 2022-12-22 PROCEDURE — 87804 INFLUENZA ASSAY W/OPTIC: CPT | Performed by: NURSE PRACTITIONER

## 2022-12-22 PROCEDURE — 87660 TRICHOMONAS VAGIN DIR PROBE: CPT | Performed by: NURSE PRACTITIONER

## 2022-12-22 RX ORDER — INSULIN DETEMIR 100 [IU]/ML
10 INJECTION, SOLUTION SUBCUTANEOUS NIGHTLY
Qty: 15 ML | Refills: 1 | Status: SHIPPED | OUTPATIENT
Start: 2022-12-22 | End: 2022-12-30

## 2022-12-22 RX ORDER — LOPERAMIDE HYDROCHLORIDE 2 MG/1
2 TABLET ORAL 4 TIMES DAILY PRN
Qty: 30 TABLET | Refills: 0 | Status: SHIPPED | OUTPATIENT
Start: 2022-12-22 | End: 2023-02-15

## 2022-12-22 RX ORDER — FEXOFENADINE HYDROCHLORIDE 180 MG/1
180 TABLET, FILM COATED ORAL DAILY
COMMUNITY
Start: 2022-12-20 | End: 2023-02-15

## 2022-12-22 RX ORDER — ONDANSETRON 4 MG/1
4 TABLET, ORALLY DISINTEGRATING ORAL EVERY 8 HOURS PRN
Qty: 30 TABLET | Refills: 0 | Status: SHIPPED | OUTPATIENT
Start: 2022-12-22 | End: 2023-02-15
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
## 2022-12-22 NOTE — PROGRESS NOTES
Chief Complaint  Diarrhea and Nausea    Subjective        Dorinda Sherwood presents to [facility] for History of Present Illness  Concerns for nausea and diarrhea.  Mother reports that her symptoms have been going on for the last couple of days, she has 2 young children, with similar symptoms.  Exposure to viruses through extended family members as possible.  Patient is also having vaginal odor, she has noticed this for a few days, history of bacterial vaginosis and UTIs.       Objective   Vital Signs:   /78   Pulse (!) 122   Temp 97.4 °F (36.3 °C) (Temporal)   Wt (!) 139 kg (307 lb 6.4 oz)   SpO2 99%   BMI 56.22 kg/m²     Physical Exam  Vitals and nursing note reviewed.   Constitutional:       Appearance: Normal appearance.   HENT:      Head: Normocephalic and atraumatic.   Pulmonary:      Effort: Pulmonary effort is normal.   Neurological:      Mental Status: She is alert.   Psychiatric:         Mood and Affect: Mood normal.         Thought Content: Thought content normal.        Result Review :  {The following data was reviewed by YOKO Collier on 12/22/22                Diagnoses and all orders for this visit:    1. Vaginal odor (Primary)  -     Gardnerella vaginalis, Trichomonas vaginalis, Candida albicans, DNA - Swab, Vagina    2. Nausea  -     POCT SARS-CoV-2 Antigen KARLA  -     POCT Influenza A/B    3. Diarrhea, unspecified type  -     POCT SARS-CoV-2 Antigen KARLA  -     POCT Influenza A/B    Patient likely with viral bug, we discussed watchful waiting.  Concern for bacterial vaginosis, we will follow-up with patient based on the vaginal screen results.  Unable to leave urine today.  We will follow-up once results are available, treatment plan based on results.      Follow Up   No follow-ups on file.  Patient was given instructions and counseling regarding her condition or for health maintenance advice. Please see specific information pulled into the AVS if  appropriate.     Abel Moreno, YOKO  12/22/2022  This note was electronically signed.

## 2022-12-27 ENCOUNTER — PATIENT OUTREACH (OUTPATIENT)
Dept: CASE MANAGEMENT | Facility: OTHER | Age: 27
End: 2022-12-27

## 2022-12-27 DIAGNOSIS — Z79.4 TYPE 2 DIABETES MELLITUS WITH HYPERGLYCEMIA, WITH LONG-TERM CURRENT USE OF INSULIN: Primary | ICD-10-CM

## 2022-12-27 DIAGNOSIS — F33.41 RECURRENT MAJOR DEPRESSIVE DISORDER, IN PARTIAL REMISSION: ICD-10-CM

## 2022-12-27 DIAGNOSIS — E11.65 TYPE 2 DIABETES MELLITUS WITH HYPERGLYCEMIA, WITH LONG-TERM CURRENT USE OF INSULIN: Primary | ICD-10-CM

## 2022-12-27 NOTE — OUTREACH NOTE
AMBULATORY CASE MANAGEMENT NOTE    Name and Relationship of Patient/Support Person: Dorinda Sherwood M - Self    CCM Interim Update    Patient was unable to attend OBGYN appt last week.  Did see provider in PCP office.  Patient unable to leave urine sample but vaginal swab came back WNL.  Patient agreed to possibly leave urine sample today.  Educated on risks of untreated possible UTI.    Patient has not picked up her Hydroxyzine yet.  Will try and pickup today.      Due to stress of holidays, patient has not kept a log.      Friday 70's 169  226  346 (patient admits to increased stress and personal issues leading to increased sugar)    Saturday through Monday - 300's.    Today - 176  221(increased stress)    Will call next week to f/u regarding BS.        Education Documentation  No documentation found.        BULL SNYDER  Ambulatory Case Management    12/27/2022, 10:33 EST

## 2022-12-28 DIAGNOSIS — R82.90 ABNORMAL URINE ODOR: ICD-10-CM

## 2022-12-28 LAB
BACTERIA UR QL AUTO: ABNORMAL /HPF
BILIRUB UR QL STRIP: NEGATIVE
CLARITY UR: ABNORMAL
COLOR UR: ABNORMAL
GLUCOSE UR STRIP-MCNC: NEGATIVE MG/DL
HGB UR QL STRIP.AUTO: NEGATIVE
HYALINE CASTS UR QL AUTO: ABNORMAL /LPF
KETONES UR QL STRIP: NEGATIVE
LEUKOCYTE ESTERASE UR QL STRIP.AUTO: ABNORMAL
NITRITE UR QL STRIP: NEGATIVE
PH UR STRIP.AUTO: 6 [PH] (ref 5–8)
PROT UR QL STRIP: NEGATIVE
RBC # UR STRIP: ABNORMAL /HPF
REF LAB TEST METHOD: ABNORMAL
SP GR UR STRIP: 1.02 (ref 1–1.03)
SQUAMOUS #/AREA URNS HPF: ABNORMAL /HPF
UROBILINOGEN UR QL STRIP: ABNORMAL
WBC # UR STRIP: ABNORMAL /HPF

## 2022-12-28 PROCEDURE — 81001 URINALYSIS AUTO W/SCOPE: CPT | Performed by: INTERNAL MEDICINE

## 2022-12-28 PROCEDURE — 87086 URINE CULTURE/COLONY COUNT: CPT | Performed by: INTERNAL MEDICINE

## 2022-12-29 ENCOUNTER — TELEPHONE (OUTPATIENT)
Dept: CASE MANAGEMENT | Facility: OTHER | Age: 27
End: 2022-12-29

## 2022-12-29 ENCOUNTER — PATIENT OUTREACH (OUTPATIENT)
Dept: CASE MANAGEMENT | Facility: OTHER | Age: 27
End: 2022-12-29

## 2022-12-29 DIAGNOSIS — Z79.4 TYPE 2 DIABETES MELLITUS WITH HYPERGLYCEMIA, WITH LONG-TERM CURRENT USE OF INSULIN: ICD-10-CM

## 2022-12-29 DIAGNOSIS — R73.09 HEMOGLOBIN A1C GREATER THAN 9.0%: ICD-10-CM

## 2022-12-29 DIAGNOSIS — E11.65 TYPE 2 DIABETES MELLITUS WITH HYPERGLYCEMIA, WITH LONG-TERM CURRENT USE OF INSULIN: Primary | ICD-10-CM

## 2022-12-29 DIAGNOSIS — F33.41 RECURRENT MAJOR DEPRESSIVE DISORDER, IN PARTIAL REMISSION: ICD-10-CM

## 2022-12-29 DIAGNOSIS — Z79.4 TYPE 2 DIABETES MELLITUS WITH HYPERGLYCEMIA, WITH LONG-TERM CURRENT USE OF INSULIN: Primary | ICD-10-CM

## 2022-12-29 DIAGNOSIS — E11.65 TYPE 2 DIABETES MELLITUS WITH HYPERGLYCEMIA, WITH LONG-TERM CURRENT USE OF INSULIN: ICD-10-CM

## 2022-12-29 LAB — BACTERIA SPEC AEROBE CULT: NORMAL

## 2022-12-29 NOTE — OUTREACH NOTE
[facility] End of Month Documentation    This Chronic Medical Management Care Plan for Dorinda Sherwood, 27 y.o. female, has been monitored and managed; reviewed and a new plan of care implemented for the month of December.  A cumulative time of 178 minutes was spent on this patient record this month, including phone call with patient; electronic communication with primary care provider; electronic communication with other providers; chart review.    Regarding the patient's problems: has Calculus of gallbladder with chronic cholecystitis without obstruction; Type 2 diabetes mellitus with hyperglycemia, with long-term current use of insulin (HCC); S/P cholecystectomy; Allergic rhinitis; Asthma; Atrial septal defect, secundum; Depression; Anxiety; Chest pain; Chronic intractable headache; Morbid obesity with BMI of 50.0-59.9, adult (HCC); Abnormal uterine bleeding (AUB); Vaginal irritation; and Generalized anxiety disorder on their problem list., the following items were addressed: medical records; medications; changes to medical care; referrals to community service providers, refered back to OBGYN and any changes can be found within the plan section of the note.  A detailed listing of time spent for chronic care management is tracked within each outreach encounter.  Current medications include:  has a current medication list which includes the following prescription(s): allergy relief, antacid calcium, ascorbic acid, atenolol, buspirone, calcium carbonate, dexcom g6 , dexcom g6 sensor, dexcom g6 transmitter, vitamin b-12 er, desvenlafaxine, fluticasone, glipizide, hydroxyzine, ibuprofen, levemir flextouch, insulin lispro, insulin pen needle, insulin syringe-needle u-100, ipratropium, ketorolac, loperamide, medroxyprogesterone, metformin, montelukast, glucose management, fish oil, omeprazole, ondansetron odt, polyethylene glycol, prazosin, proair hfa, mounjaro, vitamin b-12, vitamin d, and zinc. and the patient is  reported to be patient is compliant with medication protocol,  Medications are reported to be effective, sees Matty in 2023.  Regarding these diagnoses, referrals were made to the following provider(s):  Pending appt with obgyn.  All notes on chart for PCP to review.    The patient was monitored remotely for blood glucose; mood & behavior; pain; medications, possible UTI and acute illness.    The patient's physical needs include:  physical healthcare; physician referral, patient still needs to see OBGYN.     The patient's mental support needs include:  increased support    The patient's cognitive support needs include:  continued support    The patient's psychosocial support needs include:  need for increased support; medication management or adherence    The patient's functional needs include: physical healthcare; physician referral, pt has not established care with obgyn yet    The patient's environmental needs include:  no access to transportation, pt does not drive and relies on mother for transportation    Care Plan overall comments:  No data recorded    Refer to previous outreach notes for more information on the areas listed above.    Monthly Billing Diagnoses  (E11.65,  Z79.4) Type 2 diabetes mellitus with hyperglycemia, with long-term current use of insulin (HCC)    (F33.41) Recurrent major depressive disorder, in partial remission (HCC)    Medications   · Medications have been reconciled    Care Plan progress this month:      Recently Modified Care Plans Updates made since 11/28/2022 12:00 AM    No recently modified care plans.          Instructions   · Patient was provided an electronic copy of care plan  · CCM services were explained and offered and patient has accepted these services.  · Patient has given their written consent to receive CCM services and understands that this includes the authorization of electronic communication of medical information with the other treating providers.  · Patient  understands that they may stop CCM services at any time and these changes will be effective at the end of the calendar month and will effectively revocate the agreement of CCM services.  · Patient understands that only one practitioner can furnish and be paid for CCM services during one calendar month.  Patient also understands that there may be co-payment or deductible fees in association with CCM services.  · Patient will continue with at least monthly follow-up calls with the Ambulatory .    BULL SNYDER  Ambulatory Case Management    12/29/2022, 09:42 EST

## 2022-12-29 NOTE — TELEPHONE ENCOUNTER
Per chart, pt's urine culture yesterday was wnl. Pt still admits to strong vaginal odor, excessive sweating and itching. During a visit with Abel Moreno a week ago, vaginal swabs obtained and also wnl. Pt stated she cannot take nystatin powder. Thinking it might be a yeast infection. Please advise if Diflucan would be appropriate.    Also, please see telephone call to PCP on 12-19. Pt was having low sugars (40-70) with sliding scale/Humalog. That was completely DC'd. Pt was increased on Mounjaro that day. Takes 10 units Levemir nightly. Also takes glipizide 10mg daily and metformin 850 mg BID. Since taking the Humalog away, pt had increased sugars. Crow ok with some 200s. But now she is having 300-400s. Please advise.

## 2022-12-30 RX ORDER — INSULIN DETEMIR 100 [IU]/ML
15 INJECTION, SOLUTION SUBCUTANEOUS NIGHTLY
Qty: 15 ML | Refills: 1 | Status: SHIPPED | OUTPATIENT
Start: 2022-12-30 | End: 2023-02-15

## 2022-12-30 RX ORDER — PROCHLORPERAZINE 25 MG/1
SUPPOSITORY RECTAL
Qty: 3 EACH | Refills: 3 | Status: SHIPPED | OUTPATIENT
Start: 2022-12-30 | End: 2023-02-15

## 2022-12-30 RX ORDER — INSULIN LISPRO 100 [IU]/ML
INJECTION, SOLUTION INTRAVENOUS; SUBCUTANEOUS
Qty: 5 ML | Refills: 1 | OUTPATIENT
Start: 2022-12-30 | End: 2023-01-24

## 2022-12-30 NOTE — TELEPHONE ENCOUNTER
Spoke to Dr Maria. She advised to increase Levemir to 15 units nightly. Re start sliding scale for Humalog (if sugar over 140, take 4 units, if over 180 take 8 units)    Pt aware    meds sent in

## 2023-01-04 ENCOUNTER — TELEPHONE (OUTPATIENT)
Dept: CASE MANAGEMENT | Facility: OTHER | Age: 28
End: 2023-01-04
Payer: COMMERCIAL

## 2023-01-04 DIAGNOSIS — E11.65 TYPE 2 DIABETES MELLITUS WITH HYPERGLYCEMIA, WITH LONG-TERM CURRENT USE OF INSULIN: Primary | ICD-10-CM

## 2023-01-04 DIAGNOSIS — R20.2 TINGLING OF BOTH FEET: ICD-10-CM

## 2023-01-04 DIAGNOSIS — Z79.4 TYPE 2 DIABETES MELLITUS WITH HYPERGLYCEMIA, WITH LONG-TERM CURRENT USE OF INSULIN: Primary | ICD-10-CM
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
## 2023-01-05 NOTE — TELEPHONE ENCOUNTER
Increase levemir to 20 units.  She had been doing so well I don't understand what changed.  Is it really that much dietary change?  After she has been on mounjaro for a month we can increase to the next level too as long as she isn't having side effects.

## 2023-01-05 NOTE — TELEPHONE ENCOUNTER
Pt verbalized understanding of increasing Levemir to 20 units nightly. She also understood possibly increasing Mounjaro by the end of the month. Per chart, Mounjaro 5 sent in on 12-20. Will make myself a note to call on 1/20 (will call sooner for log) specially to check on status and possibly increase Mounjaro.

## 2023-01-09 ENCOUNTER — PATIENT OUTREACH (OUTPATIENT)
Dept: CASE MANAGEMENT | Facility: OTHER | Age: 28
End: 2023-01-09
Payer: COMMERCIAL

## 2023-01-09 DIAGNOSIS — Z79.4 TYPE 2 DIABETES MELLITUS WITH HYPERGLYCEMIA, WITH LONG-TERM CURRENT USE OF INSULIN: Primary | ICD-10-CM

## 2023-01-09 DIAGNOSIS — F33.41 RECURRENT MAJOR DEPRESSIVE DISORDER, IN PARTIAL REMISSION: ICD-10-CM

## 2023-01-09 DIAGNOSIS — E11.65 TYPE 2 DIABETES MELLITUS WITH HYPERGLYCEMIA, WITH LONG-TERM CURRENT USE OF INSULIN: Primary | ICD-10-CM
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
## 2023-01-12 ENCOUNTER — PATIENT OUTREACH (OUTPATIENT)
Dept: CASE MANAGEMENT | Facility: OTHER | Age: 28
End: 2023-01-12
Payer: COMMERCIAL

## 2023-01-12 NOTE — OUTREACH NOTE
Patient Outreach    MSW spoke with patient on 1/10 and she states that she will call MSW back. MSW never received call back. MSW attempted x2 more on 1/11 and 1/12 but UTR patient. MSW has sent patient list of housing resources in the past (on 12/21/22). MSW resent resources on this day and also included a few additional rental properties in [place] with contact information.    ALEXSANDER PU -   Ambulatory Case Management    1/12/2023, 08:34 EST

## 2023-01-13 ENCOUNTER — TELEPHONE (OUTPATIENT)
Dept: CASE MANAGEMENT | Facility: OTHER | Age: 28
End: 2023-01-13
Payer: COMMERCIAL

## 2023-01-13 DIAGNOSIS — E11.65 TYPE 2 DIABETES MELLITUS WITH HYPERGLYCEMIA, WITH LONG-TERM CURRENT USE OF INSULIN: Primary | ICD-10-CM
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
## 2023-01-13 DIAGNOSIS — Z46.89 ENCOUNTER FOR EVALUATION OF ORTHOTIC DEVICE: ICD-10-CM

## 2023-01-13 DIAGNOSIS — Z79.4 TYPE 2 DIABETES MELLITUS WITH HYPERGLYCEMIA, WITH LONG-TERM CURRENT USE OF INSULIN: Primary | ICD-10-CM

## 2023-01-13 DIAGNOSIS — M25.571 ACUTE RIGHT ANKLE PAIN: ICD-10-CM

## 2023-01-13 NOTE — TELEPHONE ENCOUNTER
Called pt x2 for sugar log. No answer.    Per chart, SW also called pt about housing resources but was not able to reach pt either.

## 2023-01-13 NOTE — TELEPHONE ENCOUNTER
Spoke to pt:    1. Sugars still 300-400, with increasing Levemir to 20 units nightly. Pt on:   Mounjaro 5 weekly  Humalog (4-8 units TID with meals per sliding scale)  Metformin 850 mg BID  Glipizide 10 mg daily  Levemir 20 units nightly    Please advise    2. Pt has not had a period since Nov 2022. Stated she is not on birth control and has not had sexual intercourse in 3 years, no chance of pregnancy. Educated pt that stress, anxiety and diabetes can be factors for an irregular period., she sees OBGYN next month.    3. Pt c/o leg pain bilaterally, entire legs. Admitted she recently got a dog who she has been walking daily so she is more active than normal. However, she fell yesterday, hurt her right ankle. Pt stated her right ankle has chronic intermittent swelling. This swelling is much more prominent, cannot put shoes on, 10/10 needles/pin pain. Educated on possibility of neuropathy, sprain/strain, break, or other injuiry. Per office staff at [facility], there is no xray tech available today. Pt agreed to go to Urgent Care in [place] to be evaluated.

## 2023-01-14 NOTE — TELEPHONE ENCOUNTER
Increase levemir to 30 units nightly; agree with waiting on OB appt and urgent care for other issues

## 2023-01-16 NOTE — TELEPHONE ENCOUNTER
1. Pt agreed to make this adjustment. Lowest sugar this weekend was 90. Every other sugar was 300+. Pt asked about increasing her meal time insulin. Current dose is 4-8 units per sliding scale.    2. Will see obgyn next month.    3. Did not go to urgent care due to no xray techs being available.pt agreed to try again. Using Motrin/tylenol multiple times daily. Swelling and pain present.    4. Asking for a different podiatry referrals, specifically wants [facility]

## 2023-01-18 NOTE — TELEPHONE ENCOUNTER
Advised pt of new sliding scale:  sugar 140+ take 8 units of Humalog  If sugar is 180+ take 12 units    Sugars have been running over 300 still. No lows. Educated on s/s hypoglycemia. Will call within the week for f/u.    Pt chose to keep pcp appt on 1-20 as virtual.

## 2023-01-20 ENCOUNTER — TELEPHONE (OUTPATIENT)
Dept: INTERNAL MEDICINE | Facility: CLINIC | Age: 28
End: 2023-01-20

## 2023-01-24 ENCOUNTER — PATIENT OUTREACH (OUTPATIENT)
Dept: CASE MANAGEMENT | Facility: OTHER | Age: 28
End: 2023-01-24
Payer: COMMERCIAL

## 2023-01-24 DIAGNOSIS — Z79.4 TYPE 2 DIABETES MELLITUS WITH HYPERGLYCEMIA, WITH LONG-TERM CURRENT USE OF INSULIN: ICD-10-CM

## 2023-01-24 DIAGNOSIS — J45.909 ASTHMA, UNSPECIFIED ASTHMA SEVERITY, UNSPECIFIED WHETHER COMPLICATED, UNSPECIFIED WHETHER PERSISTENT: ICD-10-CM

## 2023-01-24 DIAGNOSIS — E11.65 TYPE 2 DIABETES MELLITUS WITH HYPERGLYCEMIA, WITH LONG-TERM CURRENT USE OF INSULIN: ICD-10-CM

## 2023-01-24 DIAGNOSIS — R73.09 HEMOGLOBIN A1C GREATER THAN 9.0%: ICD-10-CM

## 2023-01-24 DIAGNOSIS — J30.9 ALLERGIC RHINITIS, UNSPECIFIED SEASONALITY, UNSPECIFIED TRIGGER: Primary | ICD-10-CM

## 2023-01-24 RX ORDER — BUSPIRONE HYDROCHLORIDE 5 MG/1
5 TABLET ORAL 3 TIMES DAILY
Qty: 90 TABLET | Refills: 0 | Status: SHIPPED | OUTPATIENT
Start: 2023-01-24 | End: 2023-02-15

## 2023-01-24 RX ORDER — INSULIN LISPRO 100 [IU]/ML
INJECTION, SOLUTION INTRAVENOUS; SUBCUTANEOUS
Qty: 6 ML | Refills: 1 | OUTPATIENT
Start: 2023-01-24 | End: 2023-01-30 | Stop reason: SDUPTHER

## 2023-01-24 NOTE — TELEPHONE ENCOUNTER
Per chart, pt no-show to pcp appt on 1-20. Pt was to also have cardio appt that day, unsure if that was attended.    Pt has 3 appts next month. Will discuss with pt. Need to remake pcp appt as well.    Need to f/u on sugars.    Called, no answer, lmtrc.

## 2023-01-24 NOTE — TELEPHONE ENCOUNTER
"1. Pt reported one low (glucometer read \"low\") over weekend. But she did not eat and had been walking a lot. Asking for refill with new sliding scale on Humalog, pended.     2. Buspar pended, has been out for 1+ week. Will do 30 days, she sees psych next month.    3. Rescue inhaler has been out for months. Pended.    3. Asking for asthma referral. Thinks she is allergic to new dog.  "

## 2023-01-24 NOTE — OUTREACH NOTE
Patient Outreach    MSW spoke with patient and she states that she has called some of the housing options and placed herself on the waitlist. MSW also sent patient an updated list on 1/12 that may have sooner availability. Patient states that she received and will call those also. MSW available for future needs.    ALEXSANDER UP -   Ambulatory Case Management    1/24/2023, 10:33 EST

## 2023-01-26 ENCOUNTER — TELEPHONE (OUTPATIENT)
Dept: CASE MANAGEMENT | Facility: OTHER | Age: 28
End: 2023-01-26
Payer: COMMERCIAL

## 2023-01-26 DIAGNOSIS — R73.09 HEMOGLOBIN A1C GREATER THAN 9.0%: ICD-10-CM

## 2023-01-26 DIAGNOSIS — E11.65 TYPE 2 DIABETES MELLITUS WITH HYPERGLYCEMIA, WITH LONG-TERM CURRENT USE OF INSULIN: ICD-10-CM

## 2023-01-26 DIAGNOSIS — Z79.4 TYPE 2 DIABETES MELLITUS WITH HYPERGLYCEMIA, WITH LONG-TERM CURRENT USE OF INSULIN: ICD-10-CM

## 2023-01-26 NOTE — TELEPHONE ENCOUNTER
"Pt called stating her fingertips were numb and tingling (does not go passt her fingertips). Her ankle (the one she fell on almost two weeks ago) is continuing to swell, severely. She also stated sugars have been 400-500, reading \"high\". She is out of Humalog.    Message sent to pcp about Humalog. Pt to come and get Humalog tomorrow. Rx was sent in already to pharmacy, but insurance will not fill yet.    PCP- I advised pt to go to ER/urgent care for uncontrolled sugar, swelling and finger complaint. she cannot go until tomorrow. I educated pt on risk for severe complications delaying these issues. she verbalized understanding.   "

## 2023-01-30 ENCOUNTER — PATIENT OUTREACH (OUTPATIENT)
Dept: CASE MANAGEMENT | Facility: OTHER | Age: 28
End: 2023-01-30
Payer: COMMERCIAL

## 2023-01-30 DIAGNOSIS — E11.65 TYPE 2 DIABETES MELLITUS WITH HYPERGLYCEMIA, WITH LONG-TERM CURRENT USE OF INSULIN: Primary | ICD-10-CM

## 2023-01-30 DIAGNOSIS — F41.9 ANXIETY: ICD-10-CM

## 2023-01-30 DIAGNOSIS — F33.41 RECURRENT MAJOR DEPRESSIVE DISORDER, IN PARTIAL REMISSION: ICD-10-CM

## 2023-01-30 DIAGNOSIS — J45.909 ASTHMA, UNSPECIFIED ASTHMA SEVERITY, UNSPECIFIED WHETHER COMPLICATED, UNSPECIFIED WHETHER PERSISTENT: ICD-10-CM

## 2023-01-30 DIAGNOSIS — R73.09 HEMOGLOBIN A1C GREATER THAN 9.0%: ICD-10-CM

## 2023-01-30 DIAGNOSIS — Z79.4 TYPE 2 DIABETES MELLITUS WITH HYPERGLYCEMIA, WITH LONG-TERM CURRENT USE OF INSULIN: Primary | ICD-10-CM

## 2023-01-30 DIAGNOSIS — J30.9 ALLERGIC RHINITIS, UNSPECIFIED SEASONALITY, UNSPECIFIED TRIGGER: ICD-10-CM

## 2023-01-30 RX ORDER — INSULIN LISPRO 100 [IU]/ML
INJECTION, SOLUTION INTRAVENOUS; SUBCUTANEOUS
Qty: 6 ML | Refills: 1 | Status: SHIPPED | OUTPATIENT
Start: 2023-01-30 | End: 2023-02-15

## 2023-01-30 NOTE — OUTREACH NOTE
AMBULATORY CASE MANAGEMENT NOTE    Name and Relationship of Patient/Support Person: Presley Dorinda M - Self    See telephone call to pcp.  Pt thought to be eating high carb foods and increasing her sugar. Over the weekend, her meter read HIGH every time. She has been cutting out sweets, most sodas and no chips.Food log from Sunday:  Breakfast- egg and cantu, water  Dinner/lunch- corn dog (took breading off), water, green beans, 1/4 cup of potatoes.  No dessert, no snacks.  educated on card foods.  Pt has been out of Humalog for some time now, see message to pcp.      After the call-  Unsure if pt is taking 15 units or 20 units of Levemir nightly. She should be on 20. Called pt and lmtrc.    Education Documentation  Follow-Up Care, taught by Katharine Pedro RN at 1/30/2023 10:42 AM.  Learner: Patient  Readiness: Acceptance  Method: Explanation  Response: Verbalizes Understanding    Healthy Food Choices, taught by Katharine Pedro RN at 1/30/2023 10:42 AM.  Learner: Patient  Readiness: Acceptance  Method: Explanation  Response: Verbalizes Understanding    Carbohydrate-Containing Foods, taught by Katharine Pedro RN at 1/30/2023 10:42 AM.  Learner: Patient  Readiness: Acceptance  Method: Explanation  Response: Verbalizes Understanding    Oral Medication, taught by Katharine Pedro RN at 1/30/2023 10:42 AM.  Learner: Patient  Readiness: Acceptance  Method: Explanation  Response: Verbalizes Understanding    Insulin Therapy, taught by Katharine Pedro RN at 1/30/2023 10:42 AM.  Learner: Patient  Readiness: Acceptance  Method: Explanation  Response: Verbalizes Understanding    Blood Glucose Monitoring, taught by Katharine Pedro RN at 1/30/2023 10:42 AM.  Learner: Patient  Readiness: Acceptance  Method: Explanation  Response: Verbalizes Understanding    Blood Glucose Goal, taught by Katharine Pedro RN at 1/30/2023 10:42 AM.  Learner: Patient  Readiness: Acceptance  Method:  Explanation  Response: Verbalizes Understanding          BULL SNYDER  Ambulatory Case Management    1/30/2023, 10:43 EST

## 2023-01-30 NOTE — TELEPHONE ENCOUNTER
Per chart, the last Humalog rx sent in did not go through electronically (confirmed with pharmacy). It printed. I will resend.    Pt was not able to go to ER this weekend (daughter was hospitalized). Educated pt on sugar and swelling and precautions to take. She was at an appt with her son. I will call her tomorrow and arrange sooner appt.    Pt stated that her mom came to  samples of Humalog from office but was told there were none. Message sent to pcp and .    See Pt Outreach Encounter for more DM education.

## 2023-01-30 NOTE — TELEPHONE ENCOUNTER
We can try to get her in sooner if she continues to have issues.  I really feel like sugar elevation is a problem with how she is taking meds or diet, please continue to confirm what she is doing there.  If sugar is up would somewhat make sense that some areas will swell more, but she needs to get up and walk regularly and then elevate her legs when sitting.

## 2023-01-31 ENCOUNTER — PATIENT OUTREACH (OUTPATIENT)
Dept: CASE MANAGEMENT | Facility: OTHER | Age: 28
End: 2023-01-31
Payer: COMMERCIAL

## 2023-01-31 DIAGNOSIS — F33.41 RECURRENT MAJOR DEPRESSIVE DISORDER, IN PARTIAL REMISSION: ICD-10-CM

## 2023-01-31 DIAGNOSIS — Z79.4 TYPE 2 DIABETES MELLITUS WITH HYPERGLYCEMIA, WITH LONG-TERM CURRENT USE OF INSULIN: Primary | ICD-10-CM

## 2023-01-31 DIAGNOSIS — E11.65 TYPE 2 DIABETES MELLITUS WITH HYPERGLYCEMIA, WITH LONG-TERM CURRENT USE OF INSULIN: Primary | ICD-10-CM

## 2023-01-31 NOTE — OUTREACH NOTE
[facility] End of Month Documentation    This Chronic Medical Management Care Plan for Dorinda Sherwood, 27 y.o. female, has been monitored and managed; reviewed; revised and a new plan of care implemented for the month of January.  A cumulative time of 157  minutes was spent on this patient record this month, including phone call with patient; electronic communication with primary care provider; electronic communication with other providers; chart review.    Regarding the patient's problems: has Calculus of gallbladder with chronic cholecystitis without obstruction; Type 2 diabetes mellitus with hyperglycemia, with long-term current use of insulin (HCC); S/P cholecystectomy; Allergic rhinitis; Asthma; Atrial septal defect, secundum; Depression; Anxiety; Chest pain; Chronic intractable headache; Morbid obesity with BMI of 50.0-59.9, adult (HCC); Abnormal uterine bleeding (AUB); Vaginal irritation; and Generalized anxiety disorder on their problem list., the following items were addressed: medical records; medications; changes to medical care; referrals to community service providers and any changes can be found within the plan section of the note.  A detailed listing of time spent for chronic care management is tracked within each outreach encounter.  Current medications include:  has a current medication list which includes the following prescription(s): allergy relief, antacid calcium, ascorbic acid, atenolol, buspirone, calcium carbonate, dexcom g6 , dexcom g6 sensor, dexcom g6 transmitter, vitamin b-12 er, desvenlafaxine, fluticasone, glipizide, hydroxyzine, ibuprofen, levemir flextouch, insulin lispro, insulin pen needle, insulin syringe-needle u-100, ipratropium, ketorolac, loperamide, medroxyprogesterone, metformin, montelukast, glucose management, fish oil, omeprazole, ondansetron odt, polyethylene glycol, prazosin, proair hfa, mounjaro, vitamin b-12, vitamin d, and zinc. and the patient is reported to be  patient is compliant with medication protocol,  Medications are reported to be non-effective in controlling symptoms and changes have been made to the medication protocol, endo appt next month.  Regarding these diagnoses, referrals were made to the following provider(s):  Asthma/Allergist..  All notes on chart for PCP to review.    The patient was monitored remotely for blood glucose; mood & behavior; pain; medications; activity level.    The patient's physical needs include:  physical healthcare; physician referral.     The patient's mental support needs include:  increased support    The patient's cognitive support needs include:  continued support    The patient's psychosocial support needs include:  need for increased support; medication management or adherence    The patient's functional needs include: physical healthcare; physician referral, pt has not established care with obgyn yet    The patient's environmental needs include:  no access to transportation, pt does not drive and relies on mother for transportation    Care Plan overall comments:  No data recorded    Refer to previous outreach notes for more information on the areas listed above.    Monthly Billing Diagnoses  (E11.65,  Z79.4) Type 2 diabetes mellitus with hyperglycemia, with long-term current use of insulin (HCC)    (F33.41) Recurrent major depressive disorder, in partial remission (HCC)    Medications   · Medications have been reconciled    Care Plan progress this month:      Recently Modified Care Plans Updates made since 12/31/2022 12:00 AM     Diabetes Type 2 (Adult)         Problem Priority Last Modified     ELS GLYCEMIC MANAGEMENT (DIABETES, TYPE 2) (ADULT) --  3/28/2022  2:43 PM by Katharine Pedro RN              Goal Recent Progress Last Modified     Glycemic Management Optimized --  3/28/2022  2:43 PM by Katharine Pedro RN     Evidence-based guidance:   Anticipate A1C testing (point-of-care) every 3 to 6 months based on goal  attainment.   Review mutually-set A1C goal or target range.   Anticipate use of antihyperglycemic with or without insulin and periodic adjustments; consider active involvement of pharmacist.   Provide medical nutrition therapy and development of individualized eating.   Compare self-reported symptoms of hypo or hyperglycemia to blood glucose levels, diet and fluid intake, current medications, psychosocial and physiologic stressors, change in activity and barriers to care adherence.   Promote self-monitoring of blood glucose levels.   Assess and address barriers to management plan, such as food insecurity, age, developmental ability, depression, anxiety, fear of hypoglycemia or weight gain, as well as medication cost, side effects and complicated regimen.   Consider referral to community-based diabetes education program, visiting nurse, community health worker or health .   Encourage regular dental care for treatment of periodontal disease; refer to dental provider when needed.    Notes:            Task Due Date Last Modified     Alleviate Barriers to Glycemic Management --  1/30/2023 10:40 AM by Katharine Pedro RN     Care Management Activities:      - barriers to adherence to treatment plan identified  - blood glucose monitoring encouraged  - blood glucose readings reviewed  - individualized medical nutrition therapy provided  - use of blood glucose monitoring log promoted      Notes: endo appt 2/16             Problem Priority Last Modified     ELS DISEASE PROGRESSION (DIABETES, TYPE 2) (ADULT) --  3/28/2022  2:43 PM by Katharine Pedro RN              Goal Recent Progress Last Modified     Disease Progression Prevented or Minimized --  3/28/2022  2:43 PM by Katharine Pedro, RN     Evidence-based guidance:   Prepare patient for laboratory and diagnostic exams based on risk and presentation.   Encourage lifestyle changes, such as increased intake of plant-based foods, stress reduction, consistent  physical activity and smoking cessation to prevent long-term complications and chronic disease.    Individualize activity and exercise recommendations while considering potential limitations, such as neuropathy, retinopathy or the ability to prevent hyperglycemia or hypoglycemia.    Prepare patient for use of pharmacologic therapy that may include antihypertensive, analgesic, prostaglandin E1 with periodic adjustments, based on presenting chronic condition and laboratory results.   Assess signs/symptoms and risk factors for hypertension, sleep-disordered breathing, neuropathy (including changes in gait and balance), retinopathy, nephropathy and sexual dysfunction.   Address pregnancy planning and contraceptive choice, especially when prescribing antihypertensive or statin.   Ensure completion of annual comprehensive foot exam and dilated eye exam.    Implement additional individualized goals and interventions based on identified risk factors.   Prepare patient for consultation or referral for specialist care, such as ophthalmology, neurology, cardiology, podiatry, nephrology or perinatology.    Notes:            Task Due Date Last Modified     Monitor and Manage Follow-up for Comorbidities --  1/30/2023 10:40 AM by Katharine Pedro RN     Care Management Activities:      - activity based on tolerance and functional limitations encouraged  - healthy lifestyle promoted  - response to pharmacologic therapy monitored      Notes:                 Anxiety (Adult)         Problem Priority Last Modified     Anxiety Identification (Anxiety) --  1/30/2023 10:39 AM by Katharine Pedro RN              Goal Recent Progress Last Modified     Anxiety Symptoms Identified --  1/30/2023 10:39 AM by Katharine Pedro RN     Evidence-based guidance:   Assess for presence of additional co-occurring psychiatric comorbidity [e.g., substance use, other anxiety disorder (specific phobia, social anxiety disorder, panic disorder,  agoraphobia, substance or medically-induced    anxiety disorder)].   Assess for presence of medical comorbidity (e.g., chronic pain, chronic illness), recent or recurrent trauma or abuse, family history of substance use disorder or mental illness.   Screen for anxiety using standardized, validated tool.   Move gradually from investigating somatic complaints to exploring social or psychologic distress.   Assess for signs and symptoms of anxiety in an atmosphere of hope and optimism.   Facilitate full diagnostic interview when positive screening results are noted; utilize DSM-5 criteria to determine appropriate diagnosis.   Screen for depression simultaneously due to the frequency of co-occurrence.    Notes:            Task Due Date Last Modified     Identify Anxiety Symptoms and Facilitate Treatment --  1/30/2023 10:40 AM by Katharine Pedro RN     Care Management Activities:      - mental health treatment arranged  - participation in psychiatric services encouraged      Notes: psych appt 2.2             Problem Priority Last Modified     Symptoms (Anxiety) --  1/30/2023 10:39 AM by Katharine Pedro RN              Goal Recent Progress Last Modified     Anxiety Symptoms Monitored and Managed --  1/30/2023 10:39 AM by Katharine Pedro RN     Evidence-based guidance:   Discuss adherence to medication therapy; assess and manage barriers, such as costs, side effects, feeling little or no improvement, stigma or low motivation.   Discuss past experiences with psychotropic medication, potential side effects and need to continue medication until treatment becomes effective (e.g., 4 to 8 weeks).   Explain the interplay of stress and physical symptoms, as well as the relationship between illness and emotional problems.   Explore complementary and alternative therapy options, such as applied relaxation, mindfulness, meditation, music therapy, aromatherapy, acupuncture or massage.   Prepare patient for antidepressant  pharmacologic therapy which may include additional short-term medications until maintenance medications demonstrate therapeutic effect.    Assess response; monitor and manage side effects.   Prepare patient for use of pharmacologic therapy (e.g., anxiolytic, selective serotonin-reuptake inhibitor, serotonin norepinephrine-reuptake inhibitor, tricyclic antidepressant, antiepileptic, antipsychotic for comorbid depression,    phosphodiesterase-inhibitor for sexual dysfunction, sedative or hypnotic for sleep disturbance); monitor and manage side effects.   Prepare patient for long-term pharmacologic therapy to prevent relapse (e.g., 12 months after symptom improvement).   Promote cognitive behavioral therapy, individualized to severity of symptoms [e.g., nonfacilitated self-help, facilitated (computerized or manual-based) self-help, face-to-face individual treatment].   Encourage patient to develop a self-management plan that identifies and manages lifestyle triggers (e.g., caffeine, stimulants, nicotine, stress), improves sleep, exercise or physical activity based on tolerance.   Prepare patient for a referral to a psychiatrist when patient has a poor response to treatment, atypical presentation or comorbid psychiatric disorder.   Provide frequent follow-up (e.g., every 2 weeks for the first 6 to 8 weeks of treatment and monthly thereafter).   Explore means to support work reintegration, such as modified working hours, peer support or coaching or a community jobs program.    Notes:            Task Due Date Last Modified     Alleviate Barriers to Anxiety Treatment Success --  1/30/2023 10:41 AM by Katharine Pedro RN     Care Management Activities:      - activity or exercise based on tolerance encouraged  - response to pharmacologic therapy monitored      Notes:              Problem Priority Last Modified     Harm or Injury (Anxiety) --  1/30/2023 10:39 AM by Katharine Pedro RN              Goal Recent  Progress Last Modified     Harm or Injury Prevented --  1/30/2023 10:39 AM by Katharine Pedro, RN     Evidence-based guidance:   When using selective serotonin-reuptake inhibitor, anticipate bone density scanning and supplementation with calcium and vitamin D based on presentation and risk factors.   Engage family in providing a safe home environment and in closely monitoring changes in the patient's emotional state, such as negativity, hopelessness and suicidal ideation.   Explore risk of suicide, self-injurious behavior, self-neglect, severe functional impairment and potential harm to others.   Explore use of herbal and over-the-counter medications, such as Myla's Wort, Kava or Valerian; address drug interaction concerns.   Maintain frequent, structured and supportive contact, such as by phone, office or home visit; collaborate closely with behavioral health specialists and psychiatry.   Make immediate arrangements for evaluation at a local emergency department, community mental health agency or other psychiatric service when patient expresses positive suicidal ideation with a plan and access to lethal means.   Monitor and manage side effects of pharmacologic therapy, including osteoporotic fractures, fall risk and gastrointestinal disturbances; consider exaggerated side effects in the elderly due to longer elimination half-life.   Provide anticipatory guidance to remove lethal medication and firearms from home; ensure adequate supervision is provided to monitor for increasing risk factors; provide emergency contact information and instructions.    Notes:            Task Due Date Last Modified     Identify and Reduce Risks for Harm or Injury --  1/30/2023 10:41 AM by Katharine Pedro, RN     Care Management Activities:      - not discussed during this outreach      Notes:                    Instructions   · Patient was provided an electronic copy of care plan  · CCM services were explained and offered  and patient has accepted these services.  · Patient has given their written consent to receive CCM services and understands that this includes the authorization of electronic communication of medical information with the other treating providers.  · Patient understands that they may stop CCM services at any time and these changes will be effective at the end of the calendar month and will effectively revocate the agreement of CCM services.  · Patient understands that only one practitioner can furnish and be paid for CCM services during one calendar month.  Patient also understands that there may be co-payment or deductible fees in association with CCM services.  · Patient will continue with at least monthly follow-up calls with the Ambulatory .    BULL SNYDER  Ambulatory Case Management    1/31/2023, 08:57 EST

## 2023-01-31 NOTE — OUTREACH NOTE
AMBULATORY CASE MANAGEMENT NOTE    Name and Relationship of Patient/Support Person: Dorinda Sherwood - Self    Called pt, no answer, lmtrc. Pt needs appt with pcp asap.    Education Documentation  No documentation found.        BULL SNYDER  Ambulatory Case Management    1/31/2023, 08:57 EST

## 2023-02-01 ENCOUNTER — PATIENT OUTREACH (OUTPATIENT)
Dept: CASE MANAGEMENT | Facility: OTHER | Age: 28
End: 2023-02-01
Payer: COMMERCIAL

## 2023-02-01 DIAGNOSIS — E11.65 TYPE 2 DIABETES MELLITUS WITH HYPERGLYCEMIA, WITH LONG-TERM CURRENT USE OF INSULIN: Primary | ICD-10-CM

## 2023-02-01 DIAGNOSIS — Z79.4 TYPE 2 DIABETES MELLITUS WITH HYPERGLYCEMIA, WITH LONG-TERM CURRENT USE OF INSULIN: Primary | ICD-10-CM

## 2023-02-01 NOTE — OUTREACH NOTE
AMBULATORY CASE MANAGEMENT NOTE    Name and Relationship of Patient/Support Person: Dorinda Sherwood M - Self    CCM Interim Update    Made pt sooner appt with pcp for leg/ankle pain, tingling and DM uncontrolled.    Pt also has psych appt tomorrow.         Education Documentation  No documentation found.        BULL SNYDER  Ambulatory Case Management    2/1/2023, 11:34 EST

## 2023-02-02 ENCOUNTER — TELEPHONE (OUTPATIENT)
Dept: CASE MANAGEMENT | Facility: OTHER | Age: 28
End: 2023-02-02
Payer: COMMERCIAL

## 2023-02-06 ENCOUNTER — TELEPHONE (OUTPATIENT)
Dept: INTERNAL MEDICINE | Facility: CLINIC | Age: 28
End: 2023-02-06
Payer: COMMERCIAL

## 2023-02-06 ENCOUNTER — TELEPHONE (OUTPATIENT)
Dept: INTERNAL MEDICINE | Facility: CLINIC | Age: 28
End: 2023-02-06

## 2023-02-06 NOTE — TELEPHONE ENCOUNTER
Patient called and is very upset . Stated Dr Thornton was taking her off all her medication. Her physician is DR. Maria. I reviewed her chart DR thornton has not seen her.Patient states she is going to get a new Doctor some ware else.

## 2023-02-07 ENCOUNTER — TELEPHONE (OUTPATIENT)
Dept: CASE MANAGEMENT | Facility: OTHER | Age: 28
End: 2023-02-07
Payer: COMMERCIAL

## 2023-02-07 NOTE — TELEPHONE ENCOUNTER
Per chart, psych appt missed last week and not remade. Pt has pcp appt tomorrow. Many other specialty appts in the next few weeks. Called to remind pt. No answer, left message about pcp appt tomorrow and remaking psych appt. Will update pcp.

## 2023-02-10 ENCOUNTER — PATIENT OUTREACH (OUTPATIENT)
Dept: CASE MANAGEMENT | Facility: OTHER | Age: 28
End: 2023-02-10
Payer: COMMERCIAL

## 2023-02-10 DIAGNOSIS — Z79.4 TYPE 2 DIABETES MELLITUS WITH HYPERGLYCEMIA, WITH LONG-TERM CURRENT USE OF INSULIN: Primary | ICD-10-CM

## 2023-02-10 DIAGNOSIS — E11.65 TYPE 2 DIABETES MELLITUS WITH HYPERGLYCEMIA, WITH LONG-TERM CURRENT USE OF INSULIN: Primary | ICD-10-CM

## 2023-02-10 NOTE — OUTREACH NOTE
AMBULATORY CASE MANAGEMENT NOTE    Name and Relationship of Patient/Support Person: Dorinda Sherwood M - Self    CCM Interim Update    Pt stated she has a new pcp appt on 3/13. Declines wanting to see Dr Maria. Canceled appt for next month. Will update pcp.    I gave her dates/times of other upcoming appts.    She does not want to see Dr Cuadra, wants [facility]. Message sent to referral staff to discuss.          Education Documentation  No documentation found.        Katharine SNYDER  Ambulatory Case Management    2/10/2023, 09:52 EST

## 2023-02-14 ENCOUNTER — TELEPHONE (OUTPATIENT)
Dept: OBSTETRICS AND GYNECOLOGY | Facility: CLINIC | Age: 28
End: 2023-02-14
Payer: COMMERCIAL

## 2023-02-15 PROCEDURE — 87480 CANDIDA DNA DIR PROBE: CPT

## 2023-02-15 PROCEDURE — 87086 URINE CULTURE/COLONY COUNT: CPT

## 2023-02-15 PROCEDURE — 87660 TRICHOMONAS VAGIN DIR PROBE: CPT

## 2023-02-15 PROCEDURE — 87510 GARDNER VAG DNA DIR PROBE: CPT

## 2023-02-16 ENCOUNTER — TELEMEDICINE (OUTPATIENT)
Dept: DIABETES SERVICES | Facility: HOSPITAL | Age: 28
End: 2023-02-16
Payer: COMMERCIAL

## 2023-02-16 DIAGNOSIS — E11.65 UNCONTROLLED TYPE 2 DIABETES MELLITUS WITH HYPERGLYCEMIA: Primary | ICD-10-CM

## 2023-02-16 DIAGNOSIS — E66.01 SEVERE OBESITY (BMI >= 40): ICD-10-CM

## 2023-02-16 PROCEDURE — 99214 OFFICE O/P EST MOD 30 MIN: CPT | Performed by: NURSE PRACTITIONER

## 2023-02-16 RX ORDER — BLOOD SUGAR DIAGNOSTIC
1 STRIP MISCELLANEOUS 4 TIMES DAILY
Qty: 400 EACH | Refills: 1 | Status: SHIPPED | OUTPATIENT
Start: 2023-02-16 | End: 2023-03-01 | Stop reason: SDUPTHER

## 2023-02-16 RX ORDER — INSULIN ASPART 100 [IU]/ML
18 INJECTION, SOLUTION INTRAVENOUS; SUBCUTANEOUS
Qty: 45 ML | Refills: 1 | Status: SHIPPED | OUTPATIENT
Start: 2023-02-16 | End: 2023-04-04

## 2023-02-16 RX ORDER — INSULIN DETEMIR 100 [IU]/ML
20 INJECTION, SOLUTION SUBCUTANEOUS 2 TIMES DAILY
Qty: 30 ML | Refills: 1 | Status: SHIPPED | OUTPATIENT
Start: 2023-02-16 | End: 2023-03-01 | Stop reason: SDUPTHER

## 2023-02-16 NOTE — PROGRESS NOTES
Chief Complaint  Diabetes    Referred By: Jess Maria MD    Subjective          Mode of visit: Video  Location of patient: Home  You have chosen to receive care through a telehealth visit.  Does the patient consent to use of video/audio connection for medical care today: Yes  The visit includes audio and video interaction.  No technical issues occurred during this visit.    Dorinda Sherwood presents to [facility] for diabetes medication management    History of Present Illness    Visit type:  follow-up  Diabetes type:  Type 2  Current diabetes status/concerns/issues:  This patient was last seen in May 2022.  She indicates she is not currently taking any diabetes medications.  She was was on Ozempic at her last appointment but states this has been stopped.  Her PCP started her on metformin but she is no longer taking this medication.  She has also been tried on Mounjaro as well and has had her insulin restarted but she is not taking any of these medications at this time.  She states she has been off of the insulin for approximately 2 weeks now.  The patient would like to have an insulin pump.  She states her PCP and her insurance company feels she would benefit from an insulin pump.  Other health concerns: She is in counseling for depression.  She has had a lot of stress in her life recently.  Current Diabetes symptoms:    Polyuria: Yes   Polydipsia: Yes   Polyphagia: No   Blurred vision: Yes   Excessive fatigue: No  Known Diabetes complications:  Neuro: None  Renal: None  Eyes: None  Amputation/Wounds: None  GI: None  Cardiovascular: Hypertension  ED: N/A  Other: None  Hypoglycemia:  None reported at this time  Hypoglycemia Symptoms:  No hypoglycemia at this time  Diabetes treatment:  None at this time  Blood glucose device:  Dexcom CGM  Blood glucose monitoring frequency:  Continuous per CGM  Blood glucose range/average:  300-400     Glucose Source: Patient Reported  Diet:   "\"Eat what I want\"/No diet plan  Activity/Exercise:  None    Past Medical History:   Diagnosis Date   • Anesthesia     slow to wake up postop, anxiety postop   • Anxiety    • AR (allergic rhinitis)    • ASD (atrial septal defect)     had since birth- asymptomatic- see's dr falk    • Asthma     no inhalers   • Bipolar disorder (HCC)    • Depression    • Diabetes mellitus (HCC)     type ii- bg runs around 200-300   • Gallstones currently   • GERD (gastroesophageal reflux disease)    • Hypertension    • Migraine    • Ovarian cyst    • Trauma      Past Surgical History:   Procedure Laterality Date   •  SECTION     • CHOLECYSTECTOMY N/A 2021    Procedure: CHOLECYSTECTOMY LAPAROSCOPIC;  Surgeon: Robert Araya MD;  [facility]: [facility] OR [facility];  Service: General;  Laterality: N/A;   • COLONOSCOPY     • TUBAL ABDOMINAL LIGATION     • WISDOM TOOTH EXTRACTION       Family History   Problem Relation Age of Onset   • Heart disease Father    • Heart disease Other    • Heart disease Other    • Heart disease Other    • Diabetes type II Other      Social History     Socioeconomic History   • Marital status:    • Number of children: 2   Tobacco Use   • Smoking status: Never   • Smokeless tobacco: Never   Vaping Use   • Vaping Use: Never used   Substance and Sexual Activity   • Alcohol use: Never   • Drug use: Never   • Sexual activity: Not Currently     Allergies   Allergen Reactions   • Amoxicillin Shortness Of Breath   • Hydrocodone Shortness Of Breath   • Nystatin Itching and Rash       Current Outpatient Medications:   •  fluconazole (DIFLUCAN) 150 MG tablet, Take 1 tablet for vulvovaginal itching and vaginal discharge, can take second tablet 72 hours later if symptoms persist, Disp: 2 tablet, Rfl: 0  •  insulin aspart (NovoLOG FlexPen) 100 UNIT/ML solution pen-injector sc pen, Inject 18 Units under the skin into the appropriate area as directed 3 (Three) Times a Day With Meals. Adjust dose based " on glucose levels and carbohydrate intake as directed, Disp: 45 mL, Rfl: 1  •  insulin detemir (Levemir FlexTouch) 100 UNIT/ML injection, Inject 20 Units under the skin into the appropriate area as directed 2 (Two) Times a Day., Disp: 30 mL, Rfl: 1  •  Insulin Pen Needle (Pen Needles) 32G X 5 MM misc, 1 each 4 (Four) Times a Day., Disp: 400 each, Rfl: 1  •  metoprolol succinate XL (TOPROL-XL) 25 MG 24 hr tablet, Take 1 tablet by mouth Daily., Disp: , Rfl:     Review of Systems   Constitutional: Negative for activity change, appetite change, fatigue, unexpected weight gain and unexpected weight loss.   Eyes: Negative for blurred vision and visual disturbance.   Gastrointestinal: Negative for abdominal pain, constipation, diarrhea, nausea, vomiting, GERD and indigestion.   Endocrine: Negative for polydipsia, polyphagia and polyuria.   Neurological: Negative for numbness.        Objective     There were no vitals filed for this visit.  There is no height or weight on file to calculate BMI.    Physical Exam  Constitutional:       Appearance: Normal appearance. She is obese.   HENT:      Head: Normocephalic and atraumatic.   Eyes:      Extraocular Movements: Extraocular movements intact.   Neurological:      General: No focal deficit present.      Mental Status: She is alert and oriented to person, place, and time. Mental status is at baseline.   Psychiatric:         Mood and Affect: Mood normal.         Behavior: Behavior normal.         Thought Content: Thought content normal.         Judgment: Judgment normal.         Result Review :   The following data was reviewed by: YOKO Madrigal on 02/16/2023:    Most Recent A1C    HGBA1C Most Recent 9/9/22   Hemoglobin A1C 10.30 (A)   (A) Abnormal value              A1C Last 3 Results    HGBA1C Last 3 Results 5/18/22 9/9/22   Hemoglobin A1C 9.90 (A) 10.30 (A)   (A) Abnormal value            Her most recent A1c was collected on 9/9/2022 and was 10.3% indicating  uncontrolled type 2 diabetes.  This was up from a prior result of 9.9 collected in May 2022      Creatinine   Date Value Ref Range Status   12/06/2022 0.52 (L) 0.57 - 1.00 mg/dL Final   08/10/2022 0.43 (L) 0.57 - 1.00 mg/dL Final     eGFR   Date Value Ref Range Status   12/06/2022 130.0 >60.0 mL/min/1.73 Final     Comment:     National Kidney Foundation and American Society of Nephrology (ASN) Task Force recommended calculation based on the Chronic Kidney Disease Epidemiology Collaboration (CKD-EPI) equation refit without adjustment for race.   08/10/2022 136.1 >60.0 mL/min/1.73 Final     Comment:     National Kidney Foundation and American Society of Nephrology (ASN) Task Force recommended calculation based on the Chronic Kidney Disease Epidemiology Collaboration (CKD-EPI) equation refit without adjustment for race.     Labs collected on 12/6/2022 show normal GFR with slightly diminished creatinine    Microalbumin, Urine   Date Value Ref Range Status   05/18/2022 2.5 mg/dL Final             Assessment: The patient does not have a current A1c but reports her glucose levels per her continuous glucose sensor are 300 or higher on most occasions.  She is not currently taking any medications for her diabetes.  She would like to have an insulin pump to help her manage her diabetes.  Patient was advised we would have to start her on multiple daily injections of insulin first and then we can proceed with an insulin pump at a follow-up appointment.      Diagnoses and all orders for this visit:    1. Uncontrolled type 2 diabetes mellitus with hyperglycemia (HCC) (Primary)  -     insulin detemir (Levemir FlexTouch) 100 UNIT/ML injection; Inject 20 Units under the skin into the appropriate area as directed 2 (Two) Times a Day.  Dispense: 30 mL; Refill: 1  -     insulin aspart (NovoLOG FlexPen) 100 UNIT/ML solution pen-injector sc pen; Inject 18 Units under the skin into the appropriate area as directed 3 (Three) Times a Day  With Meals. Adjust dose based on glucose levels and carbohydrate intake as directed  Dispense: 45 mL; Refill: 1  -     Insulin Pen Needle (Pen Needles) 32G X 5 MM misc; 1 each 4 (Four) Times a Day.  Dispense: 400 each; Refill: 1    2. Severe obesity (BMI >= 40) ([facility])        Plan: The patient will begin taking Levemir 20 units twice a day.  She will also take NovoLog using 5, 7, or 10 units based on small, medium, or large amounts of carbohydrates with her meals.  Additionally she will add NovoLog 4 to the mealtime dose for blood glucose correction correction as follows:    If blood glucose is less than 150 mg/dl - 0 units  If blood glucose is between 151 and 175 - 2 units  If blood glucose is between 176 and 200 - 3 units  If blood glucose is between 201 and 225 - 4 units  If blood glucose is between 226 and 250 - 5 units  If blood glucose is between 251 and 275 - 6 units  If blood glucose is between 276 and 300 - 7 units  If blood glucose is between 301 and 325 - 8 units  If blood glucose is between 326 and 350 - 9 units  If blood glucose is 351 or above - 10 units     The patient will monitor her blood glucose levels using her continuous glucose sensor.  If she develops problematic hyperglycemia or hypoglycemia or adverse drug reactions, she will contact the office for further instructions.          Follow Up     Return in about 2 months (around 4/16/2023) for Medication Management, CGM Follow-up.    Patient was given instructions and counseling regarding her condition or for health maintenance advice. Please see specific information pulled into the AVS if appropriate.     Arcelia Mohan, YOKO  02/16/2023      Dictated Utilizing Dragon Dictation.  Please note that portions of this note were completed with a voice recognition program.  Part of this note may be an electronic transcription/translation of spoken language to printed text using the Dragon Dictation System.

## 2023-02-16 NOTE — PATIENT INSTRUCTIONS
BEFORE each meal take NovoLog insulin as follows:    5 units for “small meal” (30 - 45 grams of carbohydrate)        (may take ½ dose if eating less than 30 grams)  7 units for “medium meal” (45 - 60 grams of carbohydrate)  10 units for “large meal” (60 - 90 grams of carbohydrate)     Add NovoLog insulin to the mealtime dose to correct glucose levels over 150 mg/dl as follows:    If blood glucose is less than 150 mg/dl - 0 units  If blood glucose is between 151 and 175 - 2 units  If blood glucose is between 176 and 200 - 3 units  If blood glucose is between 201 and 225 - 4 units  If blood glucose is between 226 and 250 - 5 units  If blood glucose is between 251 and 275 - 6 units  If blood glucose is between 276 and 300 - 7 units  If blood glucose is between 301 and 325 - 8 units  If blood glucose is between 326 and 350 - 9 units  If blood glucose is 351 or above - 10 units

## 2023-02-17 ENCOUNTER — TELEPHONE (OUTPATIENT)
Dept: URGENT CARE | Facility: CLINIC | Age: 28
End: 2023-02-17
Payer: COMMERCIAL

## 2023-02-28 ENCOUNTER — PATIENT OUTREACH (OUTPATIENT)
Dept: CASE MANAGEMENT | Facility: OTHER | Age: 28
End: 2023-02-28
Payer: COMMERCIAL

## 2023-02-28 DIAGNOSIS — Z79.4 TYPE 2 DIABETES MELLITUS WITH HYPERGLYCEMIA, WITH LONG-TERM CURRENT USE OF INSULIN: Primary | ICD-10-CM

## 2023-02-28 DIAGNOSIS — F33.41 RECURRENT MAJOR DEPRESSIVE DISORDER, IN PARTIAL REMISSION: ICD-10-CM

## 2023-02-28 DIAGNOSIS — Z91.199 NONCOMPLIANCE: ICD-10-CM

## 2023-02-28 DIAGNOSIS — E11.65 TYPE 2 DIABETES MELLITUS WITH HYPERGLYCEMIA, WITH LONG-TERM CURRENT USE OF INSULIN: Primary | ICD-10-CM

## 2023-02-28 NOTE — OUTREACH NOTE
[facility] End of Month Documentation    This Chronic Medical Management Care Plan for Dorinda Sherwood, 27 y.o. female, has been monitored and managed; reviewed; revised; complete and a new plan of care implemented for the month of February.  A cumulative time of 25  minutes was spent on this patient record this month, including phone call with patient; electronic communication with primary care provider; electronic communication with other providers; chart review.    Regarding the patient's problems: has Calculus of gallbladder with chronic cholecystitis without obstruction; Type 2 diabetes mellitus with hyperglycemia, with long-term current use of insulin (HCC); S/P cholecystectomy; Allergic rhinitis; Asthma; Atrial septal defect, secundum; Depression; Anxiety; Chest pain; Chronic intractable headache; Morbid obesity with BMI of 50.0-59.9, adult (HCC); Abnormal uterine bleeding (AUB); Vaginal irritation; and Generalized anxiety disorder on their problem list., the following items were addressed: medical records; medications; changes to medical care; referrals to community service providers; transitions to medical care, pt chose to switch PCP's and any changes can be found within the plan section of the note.  A detailed listing of time spent for chronic care management is tracked within each outreach encounter.  Current medications include:  has a current medication list which includes the following prescription(s): fluconazole, novolog flexpen, levemir flextouch, pen needles, and metoprolol succinate xl. and the patient is reported to be patient is noncompliant with medication protocol, not checking sugar,  Medications are reported to be non-effective in controlling symptoms and changes have been made to the medication protocol.  Regarding these diagnoses, referrals were made to the following provider(s):  none.  All notes on chart for PCP to review.    The patient was monitored remotely for blood glucose; mood &  behavior; pain; medications; activity level.    The patient's physical needs include:  physical healthcare; physician referral; DME supplies, patient still needs to see OBGYN.     The patient's mental support needs include:  increased support    The patient's cognitive support needs include:  continued support    The patient's psychosocial support needs include:  need for increased support; medication management or adherence    The patient's functional needs include: No data recorded    The patient's environmental needs include:  no access to transportation    Care Plan overall comments:  No data recorded    Refer to previous outreach notes for more information on the areas listed above.    Monthly Billing Diagnoses  (E11.65,  Z79.4) Type 2 diabetes mellitus with hyperglycemia, with long-term current use of insulin (HCC)    (Z91.199) Noncompliance    (F33.41) Recurrent major depressive disorder, in partial remission (HCC)    Medications   · Medications have been reconciled    Care Plan progress this month:      Recently Modified Care Plans Updates made since 1/28/2023 12:00 AM     Diabetes Type 2 (Adult)         Problem Priority Last Modified     ELS GLYCEMIC MANAGEMENT (DIABETES, TYPE 2) (ADULT) --  3/28/2022  2:43 PM by Katharine Pedro RN              Goal Recent Progress Last Modified     Glycemic Management Optimized --  3/28/2022  2:43 PM by Katharine Pedro RN     Evidence-based guidance:   Anticipate A1C testing (point-of-care) every 3 to 6 months based on goal attainment.   Review mutually-set A1C goal or target range.   Anticipate use of antihyperglycemic with or without insulin and periodic adjustments; consider active involvement of pharmacist.   Provide medical nutrition therapy and development of individualized eating.   Compare self-reported symptoms of hypo or hyperglycemia to blood glucose levels, diet and fluid intake, current medications, psychosocial and physiologic stressors, change in  activity and barriers to care adherence.   Promote self-monitoring of blood glucose levels.   Assess and address barriers to management plan, such as food insecurity, age, developmental ability, depression, anxiety, fear of hypoglycemia or weight gain, as well as medication cost, side effects and complicated regimen.   Consider referral to community-based diabetes education program, visiting nurse, community health worker or health .   Encourage regular dental care for treatment of periodontal disease; refer to dental provider when needed.    Notes:            Task Due Date Last Modified     Alleviate Barriers to Glycemic Management --  1/30/2023 10:40 AM by Katharine Pedro RN     Care Management Activities:      - barriers to adherence to treatment plan identified  - blood glucose monitoring encouraged  - blood glucose readings reviewed  - individualized medical nutrition therapy provided  - use of blood glucose monitoring log promoted      Notes: endo appt 2/16             Problem Priority Last Modified     ELS DISEASE PROGRESSION (DIABETES, TYPE 2) (ADULT) --  3/28/2022  2:43 PM by Katharine Pedro RN              Goal Recent Progress Last Modified     Disease Progression Prevented or Minimized --  3/28/2022  2:43 PM by Katharine Pedro RN     Evidence-based guidance:   Prepare patient for laboratory and diagnostic exams based on risk and presentation.   Encourage lifestyle changes, such as increased intake of plant-based foods, stress reduction, consistent physical activity and smoking cessation to prevent long-term complications and chronic disease.    Individualize activity and exercise recommendations while considering potential limitations, such as neuropathy, retinopathy or the ability to prevent hyperglycemia or hypoglycemia.    Prepare patient for use of pharmacologic therapy that may include antihypertensive, analgesic, prostaglandin E1 with periodic adjustments, based on presenting  chronic condition and laboratory results.   Assess signs/symptoms and risk factors for hypertension, sleep-disordered breathing, neuropathy (including changes in gait and balance), retinopathy, nephropathy and sexual dysfunction.   Address pregnancy planning and contraceptive choice, especially when prescribing antihypertensive or statin.   Ensure completion of annual comprehensive foot exam and dilated eye exam.    Implement additional individualized goals and interventions based on identified risk factors.   Prepare patient for consultation or referral for specialist care, such as ophthalmology, neurology, cardiology, podiatry, nephrology or perinatology.    Notes:            Task Due Date Last Modified     Monitor and Manage Follow-up for Comorbidities --  1/30/2023 10:40 AM by Katharine Pedro RN     Care Management Activities:      - activity based on tolerance and functional limitations encouraged  - healthy lifestyle promoted  - response to pharmacologic therapy monitored      Notes:                 Anxiety (Adult)         Problem Priority Last Modified     Anxiety Identification (Anxiety) --  1/30/2023 10:39 AM by Katharine Pedro RN              Goal Recent Progress Last Modified     Anxiety Symptoms Identified --  1/30/2023 10:39 AM by Katharine Pedro RN     Evidence-based guidance:   Assess for presence of additional co-occurring psychiatric comorbidity [e.g., substance use, other anxiety disorder (specific phobia, social anxiety disorder, panic disorder, agoraphobia, substance or medically-induced    anxiety disorder)].   Assess for presence of medical comorbidity (e.g., chronic pain, chronic illness), recent or recurrent trauma or abuse, family history of substance use disorder or mental illness.   Screen for anxiety using standardized, validated tool.   Move gradually from investigating somatic complaints to exploring social or psychologic distress.   Assess for signs and symptoms of  anxiety in an atmosphere of hope and optimism.   Facilitate full diagnostic interview when positive screening results are noted; utilize DSM-5 criteria to determine appropriate diagnosis.   Screen for depression simultaneously due to the frequency of co-occurrence.    Notes:            Task Due Date Last Modified     Identify Anxiety Symptoms and Facilitate Treatment --  1/30/2023 10:40 AM by Katharine Pedro RN     Care Management Activities:      - mental health treatment arranged  - participation in psychiatric services encouraged      Notes: psych appt 2.2             Problem Priority Last Modified     Symptoms (Anxiety) --  1/30/2023 10:39 AM by Katharine Pedro RN              Goal Recent Progress Last Modified     Anxiety Symptoms Monitored and Managed --  1/30/2023 10:39 AM by Katharine Pedro RN     Evidence-based guidance:   Discuss adherence to medication therapy; assess and manage barriers, such as costs, side effects, feeling little or no improvement, stigma or low motivation.   Discuss past experiences with psychotropic medication, potential side effects and need to continue medication until treatment becomes effective (e.g., 4 to 8 weeks).   Explain the interplay of stress and physical symptoms, as well as the relationship between illness and emotional problems.   Explore complementary and alternative therapy options, such as applied relaxation, mindfulness, meditation, music therapy, aromatherapy, acupuncture or massage.   Prepare patient for antidepressant pharmacologic therapy which may include additional short-term medications until maintenance medications demonstrate therapeutic effect.    Assess response; monitor and manage side effects.   Prepare patient for use of pharmacologic therapy (e.g., anxiolytic, selective serotonin-reuptake inhibitor, serotonin norepinephrine-reuptake inhibitor, tricyclic antidepressant, antiepileptic, antipsychotic for comorbid depression,     phosphodiesterase-inhibitor for sexual dysfunction, sedative or hypnotic for sleep disturbance); monitor and manage side effects.   Prepare patient for long-term pharmacologic therapy to prevent relapse (e.g., 12 months after symptom improvement).   Promote cognitive behavioral therapy, individualized to severity of symptoms [e.g., nonfacilitated self-help, facilitated (computerized or manual-based) self-help, face-to-face individual treatment].   Encourage patient to develop a self-management plan that identifies and manages lifestyle triggers (e.g., caffeine, stimulants, nicotine, stress), improves sleep, exercise or physical activity based on tolerance.   Prepare patient for a referral to a psychiatrist when patient has a poor response to treatment, atypical presentation or comorbid psychiatric disorder.   Provide frequent follow-up (e.g., every 2 weeks for the first 6 to 8 weeks of treatment and monthly thereafter).   Explore means to support work reintegration, such as modified working hours, peer support or coaching or a community [employer] program.    Notes:            Task Due Date Last Modified     Alleviate Barriers to Anxiety Treatment Success --  1/30/2023 10:41 AM by Katharine Pedro RN     Care Management Activities:      - activity or exercise based on tolerance encouraged  - response to pharmacologic therapy monitored      Notes:              Problem Priority Last Modified     Harm or Injury (Anxiety) --  1/30/2023 10:39 AM by Katharine Pedro RN              Goal Recent Progress Last Modified     Harm or Injury Prevented --  1/30/2023 10:39 AM by Katharine Pedro RN     Evidence-based guidance:   When using selective serotonin-reuptake inhibitor, anticipate bone density scanning and supplementation with calcium and vitamin D based on presentation and risk factors.   Engage family in providing a safe home environment and in closely monitoring changes in the patient's emotional state, such as  negativity, hopelessness and suicidal ideation.   Explore risk of suicide, self-injurious behavior, self-neglect, severe functional impairment and potential harm to others.   Explore use of herbal and over-the-counter medications, such as Myla's Wort, Kava or Valerian; address drug interaction concerns.   Maintain frequent, structured and supportive contact, such as by phone, office or home visit; collaborate closely with behavioral health specialists and psychiatry.   Make immediate arrangements for evaluation at a local emergency department, community mental health agency or other psychiatric service when patient expresses positive suicidal ideation with a plan and access to lethal means.   Monitor and manage side effects of pharmacologic therapy, including osteoporotic fractures, fall risk and gastrointestinal disturbances; consider exaggerated side effects in the elderly due to longer elimination half-life.   Provide anticipatory guidance to remove lethal medication and firearms from home; ensure adequate supervision is provided to monitor for increasing risk factors; provide emergency contact information and instructions.    Notes:            Task Due Date Last Modified     Identify and Reduce Risks for Harm or Injury --  1/30/2023 10:41 AM by Katharine Pedro RN     Care Management Activities:      - not discussed during this outreach      Notes:                          Instructions   · Patient was provided an electronic copy of care plan  · CCM services were explained and offered and patient has accepted these services.  · Patient has given their written consent to receive CCM services and understands that this includes the authorization of electronic communication of medical information with the other treating providers.  · Patient understands that they may stop CCM services at any time and these changes will be effective at the end of the calendar month and will effectively revocate the agreement  of CCM services.  · Patient understands that only one practitioner can furnish and be paid for CCM services during one calendar month.  Patient also understands that there may be co-payment or deductible fees in association with CCM services.  · Patient will continue with at least monthly follow-up calls with the Ambulatory .    Katharine SNYDER  Ambulatory Case Management    2/28/2023, 08:03 EST

## 2023-03-01 ENCOUNTER — OFFICE VISIT (OUTPATIENT)
Dept: DIABETES SERVICES | Facility: HOSPITAL | Age: 28
End: 2023-03-01
Payer: COMMERCIAL

## 2023-03-01 VITALS
HEIGHT: 62 IN | SYSTOLIC BLOOD PRESSURE: 128 MMHG | OXYGEN SATURATION: 97 % | HEART RATE: 112 BPM | TEMPERATURE: 97.5 F | DIASTOLIC BLOOD PRESSURE: 71 MMHG | WEIGHT: 293 LBS | BODY MASS INDEX: 53.92 KG/M2

## 2023-03-01 DIAGNOSIS — N76.0 BACTERIAL VAGINOSIS: ICD-10-CM

## 2023-03-01 DIAGNOSIS — E11.65 UNCONTROLLED TYPE 2 DIABETES MELLITUS WITH HYPERGLYCEMIA: Primary | ICD-10-CM

## 2023-03-01 DIAGNOSIS — E66.01 SEVERE OBESITY (BMI >= 40): ICD-10-CM

## 2023-03-01 DIAGNOSIS — B96.89 BACTERIAL VAGINOSIS: ICD-10-CM

## 2023-03-01 DIAGNOSIS — Z79.4 TYPE 2 DIABETES MELLITUS WITHOUT COMPLICATION, WITH LONG-TERM CURRENT USE OF INSULIN: ICD-10-CM

## 2023-03-01 DIAGNOSIS — E11.9 TYPE 2 DIABETES MELLITUS WITHOUT COMPLICATION, WITH LONG-TERM CURRENT USE OF INSULIN: ICD-10-CM

## 2023-03-01 PROBLEM — Q21.10 ATRIAL SEPTAL DEFECT: Status: ACTIVE | Noted: 2020-12-29

## 2023-03-01 PROBLEM — J45.909 UNSPECIFIED ASTHMA, UNCOMPLICATED: Status: ACTIVE | Noted: 2021-07-16

## 2023-03-01 LAB
EXPIRATION DATE: ABNORMAL
GLUCOSE BLDC GLUCOMTR-MCNC: 339 MG/DL (ref 70–99)
HBA1C MFR BLD: 10 %
Lab: ABNORMAL

## 2023-03-01 PROCEDURE — 99214 OFFICE O/P EST MOD 30 MIN: CPT | Performed by: NURSE PRACTITIONER

## 2023-03-01 PROCEDURE — 1160F RVW MEDS BY RX/DR IN RCRD: CPT | Performed by: NURSE PRACTITIONER

## 2023-03-01 PROCEDURE — 3046F HEMOGLOBIN A1C LEVEL >9.0%: CPT | Performed by: NURSE PRACTITIONER

## 2023-03-01 PROCEDURE — 82962 GLUCOSE BLOOD TEST: CPT | Performed by: NURSE PRACTITIONER

## 2023-03-01 PROCEDURE — 1159F MED LIST DOCD IN RCRD: CPT | Performed by: NURSE PRACTITIONER

## 2023-03-01 RX ORDER — LEVOCETIRIZINE DIHYDROCHLORIDE 5 MG/1
1 TABLET, FILM COATED ORAL DAILY
COMMUNITY
Start: 2023-02-22

## 2023-03-01 RX ORDER — PREDNISONE 20 MG/1
TABLET ORAL
COMMUNITY
Start: 2023-02-22 | End: 2023-03-31

## 2023-03-01 RX ORDER — INSULIN DETEMIR 100 [IU]/ML
40 INJECTION, SOLUTION SUBCUTANEOUS 2 TIMES DAILY
Qty: 72 ML | Refills: 1 | Status: SHIPPED | OUTPATIENT
Start: 2023-03-01

## 2023-03-01 RX ORDER — FLURBIPROFEN SODIUM 0.3 MG/ML
1 SOLUTION/ DROPS OPHTHALMIC 4 TIMES DAILY
COMMUNITY
Start: 2023-02-16 | End: 2023-03-01 | Stop reason: SDUPTHER

## 2023-03-01 RX ORDER — PROCHLORPERAZINE 25 MG/1
1 SUPPOSITORY RECTAL
Qty: 1 EACH | Refills: 3 | Status: SHIPPED | OUTPATIENT
Start: 2023-03-01 | End: 2023-03-14 | Stop reason: SDUPTHER

## 2023-03-01 RX ORDER — METRONIDAZOLE 500 MG/1
500 TABLET ORAL 2 TIMES DAILY
Qty: 14 TABLET | Refills: 0 | Status: SHIPPED | OUTPATIENT
Start: 2023-03-01 | End: 2023-03-08

## 2023-03-01 RX ORDER — ALBUTEROL SULFATE 90 UG/1
AEROSOL, METERED RESPIRATORY (INHALATION)
COMMUNITY
Start: 2023-02-22

## 2023-03-01 RX ORDER — PROCHLORPERAZINE 25 MG/1
SUPPOSITORY RECTAL
Qty: 3 EACH | Refills: 11 | Status: SHIPPED | OUTPATIENT
Start: 2023-03-01 | End: 2023-03-14 | Stop reason: SDUPTHER

## 2023-03-01 RX ORDER — CETIRIZINE HYDROCHLORIDE 10 MG/1
1 TABLET ORAL DAILY
COMMUNITY
Start: 2023-02-22

## 2023-03-01 RX ORDER — BLOOD SUGAR DIAGNOSTIC
1 STRIP MISCELLANEOUS 4 TIMES DAILY
Qty: 400 EACH | Refills: 1 | Status: SHIPPED | OUTPATIENT
Start: 2023-03-01

## 2023-03-01 RX ORDER — FLUTICASONE PROPIONATE AND SALMETEROL 50; 500 UG/1; UG/1
POWDER RESPIRATORY (INHALATION)
COMMUNITY
Start: 2023-02-22

## 2023-03-01 RX ORDER — MONTELUKAST SODIUM 10 MG/1
10 TABLET ORAL
COMMUNITY
Start: 2023-02-22

## 2023-03-01 RX ORDER — TIOTROPIUM BROMIDE INHALATION SPRAY 1.56 UG/1
SPRAY, METERED RESPIRATORY (INHALATION)
COMMUNITY
Start: 2023-03-01

## 2023-03-01 RX ORDER — AZELASTINE HYDROCHLORIDE AND FLUTICASONE PROPIONATE 137; 50 UG/1; UG/1
SPRAY, METERED NASAL
COMMUNITY
Start: 2023-02-22 | End: 2023-03-31

## 2023-03-01 RX ORDER — FLURBIPROFEN SODIUM 0.3 MG/ML
1 SOLUTION/ DROPS OPHTHALMIC
Qty: 450 EACH | Refills: 1 | Status: SHIPPED | OUTPATIENT
Start: 2023-03-01

## 2023-03-01 NOTE — PATIENT INSTRUCTIONS
Levemir 30 units twice a day.  Take this dose for period of 2 weeks.  If after 2 weeks your glucose levels are staying above 200 consistently go to 35 units twice a day.  Continue to monitor your blood sugars using your Dexcom and if they continue to stay above 200 after going up to 35 units then you can increase to 40 units twice a day and stay on this dose until your follow-up appointment.    NovoLog using 5, 7, or 10 units based on small, medium, or large amounts of carbohydrates with her meals.      Add NovoLog to the mealtime dose for blood glucose correction correction as follows:    If blood glucose is less than 150 mg/dl - 0 units  If blood glucose is between 151 and 175 - 2 units  If blood glucose is between 176 and 200 - 3 units  If blood glucose is between 201 and 225 - 4 units  If blood glucose is between 226 and 250 - 5 units  If blood glucose is between 251 and 275 - 6 units  If blood glucose is between 276 and 300 - 7 units  If blood glucose is between 301 and 325 - 8 units  If blood glucose is between 326 and 350 - 9 units  If blood glucose is 351 or above - 10 units

## 2023-03-01 NOTE — PROGRESS NOTES
Chief Complaint  Diabetes (1st time in office, follow up/med mgt, a1c eval, cgm eval, talk about a pump )    Referred By: YOKO Higginbotham presents to [facility] for diabetes medication management    History of Present Illness    Visit type:  follow-up  Diabetes type:  Type 2  Current diabetes status/concerns/issues:  She is interested in going on an Omnipod pump.  She was on the Omnipod when she was pregnant in 2020  Other health concerns: She was on an antibiotic for yeast infection 2 weeks ago; she is now c/o vaginal odor and itching.  She has been on steroids for asthma since mid February  Current Diabetes symptoms:    Polyuria: Yes   Polydipsia: Yes   Polyphagia: Yes   Blurred vision: No   Excessive fatigue: Yes   Known Diabetes complications:  Neuro: None  Renal: None  Eyes: None  Amputation/Wounds: None  GI: None  Cardiovascular: Hypertension  ED: N/A   Other: None  Hypoglycemia:  None reported at this time  Hypoglycemia Symptoms:  No hypoglycemia at this time  Current diabetes treatment:   Levemir 20 units twice a day.  She will also take NovoLog using 5, 7, or 10 units based on small, medium, or large amounts of carbohydrates with her meals.  Additionally she adds NovoLog to the mealtime dose for blood glucose correction correction as follows:  If blood glucose is less than 150 mg/dl - 0 units  If blood glucose is between 151 and 175 - 2 units  If blood glucose is between 176 and 200 - 3 units  If blood glucose is between 201 and 225 - 4 units  If blood glucose is between 226 and 250 - 5 units  If blood glucose is between 251 and 275 - 6 units  If blood glucose is between 276 and 300 - 7 units  If blood glucose is between 301 and 325 - 8 units  If blood glucose is between 326 and 350 - 9 units  If blood glucose is 351 or above - 10 units   She states she is only using around 10 units at most meals  Blood glucose device:  Dexcom  CGM  Blood glucose monitoring frequency:  Continuous per CGM  Blood glucose range/average: The 14-day sensor report shows a glucose average of 336 mg/dL with 98% of glucose levels falling above 250 mg/dL and 2% being between 180 and 250 mg/dL     Glucose Source: Device Reviewed  Diet:  she does drink some sweet tea  Activity/Exercise:  walking her dog    Past Medical History:   Diagnosis Date   • Anesthesia     slow to wake up postop, anxiety postop   • Anxiety    • AR (allergic rhinitis)    • ASD (atrial septal defect)     had since birth- asymptomatic- see's dr falk    • Asthma     no inhalers   • Bipolar disorder (HCC)    • Depression    • Diabetes mellitus (HCC)     type ii- bg runs around 200-300   • Gallstones currently   • GERD (gastroesophageal reflux disease)    • Hypertension    • Migraine    • Ovarian cyst    • Trauma      Past Surgical History:   Procedure Laterality Date   •  SECTION     • CHOLECYSTECTOMY N/A 2021    Procedure: CHOLECYSTECTOMY LAPAROSCOPIC;  Surgeon: Robert Araya MD;  Location: [facility];  Service: General;  Laterality: N/A;   • COLONOSCOPY     • TUBAL ABDOMINAL LIGATION     • WISDOM TOOTH EXTRACTION       Family History   Problem Relation Age of Onset   • Heart disease Father    • Heart disease Other    • Heart disease Other    • Heart disease Other    • Diabetes type II Other      Social History     Socioeconomic History   • Marital status:    • Number of children: 2   Tobacco Use   • Smoking status: Never   • Smokeless tobacco: Never   Vaping Use   • Vaping Use: Never used   Substance and Sexual Activity   • Alcohol use: Never   • Drug use: Never   • Sexual activity: Not Currently     Allergies   Allergen Reactions   • Amoxicillin Shortness Of Breath   • Hydrocodone Shortness Of Breath   • Nystatin Itching and Rash       Current Outpatient Medications:   •  albuterol sulfate  (90 Base) MCG/ACT inhaler, INHALE TWO puffs every FOUR TO  6 hours as needed for cough, wheeze or shortness of breath. Use with vortex spacer.], Disp: , Rfl:   •  Allergy Relief 5 MG tablet, Take 1 tablet by mouth Daily., Disp: , Rfl:   •  B-D UF III MINI PEN NEEDLES 31G X 5 MM misc, 1 each by Other route 5 (Five) Times a Day., Disp: 450 each, Rfl: 1  •  cetirizine (zyrTEC) 10 MG tablet, Take 1 tablet by mouth Daily., Disp: , Rfl:   •  Continuous Blood Gluc Sensor (Dexcom G6 Sensor), Every 10 (Ten) Days., Disp: 3 each, Rfl: 11  •  Continuous Blood Gluc Transmit (Dexcom G6 Transmitter) misc, 1 each Every 3 (Three) Months., Disp: 1 each, Rfl: 3  •  Dymista 137-50 MCG/ACT suspension, , Disp: , Rfl:   •  insulin aspart (NovoLOG FlexPen) 100 UNIT/ML solution pen-injector sc pen, Inject 18 Units under the skin into the appropriate area as directed 3 (Three) Times a Day With Meals. Adjust dose based on glucose levels and carbohydrate intake as directed, Disp: 45 mL, Rfl: 1  •  insulin detemir (Levemir FlexTouch) 100 UNIT/ML injection, Inject 40 Units under the skin into the appropriate area as directed 2 (Two) Times a Day., Disp: 72 mL, Rfl: 1  •  Insulin Pen Needle (Pen Needles) 32G X 5 MM misc, 1 each 4 (Four) Times a Day., Disp: 400 each, Rfl: 1  •  montelukast (SINGULAIR) 10 MG tablet, Take 1 tablet by mouth every night at bedtime., Disp: , Rfl:   •  predniSONE (DELTASONE) 20 MG tablet, Take one tablet by mouth twice a day for 7 days. Monitor blood sugar., Disp: , Rfl:   •  Spiriva Respimat 1.25 MCG/ACT aerosol solution inhaler, , Disp: , Rfl:   •  Advair Diskus 500-50 MCG/ACT DISKUS, INHALE ONE PUFF BY MOUTH EVERY TWELVE HOURS (RINSE MOUTH AFTER EACH USE), Disp: , Rfl:   •  fluconazole (DIFLUCAN) 150 MG tablet, Take 1 tablet by mouth 4 vulvovaginal itching and vaginal discharge.  If symptoms persist after 72 hours, take the second tablet., Disp: 2 tablet, Rfl: 0  •  metoprolol succinate XL (TOPROL-XL) 25 MG 24 hr tablet, Take 1 tablet by mouth Daily., Disp: , Rfl:     Review  "of Systems   Constitutional: Positive for fatigue. Negative for activity change, appetite change, unexpected weight gain and unexpected weight loss.   Eyes: Negative for blurred vision and visual disturbance.   Gastrointestinal: Negative for abdominal pain, constipation, diarrhea, nausea, vomiting, GERD and indigestion.   Endocrine: Positive for polydipsia, polyphagia and polyuria.   Neurological: Negative for numbness.        Objective     Vitals:    03/01/23 1306   BP: 128/71   BP Location: Right arm   Patient Position: Sitting   Cuff Size: Adult   Pulse: 112   Temp: 97.5 °F (36.4 °C)   SpO2: 97%   Weight: (!) 137 kg (302 lb)   Height: 157.5 cm (62\")   PainSc: 0-No pain     Body mass index is 55.24 kg/m².    Physical Exam  Constitutional:       Appearance: Normal appearance. She is obese.      Comments: Severe Obesity (BMI >= 40) Pt Current BMI = 56.11     HENT:      Head: Normocephalic and atraumatic.      Right Ear: External ear normal.      Left Ear: External ear normal.      Nose: Nose normal.   Eyes:      Extraocular Movements: Extraocular movements intact.      Conjunctiva/sclera: Conjunctivae normal.   Pulmonary:      Effort: Pulmonary effort is normal.   Musculoskeletal:         General: Normal range of motion.      Cervical back: Normal range of motion.   Skin:     General: Skin is warm and dry.   Neurological:      General: No focal deficit present.      Mental Status: She is alert and oriented to person, place, and time. Mental status is at baseline.   Psychiatric:         Mood and Affect: Mood normal.         Behavior: Behavior normal.         Thought Content: Thought content normal.         Judgment: Judgment normal.         Result Review :   The following data was reviewed by: YOKO Madrigal on 03/01/2023:    Most Recent A1C    HGBA1C Most Recent 3/1/23   Hemoglobin A1C 10 (A)   (A) Abnormal value              A1C Last 3 Results    HGBA1C Last 3 Results 5/18/22 9/9/22 3/1/23   Hemoglobin " A1C 9.90 (A) 10.30 (A) 10 (A)   (A) Abnormal value            Point-of-care A1c in the office today is 10.0% indicating uncontrolled type 2 diabetes.  This is down slightly from the prior result of 10.3 collected in September 2022    Glucose   Date Value Ref Range Status   03/01/2023 339 (H) 70 - 99 mg/dL Final     Comment:     Serial Number: [id number]:  [id number]     Point-of-care glucose in the office today is elevated at 339 mg/dL    Creatinine   Date Value Ref Range Status   12/06/2022 0.52 (L) 0.57 - 1.00 mg/dL Final   08/10/2022 0.43 (L) 0.57 - 1.00 mg/dL Final     eGFR   Date Value Ref Range Status   12/06/2022 130.0 >60.0 mL/min/1.73 Final     Comment:     National Kidney Foundation and American Society of Nephrology (ASN) Task Force recommended calculation based on the Chronic Kidney Disease Epidemiology Collaboration (CKD-EPI) equation refit without adjustment for race.   08/10/2022 136.1 >60.0 mL/min/1.73 Final     Comment:     National Kidney Foundation and American Society of Nephrology (ASN) Task Force recommended calculation based on the Chronic Kidney Disease Epidemiology Collaboration (CKD-EPI) equation refit without adjustment for race.     Labs collected on 12/6/2022 show normal GFR with decreased creatinine          Assessment: The patient's glucose levels are markedly elevated as well as her A1c.  Patient states she has been on steroids since February due to pulmonary issues.  The patient would like to go back on an insulin pump.  She expresses interest in the tandem insulin pump as well as a Dexcom continuous glucose sensor.  Patient was previously on an OmniPod pump during her prior pregnancy.    Patient is also complaining of symptoms of vaginal yeast infection and is requesting treatment    Diagnoses and all orders for this visit:    1. Uncontrolled type 2 diabetes mellitus with hyperglycemia (HCC) (Primary)  -     POC Glycosylated Hemoglobin (Hb A1C)  -     POC Glucose  -      insulin detemir (Levemir FlexTouch) 100 UNIT/ML injection; Inject 40 Units under the skin into the appropriate area as directed 2 (Two) Times a Day.  Dispense: 72 mL; Refill: 1  -     Insulin Pen Needle (Pen Needles) 32G X 5 MM misc; 1 each 4 (Four) Times a Day.  Dispense: 400 each; Refill: 1  -     B-D UF III MINI PEN NEEDLES 31G X 5 MM misc; 1 each by Other route 5 (Five) Times a Day.  Dispense: 450 each; Refill: 1  -     Continuous Blood Gluc Sensor (Dexcom G6 Sensor); Every 10 (Ten) Days.  Dispense: 3 each; Refill: 11  -     Continuous Blood Gluc Transmit (Dexcom G6 Transmitter) misc; 1 each Every 3 (Three) Months.  Dispense: 1 each; Refill: 3    2. Severe obesity (BMI >= 40) ([facility])    3. Type 2 diabetes mellitus without complication, with long-term current use of insulin ([facility])        Plan: We will attempt to process the patient for the tandem insulin pump as well as a Dexcom continuous glucose sensor.  If approved the patient will contact the office to schedule appointment for training on the devices.    In the meantime the patient was instructed to increase her Levemir to 30 units twice a day for a period of 2 weeks.  If glucose levels remain elevated above 200 consistently she will go to 35 units twice a day.  She may further increase to 40 units twice a day if necessary.    She will take NovoLog 5, 7, or 10 units based on small, medium, or large amounts of carbohydrates with her meal and will add correction for glucose levels greater than 150 mg/dL starting with 2 units and increasing by 1 unit for every 25 mg/dL thereafter    The patient will monitor her blood glucose levels 4 times a day or using the continuous glucose sensor.  If she develops problematic hyperglycemia or hypoglycemia or adverse drug reactions, she will contact the office for further instructions.        Follow Up     Return in about 3 months (around 6/1/2023) for Medication Management, CGM Follow-up.    Patient was given instructions and  counseling regarding her condition or for health maintenance advice. Please see specific information pulled into the AVS if appropriate.     Arcelia Mohan, APRN  03/01/2023      Dictated Utilizing Dragon Dictation.  Please note that portions of this note were completed with a voice recognition program.  Part of this note may be an electronic transcription/translation of spoken language to printed text using the Dragon Dictation System.   details…

## 2023-03-14 ENCOUNTER — TELEPHONE (OUTPATIENT)
Dept: DIABETES SERVICES | Facility: HOSPITAL | Age: 28
End: 2023-03-14
Payer: COMMERCIAL

## 2023-03-14 RX ORDER — PROCHLORPERAZINE 25 MG/1
1 SUPPOSITORY RECTAL
Qty: 1 EACH | Refills: 3 | Status: SHIPPED | OUTPATIENT
Start: 2023-03-14 | End: 2023-03-31

## 2023-03-14 RX ORDER — PROCHLORPERAZINE 25 MG/1
SUPPOSITORY RECTAL
Qty: 3 EACH | Refills: 11 | Status: SHIPPED | OUTPATIENT
Start: 2023-03-14

## 2023-03-17 ENCOUNTER — TELEPHONE (OUTPATIENT)
Dept: DIABETES SERVICES | Facility: HOSPITAL | Age: 28
End: 2023-03-17
Payer: COMMERCIAL

## 2023-03-17 NOTE — TELEPHONE ENCOUNTER
Pt called and stated that her insurance will not cover a Tandem due to her Omnipod still being in warranty until the end of the year, Pt stated you could write a nex RX for a new Omnipod  as well as the PODS and insurance would approve that. Please advise

## 2023-03-21 ENCOUNTER — HOSPITAL ENCOUNTER (EMERGENCY)
Facility: HOSPITAL | Age: 28
Discharge: HOME | End: 2023-03-21
Attending: EMERGENCY MEDICINE | Admitting: EMERGENCY MEDICINE
Payer: COMMERCIAL

## 2023-03-21 ENCOUNTER — TELEPHONE (OUTPATIENT)
Dept: DIABETES SERVICES | Facility: HOSPITAL | Age: 28
End: 2023-03-21
Payer: COMMERCIAL

## 2023-03-21 ENCOUNTER — APPOINTMENT (OUTPATIENT)
Dept: GENERAL RADIOLOGY | Facility: HOSPITAL | Age: 28
End: 2023-03-21
Payer: COMMERCIAL

## 2023-03-21 VITALS
TEMPERATURE: 97.7 F | DIASTOLIC BLOOD PRESSURE: 93 MMHG | OXYGEN SATURATION: 97 % | WEIGHT: 293 LBS | HEIGHT: 62 IN | BODY MASS INDEX: 53.92 KG/M2 | SYSTOLIC BLOOD PRESSURE: 130 MMHG | RESPIRATION RATE: 16 BRPM | HEART RATE: 106 BPM

## 2023-03-21 DIAGNOSIS — R73.9 HYPERGLYCEMIA: Primary | ICD-10-CM

## 2023-03-21 DIAGNOSIS — M25.571 ACUTE RIGHT ANKLE PAIN: ICD-10-CM

## 2023-03-21 LAB
ACETONE BLD QL: NEGATIVE
ALBUMIN SERPL-MCNC: 4 G/DL (ref 3.5–5.2)
ALBUMIN/GLOB SERPL: 1.4 G/DL
ALP SERPL-CCNC: 79 U/L (ref 39–117)
ALT SERPL W P-5'-P-CCNC: 19 U/L (ref 1–33)
ANION GAP SERPL CALCULATED.3IONS-SCNC: 10.7 MMOL/L (ref 5–15)
ANISOCYTOSIS BLD QL: NORMAL
AST SERPL-CCNC: 14 U/L (ref 1–32)
BACTERIA UR QL AUTO: ABNORMAL /HPF
BASE EXCESS BLDV CALC-SCNC: 0.3 MMOL/L (ref -2–2)
BASOPHILS # BLD AUTO: 0.03 10*3/MM3 (ref 0–0.2)
BASOPHILS NFR BLD AUTO: 0.3 % (ref 0–1.5)
BDY SITE: ABNORMAL
BILIRUB SERPL-MCNC: <0.2 MG/DL (ref 0–1.2)
BILIRUB UR QL STRIP: NEGATIVE
BUN SERPL-MCNC: 8 MG/DL (ref 6–20)
BUN/CREAT SERPL: 17 (ref 7–25)
CA-I BLDA-SCNC: 1.09 MMOL/L (ref 1.13–1.32)
CALCIUM SPEC-SCNC: 8.8 MG/DL (ref 8.6–10.5)
CHLORIDE BLDV-SCNC: 99 MMOL/L (ref 98–106)
CHLORIDE SERPL-SCNC: 99 MMOL/L (ref 98–107)
CLARITY UR: CLEAR
CO2 SERPL-SCNC: 25.3 MMOL/L (ref 22–29)
COHGB MFR BLD: 2 % (ref 0–1.5)
COLOR UR: YELLOW
CREAT SERPL-MCNC: 0.47 MG/DL (ref 0.57–1)
DEPRECATED RDW RBC AUTO: 38.3 FL (ref 37–54)
EGFRCR SERPLBLD CKD-EPI 2021: 133.2 ML/MIN/1.73
EOSINOPHIL # BLD AUTO: 0.1 10*3/MM3 (ref 0–0.4)
EOSINOPHIL NFR BLD AUTO: 1.2 % (ref 0.3–6.2)
ERYTHROCYTE [DISTWIDTH] IN BLOOD BY AUTOMATED COUNT: 13.2 % (ref 12.3–15.4)
FHHB: 14.6 % (ref 0–5)
GAS FLOW AIRWAY: ABNORMAL L/MIN
GLOBULIN UR ELPH-MCNC: 2.9 GM/DL
GLUCOSE BLDA-MCNC: 415 MG/DL (ref 65–99)
GLUCOSE BLDC GLUCOMTR-MCNC: 291 MG/DL (ref 70–99)
GLUCOSE SERPL-MCNC: 405 MG/DL (ref 65–99)
GLUCOSE UR STRIP-MCNC: ABNORMAL MG/DL
HCO3 BLDV-SCNC: 26.1 MMOL/L (ref 22–26)
HCT VFR BLD AUTO: 40.5 % (ref 34–46.6)
HGB BLD-MCNC: 13.4 G/DL (ref 12–15.9)
HGB BLDA-MCNC: 14.6 G/DL (ref 11.7–14.6)
HGB UR QL STRIP.AUTO: ABNORMAL
HOLD SPECIMEN: NORMAL
HOLD SPECIMEN: NORMAL
HYALINE CASTS UR QL AUTO: ABNORMAL /LPF
IMM GRANULOCYTES # BLD AUTO: 0.03 10*3/MM3 (ref 0–0.05)
IMM GRANULOCYTES NFR BLD AUTO: 0.3 % (ref 0–0.5)
INHALED O2 CONCENTRATION: 21 %
KETONES UR QL STRIP: NEGATIVE
LACTATE BLDA-SCNC: 3.14 MMOL/L (ref 0.5–2)
LEUKOCYTE ESTERASE UR QL STRIP.AUTO: NEGATIVE
LYMPHOCYTES # BLD AUTO: 2.76 10*3/MM3 (ref 0.7–3.1)
LYMPHOCYTES NFR BLD AUTO: 32.1 % (ref 19.6–45.3)
MCH RBC QN AUTO: 27 PG (ref 26.6–33)
MCHC RBC AUTO-ENTMCNC: 33.1 G/DL (ref 31.5–35.7)
MCV RBC AUTO: 81.5 FL (ref 79–97)
METHGB BLD QL: 0.1 % (ref 0–1.5)
MODALITY: ABNORMAL
MONOCYTES # BLD AUTO: 0.52 10*3/MM3 (ref 0.1–0.9)
MONOCYTES NFR BLD AUTO: 6.1 % (ref 5–12)
NEUTROPHILS NFR BLD AUTO: 5.15 10*3/MM3 (ref 1.7–7)
NEUTROPHILS NFR BLD AUTO: 60 % (ref 42.7–76)
NITRITE UR QL STRIP: NEGATIVE
NOTE: ABNORMAL
NRBC BLD AUTO-RTO: 0 /100 WBC (ref 0–0.2)
OXYHGB MFR BLDV: 83.3 % (ref 94–99)
PCO2 BLDV: 46.7 MM HG (ref 41–51)
PH BLDV: 7.37 PH UNITS (ref 7.31–7.41)
PH UR STRIP.AUTO: 6 [PH] (ref 5–8)
PLATELET # BLD AUTO: 250 10*3/MM3 (ref 140–450)
PMV BLD AUTO: 10.6 FL (ref 6–12)
PO2 BLDV: 47.7 MM HG (ref 35–42)
POTASSIUM BLDV-SCNC: 3.7 MMOL/L (ref 3.5–5)
POTASSIUM SERPL-SCNC: 3.7 MMOL/L (ref 3.5–5.2)
PROT SERPL-MCNC: 6.9 G/DL (ref 6–8.5)
PROT UR QL STRIP: NEGATIVE
RBC # BLD AUTO: 4.97 10*6/MM3 (ref 3.77–5.28)
RBC # UR STRIP: ABNORMAL /HPF
REF LAB TEST METHOD: ABNORMAL
SAO2 % BLDCOV: 85.1 % (ref 45–75)
SMALL PLATELETS BLD QL SMEAR: ADEQUATE
SODIUM BLDV-SCNC: 128.6 MMOL/L (ref 136–146)
SODIUM SERPL-SCNC: 135 MMOL/L (ref 136–145)
SP GR UR STRIP: >1.03 (ref 1–1.03)
SQUAMOUS #/AREA URNS HPF: ABNORMAL /HPF
UROBILINOGEN UR QL STRIP: ABNORMAL
WBC # UR STRIP: ABNORMAL /HPF
WBC MORPH BLD: NORMAL
WBC NRBC COR # BLD: 8.59 10*3/MM3 (ref 3.4–10.8)
WHOLE BLOOD HOLD COAG: NORMAL
WHOLE BLOOD HOLD SPECIMEN: NORMAL

## 2023-03-21 PROCEDURE — 85025 COMPLETE CBC W/AUTO DIFF WBC: CPT

## 2023-03-21 PROCEDURE — 73610 X-RAY EXAM OF ANKLE: CPT

## 2023-03-21 PROCEDURE — 96360 HYDRATION IV INFUSION INIT: CPT

## 2023-03-21 PROCEDURE — 82820 HEMOGLOBIN-OXYGEN AFFINITY: CPT

## 2023-03-21 PROCEDURE — 81001 URINALYSIS AUTO W/SCOPE: CPT

## 2023-03-21 PROCEDURE — 85007 BL SMEAR W/DIFF WBC COUNT: CPT

## 2023-03-21 PROCEDURE — 82805 BLOOD GASES W/O2 SATURATION: CPT

## 2023-03-21 PROCEDURE — 36415 COLL VENOUS BLD VENIPUNCTURE: CPT

## 2023-03-21 PROCEDURE — 99284 EMERGENCY DEPT VISIT MOD MDM: CPT

## 2023-03-21 PROCEDURE — 82962 GLUCOSE BLOOD TEST: CPT

## 2023-03-21 PROCEDURE — 80053 COMPREHEN METABOLIC PANEL: CPT

## 2023-03-21 PROCEDURE — 82009 KETONE BODYS QUAL: CPT

## 2023-03-21 RX ORDER — SODIUM CHLORIDE 0.9 % (FLUSH) 0.9 %
10 SYRINGE (ML) INJECTION AS NEEDED
Status: DISCONTINUED | OUTPATIENT
Start: 2023-03-21 | End: 2023-03-21 | Stop reason: HOSPADM

## 2023-03-21 RX ADMIN — Medication 10 ML: at 18:42

## 2023-03-21 RX ADMIN — SODIUM CHLORIDE 1000 ML: 9 INJECTION, SOLUTION INTRAVENOUS at 18:40

## 2023-03-21 NOTE — ED PROVIDER NOTES
Time: 5:41 PM EDT  Date of encounter:  3/21/2023  Independent Historian/Clinical History and Information was obtained by:   Patient  Chief Complaint   Patient presents with   • Hyperglycemia     Pt reports blood sugars in the 500's all day even with short and long acting insulins. Reports passing out this am. Swollen and painful R ankle       History is limited by: N/A    History of Present Illness:  Patient is a 27 y.o. year old female who presents to the emergency department for evaluation of hyperglycemia.  Patient states that her blood glucose has been high all day today.  Currently her Dexcom is reading high.  Patient denies history of DKA.  Patient does states she has been passing out today.  Patient has a chronic history of passing out when her sugar gets high and she has a service dog for that so none of that was new.  Patient admits to frequent urination.  (Provider in triage, Josue Naik PA-C)    [facility]    Patient Care Team  Primary Care Provider: Emilee Hi APRN    Past Medical History:     Allergies   Allergen Reactions   • Amoxicillin Shortness Of Breath   • Hydrocodone Shortness Of Breath   • Nystatin Itching and Rash     Past Medical History:   Diagnosis Date   • Anesthesia     slow to wake up postop, anxiety postop   • Anxiety    • AR (allergic rhinitis)    • ASD (atrial septal defect)     had since birth- asymptomatic- see's dr falk    • Asthma     no inhalers   • Bipolar disorder (HCC)    • Depression    • Diabetes mellitus (HCC)     type ii- bg runs around 200-300   • Gallstones currently   • GERD (gastroesophageal reflux disease)    • Hypertension    • Migraine    • Ovarian cyst    • Trauma      Past Surgical History:   Procedure Laterality Date   •  SECTION     • CHOLECYSTECTOMY N/A 2021    Procedure: CHOLECYSTECTOMY LAPAROSCOPIC;  Surgeon: Robert Araya MD;  Location: [facility] OR [facility];  Service: General;  Laterality: N/A;   • COLONOSCOPY     • TUBAL ABDOMINAL  LIGATION     • WISDOM TOOTH EXTRACTION       Family History   Problem Relation Age of Onset   • Heart disease Father    • Heart disease Other    • Heart disease Other    • Heart disease Other    • Diabetes type II Other        Home Medications:  Prior to Admission medications    Medication Sig Start Date End Date Taking? Authorizing Provider   Advair Diskus 500-50 MCG/ACT DISKUS INHALE ONE PUFF BY MOUTH EVERY TWELVE HOURS (RINSE MOUTH AFTER EACH USE) 2/22/23   Brock Govea MD   albuterol sulfate  (90 Base) MCG/ACT inhaler INHALE TWO puffs every FOUR TO 6 hours as needed for cough, wheeze or shortness of breath. Use with vortex spacer.] 2/22/23   Brock Govea MD   Allergy Relief 5 MG tablet Take 1 tablet by mouth Daily. 2/22/23   Brock Govea MD   B-D UF III MINI PEN NEEDLES 31G X 5 MM misc 1 each by Other route 5 (Five) Times a Day. 3/1/23   Arcelia Mohan APRN   cetirizine (zyrTEC) 10 MG tablet Take 1 tablet by mouth Daily. 2/22/23   Brock Govea MD   Continuous Blood Gluc Sensor (Dexcom G6 Sensor) Every 10 (Ten) Days. 3/14/23   Arcelia Mohan APRN   Continuous Blood Gluc Transmit (Dexcom G6 Transmitter) misc 1 each Every 3 (Three) Months. 3/14/23   Arcelia Mohan APRN   Dymista 137-50 MCG/ACT suspension  2/22/23   Brock Govea MD   fluconazole (DIFLUCAN) 150 MG tablet Take 1 tablet by mouth 4 vulvovaginal itching and vaginal discharge.  If symptoms persist after 72 hours, take the second tablet. 3/10/23   Lakesha Conway PA-C   insulin aspart (NovoLOG FlexPen) 100 UNIT/ML solution pen-injector sc pen Inject 18 Units under the skin into the appropriate area as directed 3 (Three) Times a Day With Meals. Adjust dose based on glucose levels and carbohydrate intake as directed 2/16/23   Arcelia Mohan APRN   insulin detemir (Levemir FlexTouch) 100 UNIT/ML injection Inject 40 Units under the skin into the appropriate area as directed 2 (Two)  "Times a Day. 3/1/23   Arcelia Mohan APRN   Insulin Pen Needle (Pen Needles) 32G X 5 MM misc 1 each 4 (Four) Times a Day. 3/1/23   Arcelia Mohan APRN   metoprolol succinate XL (TOPROL-XL) 25 MG 24 hr tablet Take 1 tablet by mouth Daily. 1/20/23   Brock Govea MD   montelukast (SINGULAIR) 10 MG tablet Take 1 tablet by mouth every night at bedtime. 2/22/23   Brock Govea MD   predniSONE (DELTASONE) 20 MG tablet Take one tablet by mouth twice a day for 7 days. Monitor blood sugar. 2/22/23   Brock Govea MD   Spiriva Respimat 1.25 MCG/ACT aerosol solution inhaler  3/1/23   Brock Govea MD        Social History:   Social History     Tobacco Use   • Smoking status: Never   • Smokeless tobacco: Never   Vaping Use   • Vaping Use: Never used   Substance Use Topics   • Alcohol use: Never   • Drug use: Never         Review of Systems:  Review of Systems   Constitutional: Negative for chills and fever.   HENT: Negative for congestion, ear pain and sore throat.    Eyes: Negative for pain.   Respiratory: Negative for cough, chest tightness and shortness of breath.    Cardiovascular: Negative for chest pain.   Gastrointestinal: Positive for nausea and vomiting. Negative for abdominal pain and diarrhea.   Genitourinary: Positive for frequency. Negative for flank pain and hematuria.   Musculoskeletal: Positive for arthralgias ( right ankle pain after she woke up from passing out). Negative for back pain, joint swelling and neck pain.   Skin: Negative for pallor.   Neurological: Positive for syncope ( recurrent when sugars high) and headaches. Negative for seizures.   Hematological: Negative.    Psychiatric/Behavioral: Negative.    All other systems reviewed and are negative.       Physical Exam:  /93   Pulse 104   Temp 97.7 °F (36.5 °C)   Resp 20   Ht 157.5 cm (62\")   Wt (!) 138 kg (304 lb 3.8 oz)   SpO2 97%   BMI 55.65 kg/m²     Physical Exam  Vitals and nursing note " reviewed.   Constitutional:       General: She is not in acute distress.     Appearance: Normal appearance. She is obese. She is not ill-appearing or toxic-appearing.   HENT:      Head: Normocephalic and atraumatic.      Right Ear: Tympanic membrane, ear canal and external ear normal.      Left Ear: Tympanic membrane and ear canal normal.      Nose: Nose normal.      Mouth/Throat:      Mouth: Mucous membranes are moist.      Pharynx: No posterior oropharyngeal erythema.   Eyes:      General: No scleral icterus.     Extraocular Movements: Extraocular movements intact.      Conjunctiva/sclera: Conjunctivae normal.      Pupils: Pupils are equal, round, and reactive to light.   Cardiovascular:      Rate and Rhythm: Normal rate and regular rhythm.      Pulses: Normal pulses.      Heart sounds: Normal heart sounds.   Pulmonary:      Effort: Pulmonary effort is normal. No respiratory distress.      Breath sounds: Normal breath sounds.   Abdominal:      General: Bowel sounds are normal. There is no distension.      Palpations: Abdomen is soft.      Tenderness: There is no abdominal tenderness. There is no right CVA tenderness or left CVA tenderness.   Musculoskeletal:         General: Tenderness ( Mild tenderness right lateral and anterior ankle) present. No swelling. Normal range of motion.      Cervical back: Normal range of motion and neck supple. No tenderness.   Skin:     General: Skin is warm and dry.      Capillary Refill: Capillary refill takes less than 2 seconds.   Neurological:      General: No focal deficit present.      Mental Status: She is alert and oriented to person, place, and time. Mental status is at baseline.      Cranial Nerves: No cranial nerve deficit.      Sensory: No sensory deficit.      Motor: No weakness.   Psychiatric:         Mood and Affect: Mood normal.         Behavior: Behavior normal.          Procedures:  Procedures      Medical Decision Making:      Comorbidities that affect  care:    Asthma, Diabetes, Hypertension, Obesity    External Notes reviewed:    Previous Clinic Note: Previous clinic notes reviewed last visit with Arcelia Velázquez was March 1 no new changes      The following orders were placed and all results were independently analyzed by me:  Orders Placed This Encounter   Procedures   • XR Ankle 3+ View Right   • [place] Draw   • Comprehensive Metabolic Panel   • Urinalysis With Microscopic If Indicated (No Culture) - Urine, Clean Catch   • Acetone   • VBG with Co-Ox and Electrolytes   • CBC Auto Differential   • Scan Slide   • Urinalysis, Microscopic Only - Urine, Clean Catch   • NPO Diet NPO Type: Strict NPO   • Undress & Gown   • Cardiac Monitoring   • Continuous Pulse Oximetry   • Vital Signs   • Apply ace wrap   • Oxygen Therapy- Nasal Cannula; 2 LPM; Titrate for SPO2: 92%, Greater Than or Equal To   • POC Glucose Q1H   • POC Glucose Once   • Insert Peripheral IV   • CBC & Differential   • Green Top (Gel)   • Lavender Top   • Gold Top - SST   • Light Blue Top       Medications Given in the Emergency Department:  Medications   sodium chloride 0.9 % flush 10 mL (10 mL Intravenous Given 3/21/23 1842)   sodium chloride 0.9 % bolus 1,000 mL (0 mL Intravenous Stopped 3/21/23 2054)        ED Course:    The patient was initially evaluated in the triage area where orders were placed. The patient was later dispositioned by YOKO Willis.      The patient was advised to stay for completion of workup which includes but is not limited to communication of labs and radiological results, reassessment and plan. The patient was advised that leaving prior to disposition by a provider could result in critical findings that are not communicated to the patient.     ED Course as of 03/21/23 2055   Tue Mar 21, 2023   1741 PROVIDER IN TRIAGE  Patient was evaluated by me in triage, Josue Naik PA-C.  Orders were placed and patient is currently awaiting final results and disposition.  [MD]    2009 XR Ankle 3+ View Right  No acute findings [DS]      ED Course User Index  [DS] Anabelle Osman APRN  [MD] Josue Naik PA-C       Labs:    Lab Results (last 24 hours)     Procedure Component Value Units Date/Time    CBC & Differential [[id number]] Collected: 03/21/23 1751    Specimen: Blood Updated: 03/21/23 1822    Narrative:      The following orders were created for panel order CBC & Differential.  Procedure                               Abnormality         Status                     ---------                               -----------         ------                     CBC Auto Differential[[id number]]        Normal              Final result               Scan Slide[[id number]]                                       Final result                 Please view results for these tests on the individual orders.    Comprehensive Metabolic Panel [[id number]]  (Abnormal) Collected: 03/21/23 1751    Specimen: Blood Updated: 03/21/23 1843     Glucose 405 mg/dL      BUN 8 mg/dL      Creatinine 0.47 mg/dL      Sodium 135 mmol/L      Potassium 3.7 mmol/L      Chloride 99 mmol/L      CO2 25.3 mmol/L      Calcium 8.8 mg/dL      Total Protein 6.9 g/dL      Albumin 4.0 g/dL      ALT (SGPT) 19 U/L      AST (SGOT) 14 U/L      Alkaline Phosphatase 79 U/L      Total Bilirubin <0.2 mg/dL      Globulin 2.9 gm/dL      A/G Ratio 1.4 g/dL      BUN/Creatinine Ratio 17.0     Anion Gap 10.7 mmol/L      eGFR 133.2 mL/min/1.73     Narrative:      GFR Normal >60  Chronic Kidney Disease <60  Kidney Failure <15      Acetone [[id number]]  (Normal) Collected: 03/21/23 1751    Specimen: Blood Updated: 03/21/23 1813     Acetone Negative    VBG with Co-Ox and Electrolytes [[id number]]  (Abnormal) Collected: 03/21/23 1751    Specimen: Venous Blood Updated: 03/21/23 1804     pH, Venous 7.366 pH Units      pCO2, Venous 46.7 mm Hg      pO2, Venous 47.7 mm Hg      HCO3, Venous 26.1 mmol/L      Base Excess, Venous 0.3 mmol/L      O2 Saturation, Venous  85.1 %      Hemoglobin, Blood Gas 14.6 g/dL      Carboxyhemoglobin 2.0 %      Methemoglobin 0.10 %      Oxyhemoglobin 83.3 %      FHHB 14.6 %      Note --     Site Venous     Modality Room Air     FIO2 21 %      Flow Rate --     Sodium, Venous 128.6 mmol/L      Potassium, Venous 3.7 mmol/L      Ionized Calcium, Arterial 1.09 mmol/L      Chloride, Venous  99 mmol/L      Glucose, Arterial 415 mg/dL      Lactate, Arterial 3.14 mmol/L     CBC Auto Differential [[id number]]  (Normal) Collected: 03/21/23 1751    Specimen: Blood Updated: 03/21/23 1822     WBC 8.59 10*3/mm3      RBC 4.97 10*6/mm3      Hemoglobin 13.4 g/dL      Hematocrit 40.5 %      MCV 81.5 fL      MCH 27.0 pg      MCHC 33.1 g/dL      RDW 13.2 %      RDW-SD 38.3 fl      MPV 10.6 fL      Platelets 250 10*3/mm3      Neutrophil % 60.0 %      Lymphocyte % 32.1 %      Monocyte % 6.1 %      Eosinophil % 1.2 %      Basophil % 0.3 %      Immature Grans % 0.3 %      Neutrophils, Absolute 5.15 10*3/mm3      Lymphocytes, Absolute 2.76 10*3/mm3      Monocytes, Absolute 0.52 10*3/mm3      Eosinophils, Absolute 0.10 10*3/mm3      Basophils, Absolute 0.03 10*3/mm3      Immature Grans, Absolute 0.03 10*3/mm3      nRBC 0.0 /100 WBC     Scan Slide [[id number]] Collected: 03/21/23 1751    Specimen: Blood Updated: 03/21/23 1822     Anisocytosis Slight/1+     WBC Morphology Normal     Platelet Estimate Adequate    Urinalysis With Microscopic If Indicated (No Culture) - Urine, Clean Catch [[id number]]  (Abnormal) Collected: 03/21/23 1821    Specimen: Urine, Clean Catch Updated: 03/21/23 1833     Color, UA Yellow     Appearance, UA Clear     pH, UA 6.0     Specific Gravity, UA >1.030     Glucose, UA >=1000 mg/dL (3+)     Ketones, UA Negative     Bilirubin, UA Negative     Blood, UA Large (3+)     Protein, UA Negative     Leuk Esterase, UA Negative     Nitrite, UA Negative     Urobilinogen, UA 0.2 E.U./dL    Urinalysis, Microscopic Only - Urine, Clean Catch [[id number]]  (Abnormal)  Collected: 03/21/23 1821    Specimen: Urine, Clean Catch Updated: 03/21/23 1833     RBC, UA Too Numerous to Count /HPF      WBC, UA 0-2 /HPF      Bacteria, UA None Seen /HPF      Squamous Epithelial Cells, UA 0-2 /HPF      Hyaline Casts, UA 0-2 /LPF      Methodology Automated Microscopy    POC Glucose Once [[id number]]  (Abnormal) Collected: 03/21/23 1936    Specimen: Blood Updated: 03/21/23 1937     Glucose 291 mg/dL      Comment: Serial Number: [id number]:  [id number]              Imaging:    XR Ankle 3+ View Right    Result Date: 3/21/2023  PROCEDURE: XR ANKLE 3+ VW RIGHT  COMPARISON: [facility], CR, ANKLE >OR= 3V RT, 6/05/2017, 12:04.  INDICATIONS: Right ankle pain.  FINDINGS:  There is apparent soft tissue swelling, but no acute fracture or dislocation is seen.  Ankle mortise appears intact.        No acute osseous abnormality is identified of the right ankle.      DIMPLE LUNDBERG MD       Electronically Signed and Approved By: DIMPLE LUNDBERG MD on 3/21/2023 at 20:00                 Differential Diagnosis and Discussion:      Metabolic: Differential diagnosis includes but is not limited to hypertension, hyperglycemia, hyperkalemia, hypocalcemia, metabolic acidosis, hypokalemia, hypoglycemia, malnutrition, hypothyroidism, hyperthyroidism, and adrenal insufficiency.     All labs were reviewed and interpreted by me.  All X-rays were independently reviewed by me.    MDM  Number of Diagnoses or Management Options  Acute right ankle pain  Hyperglycemia  Diagnosis management comments: I have explained the patient´s condition, diagnoses and treatment plan based on the information available to me at this time. I have answered questions and addressed any concerns. The patient has a good  understanding of the patient´s diagnosis, condition, and treatment plan as can be expected at this point. The vital signs have been stable. The patient´s condition is stable and appropriate for discharge  from the emergency department.      The patient will pursue further outpatient evaluation with the primary care physician or other designated or consulting physician as outlined in the discharge instructions. They are agreeable to this plan of care and follow-up instructions have been explained in detail. The patient has received these instructions in written format and have expressed an understanding of the discharge instructions. The patient is aware that any significant change in condition or worsening of symptoms should prompt an immediate return to this or the closest emergency department or call to 911.         Amount and/or Complexity of Data Reviewed  Clinical lab tests: reviewed and ordered  Tests in the radiology section of CPT®: reviewed and ordered  Tests in the medicine section of CPT®: ordered and reviewed    Risk of Complications, Morbidity, and/or Mortality  Presenting problems: low  Diagnostic procedures: low  Management options: low    Patient Progress  Patient progress: stable         Patient Care Considerations:    ANTIBIOTICS: I considered prescribing antibiotics as an outpatient however No infectious source or cause of elevated hypoglycemia noted      Consultants/Shared Management Plan:    None    Social Determinants of Health:    Patient is independent, reliable, and has access to care.       Disposition and Care Coordination:    Discharged: The patient is suitable and stable for discharge with no need for consideration of observation or admission.    I have explained the patient´s condition, diagnoses and treatment plan based on the information available to me at this time. I have answered questions and addressed any concerns. The patient has a good  understanding of the patient´s diagnosis, condition, and treatment plan as can be expected at this point. The vital signs have been stable. The patient´s condition is stable and appropriate for discharge from the emergency department.      The  patient will pursue further outpatient evaluation with the primary care physician or other designated or consulting physician as outlined in the discharge instructions. They are agreeable to this plan of care and follow-up instructions have been explained in detail. The patient has received these instructions in written format and have expressed an understanding of the discharge instructions. The patient is aware that any significant change in condition or worsening of symptoms should prompt an immediate return to this or the closest emergency department or call to 911.    Final diagnoses:   Hyperglycemia   Acute right ankle pain        ED Disposition     ED Disposition   Discharge    Condition   Stable    Comment   --             This medical record created using voice recognition software.           Anabelle Osman, APRN  03/21/23 6892

## 2023-03-21 NOTE — TELEPHONE ENCOUNTER
Patient called stating blood glucose was greater than 500 and she was passing out.  She wanted to know if she needed to go to ER.  I said yes.  Patient stated blood glucose was 399 at lunch, she ate 3 cups of chicken fried rice and took 10 units of insulin. Patient stated she had transportation tot the Emergency Department.

## 2023-03-24 LAB — GLUCOSE BLDC GLUCOMTR-MCNC: 431 MG/DL (ref 70–99)

## 2023-03-27 ENCOUNTER — TELEPHONE (OUTPATIENT)
Dept: DIABETES SERVICES | Facility: HOSPITAL | Age: 28
End: 2023-03-27
Payer: COMMERCIAL

## 2023-03-27 DIAGNOSIS — E11.65 UNCONTROLLED TYPE 2 DIABETES MELLITUS WITH HYPERGLYCEMIA: Primary | ICD-10-CM

## 2023-03-27 RX ORDER — PROCHLORPERAZINE 25 MG/1
1 SUPPOSITORY RECTAL ONCE
Qty: 1 EACH | Refills: 0 | Status: SHIPPED | OUTPATIENT
Start: 2023-03-27 | End: 2023-03-27

## 2023-03-27 RX ORDER — INSULIN PUMP CONTROLLER
1 EACH MISCELLANEOUS ONCE
Qty: 1 KIT | Refills: 0 | Status: SHIPPED | OUTPATIENT
Start: 2023-03-27 | End: 2023-03-27

## 2023-03-27 RX ORDER — INSULIN PUMP CONTROLLER
1 EACH MISCELLANEOUS
Qty: 10 EACH | Refills: 5 | Status: SHIPPED | OUTPATIENT
Start: 2023-03-27

## 2023-03-27 RX ORDER — PROCHLORPERAZINE 25 MG/1
1 SUPPOSITORY RECTAL ONCE
COMMUNITY
End: 2023-03-27 | Stop reason: SDUPTHER

## 2023-03-29 ENCOUNTER — PATIENT MESSAGE (OUTPATIENT)
Dept: DIABETES SERVICES | Facility: HOSPITAL | Age: 28
End: 2023-03-29
Payer: COMMERCIAL

## 2023-03-31 PROCEDURE — 82962 GLUCOSE BLOOD TEST: CPT

## 2023-04-04 ENCOUNTER — TELEPHONE (OUTPATIENT)
Dept: DIABETES SERVICES | Facility: HOSPITAL | Age: 28
End: 2023-04-04
Payer: COMMERCIAL

## 2023-04-04 RX ORDER — INSULIN ASPART 100 [IU]/ML
INJECTION, SOLUTION INTRAVENOUS; SUBCUTANEOUS
Qty: 30 ML | Refills: 5 | Status: SHIPPED | OUTPATIENT
Start: 2023-04-04

## 2023-04-04 RX ORDER — INSULIN PUMP CONTROLLER
1 EACH MISCELLANEOUS ONCE
Qty: 1 KIT | Refills: 0 | Status: SHIPPED | OUTPATIENT
Start: 2023-04-04 | End: 2023-04-04

## 2023-04-04 NOTE — TELEPHONE ENCOUNTER
Pt called and is asking for Omnipod  RX to be sent to [facility] in [place], Pt states she spoke with insurance and they will pay for another one, Pt is also asking for insulin to be sent to [facility] as well, Please advise

## 2023-05-17 ENCOUNTER — HOSPITAL ENCOUNTER (EMERGENCY)
Facility: HOSPITAL | Age: 28
Discharge: HOME | End: 2023-05-18
Attending: EMERGENCY MEDICINE
Payer: COMMERCIAL

## 2023-05-17 DIAGNOSIS — R19.7 DIARRHEA, UNSPECIFIED TYPE: Primary | ICD-10-CM

## 2023-05-17 LAB
ALBUMIN SERPL-MCNC: 4 G/DL (ref 3.5–5.2)
ALBUMIN/GLOB SERPL: 1.3 G/DL
ALP SERPL-CCNC: 92 U/L (ref 39–117)
ALT SERPL W P-5'-P-CCNC: 20 U/L (ref 1–33)
ANION GAP SERPL CALCULATED.3IONS-SCNC: 11.1 MMOL/L (ref 5–15)
AST SERPL-CCNC: 14 U/L (ref 1–32)
BACTERIA UR QL AUTO: ABNORMAL /HPF
BASOPHILS # BLD AUTO: 0.04 10*3/MM3 (ref 0–0.2)
BASOPHILS NFR BLD AUTO: 0.5 % (ref 0–1.5)
BILIRUB SERPL-MCNC: 0.3 MG/DL (ref 0–1.2)
BILIRUB UR QL STRIP: NEGATIVE
BUN SERPL-MCNC: 7 MG/DL (ref 6–20)
BUN/CREAT SERPL: 15.2 (ref 7–25)
CALCIUM SPEC-SCNC: 8.8 MG/DL (ref 8.6–10.5)
CHLORIDE SERPL-SCNC: 101 MMOL/L (ref 98–107)
CLARITY UR: ABNORMAL
CO2 SERPL-SCNC: 24.9 MMOL/L (ref 22–29)
COLOR UR: YELLOW
CREAT SERPL-MCNC: 0.46 MG/DL (ref 0.57–1)
DEPRECATED RDW RBC AUTO: 35.8 FL (ref 37–54)
EGFRCR SERPLBLD CKD-EPI 2021: 133.9 ML/MIN/1.73
EOSINOPHIL # BLD AUTO: 0.1 10*3/MM3 (ref 0–0.4)
EOSINOPHIL NFR BLD AUTO: 1.2 % (ref 0.3–6.2)
ERYTHROCYTE [DISTWIDTH] IN BLOOD BY AUTOMATED COUNT: 12.5 % (ref 12.3–15.4)
GLOBULIN UR ELPH-MCNC: 3.2 GM/DL
GLUCOSE SERPL-MCNC: 152 MG/DL (ref 65–99)
GLUCOSE UR STRIP-MCNC: NEGATIVE MG/DL
HCG INTACT+B SERPL-ACNC: <0.5 MIU/ML
HCT VFR BLD AUTO: 39.2 % (ref 34–46.6)
HGB BLD-MCNC: 13.4 G/DL (ref 12–15.9)
HGB UR QL STRIP.AUTO: NEGATIVE
HOLD SPECIMEN: NORMAL
HOLD SPECIMEN: NORMAL
HYALINE CASTS UR QL AUTO: ABNORMAL /LPF
IMM GRANULOCYTES # BLD AUTO: 0.06 10*3/MM3 (ref 0–0.05)
IMM GRANULOCYTES NFR BLD AUTO: 0.7 % (ref 0–0.5)
KETONES UR QL STRIP: ABNORMAL
LEUKOCYTE ESTERASE UR QL STRIP.AUTO: NEGATIVE
LIPASE SERPL-CCNC: 12 U/L (ref 13–60)
LYMPHOCYTES # BLD AUTO: 2.51 10*3/MM3 (ref 0.7–3.1)
LYMPHOCYTES NFR BLD AUTO: 31 % (ref 19.6–45.3)
MCH RBC QN AUTO: 27.2 PG (ref 26.6–33)
MCHC RBC AUTO-ENTMCNC: 34.2 G/DL (ref 31.5–35.7)
MCV RBC AUTO: 79.7 FL (ref 79–97)
MONOCYTES # BLD AUTO: 0.65 10*3/MM3 (ref 0.1–0.9)
MONOCYTES NFR BLD AUTO: 8 % (ref 5–12)
MUCOUS THREADS URNS QL MICRO: ABNORMAL /HPF
NEUTROPHILS NFR BLD AUTO: 4.74 10*3/MM3 (ref 1.7–7)
NEUTROPHILS NFR BLD AUTO: 58.6 % (ref 42.7–76)
NITRITE UR QL STRIP: NEGATIVE
NRBC BLD AUTO-RTO: 0 /100 WBC (ref 0–0.2)
PH UR STRIP.AUTO: 6 [PH] (ref 5–8)
PLATELET # BLD AUTO: 285 10*3/MM3 (ref 140–450)
PMV BLD AUTO: 10.1 FL (ref 6–12)
POTASSIUM SERPL-SCNC: 4 MMOL/L (ref 3.5–5.2)
PROT SERPL-MCNC: 7.2 G/DL (ref 6–8.5)
PROT UR QL STRIP: ABNORMAL
RBC # BLD AUTO: 4.92 10*6/MM3 (ref 3.77–5.28)
RBC # UR STRIP: ABNORMAL /HPF
REF LAB TEST METHOD: ABNORMAL
SODIUM SERPL-SCNC: 137 MMOL/L (ref 136–145)
SP GR UR STRIP: >1.03 (ref 1–1.03)
SQUAMOUS #/AREA URNS HPF: ABNORMAL /HPF
UROBILINOGEN UR QL STRIP: ABNORMAL
WBC # UR STRIP: ABNORMAL /HPF
WBC NRBC COR # BLD: 8.1 10*3/MM3 (ref 3.4–10.8)
WHOLE BLOOD HOLD COAG: NORMAL
WHOLE BLOOD HOLD SPECIMEN: NORMAL

## 2023-05-17 PROCEDURE — 84702 CHORIONIC GONADOTROPIN TEST: CPT

## 2023-05-17 PROCEDURE — 96374 THER/PROPH/DIAG INJ IV PUSH: CPT

## 2023-05-17 PROCEDURE — 25010000002 DIPHENHYDRAMINE PER 50 MG

## 2023-05-17 PROCEDURE — 83690 ASSAY OF LIPASE: CPT

## 2023-05-17 PROCEDURE — 85025 COMPLETE CBC W/AUTO DIFF WBC: CPT

## 2023-05-17 PROCEDURE — 80053 COMPREHEN METABOLIC PANEL: CPT

## 2023-05-17 PROCEDURE — 36415 COLL VENOUS BLD VENIPUNCTURE: CPT

## 2023-05-17 PROCEDURE — 81001 URINALYSIS AUTO W/SCOPE: CPT

## 2023-05-17 PROCEDURE — 25010000002 KETOROLAC TROMETHAMINE PER 15 MG

## 2023-05-17 PROCEDURE — 25010000002 METOCLOPRAMIDE PER 10 MG

## 2023-05-17 PROCEDURE — 96372 THER/PROPH/DIAG INJ SC/IM: CPT

## 2023-05-17 PROCEDURE — 25010000002 DICYCLOMINE PER 20 MG: Performed by: EMERGENCY MEDICINE

## 2023-05-17 PROCEDURE — 99283 EMERGENCY DEPT VISIT LOW MDM: CPT

## 2023-05-17 PROCEDURE — 96375 TX/PRO/DX INJ NEW DRUG ADDON: CPT

## 2023-05-17 RX ORDER — KETOROLAC TROMETHAMINE 30 MG/ML
30 INJECTION, SOLUTION INTRAMUSCULAR; INTRAVENOUS ONCE
Status: COMPLETED | OUTPATIENT
Start: 2023-05-17 | End: 2023-05-17

## 2023-05-17 RX ORDER — DIPHENHYDRAMINE HYDROCHLORIDE 50 MG/ML
25 INJECTION INTRAMUSCULAR; INTRAVENOUS ONCE
Status: COMPLETED | OUTPATIENT
Start: 2023-05-17 | End: 2023-05-17

## 2023-05-17 RX ORDER — METOCLOPRAMIDE HYDROCHLORIDE 5 MG/ML
10 INJECTION INTRAMUSCULAR; INTRAVENOUS ONCE
Status: COMPLETED | OUTPATIENT
Start: 2023-05-17 | End: 2023-05-17

## 2023-05-17 RX ORDER — ONDANSETRON 4 MG/1
4 TABLET, ORALLY DISINTEGRATING ORAL ONCE
Status: DISCONTINUED | OUTPATIENT
Start: 2023-05-17 | End: 2023-05-17

## 2023-05-17 RX ORDER — SODIUM CHLORIDE 0.9 % (FLUSH) 0.9 %
10 SYRINGE (ML) INJECTION AS NEEDED
Status: DISCONTINUED | OUTPATIENT
Start: 2023-05-17 | End: 2023-05-18 | Stop reason: HOSPADM

## 2023-05-17 RX ORDER — DICYCLOMINE HYDROCHLORIDE 10 MG/ML
20 INJECTION INTRAMUSCULAR ONCE
Status: COMPLETED | OUTPATIENT
Start: 2023-05-17 | End: 2023-05-17

## 2023-05-17 RX ADMIN — METOCLOPRAMIDE 10 MG: 5 INJECTION, SOLUTION INTRAMUSCULAR; INTRAVENOUS at 22:58

## 2023-05-17 RX ADMIN — SODIUM CHLORIDE 1000 ML: 9 INJECTION, SOLUTION INTRAVENOUS at 22:54

## 2023-05-17 RX ADMIN — KETOROLAC TROMETHAMINE 30 MG: 30 INJECTION, SOLUTION INTRAMUSCULAR; INTRAVENOUS at 22:57

## 2023-05-17 RX ADMIN — DIPHENHYDRAMINE HYDROCHLORIDE 25 MG: 50 INJECTION INTRAMUSCULAR; INTRAVENOUS at 22:56

## 2023-05-17 RX ADMIN — DICYCLOMINE HYDROCHLORIDE 20 MG: 20 INJECTION, SOLUTION INTRAMUSCULAR at 23:51

## 2023-05-17 NOTE — ED PROVIDER NOTES
"Time: 7:40 PM EDT  Date of encounter:  2023  Independent Historian/Clinical History and Information was obtained by:   Patient  Chief Complaint   Patient presents with   • Diarrhea   • Vomiting     For \"two weeks\".       History is limited by: N/A    History of Present Illness:  Patient is a 27 y.o. year old female who presents to the emergency department for evaluation of vomiting.  Patient states that she has had nausea, vomiting, and diarrhea for the past 2 weeks.  Patient states she thought it may be related to her diabetes but her blood sugar has been well controlled.  Patient has no cough or hemoptysis.  Patient denies dysuria and urinary frequency.    HPI    Patient Care Team  Primary Care Provider: Emilee Hi APRN    Past Medical History:     No Known Allergies  Past Medical History:   Diagnosis Date   • Anesthesia     slow to wake up postop, anxiety postop   • Anxiety    • AR (allergic rhinitis)    • ASD (atrial septal defect)     had since birth- asymptomatic- see's dr falk    • Asthma     no inhalers   • Bipolar disorder    • Depression    • Diabetes mellitus     type ii- bg runs around 200-300   • Gallstones currently   • GERD (gastroesophageal reflux disease)    • Hypertension    • Migraine    • Ovarian cyst    • Trauma      Past Surgical History:   Procedure Laterality Date   •  SECTION     • CHOLECYSTECTOMY N/A 2021    Procedure: CHOLECYSTECTOMY LAPAROSCOPIC;  Surgeon: Robert rAaya MD;  [place] [facility] [place] [facility] [place]  Service: General;  Laterality: N/A;   • COLONOSCOPY     • TUBAL ABDOMINAL LIGATION     • WISDOM TOOTH EXTRACTION       Family History   Problem Relation Age of Onset   • Heart disease Father    • Heart disease Other    • Heart disease Other    • Heart disease Other    • Diabetes type II Other        Home Medications:  Prior to Admission medications    Medication Sig Start Date End Date Taking? Authorizing Provider   Advair Diskus 500-50 MCG/ACT DISKUS " INHALE ONE PUFF BY MOUTH EVERY TWELVE HOURS (RINSE MOUTH AFTER EACH USE) 2/22/23   Brock Govea MD   albuterol sulfate  (90 Base) MCG/ACT inhaler INHALE TWO puffs every FOUR TO 6 hours as needed for cough, wheeze or shortness of breath. Use with vortex spacer.] 2/22/23   Brock Govea MD B-D UF III MINI PEN NEEDLES 31G X 5 MM misc 1 each by Other route 5 (Five) Times a Day. 3/1/23   Arcelia Mohan APRN   cetirizine (zyrTEC) 10 MG tablet Take 1 tablet by mouth Daily. 2/22/23   Brock Govea MD   Continuous Blood Gluc  (Dexcom G6 ) device USE AS DIRECTED 365 DAYS 3/27/23   Brock Govea MD   Continuous Blood Gluc Sensor (Dexcom G6 Sensor) Every 10 (Ten) Days. 3/14/23   Arcelia Mohan APRN   famotidine (PEPCID) 40 MG tablet Take 1 tablet by mouth Daily. 3/31/23   Ren Pepper PA   fluconazole (DIFLUCAN) 150 MG tablet Take 1 tablet by mouth 4 vulvovaginal itching and vaginal discharge.  If symptoms persist after 72 hours, take the second tablet. 3/10/23   Lakesha Conway PA-C   Insulin Disposable Pump (Omnipod DASH Pods, Gen 4,) misc 1 each Every 3 (Three) Days. 3/27/23   Arcelia Mohan APRN   Insulin Pen Needle (Pen Needles) 32G X 5 MM misc 1 each 4 (Four) Times a Day. 3/1/23   Arcelia Mohan APRN   loperamide (IMODIUM) 2 MG capsule Take 1 capsule by mouth 4 (Four) Times a Day As Needed for Diarrhea. 3/31/23   Ren Pepper PA   metFORMIN (GLUCOPHAGE) 500 MG tablet Take 1 tablet by mouth every night at bedtime.    Brock Govea MD   metoprolol succinate XL (TOPROL-XL) 25 MG 24 hr tablet Take 1 tablet by mouth Daily. 1/20/23   Brock Govea MD   montelukast (SINGULAIR) 10 MG tablet Take 1 tablet by mouth every night at bedtime. 2/22/23   Provider, Historical, MD   ondansetron ODT (ZOFRAN-ODT) 4 MG disintegrating tablet Place 1 tablet on the tongue Every 8 (Eight) Hours As Needed for Nausea or Vomiting. 3/31/23    "Ren Pepper PA   sertraline (ZOLOFT) 25 MG tablet Take 1 tablet by mouth Daily. 3/13/23   Provider, MD Brock   Spiriva Respimat 1.25 MCG/ACT aerosol solution inhaler  3/1/23   Provider, MD Brock        Social History:   Social History     Tobacco Use   • Smoking status: Never   • Smokeless tobacco: Never   Vaping Use   • Vaping Use: Never used   Substance Use Topics   • Alcohol use: Never   • Drug use: Never         Review of Systems:  Review of Systems   Constitutional: Negative for chills and fever.   HENT: Negative for congestion, rhinorrhea and sore throat.    Eyes: Negative for pain and visual disturbance.   Respiratory: Negative for apnea, cough, chest tightness and shortness of breath.    Cardiovascular: Negative for chest pain and palpitations.   Gastrointestinal: Positive for diarrhea and vomiting. Negative for abdominal pain and nausea.   Genitourinary: Negative for difficulty urinating and dysuria.   Musculoskeletal: Negative for joint swelling and myalgias.   Skin: Negative for color change.   Neurological: Positive for headaches. Negative for seizures.   Psychiatric/Behavioral: Negative.    All other systems reviewed and are negative.       Physical Exam:  /75   Pulse 118   Temp 98.6 °F (37 °C)   Resp 20   Ht 157.5 cm (62\")   Wt (!) 137 kg (301 lb 5.9 oz)   SpO2 96%   BMI 55.12 kg/m²     Physical Exam  Vitals and nursing note reviewed.   Constitutional:       General: She is not in acute distress.     Appearance: Normal appearance. She is not toxic-appearing.   HENT:      Head: Normocephalic and atraumatic.      Jaw: There is normal jaw occlusion.      Mouth/Throat:      Mouth: Mucous membranes are moist.   Eyes:      General: Lids are normal.      Extraocular Movements: Extraocular movements intact.      Conjunctiva/sclera: Conjunctivae normal.      Pupils: Pupils are equal, round, and reactive to light.   Cardiovascular:      Rate and Rhythm: Normal rate and regular " rhythm.      Pulses: Normal pulses.      Heart sounds: Normal heart sounds.   Pulmonary:      Effort: Pulmonary effort is normal. No respiratory distress.      Breath sounds: Normal breath sounds. No wheezing or rhonchi.   Abdominal:      General: Abdomen is flat. There is no distension.      Palpations: Abdomen is soft.      Tenderness: There is no abdominal tenderness. There is no guarding or rebound.   Musculoskeletal:         General: Normal range of motion.      Cervical back: Normal range of motion and neck supple.      Right lower leg: No edema.      Left lower leg: No edema.   Skin:     General: Skin is warm and dry.   Neurological:      General: No focal deficit present.      Mental Status: She is alert and oriented to person, place, and time. Mental status is at baseline.   Psychiatric:         Mood and Affect: Mood normal.         Behavior: Behavior normal.                  Procedures:  Procedures      Medical Decision Making:      Comorbidities that affect care:    Diabetes    External Notes reviewed:    Previous Clinic Note: Patient was recently seen in urgent care and diagnosed with strep throat.      The following orders were placed and all results were independently analyzed by me:  Orders Placed This Encounter   Procedures   • Clostridioides difficile Toxin - Stool, Per Rectum   • Clostridioides difficile Toxin, PCR - Stool, Per Rectum   • [place] Draw   • Comprehensive Metabolic Panel   • Lipase   • Urinalysis With Microscopic If Indicated (No Culture) - Urine, Clean Catch   • hCG, Quantitative, Pregnancy   • CBC Auto Differential   • Urinalysis, Microscopic Only - Urine, Clean Catch   • NPO Diet NPO Type: Strict NPO   • Undress & Gown   • Insert Peripheral IV   • CBC & Differential   • Green Top (Gel)   • Lavender Top   • Gold Top - SST   • Light Blue Top       Medications Given in the Emergency Department:  Medications   sodium chloride 0.9 % flush 10 mL (has no administration in time range)    sodium chloride 0.9 % bolus 1,000 mL (0 mL Intravenous Stopped 5/18/23 0019)   metoclopramide (REGLAN) injection 10 mg (10 mg Intravenous Given 5/17/23 2258)   diphenhydrAMINE (BENADRYL) injection 25 mg (25 mg Intravenous Given 5/17/23 2256)   ketorolac (TORADOL) injection 30 mg (30 mg Intravenous Given 5/17/23 2257)   dicyclomine (BENTYL) injection 20 mg (20 mg Intramuscular Given 5/17/23 2351)        ED Course:    The patient was initially evaluated in the triage area where orders were placed. The patient was later dispositioned by Ramirez Coley MD.      The patient was advised to stay for completion of workup which includes but is not limited to communication of labs and radiological results, reassessment and plan. The patient was advised that leaving prior to disposition by a provider could result in critical findings that are not communicated to the patient.     ED Course as of 05/18/23 0022   Wed May 17, 2023   1942 PROVIDER IN TRIAGE  Patient was evaluated by me in triage, Josue Naik PA-C.  Orders were placed and patient is currently awaiting final results and disposition.  [MD]      ED Course User Index  [MD] Josue Naik PA-C       Labs:    Lab Results (last 24 hours)     Procedure Component Value Units Date/Time    CBC & Differential [[id number]]  (Abnormal) Collected: 05/17/23 1827    Specimen: Blood Updated: 05/17/23 1848    Narrative:      The following orders were created for panel order CBC & Differential.  Procedure                               Abnormality         Status                     ---------                               -----------         ------                     CBC Auto Differential[[id number]]        Abnormal            Final result                 Please view results for these tests on the individual orders.    Comprehensive Metabolic Panel [[id number]]  (Abnormal) Collected: 05/17/23 1827    Specimen: Blood Updated: 05/17/23 1908     Glucose 152 mg/dL      BUN 7  mg/dL      Creatinine 0.46 mg/dL      Sodium 137 mmol/L      Potassium 4.0 mmol/L      Chloride 101 mmol/L      CO2 24.9 mmol/L      Calcium 8.8 mg/dL      Total Protein 7.2 g/dL      Albumin 4.0 g/dL      ALT (SGPT) 20 U/L      AST (SGOT) 14 U/L      Alkaline Phosphatase 92 U/L      Total Bilirubin 0.3 mg/dL      Globulin 3.2 gm/dL      A/G Ratio 1.3 g/dL      BUN/Creatinine Ratio 15.2     Anion Gap 11.1 mmol/L      eGFR 133.9 mL/min/1.73     Narrative:      GFR Normal >60  Chronic Kidney Disease <60  Kidney Failure <15      Lipase [[id number]]  (Abnormal) Collected: 05/17/23 1827    Specimen: Blood Updated: 05/17/23 1908     Lipase 12 U/L     hCG, Quantitative, Pregnancy [[id number]] Collected: 05/17/23 1827    Specimen: Blood Updated: 05/17/23 1910     HCG Quantitative <0.50 mIU/mL     Narrative:      HCG Ranges by Gestational Age    Females - non-pregnant premenopausal   </= 1mIU/mL HCG  Females - postmenopausal               </= 7mIU/mL HCG    3 Weeks       5.4   -      72 mIU/mL  4 Weeks      10.2   -     708 mIU/mL  5 Weeks       217   -   8,245 mIU/mL  6 Weeks       152   -  32,177 mIU/mL  7 Weeks     4,059   - 153,767 mIU/mL  8 Weeks    31,366   - 149,094 mIU/mL  9 Weeks    59,109   - 135,901 mIU/mL  10 Weeks   44,186   - 170,409 mIU/mL  12 Weeks   27,107   - 201,615 mIU/mL  14 Weeks   24,302   -  93,646 mIU/mL  15 Weeks   12,540   -  69,747 mIU/mL  16 Weeks    8,904   -  55,332 mIU/mL  17 Weeks    8,240   -  51,793 mIU/mL  18 Weeks    9,649   -  55,271 mIU/mL      CBC Auto Differential [[id number]]  (Abnormal) Collected: 05/17/23 1827    Specimen: Blood Updated: 05/17/23 1848     WBC 8.10 10*3/mm3      RBC 4.92 10*6/mm3      Hemoglobin 13.4 g/dL      Hematocrit 39.2 %      MCV 79.7 fL      MCH 27.2 pg      MCHC 34.2 g/dL      RDW 12.5 %      RDW-SD 35.8 fl      MPV 10.1 fL      Platelets 285 10*3/mm3      Neutrophil % 58.6 %      Lymphocyte % 31.0 %      Monocyte % 8.0 %      Eosinophil % 1.2 %      Basophil  % 0.5 %      Immature Grans % 0.7 %      Neutrophils, Absolute 4.74 10*3/mm3      Lymphocytes, Absolute 2.51 10*3/mm3      Monocytes, Absolute 0.65 10*3/mm3      Eosinophils, Absolute 0.10 10*3/mm3      Basophils, Absolute 0.04 10*3/mm3      Immature Grans, Absolute 0.06 10*3/mm3      nRBC 0.0 /100 WBC     Urinalysis With Microscopic If Indicated (No Culture) - Urine, Clean Catch [[id number]]  (Abnormal) Collected: 05/17/23 1915    Specimen: Urine, Clean Catch Updated: 05/17/23 1954     Color, UA Yellow     Appearance, UA Cloudy     pH, UA 6.0     Specific Gravity, UA >1.030     Glucose, UA Negative     Ketones, UA Trace     Bilirubin, UA Negative     Blood, UA Negative     Protein, UA 30 mg/dL (1+)     Leuk Esterase, UA Negative     Nitrite, UA Negative     Urobilinogen, UA 0.2 E.U./dL    Urinalysis, Microscopic Only - Urine, Clean Catch [[id number]]  (Abnormal) Collected: 05/17/23 1915    Specimen: Urine, Clean Catch Updated: 05/17/23 1958     RBC, UA None Seen /HPF      WBC, UA 0-2 /HPF      Bacteria, UA Trace /HPF      Squamous Epithelial Cells, UA 3-6 /HPF      Hyaline Casts, UA None Seen /LPF      Mucus, UA Trace /HPF      Methodology Manual Light Microscopy           Imaging:    No Radiology Exams Resulted Within Past 24 Hours      Differential Diagnosis and Discussion:      Diarrhea: Differential diagnosis includes but is not limited to malabsorption syndrome, bacterial infection, carcinoid syndrome, pancreatic hypersecretion, viral infection, celiac sprue, Crohn's disease, ulcerative colitis, ischemic colitis, colitis, hypermotility, and irritable bowel syndrome.    All labs were reviewed and interpreted by me.    MDM  Number of Diagnoses or Management Options  Diarrhea, unspecified type  Diagnosis management comments: The patient presents with vomiting and diarrhea consistent with gastroenteritis.  The patient´s CBC that was reviewed and interpreted by me shows no abnormalities of critical concern. Of note,  there is no anemia requiring a blood transfusion and the platelet count is acceptable.  The patient´s CMP that was reviewed and interpretted by me shows no abnormalities of critical concern. Of note, the patient´s sodium and potassium are acceptable. The patient´s liver enzymes are unremarkable. The patient´s renal function (creatinine) is preserved. The patient has a normal anion gap.  Urinalysis shows trace bacteria with 3-6 squamous epithelial cells.  The patient has a soft and benign abdominal exam. The patient is now resting comfortably and feels better, is alert, and is in no distress. The patient is able to tolerate po intake in the ED. The patient´s labs were reviewed and hydration status was assessed. The patient has no signs of acute renal failure, sepsis, or dehydration that warrants admission to the hospital. The vital signs have been stable. The patient's condition is stable and appropriate for discharge. The patient will pursue further outpatient evaluation with the primary care physician or designated physician. The patient and/or caregivers have expressed a clear and thorough understanding and agreed to follow-up as instructed.       Amount and/or Complexity of Data Reviewed  Clinical lab tests: reviewed  Independent visualization of images, tracings, or specimens: yes    Risk of Complications, Morbidity, and/or Mortality  Presenting problems: moderate  Management options: moderate    Patient Progress  Patient progress: stable           Patient Care Considerations:    CT ABDOMEN AND PELVIS: I considered ordering a CT scan of the abdomen and pelvis however Abdomen is soft and nontender.      Consultants/Shared Management Plan:    None    Social Determinants of Health:    Patient is independent, reliable, and has access to care.       Disposition and Care Coordination:    Discharged: I considered escalation of care by admitting this patient for observation, however the patient has improved and is  suitable and  stable for discharge.    I have explained the patient´s condition, diagnoses and treatment plan based on the information available to me at this time. I have answered questions and addressed any concerns. The patient has a good  understanding of the patient´s diagnosis, condition, and treatment plan as can be expected at this point. The vital signs have been stable. The patient´s condition is stable and appropriate for discharge from the emergency department.      The patient will pursue further outpatient evaluation with the primary care physician or other designated or consulting physician as outlined in the discharge instructions. They are agreeable to this plan of care and follow-up instructions have been explained in detail. The patient has received these instructions in written format and have expressed an understanding of the discharge instructions. The patient is aware that any significant change in condition or worsening of symptoms should prompt an immediate return to this or the closest emergency department or call to 911.  I have explained discharge medications and the need for follow up with the patient/caretakers. This was also printed in the discharge instructions. Patient was discharged with the following medications and follow up:      Medication List      New Prescriptions    dicyclomine 20 MG tablet  Commonly known as: BENTYL  Take 1 tablet by mouth Every 6 (Six) Hours.     metroNIDAZOLE 500 MG tablet  Commonly known as: FLAGYL  Take 1 tablet by mouth 3 (Three) Times a Day.           Where to Get Your Medications      These medications were sent to [facility] - [place], [place] - [address] - [phone]  - [phone] FX  [address] [facility] [address]    Phone: [phone]   · dicyclomine 20 MG tablet  · metroNIDAZOLE 500 MG tablet  · ondansetron ODT 4 MG disintegrating tablet      Emilee Hi, APRN  [address]  [address]  [facility]  [address]  [phone]    In 2 days         Final diagnoses:   Diarrhea, unspecified type        ED Disposition     ED Disposition   Discharge    Condition   Stable    Comment   --             This medical record created using voice recognition software.           Ramirez Coley MD  05/18/23 0020       Ramirez Coley MD  05/18/23 0022

## 2023-05-18 ENCOUNTER — APPOINTMENT (OUTPATIENT)
Dept: CT IMAGING | Facility: HOSPITAL | Age: 28
End: 2023-05-18
Payer: COMMERCIAL

## 2023-05-18 VITALS
TEMPERATURE: 98.6 F | RESPIRATION RATE: 20 BRPM | WEIGHT: 293 LBS | HEIGHT: 62 IN | HEART RATE: 118 BPM | BODY MASS INDEX: 53.92 KG/M2 | DIASTOLIC BLOOD PRESSURE: 75 MMHG | SYSTOLIC BLOOD PRESSURE: 107 MMHG | OXYGEN SATURATION: 96 %

## 2023-05-18 VITALS
WEIGHT: 293 LBS | SYSTOLIC BLOOD PRESSURE: 114 MMHG | RESPIRATION RATE: 18 BRPM | TEMPERATURE: 98.4 F | OXYGEN SATURATION: 99 % | DIASTOLIC BLOOD PRESSURE: 59 MMHG | BODY MASS INDEX: 53.92 KG/M2 | HEART RATE: 107 BPM | HEIGHT: 62 IN

## 2023-05-18 DIAGNOSIS — T71.193A ASSAULT BY MANUAL STRANGULATION: ICD-10-CM
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
## 2023-05-18 LAB
ALBUMIN SERPL-MCNC: 3.7 G/DL (ref 3.5–5.2)
ALBUMIN/GLOB SERPL: 1.3 G/DL
ALP SERPL-CCNC: 85 U/L (ref 39–117)
ALT SERPL W P-5'-P-CCNC: 20 U/L (ref 1–33)
ANION GAP SERPL CALCULATED.3IONS-SCNC: 10.5 MMOL/L (ref 5–15)
AST SERPL-CCNC: 13 U/L (ref 1–32)
BASOPHILS # BLD AUTO: 0.03 10*3/MM3 (ref 0–0.2)
BASOPHILS NFR BLD AUTO: 0.3 % (ref 0–1.5)
BILIRUB SERPL-MCNC: 0.2 MG/DL (ref 0–1.2)
BUN SERPL-MCNC: 8 MG/DL (ref 6–20)
BUN/CREAT SERPL: 18.2 (ref 7–25)
CALCIUM SPEC-SCNC: 9 MG/DL (ref 8.6–10.5)
CHLORIDE SERPL-SCNC: 100 MMOL/L (ref 98–107)
CO2 SERPL-SCNC: 25.5 MMOL/L (ref 22–29)
CREAT SERPL-MCNC: 0.44 MG/DL (ref 0.57–1)
DEPRECATED RDW RBC AUTO: 36.5 FL (ref 37–54)
EGFRCR SERPLBLD CKD-EPI 2021: 135.3 ML/MIN/1.73
EOSINOPHIL # BLD AUTO: 0.11 10*3/MM3 (ref 0–0.4)
EOSINOPHIL NFR BLD AUTO: 1.2 % (ref 0.3–6.2)
ERYTHROCYTE [DISTWIDTH] IN BLOOD BY AUTOMATED COUNT: 12.4 % (ref 12.3–15.4)
GLOBULIN UR ELPH-MCNC: 2.8 GM/DL
GLUCOSE SERPL-MCNC: 208 MG/DL (ref 65–99)
HCT VFR BLD AUTO: 36.8 % (ref 34–46.6)
HGB BLD-MCNC: 12.4 G/DL (ref 12–15.9)
IMM GRANULOCYTES # BLD AUTO: 0.07 10*3/MM3 (ref 0–0.05)
IMM GRANULOCYTES NFR BLD AUTO: 0.8 % (ref 0–0.5)
LYMPHOCYTES # BLD AUTO: 2.87 10*3/MM3 (ref 0.7–3.1)
LYMPHOCYTES NFR BLD AUTO: 30.9 % (ref 19.6–45.3)
MCH RBC QN AUTO: 27.3 PG (ref 26.6–33)
MCHC RBC AUTO-ENTMCNC: 33.7 G/DL (ref 31.5–35.7)
MCV RBC AUTO: 81.1 FL (ref 79–97)
MONOCYTES # BLD AUTO: 0.64 10*3/MM3 (ref 0.1–0.9)
MONOCYTES NFR BLD AUTO: 6.9 % (ref 5–12)
NEUTROPHILS NFR BLD AUTO: 5.57 10*3/MM3 (ref 1.7–7)
NEUTROPHILS NFR BLD AUTO: 59.9 % (ref 42.7–76)
NRBC BLD AUTO-RTO: 0 /100 WBC (ref 0–0.2)
PLATELET # BLD AUTO: 272 10*3/MM3 (ref 140–450)
PMV BLD AUTO: 10 FL (ref 6–12)
POTASSIUM SERPL-SCNC: 3.7 MMOL/L (ref 3.5–5.2)
PROT SERPL-MCNC: 6.5 G/DL (ref 6–8.5)
RBC # BLD AUTO: 4.54 10*6/MM3 (ref 3.77–5.28)
SODIUM SERPL-SCNC: 136 MMOL/L (ref 136–145)
WBC NRBC COR # BLD: 9.29 10*3/MM3 (ref 3.4–10.8)

## 2023-05-18 PROCEDURE — 70498 CT ANGIOGRAPHY NECK: CPT

## 2023-05-18 PROCEDURE — 99283 EMERGENCY DEPT VISIT LOW MDM: CPT

## 2023-05-18 PROCEDURE — 85025 COMPLETE CBC W/AUTO DIFF WBC: CPT | Performed by: EMERGENCY MEDICINE

## 2023-05-18 PROCEDURE — 80053 COMPREHEN METABOLIC PANEL: CPT | Performed by: EMERGENCY MEDICINE

## 2023-05-18 PROCEDURE — 25510000001 IOPAMIDOL PER 1 ML: Performed by: EMERGENCY MEDICINE

## 2023-05-18 RX ORDER — ONDANSETRON 4 MG/1
4 TABLET, ORALLY DISINTEGRATING ORAL EVERY 8 HOURS PRN
Qty: 15 TABLET | Refills: 0 | Status: SHIPPED | OUTPATIENT
Start: 2023-05-18

## 2023-05-18 RX ORDER — DICYCLOMINE HCL 20 MG
20 TABLET ORAL EVERY 6 HOURS
Qty: 20 TABLET | Refills: 0 | Status: SHIPPED | OUTPATIENT
Start: 2023-05-18

## 2023-05-18 RX ORDER — METRONIDAZOLE 500 MG/1
500 TABLET ORAL 3 TIMES DAILY
Qty: 21 TABLET | Refills: 0 | Status: SHIPPED | OUTPATIENT
Start: 2023-05-18

## 2023-05-18 RX ORDER — HYDROCODONE BITARTRATE AND ACETAMINOPHEN 5; 325 MG/1; MG/1
1 TABLET ORAL ONCE
Status: COMPLETED | OUTPATIENT
Start: 2023-05-18 | End: 2023-05-18

## 2023-05-18 RX ADMIN — HYDROCODONE BITARTRATE AND ACETAMINOPHEN 1 TABLET: 5; 325 TABLET ORAL at 20:33

## 2023-05-18 RX ADMIN — IOPAMIDOL 100 ML: 755 INJECTION, SOLUTION INTRAVENOUS at 21:36
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
## 2023-05-19 NOTE — DISCHARGE INSTRUCTIONS
Follow up with your PCP for imaging results.    Tylenol or Motrin as needed for pain.    Ice to affected area.    Follow-up with [facility] as scheduled.    Return for any new or worsening symptoms

## 2023-08-16 PROCEDURE — 87510 GARDNER VAG DNA DIR PROBE: CPT

## 2023-08-16 PROCEDURE — 87660 TRICHOMONAS VAGIN DIR PROBE: CPT

## 2023-08-16 PROCEDURE — 87480 CANDIDA DNA DIR PROBE: CPT

## 2023-09-22 ENCOUNTER — APPOINTMENT (OUTPATIENT)
Dept: GENERAL RADIOLOGY | Facility: HOSPITAL | Age: 28
End: 2023-09-22
Payer: COMMERCIAL

## 2023-09-22 ENCOUNTER — HOSPITAL ENCOUNTER (EMERGENCY)
Facility: HOSPITAL | Age: 28
Discharge: HOME | End: 2023-09-22
Attending: EMERGENCY MEDICINE
Payer: COMMERCIAL

## 2023-09-22 VITALS
RESPIRATION RATE: 23 BRPM | HEIGHT: 62 IN | SYSTOLIC BLOOD PRESSURE: 108 MMHG | OXYGEN SATURATION: 98 % | DIASTOLIC BLOOD PRESSURE: 68 MMHG | HEART RATE: 110 BPM | BODY MASS INDEX: 53.92 KG/M2 | TEMPERATURE: 98.8 F | WEIGHT: 293 LBS

## 2023-09-22 DIAGNOSIS — E11.65 HYPERGLYCEMIA DUE TO DIABETES MELLITUS: ICD-10-CM

## 2023-09-22 DIAGNOSIS — J20.8 ACUTE VIRAL BRONCHITIS: Primary | ICD-10-CM

## 2023-09-22 LAB
ALBUMIN SERPL-MCNC: 4.7 G/DL (ref 3.5–5.2)
ALBUMIN/GLOB SERPL: 1.7 G/DL
ALP SERPL-CCNC: 97 U/L (ref 39–117)
ALT SERPL W P-5'-P-CCNC: 12 U/L (ref 1–33)
ANION GAP SERPL CALCULATED.3IONS-SCNC: 15.5 MMOL/L (ref 5–15)
AST SERPL-CCNC: 12 U/L (ref 1–32)
BASOPHILS # BLD AUTO: 0.08 10*3/MM3 (ref 0–0.2)
BASOPHILS NFR BLD AUTO: 0.7 % (ref 0–1.5)
BILIRUB SERPL-MCNC: 0.2 MG/DL (ref 0–1.2)
BUN SERPL-MCNC: 10 MG/DL (ref 6–20)
BUN/CREAT SERPL: 18.5 (ref 7–25)
CALCIUM SPEC-SCNC: 9.6 MG/DL (ref 8.6–10.5)
CHLORIDE SERPL-SCNC: 94 MMOL/L (ref 98–107)
CO2 SERPL-SCNC: 22.5 MMOL/L (ref 22–29)
CREAT SERPL-MCNC: 0.54 MG/DL (ref 0.57–1)
DEPRECATED RDW RBC AUTO: 37.7 FL (ref 37–54)
EGFRCR SERPLBLD CKD-EPI 2021: 128 ML/MIN/1.73
EOSINOPHIL # BLD AUTO: 0.1 10*3/MM3 (ref 0–0.4)
EOSINOPHIL NFR BLD AUTO: 0.9 % (ref 0.3–6.2)
ERYTHROCYTE [DISTWIDTH] IN BLOOD BY AUTOMATED COUNT: 13 % (ref 12.3–15.4)
GLOBULIN UR ELPH-MCNC: 2.8 GM/DL
GLUCOSE SERPL-MCNC: 326 MG/DL (ref 65–99)
HCG INTACT+B SERPL-ACNC: <0.5 MIU/ML
HCT VFR BLD AUTO: 42.6 % (ref 34–46.6)
HGB BLD-MCNC: 14.1 G/DL (ref 12–15.9)
IMM GRANULOCYTES # BLD AUTO: 0.07 10*3/MM3 (ref 0–0.05)
IMM GRANULOCYTES NFR BLD AUTO: 0.6 % (ref 0–0.5)
LYMPHOCYTES # BLD AUTO: 4.34 10*3/MM3 (ref 0.7–3.1)
LYMPHOCYTES NFR BLD AUTO: 37.1 % (ref 19.6–45.3)
MCH RBC QN AUTO: 26.8 PG (ref 26.6–33)
MCHC RBC AUTO-ENTMCNC: 33.1 G/DL (ref 31.5–35.7)
MCV RBC AUTO: 81 FL (ref 79–97)
MONOCYTES # BLD AUTO: 0.88 10*3/MM3 (ref 0.1–0.9)
MONOCYTES NFR BLD AUTO: 7.5 % (ref 5–12)
NEUTROPHILS NFR BLD AUTO: 53.2 % (ref 42.7–76)
NEUTROPHILS NFR BLD AUTO: 6.22 10*3/MM3 (ref 1.7–7)
NRBC BLD AUTO-RTO: 0 /100 WBC (ref 0–0.2)
NT-PROBNP SERPL-MCNC: <36 PG/ML (ref 0–450)
PLATELET # BLD AUTO: 281 10*3/MM3 (ref 140–450)
PMV BLD AUTO: 10.6 FL (ref 6–12)
POTASSIUM SERPL-SCNC: 3.4 MMOL/L (ref 3.5–5.2)
PROT SERPL-MCNC: 7.5 G/DL (ref 6–8.5)
RBC # BLD AUTO: 5.26 10*6/MM3 (ref 3.77–5.28)
SODIUM SERPL-SCNC: 132 MMOL/L (ref 136–145)
WBC NRBC COR # BLD: 11.69 10*3/MM3 (ref 3.4–10.8)

## 2023-09-22 PROCEDURE — 83880 ASSAY OF NATRIURETIC PEPTIDE: CPT | Performed by: EMERGENCY MEDICINE

## 2023-09-22 PROCEDURE — 63710000001 INSULIN REGULAR HUMAN PER 5 UNITS: Performed by: EMERGENCY MEDICINE

## 2023-09-22 PROCEDURE — 99283 EMERGENCY DEPT VISIT LOW MDM: CPT

## 2023-09-22 PROCEDURE — 36415 COLL VENOUS BLD VENIPUNCTURE: CPT

## 2023-09-22 PROCEDURE — 85025 COMPLETE CBC W/AUTO DIFF WBC: CPT | Performed by: EMERGENCY MEDICINE

## 2023-09-22 PROCEDURE — 84702 CHORIONIC GONADOTROPIN TEST: CPT | Performed by: EMERGENCY MEDICINE

## 2023-09-22 PROCEDURE — 71045 X-RAY EXAM CHEST 1 VIEW: CPT

## 2023-09-22 PROCEDURE — 80053 COMPREHEN METABOLIC PANEL: CPT | Performed by: EMERGENCY MEDICINE

## 2023-09-22 RX ORDER — HYDROCODONE POLISTIREX AND CHLORPHENIRAMINE POLISTIREX 10; 8 MG/5ML; MG/5ML
5 SUSPENSION, EXTENDED RELEASE ORAL EVERY 12 HOURS PRN
Qty: 100 ML | Refills: 0 | Status: SHIPPED | OUTPATIENT
Start: 2023-09-22 | End: 2023-09-25

## 2023-09-22 RX ORDER — ALBUTEROL SULFATE 90 UG/1
2 AEROSOL, METERED RESPIRATORY (INHALATION) 4 TIMES DAILY PRN
Qty: 18 G | Refills: 0 | Status: SHIPPED | OUTPATIENT
Start: 2023-09-22

## 2023-09-22 RX ADMIN — SODIUM CHLORIDE 1000 ML: 9 INJECTION, SOLUTION INTRAVENOUS at 22:29

## 2023-09-22 RX ADMIN — INSULIN HUMAN 10 UNITS: 100 INJECTION, SOLUTION PARENTERAL at 22:27

## 2023-09-23 NOTE — ED PROVIDER NOTES
Time: 8:11 PM EDT  Date of encounter:  2023  Independent Historian/Clinical History and Information was obtained by:   Patient    History is limited by: N/A    Chief Complaint   Patient presents with    Shortness of Breath     Shortness of breath, cough, chest tightness         History of Present Illness:  Patient is a 28 y.o. year old female who presents to the emergency department for evaluation of shortness of breath and cough since Monday.  Patient has been to urgent care on the  she is also been to urgent care today.  She had COVID, strep and flu all negative.  She was diagnosed with exasperation of asthma.  Patient was referred to ED. had a chest x-ray earlier today, results have not pended over.    HPI    Patient Care Team  Primary Care Provider: Emilee iH APRN    Past Medical History:     No Known Allergies  Past Medical History:   Diagnosis Date    Anesthesia     slow to wake up postop, anxiety postop    Anxiety     AR (allergic rhinitis)     ASD (atrial septal defect)     had since birth- asymptomatic- see's dr falk     Asthma     no inhalers    Bipolar disorder     Depression     Diabetes mellitus     type ii- bg runs around 200-300    Gallstones currently    GERD (gastroesophageal reflux disease)     Hypertension     Migraine     Ovarian cyst     Trauma      Past Surgical History:   Procedure Laterality Date     SECTION      CHOLECYSTECTOMY N/A 2021    Procedure: CHOLECYSTECTOMY LAPAROSCOPIC;  Surgeon: Robert Araya MD;  [place] [facility] [place]  Service: General;  Laterality: N/A;    COLONOSCOPY  2017    TUBAL ABDOMINAL LIGATION      WISDOM TOOTH EXTRACTION       Family History   Problem Relation Age of Onset    Heart disease Father     Heart disease Other     Heart disease Other     Heart disease Other     Diabetes type II Other        Home Medications:  Prior to Admission medications    Medication Sig Start Date End Date Taking? Authorizing Provider   Advair  "Diskus 500-50 MCG/ACT DISKUS INHALE ONE PUFF BY MOUTH EVERY TWELVE HOURS (RINSE MOUTH AFTER EACH USE) 2/22/23   Brock Govea MD   albuterol sulfate  (90 Base) MCG/ACT inhaler INHALE TWO puffs every FOUR TO 6 hours as needed for cough, wheeze or shortness of breath. Use with vortex spacer.] 2/22/23   Brock Govea MD   Allergy Relief 5 MG tablet Take 1 tablet by mouth Daily. 8/7/23   Brock Govea MD B-D UF III MINI PEN NEEDLES 31G X 5 MM misc 1 each by Other route 5 (Five) Times a Day. 3/1/23   Arcelia Mohan APRN   buPROPion XL (WELLBUTRIN XL) 150 MG 24 hr tablet Take 1 tablet by mouth Daily. 6/16/23   Brock Govea MD   cetirizine (zyrTEC) 10 MG tablet Take 1 tablet by mouth Daily. 2/22/23   Brock Govea MD   colestipol (COLESTID) 1 g tablet Take 1 tablet by mouth Every 12 (Twelve) Hours. 7/1/23   Brock Govea MD   Continuous Blood Gluc  (Dexcom G6 ) device USE AS DIRECTED 365 DAYS 3/27/23   Brock Govea MD   Continuous Blood Gluc Sensor (Dexcom G6 Sensor) Every 10 (Ten) Days. 3/14/23   Arcelia Mohan APRN   Continuous Blood Gluc Transmit (Dexcom G6 Transmitter) misc use as directed ONE every 3 months 5/18/23   Brock Govea MD   Easy Touch Insulin Syringe 28G X 1/2\" 1 ML misc Inject subcutaneously daily 6/17/23   Brock Govea MD   Insulin Disposable Pump (Omnipod DASH Pods, Gen 4,) misc 1 each Every 3 (Three) Days. 3/27/23   Arcelia Mohan APRN   Insulin Disposable Pump (V-Go 40) 40 UNIT/24HR kit Fill V-go insulin pump with rapid acting u-100/ml insulin & use as directed for 24hours then dispose 6/8/23   Brock Govea MD   insulin glargine (LANTUS, SEMGLEE) 100 UNIT/ML injection Inject 10 units subcutaneously EVERY MORNING 6/17/23   Brock Govea MD   Insulin Pen Needle (Pen Needles) 32G X 5 MM misc 1 each 4 (Four) Times a Day. 3/1/23   Arcelia Mohan, YOKO   loperamide " (IMODIUM) 2 MG capsule Take 1 capsule by mouth 4 (Four) Times a Day As Needed for Diarrhea. 3/31/23   Ren Pepper PA   metFORMIN ER (GLUCOPHAGE-XR) 500 MG 24 hr tablet Take 2 tablets by mouth Every 12 (Twelve) Hours. 6/17/23   Brock Govea MD   metoprolol succinate XL (TOPROL-XL) 25 MG 24 hr tablet Take 1 tablet by mouth Daily. 1/20/23   Brock Govea MD   montelukast (SINGULAIR) 10 MG tablet Take 1 tablet by mouth every night at bedtime. 2/22/23   Brock Govea MD   NovoLOG 100 UNIT/ML injection INJECT 100 UNITS DAILY PER PUMP 6/1/23   Brock Govea MD   ondansetron ODT (ZOFRAN-ODT) 4 MG disintegrating tablet Place 1 tablet under the tongue Every 4 (Four) Hours As Needed for Nausea or Vomiting for up to 3 days. 9/18/23 9/21/23  Jeanne Longo MD   predniSONE (DELTASONE) 20 MG tablet Take 1 tablet twice a day with meals. 9/18/23   Jeanne Longo MD   Prenatal Vit-Fe Fumarate-FA (prenatal vitamin 27-0.8) 27-0.8 MG tablet tablet Take 1 tablet by mouth Every Morning. 7/6/23   Brock Govea MD        Social History:   Social History     Tobacco Use    Smoking status: Never    Smokeless tobacco: Never   Vaping Use    Vaping Use: Never used   Substance Use Topics    Alcohol use: Never    Drug use: Never         Review of Systems:  Review of Systems   Constitutional:  Negative for chills and fever.   HENT:  Negative for congestion, rhinorrhea and sore throat.    Eyes:  Negative for pain and visual disturbance.   Respiratory:  Positive for cough and shortness of breath. Negative for apnea and chest tightness.    Cardiovascular:  Negative for chest pain and palpitations.   Gastrointestinal:  Negative for abdominal pain, diarrhea, nausea and vomiting.   Genitourinary:  Negative for difficulty urinating and dysuria.   Musculoskeletal:  Negative for joint swelling and myalgias.   Skin:  Negative for color change.   Neurological:  Negative for seizures and headaches.  "  Psychiatric/Behavioral: Negative.     All other systems reviewed and are negative.     Physical Exam:  /80 (Patient Position: Lying)   Pulse 112   Temp 98.8 °F (37.1 °C) (Oral)   Resp 23   Ht 157.5 cm (62\")   Wt 133 kg (294 lb 1.5 oz)   LMP 08/02/2023 (Approximate) Comment: Patient reports irregular periods.  SpO2 98%   BMI 53.79 kg/m²         Physical Exam  Vitals and nursing note reviewed.   Constitutional:       General: She is not in acute distress.     Appearance: Normal appearance. She is not toxic-appearing.   HENT:      Head: Normocephalic and atraumatic.      Jaw: There is normal jaw occlusion.      Mouth/Throat:      Mouth: Mucous membranes are moist.   Eyes:      General: Lids are normal.      Extraocular Movements: Extraocular movements intact.      Conjunctiva/sclera: Conjunctivae normal.      Pupils: Pupils are equal, round, and reactive to light.   Cardiovascular:      Rate and Rhythm: Regular rhythm. Tachycardia present.      Pulses: Normal pulses.      Heart sounds: Normal heart sounds.   Pulmonary:      Effort: Pulmonary effort is normal. No respiratory distress.      Breath sounds: Normal breath sounds. No wheezing or rhonchi.   Abdominal:      General: Abdomen is flat. There is no distension.      Palpations: Abdomen is soft.      Tenderness: There is no abdominal tenderness. There is no guarding or rebound.   Musculoskeletal:         General: Normal range of motion.      Cervical back: Normal range of motion and neck supple.      Right lower leg: No edema.      Left lower leg: No edema.   Skin:     General: Skin is warm and dry.   Neurological:      General: No focal deficit present.      Mental Status: She is alert and oriented to person, place, and time. Mental status is at baseline.   Psychiatric:         Mood and Affect: Mood normal.         Behavior: Behavior normal.                    Procedures:  Procedures      Medical Decision Making:      Comorbidities that affect " care:    Diabetes, Obesity    External Notes reviewed:    None      The following orders were placed and all results were independently analyzed by me:  Orders Placed This Encounter   Procedures    XR Chest 1 View    Comprehensive Metabolic Panel    BNP    CBC Auto Differential    hCG, Quantitative, Pregnancy    CBC & Differential       Medications Given in the Emergency Department:  Medications   sodium chloride 0.9 % bolus 1,000 mL (1,000 mL Intravenous New Bag 9/22/23 2229)   insulin regular (humuLIN R,novoLIN R) injection 10 Units (10 Units Intravenous Given 9/22/23 2227)        ED Course:    The patient was initially evaluated in the triage area where orders were placed. The patient was later dispositioned by Yuniel Epperson MD.      The patient was advised to stay for completion of workup which includes but is not limited to communication of labs and radiological results, reassessment and plan. The patient was advised that leaving prior to disposition by a provider could result in critical findings that are not communicated to the patient.     ED Course as of 09/22/23 2236   Fri Sep 22, 2023   2016 --- PROVIDER IN TRIAGE NOTE ---    The patient was evaluated by Ginna loza in triage. Orders were placed and the patient is currently awaiting disposition.    [AJ]      ED Course User Index  [AJ] Ginna Cohen, LEIA       Labs:    Lab Results (last 24 hours)       Procedure Component Value Units Date/Time    POCT GLUCOSE FINGERSTICK [[id number]]  (Abnormal) Collected: 09/22/23 1744     Updated: 09/22/23 1925    POCT URINALYSIS DIPSTICK, AUTOMATED [[id number]]  (Abnormal) Collected: 09/22/23 1756     Updated: 09/22/23 1925    Comprehensive Metabolic Panel [[id number]]  (Abnormal) Collected: 09/22/23 2031    Specimen: Blood Updated: 09/22/23 2130     Glucose 326 mg/dL      BUN 10 mg/dL      Creatinine 0.54 mg/dL      Sodium 132 mmol/L      Potassium 3.4 mmol/L      Chloride 94 mmol/L      CO2 22.5 mmol/L       Calcium 9.6 mg/dL      Total Protein 7.5 g/dL      Albumin 4.7 g/dL      ALT (SGPT) 12 U/L      AST (SGOT) 12 U/L      Alkaline Phosphatase 97 U/L      Total Bilirubin 0.2 mg/dL      Globulin 2.8 gm/dL      A/G Ratio 1.7 g/dL      BUN/Creatinine Ratio 18.5     Anion Gap 15.5 mmol/L      eGFR 128.0 mL/min/1.73     Narrative:      GFR Normal >60  Chronic Kidney Disease <60  Kidney Failure <15      CBC & Differential [[id number]]  (Abnormal) Collected: 09/22/23 2031    Specimen: Blood Updated: 09/22/23 2108    Narrative:      The following orders were created for panel order CBC & Differential.  Procedure                               Abnormality         Status                     ---------                               -----------         ------                     CBC Auto Differential[[id number]]        Abnormal            Final result                 Please view results for these tests on the individual orders.    BNP [[id number]]  (Normal) Collected: 09/22/23 2031    Specimen: Blood Updated: 09/22/23 2127     proBNP <36.0 pg/mL     Narrative:      Among patients with dyspnea, NT-proBNP is highly sensitive for the detection of acute congestive heart failure. In addition NT-proBNP of <300 pg/ml effectively rules out acute congestive heart failure with 99% negative predictive value.      CBC Auto Differential [[id number]]  (Abnormal) Collected: 09/22/23 2031    Specimen: Blood Updated: 09/22/23 2108     WBC 11.69 10*3/mm3      RBC 5.26 10*6/mm3      Hemoglobin 14.1 g/dL      Hematocrit 42.6 %      MCV 81.0 fL      MCH 26.8 pg      MCHC 33.1 g/dL      RDW 13.0 %      RDW-SD 37.7 fl      MPV 10.6 fL      Platelets 281 10*3/mm3      Neutrophil % 53.2 %      Lymphocyte % 37.1 %      Monocyte % 7.5 %      Eosinophil % 0.9 %      Basophil % 0.7 %      Immature Grans % 0.6 %      Neutrophils, Absolute 6.22 10*3/mm3      Lymphocytes, Absolute 4.34 10*3/mm3      Monocytes, Absolute 0.88 10*3/mm3      Eosinophils, Absolute  0.10 10*3/mm3      Basophils, Absolute 0.08 10*3/mm3      Immature Grans, Absolute 0.07 10*3/mm3      nRBC 0.0 /100 WBC     hCG, Quantitative, Pregnancy [[id number]] Collected: 09/22/23 2120    Specimen: Blood Updated: 09/22/23 2202     HCG Quantitative <0.50 mIU/mL     Narrative:      HCG Ranges by Gestational Age    Females - non-pregnant premenopausal   </= 1mIU/mL HCG  Females - postmenopausal               </= 7mIU/mL HCG    3 Weeks       5.4   -      72 mIU/mL  4 Weeks      10.2   -     708 mIU/mL  5 Weeks       217   -   8,245 mIU/mL  6 Weeks       152   -  32,177 mIU/mL  7 Weeks     4,059   - 153,767 mIU/mL  8 Weeks    31,366   - 149,094 mIU/mL  9 Weeks    59,109   - 135,901 mIU/mL  10 Weeks   44,186   - 170,409 mIU/mL  12 Weeks   27,107   - 201,615 mIU/mL  14 Weeks   24,302   -  93,646 mIU/mL  15 Weeks   12,540   -  69,747 mIU/mL  16 Weeks    8,904   -  55,332 mIU/mL  17 Weeks    8,240   -  51,793 mIU/mL  18 Weeks    9,649   -  55,271 mIU/mL               Imaging:    XR Chest 1 View    Result Date: 9/22/2023  PROCEDURE: XR CHEST 1 VW  COMPARISON: 10/25/2022  INDICATIONS: SOA/SOB/shortness of air/shortness of breath; cough.  FINDINGS:  A single AP (or PA) upright portable chest radiograph was performed.  No cardiac enlargement is seen.  No acute infiltrate is appreciated.  No pleural effusion or pneumothorax is identified.  No significant interval change is seen since the prior study (or studies).        No acute infiltrate is appreciated.     Please note that portions of this note were completed with a voice recognition program.  CESAR GUZMAN JR, MD       Electronically Signed and Approved By: CESAR GUZMAN JR, MD on 9/22/2023 at 21:40                 Differential Diagnosis and Discussion:      Cough: Differential diagnosis includes but is not limited to pneumonia, acute bronchitis, upper respiratory infection, ACE inhibitor use, allergic reaction, epiglottitis, seasonal allergies, chemical irritants,  exercise-induced asthma, viral syndrome.    All labs were reviewed and interpreted by me.  All X-rays impressions were independently interpreted by me.    MDM  Number of Diagnoses or Management Options  Acute viral bronchitis  Hyperglycemia due to diabetes mellitus  Diagnosis management comments: In summary this is a 28-year-old female who presents to the emergency department for evaluation of cough and not feeling well.  Patient does have a history of asthma and was seen at the urgent care earlier today given an albuterol treatment before sending to the emergency department.  Patient is tachycardic but I believe this to be due to the recent albuterol treatment she received.  Chest x-ray unremarkable.  CBC independently reviewed by me and shows no critical abnormalities.  CMP unremarkable for hyperglycemia without signs of DKA.  Patient has been given a liter of IV fluids and 10 units of insulin IV for improvement of her blood sugar.  She is otherwise well-appearing and in no acute distress.  She will be discharged home with prescription for cough medicine however I see no need for antibiotics at this time.  Very strict return to ER and follow-up instructions have been provided to the patient.               Patient Care Considerations:    CT CHEST: I considered ordering a CT scan of the chest, however no acute respiratory distress      Consultants/Shared Management Plan:    None    Social Determinants of Health:    Patient is independent, reliable, and has access to care.       Disposition and Care Coordination:    Discharged: I considered escalation of care by admitting this patient for observation, however the patient has improved and is suitable and  stable for discharge.    I have explained the patient´s condition, diagnoses and treatment plan based on the information available to me at this time. I have answered questions and addressed any concerns. The patient has a good  understanding of the patient´s  diagnosis, condition, and treatment plan as can be expected at this point. The vital signs have been stable. The patient´s condition is stable and appropriate for discharge from the emergency department.      The patient will pursue further outpatient evaluation with the primary care physician or other designated or consulting physician as outlined in the discharge instructions. They are agreeable to this plan of care and follow-up instructions have been explained in detail. The patient has received these instructions in written format and have expressed an understanding of the discharge instructions. The patient is aware that any significant change in condition or worsening of symptoms should prompt an immediate return to this or the closest emergency department or call to 911.  I have explained discharge medications and the need for follow up with the patient/caretakers. This was also printed in the discharge instructions. Patient was discharged with the following medications and follow up:      Medication List        New Prescriptions      Hydrocod Gaston-Chlorphe Gaston ER 10-8 MG/5ML ER suspension  Commonly known as: TUSSIONEX PENNKINETIC  Take 5 mL by mouth Every 12 (Twelve) Hours As Needed for Cough.            Changed      * albuterol sulfate  (90 Base) MCG/ACT inhaler  Commonly known as: PROVENTIL HFA;VENTOLIN HFA;PROAIR HFA  What changed: Another medication with the same name was added. Make sure you understand how and when to take each.     * albuterol sulfate  (90 Base) MCG/ACT inhaler  Commonly known as: PROVENTIL HFA;VENTOLIN HFA;PROAIR HFA  Inhale 2 puffs 4 (Four) Times a Day As Needed for Wheezing for up to 1 dose.  What changed: You were already taking a medication with the same name, and this prescription was added. Make sure you understand how and when to take each.           * This list has 2 medication(s) that are the same as other medications prescribed for you. Read the directions  carefully, and ask your doctor or other care provider to review them with you.                Stop      ondansetron ODT 4 MG disintegrating tablet  Commonly known as: ZOFRAN-ODT               Where to Get Your Medications        These medications were sent to [facility] - [place], [place] - [address] [facility] - [phone]  - [phone] FX  [address] [place] [address]      Phone: [phone]   albuterol sulfate  (90 Base) MCG/ACT inhaler  Hydrocod Gaston-Chlorphe Gaston ER 10-8 MG/5ML ER suspension      Emilee Hi, APRN  [address]  [address]  [place] [address] [phone]    In 1 week         Final diagnoses:   Acute viral bronchitis   Hyperglycemia due to diabetes mellitus        ED Disposition       ED Disposition   Discharge    Condition   Stable    Comment   --               This medical record created using voice recognition software.             Yuniel Epperson MD  09/22/23 4958

## 2023-09-25 ENCOUNTER — OFFICE VISIT (OUTPATIENT)
Dept: FAMILY MEDICINE CLINIC | Facility: CLINIC | Age: 28
End: 2023-09-25
Payer: COMMERCIAL

## 2023-09-25 VITALS
HEIGHT: 62 IN | OXYGEN SATURATION: 98 % | BODY MASS INDEX: 53.92 KG/M2 | TEMPERATURE: 97.9 F | SYSTOLIC BLOOD PRESSURE: 112 MMHG | HEART RATE: 98 BPM | DIASTOLIC BLOOD PRESSURE: 68 MMHG | RESPIRATION RATE: 20 BRPM | WEIGHT: 293 LBS

## 2023-09-25 DIAGNOSIS — J45.40 MODERATE PERSISTENT ASTHMA, UNSPECIFIED WHETHER COMPLICATED: ICD-10-CM

## 2023-09-25 DIAGNOSIS — E87.1 HYPONATREMIA: ICD-10-CM

## 2023-09-25 DIAGNOSIS — Z23 NEED FOR INFLUENZA VACCINATION: ICD-10-CM

## 2023-09-25 DIAGNOSIS — E11.65 TYPE 2 DIABETES MELLITUS WITH HYPERGLYCEMIA, WITH LONG-TERM CURRENT USE OF INSULIN: Primary | ICD-10-CM

## 2023-09-25 DIAGNOSIS — J45.41 MODERATE PERSISTENT ASTHMA WITH EXACERBATION: ICD-10-CM

## 2023-09-25 DIAGNOSIS — Z79.4 TYPE 2 DIABETES MELLITUS WITH HYPERGLYCEMIA, WITH LONG-TERM CURRENT USE OF INSULIN: Primary | ICD-10-CM

## 2023-09-25 LAB
ALBUMIN SERPL-MCNC: 4.1 G/DL (ref 3.5–5.2)
ALBUMIN/GLOB SERPL: 1.5 G/DL
ALP SERPL-CCNC: 83 U/L (ref 39–117)
ALT SERPL W P-5'-P-CCNC: 17 U/L (ref 1–33)
ANION GAP SERPL CALCULATED.3IONS-SCNC: 11.6 MMOL/L (ref 5–15)
AST SERPL-CCNC: 17 U/L (ref 1–32)
BILIRUB SERPL-MCNC: 0.2 MG/DL (ref 0–1.2)
BUN SERPL-MCNC: 6 MG/DL (ref 6–20)
BUN/CREAT SERPL: 13.6 (ref 7–25)
CALCIUM SPEC-SCNC: 9.2 MG/DL (ref 8.6–10.5)
CHLORIDE SERPL-SCNC: 98 MMOL/L (ref 98–107)
CO2 SERPL-SCNC: 27.4 MMOL/L (ref 22–29)
CREAT SERPL-MCNC: 0.44 MG/DL (ref 0.57–1)
EGFRCR SERPLBLD CKD-EPI 2021: 134.5 ML/MIN/1.73
GLOBULIN UR ELPH-MCNC: 2.7 GM/DL
GLUCOSE SERPL-MCNC: 213 MG/DL (ref 65–99)
HBA1C MFR BLD: 10.4 % (ref 4.8–5.6)
POTASSIUM SERPL-SCNC: 4.3 MMOL/L (ref 3.5–5.2)
PROT SERPL-MCNC: 6.8 G/DL (ref 6–8.5)
SODIUM SERPL-SCNC: 137 MMOL/L (ref 136–145)

## 2023-09-25 PROCEDURE — 83036 HEMOGLOBIN GLYCOSYLATED A1C: CPT | Performed by: FAMILY MEDICINE

## 2023-09-25 PROCEDURE — 80053 COMPREHEN METABOLIC PANEL: CPT | Performed by: FAMILY MEDICINE

## 2023-09-25 RX ORDER — AZELASTINE HYDROCHLORIDE, FLUTICASONE PROPIONATE 137; 50 UG/1; UG/1
SPRAY, METERED NASAL
COMMUNITY

## 2023-09-25 RX ORDER — BUSPIRONE HYDROCHLORIDE 5 MG/1
1 TABLET ORAL 3 TIMES DAILY
COMMUNITY

## 2023-09-25 RX ORDER — FLUTICASONE FUROATE AND VILANTEROL 100; 25 UG/1; UG/1
1 POWDER RESPIRATORY (INHALATION)
Qty: 28 EACH | Refills: 5 | Status: SHIPPED | OUTPATIENT
Start: 2023-09-25 | End: 2023-10-25

## 2023-09-25 RX ORDER — ALBUTEROL SULFATE 2.5 MG/3ML
2.5 SOLUTION RESPIRATORY (INHALATION) EVERY 4 HOURS PRN
Qty: 300 ML | Refills: 12 | Status: SHIPPED | OUTPATIENT
Start: 2023-09-25 | End: 2024-04-29

## 2023-09-25 RX ORDER — AZITHROMYCIN 250 MG/1
TABLET, FILM COATED ORAL
Qty: 6 TABLET | Refills: 0 | Status: SHIPPED | OUTPATIENT
Start: 2023-09-25

## 2023-09-25 RX ORDER — ATENOLOL 25 MG/1
TABLET ORAL
COMMUNITY

## 2023-09-25 RX ORDER — SERTRALINE HYDROCHLORIDE 25 MG/1
1 TABLET, FILM COATED ORAL DAILY
COMMUNITY
End: 2023-09-25

## 2023-09-25 RX ORDER — FAMOTIDINE 40 MG/1
TABLET, FILM COATED ORAL
COMMUNITY

## 2023-09-25 RX ORDER — DESVENLAFAXINE 100 MG/1
1 TABLET, EXTENDED RELEASE ORAL DAILY
COMMUNITY

## 2023-09-25 RX ORDER — TIOTROPIUM BROMIDE 18 UG/1
CAPSULE ORAL; RESPIRATORY (INHALATION)
COMMUNITY

## 2023-09-25 RX ORDER — INSULIN DEGLUDEC 100 U/ML
INJECTION, SOLUTION SUBCUTANEOUS
COMMUNITY
Start: 2023-09-20

## 2023-09-25 RX ORDER — EMPAGLIFLOZIN 10 MG/1
10 TABLET, FILM COATED ORAL EVERY MORNING
COMMUNITY
Start: 2023-09-07 | End: 2023-09-25

## 2023-09-25 NOTE — PROGRESS NOTES
Chief Complaint  Chief Complaint   Patient presents with    [facility]    Diabetes    Asthma     Seen in ER over the weekend        HPI:  Dorinda Sherwood presents to [facility]      The patient presents for a sick visit.    She is in for bronchitis and an asthma attack. It started on 2023. She went to [facility] and they put her on medication and it just got worse. She could not breathe. She returned to [facility] and her oxygen was dropping, so they sent her to the emergency room. They did a chest x-ray and told her she was fighting off an infection. They sent her home with cough medicine and an inhaler. She has a nebulizer at home. She is not on an inhaler for asthma. She has tried Advair and it did not help. Her oxygen saturation in the office today was 98 percent.    The patient has diabetes mellitus. The last time the patient was in the emergency room, her blood glucose levels were elevated.  She is on Toujeo. She has tried Jardiance; however, it was giving her yeast infections.    She is concerned about her weight. She is trying to exercise.    Her last Pap smear was performed in  and was within normal limits.    Procedures     Past History:  Medical History: has a past medical history of Anesthesia, Anxiety, AR (allergic rhinitis), ASD (atrial septal defect), Asthma, Bipolar disorder, Depression, Diabetes mellitus, Gallstones (currently), GERD (gastroesophageal reflux disease), Hypertension, Migraine, Ovarian cyst, and Trauma.   Surgical History: has a past surgical history that includes Colonoscopy (); [place] tooth extraction;  section; Tubal ligation; and Cholecystectomy (N/A, 2021).   Family History: family history includes Diabetes type II in an other family member; Heart disease in her father and other family members.   Social History: reports that she has never smoked. She has never been exposed to tobacco smoke. She has never used  "smokeless tobacco. She reports that she does not drink alcohol and does not use drugs.  Immunization History   Administered Date(s) Administered    COVID-19 (PFIZER) Purple Cap Monovalent 05/01/2021, 05/22/2021    Flu Vaccine Quad PF >36MO 10/29/2015, 09/30/2018    Flublok 18+yrs 09/16/2019    Fluzone (or Fluarix & Flulaval for VFC) >6mos 10/29/2015, 09/30/2018, 09/25/2023    HPV Quadrivalent 10/13/2008, 03/27/2009    Hep A, 2 Dose 11/16/2012    Influenza Quad Vaccine (Inpatient) 10/23/2012    Influenza TIV (IM) 10/13/2008    Influenza, Unspecified 09/16/2019    MCV4 Unspecified 11/16/2012    Meningococcal MCV4P (Menactra) 11/16/2012    Tdap 09/30/2018         Allergies: Patient has no known allergies.     Medications:  Current Outpatient Medications on File Prior to Visit   Medication Sig Dispense Refill    albuterol sulfate  (90 Base) MCG/ACT inhaler Inhale 2 puffs 4 (Four) Times a Day As Needed for Wheezing for up to 1 dose. 18 g 0    atenolol (TENORMIN) 25 MG tablet TAKE ONE tablet (25 MG total) by MOUTH ONE TIME A DAY      Azelastine-Fluticasone 137-50 MCG/ACT suspension       B-D UF III MINI PEN NEEDLES 31G X 5 MM misc 1 each by Other route 5 (Five) Times a Day. 450 each 1    buPROPion XL (WELLBUTRIN XL) 150 MG 24 hr tablet Take 1 tablet by mouth Daily.      busPIRone (BUSPAR) 5 MG tablet Take 1 tablet by mouth 3 (Three) Times a Day.      cetirizine (zyrTEC) 10 MG tablet Take 1 tablet by mouth Daily.      Continuous Blood Gluc  (Dexcom G6 ) device USE AS DIRECTED 365 DAYS      Continuous Blood Gluc Sensor (Dexcom G6 Sensor) Every 10 (Ten) Days. 3 each 11    Continuous Blood Gluc Transmit (Dexcom G6 Transmitter) misc use as directed ONE every 3 months      Easy Touch Insulin Syringe 28G X 1/2\" 1 ML misc Inject subcutaneously daily      Insulin Disposable Pump (Omnipod DASH Pods, Gen 4,) misc 1 each Every 3 (Three) Days. 10 each 5    Insulin Disposable Pump (V-Go 40) 40 UNIT/24HR kit Fill " V-go insulin pump with rapid acting u-100/ml insulin & use as directed for 24hours then dispose      insulin glargine (LANTUS, SEMGLEE) 100 UNIT/ML injection Inject 10 units subcutaneously EVERY MORNING      Insulin Glargine (TOUJEO SOLOSTAR SC) Inject  under the skin into the appropriate area as directed.      Insulin Pen Needle (Pen Needles) 32G X 5 MM misc 1 each 4 (Four) Times a Day. 400 each 1    metFORMIN ER (GLUCOPHAGE-XR) 500 MG 24 hr tablet Take 2 tablets by mouth Every 12 (Twelve) Hours.      NovoLOG 100 UNIT/ML injection INJECT 100 UNITS DAILY PER PUMP      Prenatal Vit-Fe Fumarate-FA (prenatal vitamin 27-0.8) 27-0.8 MG tablet tablet Take 1 tablet by mouth Every Morning.      desvenlafaxine (PRISTIQ) 100 MG 24 hr tablet Take 1 tablet by mouth Daily. (Patient not taking: Reported on 9/25/2023)      famotidine (PEPCID) 40 MG tablet  (Patient not taking: Reported on 9/25/2023)      Insulin Degludec (TRESIBA) 100 UNIT/ML solution injection inject 50 units subcutaneously EVERY MORNING (Patient not taking: Reported on 9/25/2023)      metoprolol succinate XL (TOPROL-XL) 25 MG 24 hr tablet Take 1 tablet by mouth Daily. (Patient not taking: Reported on 9/25/2023)      montelukast (SINGULAIR) 10 MG tablet Take 1 tablet by mouth every night at bedtime. (Patient not taking: Reported on 9/25/2023)      tiotropium (Spiriva HandiHaler) 18 MCG per inhalation capsule (NEEDPA 02/22/23)INH TWO PUFFS BY MOUTH DAILY       No current facility-administered medications on file prior to visit.        Health Maintenance Due   Topic Date Due    Hepatitis B (1 of 3 - 3-dose series) Never done    Pneumococcal Vaccine 0-64 (1 - PCV) Never done    HEPATITIS C SCREENING  Never done    ANNUAL PHYSICAL  Never done    URINE MICROALBUMIN  05/18/2023    COVID-19 Vaccine (3 - 2023-24 season) 09/01/2023    BMI FOLLOWUP  09/09/2023       Vital Signs:   Vitals:    09/25/23 0911   BP: 112/68   Pulse: 98   Resp: 20   Temp: 97.9 °F (36.6 °C)  "  SpO2: 98%   Weight: 136 kg (300 lb)   Height: 157.5 cm (62\")      Body mass index is 54.87 kg/m².     ROS:  Review of Systems   Constitutional:  Negative for fatigue and fever.   HENT:  Negative for congestion, ear pain and sinus pressure.    Respiratory:  Negative for cough, chest tightness and shortness of breath.    Cardiovascular:  Negative for chest pain, palpitations and leg swelling.   Gastrointestinal:  Negative for abdominal pain and diarrhea.   Genitourinary:  Negative for dysuria and frequency.   Neurological:  Negative for speech difficulty, headache and confusion.   Psychiatric/Behavioral:  Negative for agitation and behavioral problems.         Physical Exam  Vitals reviewed.   Constitutional:       Appearance: Normal appearance.   HENT:      Right Ear: Tympanic membrane normal.      Left Ear: Tympanic membrane normal.      Nose: Nose normal.   Eyes:      Extraocular Movements: Extraocular movements intact.      Conjunctiva/sclera: Conjunctivae normal.      Pupils: Pupils are equal, round, and reactive to light.   Cardiovascular:      Rate and Rhythm: Normal rate and regular rhythm.   Pulmonary:      Effort: Pulmonary effort is normal.      Breath sounds: Normal breath sounds.   Abdominal:      General: Bowel sounds are normal.   Musculoskeletal:         General: Normal range of motion.      Cervical back: Normal range of motion.   Skin:     General: Skin is warm and dry.   Neurological:      General: No focal deficit present.      Mental Status: She is alert and oriented to person, place, and time.   Psychiatric:         Mood and Affect: Mood normal.         Behavior: Behavior normal.        Result Review        Diagnoses and all orders for this visit:    1. Type 2 diabetes mellitus with hyperglycemia, with long-term current use of insulin (Primary)  -     Hemoglobin A1c    2. Moderate persistent asthma, unspecified whether complicated  -     albuterol (PROVENTIL) (2.5 MG/3ML) 0.083% nebulizer " solution; Take 2.5 mg by nebulization Every 4 (Four) Hours As Needed for Wheezing for up to 217 days.  Dispense: 300 mL; Refill: 12  -     Fluticasone Furoate-Vilanterol 100-25 MCG/ACT aerosol powder ; Inhale 1 puff Daily for 30 days.  Dispense: 28 each; Refill: 5    3. Hyponatremia  -     Comprehensive Metabolic Panel    4. Need for influenza vaccination  -     Fluzone >6 Months (3601-1187)    5. Moderate persistent asthma with exacerbation  -     azithromycin (Zithromax Z-Thony) 250 MG tablet; Take 2 tablets by mouth on day 1, then 1 tablet daily on days 2-5  Dispense: 6 tablet; Refill: 0    Diabetes mellitus  - Her hemoglobin A1c will be checked today.    Asthma  - The patient will start Breo inhaler.  - She will receive a breathing treatment while in the office today.  - Azithromycin will be sent to the pharmacy.    Health maintenance  - Laboratory studies will be performed.  - She will receive her influenza vaccine today.      Smoking Cessation:    Dorinda Sherwood  reports that she has never smoked. She has never been exposed to tobacco smoke. She has never used smokeless tobacco.          Follow Up   Return in about 4 weeks (around 10/23/2023) for Annual physical.  Patient was given instructions and counseling regarding her condition or for health maintenance advice. Please see specific information pulled into the AVS if appropriate.       Yeni Cazares MD    Transcribed from ambient dictation for Yeni Cazares MD by Nilda Martins.  09/25/23   11:54 EDT    Patient or patient representative verbalized consent to the visit recording.  I have personally performed the services described in this document as transcribed by the above individual, and it is both accurate and complete.

## 2023-12-01 ENCOUNTER — TELEPHONE (OUTPATIENT)
Dept: DIABETES SERVICES | Facility: HOSPITAL | Age: 28
End: 2023-12-01
Payer: COMMERCIAL

## 2023-12-01 NOTE — TELEPHONE ENCOUNTER
PA REQUEST FOR DEXCOM SENSOR RECEIVED FROM PHARMACY, 12-1-23. UNABLE TO COMPLETE AND FAXED BACK. PT NOT SEEN SINCE 3-1-23, NO FOLLOW UP APPT MADE

## 2024-02-05 ENCOUNTER — APPOINTMENT (OUTPATIENT)
Dept: GENERAL RADIOLOGY | Facility: HOSPITAL | Age: 29
End: 2024-02-05
Payer: COMMERCIAL

## 2024-02-05 ENCOUNTER — HOSPITAL ENCOUNTER (EMERGENCY)
Facility: HOSPITAL | Age: 29
Discharge: HOME | End: 2024-02-06
Attending: EMERGENCY MEDICINE | Admitting: EMERGENCY MEDICINE
Payer: COMMERCIAL

## 2024-02-05 VITALS
OXYGEN SATURATION: 99 % | SYSTOLIC BLOOD PRESSURE: 150 MMHG | WEIGHT: 293 LBS | RESPIRATION RATE: 18 BRPM | HEIGHT: 62 IN | BODY MASS INDEX: 53.92 KG/M2 | HEART RATE: 117 BPM | TEMPERATURE: 98 F | DIASTOLIC BLOOD PRESSURE: 81 MMHG
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
## 2024-02-05 DIAGNOSIS — S93.401A SPRAIN OF RIGHT ANKLE, UNSPECIFIED LIGAMENT, INITIAL ENCOUNTER: Primary | ICD-10-CM

## 2024-02-05 PROCEDURE — 73600 X-RAY EXAM OF ANKLE: CPT

## 2024-02-05 PROCEDURE — 99282 EMERGENCY DEPT VISIT SF MDM: CPT

## 2024-02-05 PROCEDURE — 71101 X-RAY EXAM UNILAT RIBS/CHEST: CPT
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
## 2024-02-12 ENCOUNTER — OFFICE VISIT (OUTPATIENT)
Dept: FAMILY MEDICINE CLINIC | Facility: CLINIC | Age: 29
End: 2024-02-12
Payer: COMMERCIAL

## 2024-02-12 VITALS
BODY MASS INDEX: 53.92 KG/M2 | OXYGEN SATURATION: 99 % | RESPIRATION RATE: 16 BRPM | WEIGHT: 293 LBS | DIASTOLIC BLOOD PRESSURE: 80 MMHG | SYSTOLIC BLOOD PRESSURE: 128 MMHG | TEMPERATURE: 98 F | HEART RATE: 105 BPM | HEIGHT: 62 IN

## 2024-02-12 DIAGNOSIS — R20.0 NUMBNESS OF RIGHT FOOT: ICD-10-CM

## 2024-02-12 DIAGNOSIS — S93.401D SPRAIN OF RIGHT ANKLE, UNSPECIFIED LIGAMENT, SUBSEQUENT ENCOUNTER: Primary | ICD-10-CM

## 2024-02-12 PROCEDURE — 1160F RVW MEDS BY RX/DR IN RCRD: CPT

## 2024-02-12 PROCEDURE — 1159F MED LIST DOCD IN RCRD: CPT

## 2024-02-12 PROCEDURE — 99213 OFFICE O/P EST LOW 20 MIN: CPT

## 2024-02-12 RX ORDER — MELOXICAM 7.5 MG/1
7.5 TABLET ORAL DAILY
Qty: 30 TABLET | Refills: 0 | Status: SHIPPED | OUTPATIENT
Start: 2024-02-12 | End: 2024-03-13

## 2024-04-08 ENCOUNTER — OFFICE VISIT (OUTPATIENT)
Dept: FAMILY MEDICINE CLINIC | Facility: CLINIC | Age: 29
End: 2024-04-08
Payer: COMMERCIAL

## 2024-04-08 VITALS
BODY MASS INDEX: 53.92 KG/M2 | RESPIRATION RATE: 18 BRPM | TEMPERATURE: 98 F | DIASTOLIC BLOOD PRESSURE: 80 MMHG | WEIGHT: 293 LBS | OXYGEN SATURATION: 97 % | HEIGHT: 62 IN | HEART RATE: 99 BPM | SYSTOLIC BLOOD PRESSURE: 120 MMHG
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
## 2024-04-08 DIAGNOSIS — Z79.4 TYPE 2 DIABETES MELLITUS WITH HYPERGLYCEMIA, WITH LONG-TERM CURRENT USE OF INSULIN: ICD-10-CM

## 2024-04-08 DIAGNOSIS — I49.3 PREMATURE VENTRICULAR CONTRACTIONS: ICD-10-CM

## 2024-04-08 DIAGNOSIS — G44.311 INTRACTABLE ACUTE POST-TRAUMATIC HEADACHE: ICD-10-CM

## 2024-04-08 DIAGNOSIS — E11.65 TYPE 2 DIABETES MELLITUS WITH HYPERGLYCEMIA, WITH LONG-TERM CURRENT USE OF INSULIN: ICD-10-CM

## 2024-04-08 DIAGNOSIS — M79.671 RIGHT FOOT PAIN: ICD-10-CM

## 2024-04-08 DIAGNOSIS — I49.9 IRREGULAR HEART RHYTHM: ICD-10-CM

## 2024-04-08 LAB
ALBUMIN SERPL-MCNC: 4.3 G/DL (ref 3.5–5.2)
ALBUMIN UR-MCNC: 2.2 MG/DL
ALBUMIN/GLOB SERPL: 1.5 G/DL
ALP SERPL-CCNC: 88 U/L (ref 39–117)
ALT SERPL W P-5'-P-CCNC: 22 U/L (ref 1–33)
ANION GAP SERPL CALCULATED.3IONS-SCNC: 16.4 MMOL/L (ref 5–15)
AST SERPL-CCNC: 20 U/L (ref 1–32)
BILIRUB SERPL-MCNC: 0.3 MG/DL (ref 0–1.2)
BUN SERPL-MCNC: 10 MG/DL (ref 6–20)
BUN/CREAT SERPL: 22.7 (ref 7–25)
CALCIUM SPEC-SCNC: 8.9 MG/DL (ref 8.6–10.5)
CHLORIDE SERPL-SCNC: 96 MMOL/L (ref 98–107)
CHOLEST SERPL-MCNC: 198 MG/DL (ref 0–200)
CO2 SERPL-SCNC: 24.6 MMOL/L (ref 22–29)
CREAT SERPL-MCNC: 0.44 MG/DL (ref 0.57–1)
EGFRCR SERPLBLD CKD-EPI 2021: 134.5 ML/MIN/1.73
GLOBULIN UR ELPH-MCNC: 2.9 GM/DL
GLUCOSE SERPL-MCNC: 205 MG/DL (ref 65–99)
HBA1C MFR BLD: 10.8 % (ref 4.8–5.6)
HDLC SERPL-MCNC: 33 MG/DL (ref 40–60)
LDLC SERPL CALC-MCNC: 120 MG/DL (ref 0–100)
LDLC/HDLC SERPL: 3.46 {RATIO}
POTASSIUM SERPL-SCNC: 4.1 MMOL/L (ref 3.5–5.2)
PROT SERPL-MCNC: 7.2 G/DL (ref 6–8.5)
SODIUM SERPL-SCNC: 137 MMOL/L (ref 136–145)
TRIGL SERPL-MCNC: 254 MG/DL (ref 0–150)
VLDLC SERPL-MCNC: 45 MG/DL (ref 5–40)

## 2024-04-08 PROCEDURE — 80053 COMPREHEN METABOLIC PANEL: CPT | Performed by: FAMILY MEDICINE

## 2024-04-08 PROCEDURE — 93000 ELECTROCARDIOGRAM COMPLETE: CPT | Performed by: FAMILY MEDICINE

## 2024-04-08 PROCEDURE — 1160F RVW MEDS BY RX/DR IN RCRD: CPT | Performed by: FAMILY MEDICINE

## 2024-04-08 PROCEDURE — 99214 OFFICE O/P EST MOD 30 MIN: CPT | Performed by: FAMILY MEDICINE

## 2024-04-08 PROCEDURE — 83036 HEMOGLOBIN GLYCOSYLATED A1C: CPT | Performed by: FAMILY MEDICINE

## 2024-04-08 PROCEDURE — 1159F MED LIST DOCD IN RCRD: CPT | Performed by: FAMILY MEDICINE

## 2024-04-08 PROCEDURE — 80061 LIPID PANEL: CPT | Performed by: FAMILY MEDICINE

## 2024-04-08 PROCEDURE — 82043 UR ALBUMIN QUANTITATIVE: CPT | Performed by: FAMILY MEDICINE

## 2024-04-08 RX ORDER — DAPAGLIFLOZIN 5 MG/1
5 TABLET, FILM COATED ORAL EVERY MORNING
COMMUNITY
Start: 2024-04-02 | End: 2024-04-08

## 2024-04-08 RX ORDER — NYSTATIN 100000 [USP'U]/G
POWDER TOPICAL 2 TIMES DAILY
COMMUNITY
Start: 2024-03-08
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
## 2024-04-18 ENCOUNTER — TELEPHONE (OUTPATIENT)
Dept: FAMILY MEDICINE CLINIC | Facility: CLINIC | Age: 29
End: 2024-04-18

## 2024-04-18 NOTE — TELEPHONE ENCOUNTER
Caller: Dorinda Sherwood    Relationship: Self    Best call back number: [phone]     What is the best time to reach you: ANYTIME     Who are you requesting to speak with (clinical staff, provider,  specific staff member): CLINICAL     What was the call regarding: PATIENT IS CALLING IN REGARDS TO A DEPRESSION MEDICATION TO BE PLACED ON STATED SHE RECENTLY SPOKE WITH PCP, ABOUT A MEDICATION, HUB DID CONFIRM PATIENT NO THOUGHTS OF SELF HARM OR SELF HARM TO OTHERS.

## 2024-04-19 ENCOUNTER — HOSPITAL ENCOUNTER (OUTPATIENT)
Dept: CT IMAGING | Facility: HOSPITAL | Age: 29
Discharge: HOME | End: 2024-04-19
Payer: COMMERCIAL

## 2024-04-19 ENCOUNTER — TELEPHONE (OUTPATIENT)
Dept: FAMILY MEDICINE CLINIC | Facility: CLINIC | Age: 29
End: 2024-04-19
Payer: COMMERCIAL

## 2024-04-19 ENCOUNTER — HOSPITAL ENCOUNTER (OUTPATIENT)
Dept: GENERAL RADIOLOGY | Facility: HOSPITAL | Age: 29
Discharge: HOME | End: 2024-04-19
Payer: COMMERCIAL

## 2024-04-19 DIAGNOSIS — M79.671 RIGHT FOOT PAIN: ICD-10-CM

## 2024-04-19 DIAGNOSIS — G44.311 INTRACTABLE ACUTE POST-TRAUMATIC HEADACHE: ICD-10-CM

## 2024-04-19 PROCEDURE — 70450 CT HEAD/BRAIN W/O DYE: CPT

## 2024-04-19 PROCEDURE — 73620 X-RAY EXAM OF FOOT: CPT

## 2024-04-19 NOTE — TELEPHONE ENCOUNTER
Called to inform patient of her CT head and foot x-ray results but she was not able to come to the phone so I had to leave a voicemail.  Left results that her CT of the head was normal and her x-ray of the foot did not show any acute fractures.  If patient has any more concerns she can contact the office.

## 2024-04-28 DIAGNOSIS — Z79.4 TYPE 2 DIABETES MELLITUS WITH HYPERGLYCEMIA, WITH LONG-TERM CURRENT USE OF INSULIN: Primary | ICD-10-CM

## 2024-04-28 DIAGNOSIS — E11.65 TYPE 2 DIABETES MELLITUS WITH HYPERGLYCEMIA, WITH LONG-TERM CURRENT USE OF INSULIN: Primary | ICD-10-CM

## 2024-04-28 DIAGNOSIS — E78.2 MIXED HYPERLIPIDEMIA: ICD-10-CM

## 2024-04-28 RX ORDER — ROSUVASTATIN CALCIUM 5 MG/1
5 TABLET, COATED ORAL NIGHTLY
Qty: 90 TABLET | Refills: 1 | Status: SHIPPED | OUTPATIENT
Start: 2024-04-28 | End: 2024-07-27

## 2024-05-06 ENCOUNTER — TELEPHONE (OUTPATIENT)
Dept: FAMILY MEDICINE CLINIC | Facility: CLINIC | Age: 29
End: 2024-05-06

## 2024-05-06 NOTE — TELEPHONE ENCOUNTER
Caller: Dorinda Sherwood    Relationship to patient: Self    Best call back number: [phone]    Chief complaint: BLOOD IN STOOL AND ABDOMINAL PAIN     Patient directed to call 911 or go to their nearest emergency room.     Patient verbalized understanding: [x] Yes  [] No  If no, why?    Additional notes: PATIENT STATES SHE WILL TRY AND GO TO THE ER.

## 2024-05-11 ENCOUNTER — APPOINTMENT (OUTPATIENT)
Dept: CT IMAGING | Facility: HOSPITAL | Age: 29
End: 2024-05-11
Payer: COMMERCIAL

## 2024-05-11 ENCOUNTER — HOSPITAL ENCOUNTER (EMERGENCY)
Facility: HOSPITAL | Age: 29
Discharge: HOME | End: 2024-05-11
Attending: EMERGENCY MEDICINE
Payer: COMMERCIAL

## 2024-05-11 VITALS
HEART RATE: 105 BPM | RESPIRATION RATE: 18 BRPM | TEMPERATURE: 98.3 F | DIASTOLIC BLOOD PRESSURE: 60 MMHG | WEIGHT: 293 LBS | BODY MASS INDEX: 53.92 KG/M2 | OXYGEN SATURATION: 95 % | SYSTOLIC BLOOD PRESSURE: 97 MMHG | HEIGHT: 62 IN

## 2024-05-11 DIAGNOSIS — K92.1 HEMATOCHEZIA: ICD-10-CM

## 2024-05-11 DIAGNOSIS — R10.9 LEFT SIDED ABDOMINAL PAIN OF UNKNOWN CAUSE: Primary | ICD-10-CM

## 2024-05-11 LAB
ALBUMIN SERPL-MCNC: 4.2 G/DL (ref 3.5–5.2)
ALBUMIN/GLOB SERPL: 1.4 G/DL
ALP SERPL-CCNC: 89 U/L (ref 39–117)
ALT SERPL W P-5'-P-CCNC: 19 U/L (ref 1–33)
ANION GAP SERPL CALCULATED.3IONS-SCNC: 12.1 MMOL/L (ref 5–15)
AST SERPL-CCNC: 15 U/L (ref 1–32)
BASOPHILS # BLD AUTO: 0.06 10*3/MM3 (ref 0–0.2)
BASOPHILS NFR BLD AUTO: 0.6 % (ref 0–1.5)
BILIRUB SERPL-MCNC: 0.2 MG/DL (ref 0–1.2)
BILIRUB UR QL STRIP: NEGATIVE
BUN SERPL-MCNC: 11 MG/DL (ref 6–20)
BUN/CREAT SERPL: 22 (ref 7–25)
CALCIUM SPEC-SCNC: 9.9 MG/DL (ref 8.6–10.5)
CHLORIDE SERPL-SCNC: 96 MMOL/L (ref 98–107)
CLARITY UR: CLEAR
CO2 SERPL-SCNC: 27.9 MMOL/L (ref 22–29)
COLOR UR: YELLOW
CREAT SERPL-MCNC: 0.5 MG/DL (ref 0.57–1)
DEPRECATED RDW RBC AUTO: 35.7 FL (ref 37–54)
EGFRCR SERPLBLD CKD-EPI 2021: 130.4 ML/MIN/1.73
EOSINOPHIL # BLD AUTO: 0.12 10*3/MM3 (ref 0–0.4)
EOSINOPHIL NFR BLD AUTO: 1.2 % (ref 0.3–6.2)
ERYTHROCYTE [DISTWIDTH] IN BLOOD BY AUTOMATED COUNT: 12.5 % (ref 12.3–15.4)
GLOBULIN UR ELPH-MCNC: 3 GM/DL
GLUCOSE SERPL-MCNC: 212 MG/DL (ref 65–99)
GLUCOSE UR STRIP-MCNC: ABNORMAL MG/DL
HCG INTACT+B SERPL-ACNC: <0.5 MIU/ML
HCT VFR BLD AUTO: 43.2 % (ref 34–46.6)
HEMOCCULT STL QL IA: POSITIVE
HGB BLD-MCNC: 14.5 G/DL (ref 12–15.9)
HGB UR QL STRIP.AUTO: NEGATIVE
HOLD SPECIMEN: NORMAL
HOLD SPECIMEN: NORMAL
IMM GRANULOCYTES # BLD AUTO: 0.06 10*3/MM3 (ref 0–0.05)
IMM GRANULOCYTES NFR BLD AUTO: 0.6 % (ref 0–0.5)
KETONES UR QL STRIP: NEGATIVE
LEUKOCYTE ESTERASE UR QL STRIP.AUTO: NEGATIVE
LIPASE SERPL-CCNC: 29 U/L (ref 13–60)
LYMPHOCYTES # BLD AUTO: 2.88 10*3/MM3 (ref 0.7–3.1)
LYMPHOCYTES NFR BLD AUTO: 28.6 % (ref 19.6–45.3)
MCH RBC QN AUTO: 27 PG (ref 26.6–33)
MCHC RBC AUTO-ENTMCNC: 33.6 G/DL (ref 31.5–35.7)
MCV RBC AUTO: 80.3 FL (ref 79–97)
MONOCYTES # BLD AUTO: 0.68 10*3/MM3 (ref 0.1–0.9)
MONOCYTES NFR BLD AUTO: 6.8 % (ref 5–12)
NEUTROPHILS NFR BLD AUTO: 6.27 10*3/MM3 (ref 1.7–7)
NEUTROPHILS NFR BLD AUTO: 62.2 % (ref 42.7–76)
NITRITE UR QL STRIP: NEGATIVE
NRBC BLD AUTO-RTO: 0 /100 WBC (ref 0–0.2)
PH UR STRIP.AUTO: 7.5 [PH] (ref 5–8)
PLATELET # BLD AUTO: 325 10*3/MM3 (ref 140–450)
PMV BLD AUTO: 10.7 FL (ref 6–12)
POTASSIUM SERPL-SCNC: 4.3 MMOL/L (ref 3.5–5.2)
PROT SERPL-MCNC: 7.2 G/DL (ref 6–8.5)
PROT UR QL STRIP: NEGATIVE
RBC # BLD AUTO: 5.38 10*6/MM3 (ref 3.77–5.28)
SODIUM SERPL-SCNC: 136 MMOL/L (ref 136–145)
SP GR UR STRIP: 1.02 (ref 1–1.03)
UROBILINOGEN UR QL STRIP: ABNORMAL
WBC NRBC COR # BLD AUTO: 10.07 10*3/MM3 (ref 3.4–10.8)
WHOLE BLOOD HOLD COAG: NORMAL
WHOLE BLOOD HOLD SPECIMEN: NORMAL

## 2024-05-11 PROCEDURE — 96374 THER/PROPH/DIAG INJ IV PUSH: CPT

## 2024-05-11 PROCEDURE — 81003 URINALYSIS AUTO W/O SCOPE: CPT | Performed by: EMERGENCY MEDICINE

## 2024-05-11 PROCEDURE — 82274 ASSAY TEST FOR BLOOD FECAL: CPT | Performed by: EMERGENCY MEDICINE

## 2024-05-11 PROCEDURE — 85025 COMPLETE CBC W/AUTO DIFF WBC: CPT | Performed by: EMERGENCY MEDICINE

## 2024-05-11 PROCEDURE — 83690 ASSAY OF LIPASE: CPT | Performed by: EMERGENCY MEDICINE

## 2024-05-11 PROCEDURE — 99285 EMERGENCY DEPT VISIT HI MDM: CPT

## 2024-05-11 PROCEDURE — 36415 COLL VENOUS BLD VENIPUNCTURE: CPT

## 2024-05-11 PROCEDURE — 25010000002 HYDROMORPHONE 1 MG/ML SOLUTION: Performed by: EMERGENCY MEDICINE

## 2024-05-11 PROCEDURE — 80053 COMPREHEN METABOLIC PANEL: CPT | Performed by: EMERGENCY MEDICINE

## 2024-05-11 PROCEDURE — 74177 CT ABD & PELVIS W/CONTRAST: CPT

## 2024-05-11 PROCEDURE — 25510000001 IOPAMIDOL PER 1 ML: Performed by: EMERGENCY MEDICINE

## 2024-05-11 PROCEDURE — 84702 CHORIONIC GONADOTROPIN TEST: CPT | Performed by: EMERGENCY MEDICINE

## 2024-05-11 RX ORDER — SODIUM CHLORIDE 0.9 % (FLUSH) 0.9 %
10 SYRINGE (ML) INJECTION AS NEEDED
Status: DISCONTINUED | OUTPATIENT
Start: 2024-05-11 | End: 2024-05-11 | Stop reason: HOSPADM

## 2024-05-11 RX ORDER — ONDANSETRON HYDROCHLORIDE 8 MG/1
8 TABLET, FILM COATED ORAL EVERY 8 HOURS PRN
Qty: 20 TABLET | Refills: 0 | Status: SHIPPED | OUTPATIENT
Start: 2024-05-11

## 2024-05-11 RX ORDER — TRAMADOL HYDROCHLORIDE 50 MG/1
50 TABLET ORAL EVERY 6 HOURS PRN
Qty: 15 TABLET | Refills: 0 | Status: SHIPPED | OUTPATIENT
Start: 2024-05-11

## 2024-05-11 RX ADMIN — IOPAMIDOL 100 ML: 755 INJECTION, SOLUTION INTRAVENOUS at 12:38

## 2024-05-11 RX ADMIN — HYDROMORPHONE HYDROCHLORIDE 1 MG: 1 INJECTION, SOLUTION INTRAMUSCULAR; INTRAVENOUS; SUBCUTANEOUS at 12:53

## 2024-05-11 NOTE — DISCHARGE INSTRUCTIONS
Clear liquid diet.  Drink plenty of fluids.  Sure adequate hydration.  Advance diet slowly as tolerated.  Take the prescriptions as prescribed.  I placed a referral for gastroenterology follow-up.  You should receive a phone call next week with an outpatient follow-up appointment.  Follow-up your primary care provider in 1 week.  Return to the ER for uncontrolled pain, worsening bleeding, or any other concerns issues that may arise.

## 2024-05-11 NOTE — ED PROVIDER NOTES
Time: 2:42 PM EDT  Date of encounter:  2024  Independent Historian/Clinical History and Information was obtained by:   Patient  Chief Complaint: Abdominal pain and rectal bleeding    History is limited by: N/A    History of Present Illness:  Patient is a 28 y.o. year old female who presents to the emergency department for evaluation of abdominal pain and rectal bleeding.  Patient states that the abdominal pain started yesterday.  Patient is the pain is located in the left side of her abdomen.  Patient describes as a stabbing pain.  She denies any radiation.  Patient reports the pain is exacerbated by bending over and eating.  She states that warm compresses taken the edge off the pain.  Patient also admits to having nausea and vomiting as well as has seen bright red blood in her stools.  Patient reports she has tried Tylenol and ibuprofen without relief of her pain.  Patient denies any prior episodes.    HPI    Patient Care Team  Primary Care Provider: Yeni Cazares MD    Past Medical History:     No Known Allergies  Past Medical History:   Diagnosis Date    Anesthesia     slow to wake up postop, anxiety postop    Anxiety     AR (allergic rhinitis)     ASD (atrial septal defect)     had since birth- asymptomatic- see's dr falk     Asthma     no inhalers    Bipolar disorder     Depression     Diabetes mellitus     type ii- bg runs around 200-300    Gallstones currently    GERD (gastroesophageal reflux disease)     Hypertension     Migraine     Ovarian cyst     Trauma      Past Surgical History:   Procedure Laterality Date     SECTION      CHOLECYSTECTOMY N/A 2021    Procedure: CHOLECYSTECTOMY LAPAROSCOPIC;  Surgeon: Robert Araya MD;  [facility] [facility] [place] [facility] [place]  Service: General;  Laterality: N/A;    COLONOSCOPY      TUBAL ABDOMINAL LIGATION      WISDOM TOOTH EXTRACTION       Family History   Problem Relation Age of Onset    Heart disease Father     Heart disease Other     Heart  "disease Other     Heart disease Other     Diabetes type II Other        Home Medications:  Prior to Admission medications    Medication Sig Start Date End Date Taking? Authorizing Provider   Advair Diskus 500-50 MCG/ACT DISKUS INHALE ONE PUFF BY MOUTH EVERY TWELVE HOURS (RINSE MOUTH AFTER EACH USE) 10/3/23   Brock Govea MD   albuterol sulfate  (90 Base) MCG/ACT inhaler Inhale 2 puffs 4 (Four) Times a Day As Needed for Wheezing for up to 1 dose. 9/22/23   Yuniel Epperson MD   Allergy Relief 5 MG tablet Take 1 tablet by mouth Daily.    Brock Govea MD   atenolol (TENORMIN) 25 MG tablet TAKE ONE tablet (25 MG total) by MOUTH ONE TIME A DAY    Brock Govea MD   Azelastine-Fluticasone 137-50 MCG/ACT suspension     Brock Govea MD B-D UF III MINI PEN NEEDLES 31G X 5 MM misc 1 each by Other route 5 (Five) Times a Day. 3/1/23   Arcelia Mohan APRN   buPROPion XL (WELLBUTRIN XL) 150 MG 24 hr tablet Take 1 tablet by mouth Daily. 6/16/23   Brock Govea MD   busPIRone (BUSPAR) 5 MG tablet Take 1 tablet by mouth 3 (Three) Times a Day.    Brock Govea MD   cetirizine (zyrTEC) 10 MG tablet Take 1 tablet by mouth Daily. 2/22/23   Brock Govea MD   colestipol (COLESTID) 1 g tablet Take 1 tablet by mouth 2 (Two) Times a Day.    Brock Govea MD   Continuous Blood Gluc  (Dexcom G6 ) device USE AS DIRECTED 365 DAYS 3/27/23   Brock Govea MD   Continuous Blood Gluc Sensor (Dexcom G6 Sensor) Every 10 (Ten) Days. 3/14/23   Arcelia Mohan APRN   Continuous Blood Gluc Transmit (Dexcom G6 Transmitter) misc use as directed ONE every 3 months 5/18/23   Brock Govea MD   Easy Touch Insulin Syringe 28G X 1/2\" 1 ML misc Inject subcutaneously daily 6/17/23   Brock Govea MD   Fluticasone Furoate-Vilanterol 100-25 MCG/ACT aerosol powder  Inhale 1 puff Daily for 30 days.  Patient not taking: Reported on 4/8/2024 " 9/25/23 2/12/24  Yeni Cazares MD   insulin aspart (novoLOG) 100 UNIT/ML injection Inject 18 Units under the skin into the appropriate area as directed 3 Times a Day With Meals. Adjust dose based on glucose levels and carbohydrate intake as directed    Brock Govea MD   insulin detemir (Levemir FlexPen) 100 UNIT/ML injection Inject 40 Units under the skin into the appropriate area as directed 2 (Two) Times a Day.  Patient not taking: Reported on 4/8/2024    Brock Govea MD   Insulin Disposable Pump (Omnipod DASH Pods, Gen 4,) misc 1 each Every 3 (Three) Days. 3/27/23   Arcelia Mohan APRN   Insulin Pen Needle (Pen Needles) 32G X 5 MM misc 1 each 4 (Four) Times a Day. 3/1/23   Arcelia Mohan APRN   metFORMIN (GLUCOPHAGE) 1000 MG tablet Take 1 tablet by mouth Every 12 (Twelve) Hours. 3/14/24   Brock Govea MD   metFORMIN ER (GLUCOPHAGE-XR) 500 MG 24 hr tablet Take 2 tablets by mouth Every 12 (Twelve) Hours.  Patient not taking: Reported on 2/12/2024 6/17/23   Brock Govea MD   metoprolol succinate XL (TOPROL-XL) 25 MG 24 hr tablet Take 1 tablet by mouth Daily. 1/20/23   Brock Govea MD   montelukast (SINGULAIR) 10 MG tablet Take 1 tablet by mouth every night at bedtime. 2/22/23   Brock Govea MD   NovoLIN R 100 UNIT/ML injection Inject 15 units subcutaneously before meals  Patient not taking: Reported on 2/12/2024 9/23/23   Brock Govea MD   NovoLOG 100 UNIT/ML injection INJECT 100 UNITS DAILY PER PUMP 6/1/23   Brock Govea MD   nystatin 730014 UNIT/GM powder Apply  topically to the appropriate area as directed 2 (Two) Times a Day. to affected area  Patient not taking: Reported on 4/8/2024 3/8/24   Brock Govea MD   ondansetron (ZOFRAN) 8 MG tablet     Brock Govea MD   prednisoLONE acetate (PRED FORTE) 1 % ophthalmic suspension Administer 1 drop to the right eye 4 (Four) Times a Day.  Patient not taking:  Reported on 2/12/2024 10/27/23   Brock Govea MD   predniSONE (DELTASONE) 5 MG tablet     Brock Govea MD   Prenatal Vit-Fe Fumarate-FA (prenatal vitamin 27-0.8) 27-0.8 MG tablet tablet Take 1 tablet by mouth Every Morning. 7/6/23   Brock Govea MD   promethazine (PHENERGAN) 25 MG tablet     Brock Govea MD   QUEtiapine (SEROquel) 200 MG tablet Take 1 tablet by mouth Daily.  Patient not taking: Reported on 2/12/2024    Brock Govea MD   rosuvastatin (CRESTOR) 5 MG tablet Take 1 tablet by mouth Every Night for 90 days. 4/28/24 7/27/24  Yeni Cazares MD   tiotropium (Spiriva HandiHaler) 18 MCG per inhalation capsule (NEEDPA 02/22/23)INH TWO PUFFS BY MOUTH DAILY  Patient not taking: Reported on 2/12/2024    Brock Govea MD   tiotropium bromide monohydrate (SPIRIVA RESPIMAT) 2.5 MCG/ACT aerosol solution inhaler (NEEDPA 02/22/23)INH TWO PUFFS BY MOUTH DAILY  Patient not taking: Reported on 2/12/2024    Brock Govea MD   Trulicity 4.5 MG/0.5ML solution pen-injector inject 4.5 mg weekly  Patient not taking: Reported on 2/12/2024 10/3/23   Brock Govea MD        Social History:   Social History     Tobacco Use    Smoking status: Never     Passive exposure: Never    Smokeless tobacco: Never   Vaping Use    Vaping status: Never Used   Substance Use Topics    Alcohol use: Never    Drug use: Never         Review of Systems:  Review of Systems   Constitutional:  Negative for chills and fever.   HENT:  Negative for congestion, ear pain and sore throat.    Eyes:  Negative for pain.   Respiratory:  Negative for cough, chest tightness and shortness of breath.    Cardiovascular:  Negative for chest pain.   Gastrointestinal:  Positive for abdominal pain, blood in stool, nausea and vomiting. Negative for diarrhea.   Genitourinary:  Negative for flank pain and hematuria.   Musculoskeletal:  Negative for joint swelling.   Skin:  Negative for pallor.  "  Neurological:  Negative for seizures and headaches.   All other systems reviewed and are negative.       Physical Exam:  BP 97/62   Pulse 105   Temp 98.3 °F (36.8 °C) (Oral)   Resp 15   Ht 157.5 cm (62\")   Wt (!) 139 kg (306 lb 10.6 oz)   SpO2 95%   BMI 56.09 kg/m²     Physical Exam    Vital signs were reviewed under triage note.  General appearance - Patient appears well-developed and well-nourished.  Patient is in no acute distress.  Head - Normocephalic, atraumatic.  Pupils - Equal, round, reactive to light.  Extraocular muscles are intact.  Conjunctiva is clear.  Nasal - Normal inspection.  No evidence of trauma or epistaxis.  Tympanic membranes - Gray, intact without erythema or retractions.  Oral mucosa - Pink and moist without lesions or erythema.  Uvula is midline.  Chest wall - Atraumatic.  Chest wall is nontender.  There are no vesicular rashes noted.  Neck - Supple.  Trachea was midline.  There is no palpable lymphadenopathy or thyromegaly.  There are no meningeal signs  Lungs - Clear to auscultation and percussion bilaterally.  Heart - Regular rate and rhythm without any murmurs, clicks, or gallops.  Abdomen - Soft.  Morbidly obese.  Bowel sounds are present.  There is left-sided palpable tenderness.  There is no rebound, guarding, or rigidity.  There are no palpable masses.  There are no pulsatile masses.  Rectal -  There were no external lesions noted.  Patient has normal sphincter tone.  Patient had what felt like possibly an internal hemorrhoid but no definitive palpable masses.  There was light brown stool within the rectal vault.  Back - Spine is straight and midline.  There is no CVA tenderness.  Extremities - Intact x4 with full range of motion.  There is no palpable edema.  Pulses are intact x4 and equal.  Neurologic - Patient is awake, alert, and oriented x3.  Cranial nerves II through XII are grossly intact.  Motor and sensory functions grossly intact.  Cerebellar function was " normal.  Integument - There are no rashes.  There are no petechia or purpura lesions noted.  There are no vesicular lesions noted.          Procedures:  Procedures      Medical Decision Making:      Comorbidities that affect care:    Depression, diabetes, asthma, hypertension, GERD, migraines, bipolar disorder, morbid obesity    External Notes reviewed:    Previous Clinic Note: Office visit with Dr. Cazares from 4/8/2024 was reviewed by me.      The following orders were placed and all results were independently analyzed by me:  Orders Placed This Encounter   Procedures    CT Abdomen Pelvis With Contrast    [place] Draw    Comprehensive Metabolic Panel    Lipase    Urinalysis With Microscopic If Indicated (No Culture) - Urine, Clean Catch    hCG, Quantitative, Pregnancy    CBC Auto Differential    Occult Blood, Fecal By Immunoassay - Stool, Per Rectum    Ambulatory Referral to Gastroenterology    NPO Diet NPO Type: Strict NPO    Undress & Gown    Insert Peripheral IV    CBC & Differential    Green Top (Gel)    Lavender Top    Gold Top - SST    Light Blue Top       Medications Given in the Emergency Department:  Medications   sodium chloride 0.9 % flush 10 mL (has no administration in time range)   iopamidol (ISOVUE-370) 76 % injection 100 mL (100 mL Intravenous Given 5/11/24 1238)   HYDROmorphone (DILAUDID) injection 1 mg (1 mg Intravenous Given 5/11/24 1253)        ED Course:     The patient was seen and evaluated in the ED by me.  The above history and physical examination was performed as documented.  Diagnostic data was obtained.  Results reviewed.  Findings were discussed with the patient.  Patient be treated symptomatically in regards to her nausea and vomiting.  Patient will be referred to Dr. Del Valle for outpatient follow-up and endoscopy.  I will place a referral through the [facility] chart.  Patient stable for discharge home with outpatient follow-up and return for change or worsening symptoms.    Labs:    Lab  Results (last 24 hours)       Procedure Component Value Units Date/Time    Urinalysis With Microscopic If Indicated (No Culture) - Urine, Clean Catch [[id number]]  (Abnormal) Collected: 05/11/24 1117    Specimen: Urine, Clean Catch Updated: 05/11/24 1140     Color, UA Yellow     Appearance, UA Clear     pH, UA 7.5     Specific Gravity, UA 1.025     Glucose, UA >=1000 mg/dL (3+)     Ketones, UA Negative     Bilirubin, UA Negative     Blood, UA Negative     Protein, UA Negative     Leuk Esterase, UA Negative     Nitrite, UA Negative     Urobilinogen, UA 0.2 E.U./dL    Narrative:      Urine microscopic not indicated.    CBC & Differential [[id number]]  (Abnormal) Collected: 05/11/24 1126    Specimen: Blood Updated: 05/11/24 1136    Narrative:      The following orders were created for panel order CBC & Differential.  Procedure                               Abnormality         Status                     ---------                               -----------         ------                     CBC Auto Differential[[id number]]        Abnormal            Final result                 Please view results for these tests on the individual orders.    Comprehensive Metabolic Panel [[id number]]  (Abnormal) Collected: 05/11/24 1126    Specimen: Blood Updated: 05/11/24 1203     Glucose 212 mg/dL      BUN 11 mg/dL      Creatinine 0.50 mg/dL      Sodium 136 mmol/L      Potassium 4.3 mmol/L      Chloride 96 mmol/L      CO2 27.9 mmol/L      Calcium 9.9 mg/dL      Total Protein 7.2 g/dL      Albumin 4.2 g/dL      ALT (SGPT) 19 U/L      AST (SGOT) 15 U/L      Alkaline Phosphatase 89 U/L      Total Bilirubin 0.2 mg/dL      Globulin 3.0 gm/dL      A/G Ratio 1.4 g/dL      BUN/Creatinine Ratio 22.0     Anion Gap 12.1 mmol/L      eGFR 130.4 mL/min/1.73     Narrative:      GFR Normal >60  Chronic Kidney Disease <60  Kidney Failure <15      Lipase [[id number]]  (Normal) Collected: 05/11/24 1126    Specimen: Blood Updated: 05/11/24 1203     Lipase 29  U/L     hCG, Quantitative, Pregnancy [[id number]] Collected: 05/11/24 1126    Specimen: Blood Updated: 05/11/24 1155     HCG Quantitative <0.50 mIU/mL     Narrative:      HCG Ranges by Gestational Age    Females - non-pregnant premenopausal   </= 1mIU/mL HCG  Females - postmenopausal               </= 7mIU/mL HCG    3 Weeks       5.4   -      72 mIU/mL  4 Weeks      10.2   -     708 mIU/mL  5 Weeks       217   -   8,245 mIU/mL  6 Weeks       152   -  32,177 mIU/mL  7 Weeks     4,059   - 153,767 mIU/mL  8 Weeks    31,366   - 149,094 mIU/mL  9 Weeks    59,109   - 135,901 mIU/mL  10 Weeks   44,186   - 170,409 mIU/mL  12 Weeks   27,107   - 201,615 mIU/mL  14 Weeks   24,302   -  93,646 mIU/mL  15 Weeks   12,540   -  69,747 mIU/mL  16 Weeks    8,904   -  55,332 mIU/mL  17 Weeks    8,240   -  51,793 mIU/mL  18 Weeks    9,649   -  55,271 mIU/mL      CBC Auto Differential [[id number]]  (Abnormal) Collected: 05/11/24 1126    Specimen: Blood Updated: 05/11/24 1136     WBC 10.07 10*3/mm3      RBC 5.38 10*6/mm3      Hemoglobin 14.5 g/dL      Hematocrit 43.2 %      MCV 80.3 fL      MCH 27.0 pg      MCHC 33.6 g/dL      RDW 12.5 %      RDW-SD 35.7 fl      MPV 10.7 fL      Platelets 325 10*3/mm3      Neutrophil % 62.2 %      Lymphocyte % 28.6 %      Monocyte % 6.8 %      Eosinophil % 1.2 %      Basophil % 0.6 %      Immature Grans % 0.6 %      Neutrophils, Absolute 6.27 10*3/mm3      Lymphocytes, Absolute 2.88 10*3/mm3      Monocytes, Absolute 0.68 10*3/mm3      Eosinophils, Absolute 0.12 10*3/mm3      Basophils, Absolute 0.06 10*3/mm3      Immature Grans, Absolute 0.06 10*3/mm3      nRBC 0.0 /100 WBC     Occult Blood, Fecal By Immunoassay - Stool, Per Rectum [[id number]]  (Abnormal) Collected: 05/11/24 1143    Specimen: Stool from Per Rectum Updated: 05/11/24 1144     Occult Blood, Fecal by Immunoassay Positive             Imaging:    CT Abdomen Pelvis With Contrast    Result Date: 5/11/2024  CT ABDOMEN PELVIS W CONTRAST-  Date of  Exam: 5/11/2024 12:20 PM  Indication: Left-sided abnormal pain with GI bleeding.  Comparison: 7/2/2021  Technique: Axial CT images were obtained of the abdomen and pelvis following the uneventful intravenous administration of nonionic contrast. Reconstructed coronal and sagittal images were also obtained. Automated exposure control and iterative construction methods were used.   Findings: ABDOMEN: Motion artifact is present. The lung bases are grossly clear. There is fatty infiltration of the liver. The spleen, pancreas and adrenal glands are normal. There appear to be few small renal cysts. Motion artifact obscures the images. Prior cholecystectomy.  PELVIS: Motion artifact is present. The appendix and terminal ileum are normal. No evidence of bowel obstruction, perforation or abscess. No CT evidence of colitis. The uterus and adnexa have a normal CT appearance. The urinary bladder is normal. The abdominal aorta has a normal caliber.      Impression: Motion artifact. No acute findings are identified.    Electronically Signed By-LINH PERDOMO MD On:5/11/2024 12:56 PM         Differential Diagnosis and Discussion:    Abdominal Pain: Based on the patient's signs and symptoms, I considered abdominal aortic aneurysm, small bowel obstruction, pancreatitis, acute cholecystitis, acute appendecitis, peptic ulcer disease, gastritis, colitis, endocrine disorders, irritable bowel syndrome and other differential diagnosis an etiology of the patient's abdominal pain.  GI Bleeding: Differential diagnosis includes but is not limited to gastritis, gastric ulcer, stress ulcer, duodenitis, Carolyne-Victoria tears, esophageal varices, angiodysplasia, aortic enteric fistula, hematologic issues including thrombocytopenia, GI neoplasm, ulcerative colitis, Crohn's disease, diverticulosis, diverticulitis, hemorrhoids, aortic aneurysm, and polyps    All labs were reviewed and interpreted by me.  CT scan radiology impression was interpreted by  me.    MDM     Amount and/or Complexity of Data Reviewed  Clinical lab tests: reviewed  Tests in the radiology section of CPT®: reviewed             Patient Care Considerations:    ANTIBIOTICS: I considered prescribing antibiotics as an outpatient however no bacterial focus of infection was found.      Consultants/Shared Management Plan:    None    Social Determinants of Health:    Patient is independent, reliable, and has access to care.       Disposition and Care Coordination:    Discharged: I considered escalation of care by admitting this patient to the hospital, however the patient's ED workup did not show any acute findings that would warrant inpatient admission at this time.    I have explained the patient´s condition, diagnoses and treatment plan based on the information available to me at this time. I have answered questions and addressed any concerns. The patient has a good  understanding of the patient´s diagnosis, condition, and treatment plan as can be expected at this point. The vital signs have been stable. The patient´s condition is stable and appropriate for discharge from the emergency department.      The patient will pursue further outpatient evaluation with the primary care physician or other designated or consulting physician as outlined in the discharge instructions. They are agreeable to this plan of care and follow-up instructions have been explained in detail. The patient has received these instructions in written format and has expressed an understanding of the discharge instructions. The patient is aware that any significant change in condition or worsening of symptoms should prompt an immediate return to this or the closest emergency department or call to 911.  I have explained discharge medications and the need for follow up with the patient/caretakers. This was also printed in the discharge instructions. Patient was discharged with the following medications and follow up:       Medication List        New Prescriptions      traMADol 50 MG tablet  Commonly known as: ULTRAM  Take 1 tablet by mouth Every 6 (Six) Hours As Needed for Moderate Pain.            Changed      ondansetron 8 MG tablet  Commonly known as: ZOFRAN  Take 1 tablet by mouth Every 8 (Eight) Hours As Needed for Nausea or Vomiting.  What changed: See the new instructions.               Where to Get Your Medications        These medications were sent to [id number] - [place], [place] - [address] AT [facility] OF [address] [place] - [phone]  - [phone] FX  [address], [place] [address]      Phone: [phone]   ondansetron 8 MG tablet  traMADol 50 MG tablet      Camilla Del Valle MD  [address] [facility]  [address]  [place] [address]  [phone]          Yeni Cazares MD  [address] [facility]  [address]  [place] [address]  [phone]    In 1 week        Final diagnoses:   Left sided abdominal pain of unknown cause   Hematochezia        ED Disposition       ED Disposition   Discharge    Condition   Stable    Comment   --               This medical record created using voice recognition software.             Shmuel Marrufo DO  05/11/24 2013

## 2024-05-15 ENCOUNTER — HOSPITAL ENCOUNTER (EMERGENCY)
Facility: HOSPITAL | Age: 29
Discharge: HOME | End: 2024-05-15
Attending: EMERGENCY MEDICINE
Payer: COMMERCIAL

## 2024-05-15 VITALS
TEMPERATURE: 98.5 F | HEIGHT: 62 IN | OXYGEN SATURATION: 96 % | RESPIRATION RATE: 18 BRPM | HEART RATE: 98 BPM | SYSTOLIC BLOOD PRESSURE: 106 MMHG | BODY MASS INDEX: 53.92 KG/M2 | DIASTOLIC BLOOD PRESSURE: 52 MMHG | WEIGHT: 293 LBS

## 2024-05-15 DIAGNOSIS — K29.00 ACUTE SUPERFICIAL GASTRITIS WITHOUT HEMORRHAGE: ICD-10-CM

## 2024-05-15 DIAGNOSIS — K59.00 CONSTIPATION, UNSPECIFIED CONSTIPATION TYPE: Primary | ICD-10-CM

## 2024-05-15 LAB
ALBUMIN SERPL-MCNC: 3.7 G/DL (ref 3.5–5.2)
ALBUMIN/GLOB SERPL: 1.4 G/DL
ALP SERPL-CCNC: 80 U/L (ref 39–117)
ALT SERPL W P-5'-P-CCNC: 20 U/L (ref 1–33)
ANION GAP SERPL CALCULATED.3IONS-SCNC: 10.5 MMOL/L (ref 5–15)
AST SERPL-CCNC: 22 U/L (ref 1–32)
BASOPHILS # BLD AUTO: 0.06 10*3/MM3 (ref 0–0.2)
BASOPHILS NFR BLD AUTO: 0.6 % (ref 0–1.5)
BILIRUB SERPL-MCNC: 0.2 MG/DL (ref 0–1.2)
BUN SERPL-MCNC: 7 MG/DL (ref 6–20)
BUN/CREAT SERPL: 15.2 (ref 7–25)
CALCIUM SPEC-SCNC: 9 MG/DL (ref 8.6–10.5)
CHLORIDE SERPL-SCNC: 101 MMOL/L (ref 98–107)
CO2 SERPL-SCNC: 27.5 MMOL/L (ref 22–29)
CREAT SERPL-MCNC: 0.46 MG/DL (ref 0.57–1)
DEPRECATED RDW RBC AUTO: 36.7 FL (ref 37–54)
EGFRCR SERPLBLD CKD-EPI 2021: 133 ML/MIN/1.73
EOSINOPHIL # BLD AUTO: 0.14 10*3/MM3 (ref 0–0.4)
EOSINOPHIL NFR BLD AUTO: 1.4 % (ref 0.3–6.2)
ERYTHROCYTE [DISTWIDTH] IN BLOOD BY AUTOMATED COUNT: 12.5 % (ref 12.3–15.4)
GLOBULIN UR ELPH-MCNC: 2.6 GM/DL
GLUCOSE SERPL-MCNC: 141 MG/DL (ref 65–99)
HCT VFR BLD AUTO: 42.8 % (ref 34–46.6)
HGB BLD-MCNC: 14 G/DL (ref 12–15.9)
IMM GRANULOCYTES # BLD AUTO: 0.05 10*3/MM3 (ref 0–0.05)
IMM GRANULOCYTES NFR BLD AUTO: 0.5 % (ref 0–0.5)
LIPASE SERPL-CCNC: 16 U/L (ref 13–60)
LYMPHOCYTES # BLD AUTO: 3.07 10*3/MM3 (ref 0.7–3.1)
LYMPHOCYTES NFR BLD AUTO: 30.9 % (ref 19.6–45.3)
MCH RBC QN AUTO: 26.7 PG (ref 26.6–33)
MCHC RBC AUTO-ENTMCNC: 32.7 G/DL (ref 31.5–35.7)
MCV RBC AUTO: 81.5 FL (ref 79–97)
MONOCYTES # BLD AUTO: 0.74 10*3/MM3 (ref 0.1–0.9)
MONOCYTES NFR BLD AUTO: 7.5 % (ref 5–12)
NEUTROPHILS NFR BLD AUTO: 5.87 10*3/MM3 (ref 1.7–7)
NEUTROPHILS NFR BLD AUTO: 59.1 % (ref 42.7–76)
NRBC BLD AUTO-RTO: 0 /100 WBC (ref 0–0.2)
PLATELET # BLD AUTO: 296 10*3/MM3 (ref 140–450)
PMV BLD AUTO: 10.6 FL (ref 6–12)
POTASSIUM SERPL-SCNC: 3.8 MMOL/L (ref 3.5–5.2)
PROT SERPL-MCNC: 6.3 G/DL (ref 6–8.5)
RBC # BLD AUTO: 5.25 10*6/MM3 (ref 3.77–5.28)
SODIUM SERPL-SCNC: 139 MMOL/L (ref 136–145)
WBC NRBC COR # BLD AUTO: 9.93 10*3/MM3 (ref 3.4–10.8)

## 2024-05-15 PROCEDURE — 99283 EMERGENCY DEPT VISIT LOW MDM: CPT

## 2024-05-15 PROCEDURE — 83690 ASSAY OF LIPASE: CPT | Performed by: EMERGENCY MEDICINE

## 2024-05-15 PROCEDURE — 80053 COMPREHEN METABOLIC PANEL: CPT | Performed by: EMERGENCY MEDICINE

## 2024-05-15 PROCEDURE — 25810000003 SODIUM CHLORIDE 0.9 % SOLUTION: Performed by: EMERGENCY MEDICINE

## 2024-05-15 PROCEDURE — 96374 THER/PROPH/DIAG INJ IV PUSH: CPT

## 2024-05-15 PROCEDURE — 96375 TX/PRO/DX INJ NEW DRUG ADDON: CPT

## 2024-05-15 PROCEDURE — 25010000002 ONDANSETRON PER 1 MG: Performed by: EMERGENCY MEDICINE

## 2024-05-15 PROCEDURE — 85025 COMPLETE CBC W/AUTO DIFF WBC: CPT | Performed by: EMERGENCY MEDICINE

## 2024-05-15 RX ORDER — SODIUM CHLORIDE 0.9 % (FLUSH) 0.9 %
10 SYRINGE (ML) INJECTION AS NEEDED
Status: DISCONTINUED | OUTPATIENT
Start: 2024-05-15 | End: 2024-05-15 | Stop reason: HOSPADM

## 2024-05-15 RX ORDER — ONDANSETRON 2 MG/ML
4 INJECTION INTRAMUSCULAR; INTRAVENOUS ONCE
Status: COMPLETED | OUTPATIENT
Start: 2024-05-15 | End: 2024-05-15

## 2024-05-15 RX ORDER — OMEPRAZOLE 40 MG/1
40 CAPSULE, DELAYED RELEASE ORAL DAILY
Qty: 14 CAPSULE | Refills: 0 | Status: SHIPPED | OUTPATIENT
Start: 2024-05-15

## 2024-05-15 RX ORDER — SUCRALFATE 1 G/1
1 TABLET ORAL
Qty: 28 TABLET | Refills: 0 | Status: SHIPPED | OUTPATIENT
Start: 2024-05-15 | End: 2024-05-21 | Stop reason: ALTCHOICE

## 2024-05-15 RX ORDER — ONDANSETRON 4 MG/1
4 TABLET, ORALLY DISINTEGRATING ORAL EVERY 6 HOURS PRN
Qty: 15 TABLET | Refills: 0 | Status: SHIPPED | OUTPATIENT
Start: 2024-05-15 | End: 2024-05-21 | Stop reason: ALTCHOICE

## 2024-05-15 RX ORDER — MAG HYDROX/ALUMINUM HYD/SIMETH 400-400-40
1 SUSPENSION, ORAL (FINAL DOSE FORM) ORAL AS NEEDED
Qty: 12 EACH | Refills: 0 | Status: SHIPPED | OUTPATIENT
Start: 2024-05-15

## 2024-05-15 RX ORDER — FAMOTIDINE 10 MG/ML
20 INJECTION, SOLUTION INTRAVENOUS ONCE
Status: COMPLETED | OUTPATIENT
Start: 2024-05-15 | End: 2024-05-15

## 2024-05-15 RX ADMIN — FAMOTIDINE 20 MG: 10 INJECTION INTRAVENOUS at 10:51

## 2024-05-15 RX ADMIN — ONDANSETRON 4 MG: 2 INJECTION INTRAMUSCULAR; INTRAVENOUS at 10:51

## 2024-05-15 RX ADMIN — SODIUM CHLORIDE 1000 ML: 9 INJECTION, SOLUTION INTRAVENOUS at 10:49
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
## 2024-05-15 NOTE — Clinical Note
[facility] EMERGENCY ROOM  [address] [place] [address]  Phone: [phone]  Fax: [phone]    Dorinda Sherwood was seen and treated in our emergency department on 5/15/2024.  She may return to work on 05/20/2024.         Thank you for choosing [facility].    Laureano Nguyen MD

## 2024-05-20 NOTE — H&P (VIEW-ONLY)
Chief Complaint      Abdominal pain/ Blood in stool    History of Present Illness      Dorinda Sherwood is a 28 y.o. female who presents to [facility] as a new patient with a history of left upper quadrant abdominal pain, rectal bleeding, nausea, vomiting, positive occult blood and a personal history of colon polyps.  Patient was evaluated in the ER 5/11/2024 as well as 5/15/2024 for abdominal pain and rectal bleeding.  Patient underwent CT scan which was normal.  Patient reports previously her bowel movements were loose multiple times per day she was taking colestipol.  Patient reports change in bowel movements and is currently having constipation with bowel movements every 3 to 4 days.  She is using glycerin suppositories to help with bowel movements.  Patient is also on Prilosec and Carafate as well as Zofran for nausea and vomiting.  She reports her rectal bleeding is bright red in color and has been ongoing for 2 weeks.  Labs from the emergency department were normal.  Patient denies fever,weight loss, night sweats, melena, hematemesis.  Most recent labs- 5/15/24    CT Abdomen/Pelvis w/ contrast- 5/11/24  No acute findings identified.    Colonoscopy: Review of the patient's most recent colonoscopy performed by Dr. Campbell on 3/6/2017.  Colon polyp history, normal colon 5 yr recall.    Most recent labs- 5/14/24  Results       Result Review :   The following data was reviewed by: YOKO Card on 05/21/2024     CMP          4/8/2024    09:59 5/11/2024    11:26 5/15/2024    10:40   CMP   Glucose 205  212  141    BUN 10  11  7    Creatinine 0.44  0.50  0.46    EGFR 134.5  130.4  133.0    Sodium 137  136  139    Potassium 4.1  4.3  3.8    Chloride 96  96  101    Calcium 8.9  9.9  9.0    Total Protein 7.2  7.2  6.3    Albumin 4.3  4.2  3.7    Globulin 2.9  3.0  2.6    Total Bilirubin 0.3  0.2  0.2    Alkaline Phosphatase 88  89  80    AST (SGOT) 20  15  22    ALT (SGPT) 22  19   "20    Albumin/Globulin Ratio 1.5  1.4  1.4    BUN/Creatinine Ratio 22.7  22.0  15.2    Anion Gap 16.4  12.1  10.5      CBC          2023    20:31 2024    11:26 5/15/2024    10:40   CBC   WBC 11.69  10.07  9.93    RBC 5.26  5.38  5.25    Hemoglobin 14.1  14.5  14.0    Hematocrit 42.6  43.2  42.8    MCV 81.0  80.3  81.5    MCH 26.8  27.0  26.7    MCHC 33.1  33.6  32.7    RDW 13.0  12.5  12.5    Platelets 281  325  296        Iron Profile No results found for: \"IRON\", \"TIBC\", \"LABIRON\", \"TRANSFERRIN\"  Ferritin No results found for: \"FERRITIN\"         Past Medical History       Past Medical History:   Diagnosis Date    Anesthesia     slow to wake up postop, anxiety postop    Anxiety     AR (allergic rhinitis)     ASD (atrial septal defect)     had since birth- asymptomatic- see's dr falk     Asthma     no inhalers    Bipolar disorder     Colon polyp     Depression     Diabetes mellitus     type ii- bg runs around 200-300    Gallstones currently    GERD (gastroesophageal reflux disease)     Hypertension     Migraine     Ovarian cyst     Trauma        Past Surgical History:   Procedure Laterality Date     SECTION      CHOLECYSTECTOMY N/A 2021    Procedure: CHOLECYSTECTOMY LAPAROSCOPIC;  Surgeon: Robert Araya MD;  Location: [facility];  Service: General;  Laterality: N/A;    COLONOSCOPY      TUBAL ABDOMINAL LIGATION      WISDOM TOOTH EXTRACTION           Current Outpatient Medications:     Advair Diskus 500-50 MCG/ACT DISKUS, INHALE ONE PUFF BY MOUTH EVERY TWELVE HOURS (RINSE MOUTH AFTER EACH USE), Disp: , Rfl:     albuterol sulfate  (90 Base) MCG/ACT inhaler, Inhale 2 puffs 4 (Four) Times a Day As Needed for Wheezing for up to 1 dose., Disp: 18 g, Rfl: 0    Allergy Relief 5 MG tablet, Take 1 tablet by mouth Daily., Disp: , Rfl:     atenolol (TENORMIN) 25 MG tablet, TAKE ONE tablet (25 MG total) by MOUTH ONE TIME A DAY, Disp: , Rfl:     Azelastine-Fluticasone 137-50 " "MCG/ACT suspension, , Disp: , Rfl:     B-D UF III MINI PEN NEEDLES 31G X 5 MM misc, 1 each by Other route 5 (Five) Times a Day., Disp: 450 each, Rfl: 1    buPROPion XL (WELLBUTRIN XL) 150 MG 24 hr tablet, Take 1 tablet by mouth Daily., Disp: , Rfl:     busPIRone (BUSPAR) 5 MG tablet, Take 1 tablet by mouth 3 (Three) Times a Day., Disp: , Rfl:     Continuous Blood Gluc  (Dexcom G6 ) device, USE AS DIRECTED 365 DAYS, Disp: , Rfl:     Continuous Blood Gluc Sensor (Dexcom G6 Sensor), Every 10 (Ten) Days., Disp: 3 each, Rfl: 11    Continuous Blood Gluc Transmit (Dexcom G6 Transmitter) misc, use as directed ONE every 3 months, Disp: , Rfl:     Easy Touch Insulin Syringe 28G X 1/2\" 1 ML misc, Inject subcutaneously daily, Disp: , Rfl:     glycerin adult 2 g suppository, Insert 1 suppository into the rectum As Needed for Constipation., Disp: 12 each, Rfl: 0    insulin aspart (novoLOG) 100 UNIT/ML injection, Inject 18 Units under the skin into the appropriate area as directed 3 Times a Day With Meals. Adjust dose based on glucose levels and carbohydrate intake as directed, Disp: , Rfl:     Insulin Disposable Pump (Omnipod DASH Pods, Gen 4,) misc, 1 each Every 3 (Three) Days., Disp: 10 each, Rfl: 5    Insulin Pen Needle (Pen Needles) 32G X 5 MM misc, 1 each 4 (Four) Times a Day., Disp: 400 each, Rfl: 1    metFORMIN (GLUCOPHAGE) 1000 MG tablet, Take 1 tablet by mouth Every 12 (Twelve) Hours., Disp: , Rfl:     metFORMIN ER (GLUCOPHAGE-XR) 500 MG 24 hr tablet, Take 2 tablets by mouth Every 12 (Twelve) Hours., Disp: , Rfl:     metoprolol succinate XL (TOPROL-XL) 25 MG 24 hr tablet, Take 1 tablet by mouth Daily., Disp: , Rfl:     montelukast (SINGULAIR) 10 MG tablet, Take 1 tablet by mouth every night at bedtime., Disp: , Rfl:     NovoLOG 100 UNIT/ML injection, INJECT 100 UNITS DAILY PER PUMP, Disp: , Rfl:     nystatin 238350 UNIT/GM powder, Apply  topically to the appropriate area as directed 2 (Two) Times a Day. " to affected area, Disp: , Rfl:     omeprazole (priLOSEC) 40 MG capsule, Take 1 capsule by mouth Daily., Disp: 14 capsule, Rfl: 0    ondansetron (ZOFRAN) 8 MG tablet, Take 1 tablet by mouth Every 8 (Eight) Hours As Needed for Nausea or Vomiting. (Patient not taking: Reported on 5/21/2024), Disp: 20 tablet, Rfl: 0    Sod Picosulfate-Mag Ox-Cit Acd (Clenpiq) 10-3.5-12 MG-GM -GM/160ML solution, Take 175 mL by mouth 1 (One) Time for 1 dose. As directed by office., Disp: 350 mL, Rfl: 0    Current Facility-Administered Medications:     albuterol (PROVENTIL) nebulizer solution 0.083% 2.5 mg/3mL, 2.5 mg, Nebulization, Q6H PRN, Yeni Cazares MD, 2.5 mg at 12/18/23 1414     Allergies   Allergen Reactions    Tramadol-Acetaminophen Itching       Family History   Problem Relation Age of Onset    Heart disease Father     Heart disease Other     Heart disease Other     Heart disease Other     Diabetes type II Other     Colon cancer Neg Hx         Social History     Social History Narrative    Not on file       Objective       Objective     Vital Signs:   /84 (BP Location: Right arm, Patient Position: Sitting, Cuff Size: Large Adult)   Pulse 103   Wt (!) 141 kg (310 lb 3.2 oz)   SpO2 100%   BMI 56.74 kg/m²     Body mass index is 56.74 kg/m².    Physical Exam  Constitutional:       General: She is not in acute distress.     Appearance: Normal appearance. She is well-developed. She is obese.   Eyes:      Conjunctiva/sclera: Conjunctivae normal.      Pupils: Pupils are equal, round, and reactive to light.      Visual Fields: Right eye visual fields normal and left eye visual fields normal.   Cardiovascular:      Rate and Rhythm: Normal rate and regular rhythm.      Heart sounds: Normal heart sounds.   Pulmonary:      Effort: Pulmonary effort is normal. No retractions.      Breath sounds: Normal breath sounds and air entry.      Comments: Inspection of chest: normal appearance  Abdominal:      General: Bowel sounds are  normal.      Palpations: Abdomen is soft.      Tenderness: There is abdominal tenderness in the left upper quadrant.      Comments: No appreciable hepatosplenomegaly   Musculoskeletal:      Cervical back: Neck supple.      Right lower leg: No edema.      Left lower leg: No edema.   Lymphadenopathy:      Cervical: No cervical adenopathy.   Skin:     Findings: No lesion.      Comments: Turgor normal   Neurological:      Mental Status: She is alert and oriented to person, place, and time.   Psychiatric:         Mood and Affect: Mood and affect normal.              Assessment & Plan          Assessment and Plan    Diagnoses and all orders for this visit:    1. Generalized abdominal pain (Primary)  -     Case Request; Standing  -     Verify NPO; Standing  -     Verify Bowel Prep Was Successful; Standing  -     Give Tap Water Enema If Bowel Prep Insufficient; Standing  -     Obtain Informed Consent; Standing  -     Follow Anesthesia Guidelines / Protocol; Standing  -     CBC (No Diff); Standing  -     Case Request    2. Left upper quadrant abdominal pain  -     Case Request; Standing  -     Verify NPO; Standing  -     Verify Bowel Prep Was Successful; Standing  -     Give Tap Water Enema If Bowel Prep Insufficient; Standing  -     Obtain Informed Consent; Standing  -     Follow Anesthesia Guidelines / Protocol; Standing  -     CBC (No Diff); Standing  -     Case Request    3. Altered bowel habits  -     Case Request; Standing  -     Verify NPO; Standing  -     Verify Bowel Prep Was Successful; Standing  -     Give Tap Water Enema If Bowel Prep Insufficient; Standing  -     Obtain Informed Consent; Standing  -     Follow Anesthesia Guidelines / Protocol; Standing  -     CBC (No Diff); Standing  -     Case Request    4. Constipation, unspecified constipation type  -     Case Request; Standing  -     Verify NPO; Standing  -     Verify Bowel Prep Was Successful; Standing  -     Give Tap Water Enema If Bowel Prep Insufficient;  Standing  -     Obtain Informed Consent; Standing  -     Follow Anesthesia Guidelines / Protocol; Standing  -     CBC (No Diff); Standing  -     Case Request    5. Gastroesophageal reflux disease with esophagitis, unspecified whether hemorrhage  -     Case Request; Standing  -     Verify NPO; Standing  -     Verify Bowel Prep Was Successful; Standing  -     Give Tap Water Enema If Bowel Prep Insufficient; Standing  -     Obtain Informed Consent; Standing  -     Follow Anesthesia Guidelines / Protocol; Standing  -     CBC (No Diff); Standing  -     Case Request    6. Nausea  -     Case Request; Standing  -     Verify NPO; Standing  -     Verify Bowel Prep Was Successful; Standing  -     Give Tap Water Enema If Bowel Prep Insufficient; Standing  -     Obtain Informed Consent; Standing  -     Follow Anesthesia Guidelines / Protocol; Standing  -     CBC (No Diff); Standing  -     Case Request    7. Nausea and vomiting, unspecified vomiting type  -     Case Request; Standing  -     Verify NPO; Standing  -     Verify Bowel Prep Was Successful; Standing  -     Give Tap Water Enema If Bowel Prep Insufficient; Standing  -     Obtain Informed Consent; Standing  -     Follow Anesthesia Guidelines / Protocol; Standing  -     CBC (No Diff); Standing  -     Case Request    8. Rectal bleeding  -     Case Request; Standing  -     Verify NPO; Standing  -     Verify Bowel Prep Was Successful; Standing  -     Give Tap Water Enema If Bowel Prep Insufficient; Standing  -     Obtain Informed Consent; Standing  -     Follow Anesthesia Guidelines / Protocol; Standing  -     CBC (No Diff); Standing  -     Case Request    9. Change in bowel habits  -     Case Request; Standing  -     Verify NPO; Standing  -     Verify Bowel Prep Was Successful; Standing  -     Give Tap Water Enema If Bowel Prep Insufficient; Standing  -     Obtain Informed Consent; Standing  -     Follow Anesthesia Guidelines / Protocol; Standing  -     CBC (No Diff);  Standing  -     Case Request    10. Hemorrhoids, unspecified hemorrhoid type  -     Case Request; Standing  -     Verify NPO; Standing  -     Verify Bowel Prep Was Successful; Standing  -     Give Tap Water Enema If Bowel Prep Insufficient; Standing  -     Obtain Informed Consent; Standing  -     Follow Anesthesia Guidelines / Protocol; Standing  -     CBC (No Diff); Standing  -     Case Request    Other orders  -     Sod Picosulfate-Mag Ox-Cit Acd (Clenpiq) 10-3.5-12 MG-GM -GM/160ML solution; Take 175 mL by mouth 1 (One) Time for 1 dose. As directed by office.  Dispense: 350 mL; Refill: 0      28-year-old female presenting the office today as a new patient with a history of left upper quadrant abdominal pain, rectal bleeding, nausea, vomiting, positive occult blood and a personal history of colon polyps.  I have recommended that the patient undergo further evaluation with a colonoscopy and EGD.  I have discussed this procedure in detail with the patient.  I have discussed the risks, benefits and alternatives.  I have discussed the risk of anesthesia, bleeding and perforation.  Patient understands these risks, benefits and alternatives and wishes to proceed.  I will schedule her at her earliest convenience.  Patient will continue current regimen of suppositories, omeprazole, Carafate and Zofran for constipation, nausea and vomiting.  Patient agreeable to this plan and will call with any questions or concerns.             Follow Up       Follow Up   Return for Follow up after endoscopy in office.  Patient was given instructions and counseling regarding her condition or for health maintenance advice. Please see specific information pulled into the AVS if appropriate.

## 2024-05-20 NOTE — PROGRESS NOTES
Chief Complaint      Abdominal pain/ Blood in stool    History of Present Illness      Dorinda Sherwood is a 28 y.o. female who presents to [facility] as a new patient with a history of left upper quadrant abdominal pain, rectal bleeding, nausea, vomiting, positive occult blood and a personal history of colon polyps.  Patient was evaluated in the ER 5/11/2024 as well as 5/15/2024 for abdominal pain and rectal bleeding.  Patient underwent CT scan which was normal.  Patient reports previously her bowel movements were loose multiple times per day she was taking colestipol.  Patient reports change in bowel movements and is currently having constipation with bowel movements every 3 to 4 days.  She is using glycerin suppositories to help with bowel movements.  Patient is also on Prilosec and Carafate as well as Zofran for nausea and vomiting.  She reports her rectal bleeding is bright red in color and has been ongoing for 2 weeks.  Labs from the emergency department were normal.  Patient denies fever,weight loss, night sweats, melena, hematemesis.  Most recent labs- 5/15/24    CT Abdomen/Pelvis w/ contrast- 5/11/24  No acute findings identified.    Colonoscopy: Review of the patient's most recent colonoscopy performed by Dr. Campbell on 3/6/2017.  Colon polyp history, normal colon 5 yr recall.    Most recent labs- 5/14/24  Results       Result Review :   The following data was reviewed by: YOOK Card on 05/21/2024     CMP          4/8/2024    09:59 5/11/2024    11:26 5/15/2024    10:40   CMP   Glucose 205  212  141    BUN 10  11  7    Creatinine 0.44  0.50  0.46    EGFR 134.5  130.4  133.0    Sodium 137  136  139    Potassium 4.1  4.3  3.8    Chloride 96  96  101    Calcium 8.9  9.9  9.0    Total Protein 7.2  7.2  6.3    Albumin 4.3  4.2  3.7    Globulin 2.9  3.0  2.6    Total Bilirubin 0.3  0.2  0.2    Alkaline Phosphatase 88  89  80    AST (SGOT) 20  15  22    ALT (SGPT) 22  19   "20    Albumin/Globulin Ratio 1.5  1.4  1.4    BUN/Creatinine Ratio 22.7  22.0  15.2    Anion Gap 16.4  12.1  10.5      CBC          2023    20:31 2024    11:26 5/15/2024    10:40   CBC   WBC 11.69  10.07  9.93    RBC 5.26  5.38  5.25    Hemoglobin 14.1  14.5  14.0    Hematocrit 42.6  43.2  42.8    MCV 81.0  80.3  81.5    MCH 26.8  27.0  26.7    MCHC 33.1  33.6  32.7    RDW 13.0  12.5  12.5    Platelets 281  325  296        Iron Profile No results found for: \"IRON\", \"TIBC\", \"LABIRON\", \"TRANSFERRIN\"  Ferritin No results found for: \"FERRITIN\"         Past Medical History       Past Medical History:   Diagnosis Date    Anesthesia     slow to wake up postop, anxiety postop    Anxiety     AR (allergic rhinitis)     ASD (atrial septal defect)     had since birth- asymptomatic- see's dr falk     Asthma     no inhalers    Bipolar disorder     Colon polyp     Depression     Diabetes mellitus     type ii- bg runs around 200-300    Gallstones currently    GERD (gastroesophageal reflux disease)     Hypertension     Migraine     Ovarian cyst     Trauma        Past Surgical History:   Procedure Laterality Date     SECTION      CHOLECYSTECTOMY N/A 2021    Procedure: CHOLECYSTECTOMY LAPAROSCOPIC;  Surgeon: Robert Araya MD;  Location: [facility] OR [facility] [place];  Service: General;  Laterality: N/A;    COLONOSCOPY      TUBAL ABDOMINAL LIGATION      WISDOM TOOTH EXTRACTION           Current Outpatient Medications:     Advair Diskus 500-50 MCG/ACT DISKUS, INHALE ONE PUFF BY MOUTH EVERY TWELVE HOURS (RINSE MOUTH AFTER EACH USE), Disp: , Rfl:     albuterol sulfate  (90 Base) MCG/ACT inhaler, Inhale 2 puffs 4 (Four) Times a Day As Needed for Wheezing for up to 1 dose., Disp: 18 g, Rfl: 0    Allergy Relief 5 MG tablet, Take 1 tablet by mouth Daily., Disp: , Rfl:     atenolol (TENORMIN) 25 MG tablet, TAKE ONE tablet (25 MG total) by MOUTH ONE TIME A DAY, Disp: , Rfl:     Azelastine-Fluticasone 137-50 " "MCG/ACT suspension, , Disp: , Rfl:     B-D UF III MINI PEN NEEDLES 31G X 5 MM misc, 1 each by Other route 5 (Five) Times a Day., Disp: 450 each, Rfl: 1    buPROPion XL (WELLBUTRIN XL) 150 MG 24 hr tablet, Take 1 tablet by mouth Daily., Disp: , Rfl:     busPIRone (BUSPAR) 5 MG tablet, Take 1 tablet by mouth 3 (Three) Times a Day., Disp: , Rfl:     Continuous Blood Gluc  (Dexcom G6 ) device, USE AS DIRECTED 365 DAYS, Disp: , Rfl:     Continuous Blood Gluc Sensor (Dexcom G6 Sensor), Every 10 (Ten) Days., Disp: 3 each, Rfl: 11    Continuous Blood Gluc Transmit (Dexcom G6 Transmitter) misc, use as directed ONE every 3 months, Disp: , Rfl:     Easy Touch Insulin Syringe 28G X 1/2\" 1 ML misc, Inject subcutaneously daily, Disp: , Rfl:     glycerin adult 2 g suppository, Insert 1 suppository into the rectum As Needed for Constipation., Disp: 12 each, Rfl: 0    insulin aspart (novoLOG) 100 UNIT/ML injection, Inject 18 Units under the skin into the appropriate area as directed 3 Times a Day With Meals. Adjust dose based on glucose levels and carbohydrate intake as directed, Disp: , Rfl:     Insulin Disposable Pump (Omnipod DASH Pods, Gen 4,) misc, 1 each Every 3 (Three) Days., Disp: 10 each, Rfl: 5    Insulin Pen Needle (Pen Needles) 32G X 5 MM misc, 1 each 4 (Four) Times a Day., Disp: 400 each, Rfl: 1    metFORMIN (GLUCOPHAGE) 1000 MG tablet, Take 1 tablet by mouth Every 12 (Twelve) Hours., Disp: , Rfl:     metFORMIN ER (GLUCOPHAGE-XR) 500 MG 24 hr tablet, Take 2 tablets by mouth Every 12 (Twelve) Hours., Disp: , Rfl:     metoprolol succinate XL (TOPROL-XL) 25 MG 24 hr tablet, Take 1 tablet by mouth Daily., Disp: , Rfl:     montelukast (SINGULAIR) 10 MG tablet, Take 1 tablet by mouth every night at bedtime., Disp: , Rfl:     NovoLOG 100 UNIT/ML injection, INJECT 100 UNITS DAILY PER PUMP, Disp: , Rfl:     nystatin 423835 UNIT/GM powder, Apply  topically to the appropriate area as directed 2 (Two) Times a Day. " to affected area, Disp: , Rfl:     omeprazole (priLOSEC) 40 MG capsule, Take 1 capsule by mouth Daily., Disp: 14 capsule, Rfl: 0    ondansetron (ZOFRAN) 8 MG tablet, Take 1 tablet by mouth Every 8 (Eight) Hours As Needed for Nausea or Vomiting. (Patient not taking: Reported on 5/21/2024), Disp: 20 tablet, Rfl: 0    Sod Picosulfate-Mag Ox-Cit Acd (Clenpiq) 10-3.5-12 MG-GM -GM/160ML solution, Take 175 mL by mouth 1 (One) Time for 1 dose. As directed by office., Disp: 350 mL, Rfl: 0    Current Facility-Administered Medications:     albuterol (PROVENTIL) nebulizer solution 0.083% 2.5 mg/3mL, 2.5 mg, Nebulization, Q6H PRN, Yeni Cazares MD, 2.5 mg at 12/18/23 1414     Allergies   Allergen Reactions    Tramadol-Acetaminophen Itching       Family History   Problem Relation Age of Onset    Heart disease Father     Heart disease Other     Heart disease Other     Heart disease Other     Diabetes type II Other     Colon cancer Neg Hx         Social History     Social History Narrative    Not on file       Objective       Objective     Vital Signs:   /84 (BP Location: Right arm, Patient Position: Sitting, Cuff Size: Large Adult)   Pulse 103   Wt (!) 141 kg (310 lb 3.2 oz)   SpO2 100%   BMI 56.74 kg/m²     Body mass index is 56.74 kg/m².    Physical Exam  Constitutional:       General: She is not in acute distress.     Appearance: Normal appearance. She is well-developed. She is obese.   Eyes:      Conjunctiva/sclera: Conjunctivae normal.      Pupils: Pupils are equal, round, and reactive to light.      Visual Fields: Right eye visual fields normal and left eye visual fields normal.   Cardiovascular:      Rate and Rhythm: Normal rate and regular rhythm.      Heart sounds: Normal heart sounds.   Pulmonary:      Effort: Pulmonary effort is normal. No retractions.      Breath sounds: Normal breath sounds and air entry.      Comments: Inspection of chest: normal appearance  Abdominal:      General: Bowel sounds are  normal.      Palpations: Abdomen is soft.      Tenderness: There is abdominal tenderness in the left upper quadrant.      Comments: No appreciable hepatosplenomegaly   Musculoskeletal:      Cervical back: Neck supple.      Right lower leg: No edema.      Left lower leg: No edema.   Lymphadenopathy:      Cervical: No cervical adenopathy.   Skin:     Findings: No lesion.      Comments: Turgor normal   Neurological:      Mental Status: She is alert and oriented to person, place, and time.   Psychiatric:         Mood and Affect: Mood and affect normal.              Assessment & Plan          Assessment and Plan    Diagnoses and all orders for this visit:    1. Generalized abdominal pain (Primary)  -     Case Request; Standing  -     Verify NPO; Standing  -     Verify Bowel Prep Was Successful; Standing  -     Give Tap Water Enema If Bowel Prep Insufficient; Standing  -     Obtain Informed Consent; Standing  -     Follow Anesthesia Guidelines / Protocol; Standing  -     CBC (No Diff); Standing  -     Case Request    2. Left upper quadrant abdominal pain  -     Case Request; Standing  -     Verify NPO; Standing  -     Verify Bowel Prep Was Successful; Standing  -     Give Tap Water Enema If Bowel Prep Insufficient; Standing  -     Obtain Informed Consent; Standing  -     Follow Anesthesia Guidelines / Protocol; Standing  -     CBC (No Diff); Standing  -     Case Request    3. Altered bowel habits  -     Case Request; Standing  -     Verify NPO; Standing  -     Verify Bowel Prep Was Successful; Standing  -     Give Tap Water Enema If Bowel Prep Insufficient; Standing  -     Obtain Informed Consent; Standing  -     Follow Anesthesia Guidelines / Protocol; Standing  -     CBC (No Diff); Standing  -     Case Request    4. Constipation, unspecified constipation type  -     Case Request; Standing  -     Verify NPO; Standing  -     Verify Bowel Prep Was Successful; Standing  -     Give Tap Water Enema If Bowel Prep Insufficient;  Standing  -     Obtain Informed Consent; Standing  -     Follow Anesthesia Guidelines / Protocol; Standing  -     CBC (No Diff); Standing  -     Case Request    5. Gastroesophageal reflux disease with esophagitis, unspecified whether hemorrhage  -     Case Request; Standing  -     Verify NPO; Standing  -     Verify Bowel Prep Was Successful; Standing  -     Give Tap Water Enema If Bowel Prep Insufficient; Standing  -     Obtain Informed Consent; Standing  -     Follow Anesthesia Guidelines / Protocol; Standing  -     CBC (No Diff); Standing  -     Case Request    6. Nausea  -     Case Request; Standing  -     Verify NPO; Standing  -     Verify Bowel Prep Was Successful; Standing  -     Give Tap Water Enema If Bowel Prep Insufficient; Standing  -     Obtain Informed Consent; Standing  -     Follow Anesthesia Guidelines / Protocol; Standing  -     CBC (No Diff); Standing  -     Case Request    7. Nausea and vomiting, unspecified vomiting type  -     Case Request; Standing  -     Verify NPO; Standing  -     Verify Bowel Prep Was Successful; Standing  -     Give Tap Water Enema If Bowel Prep Insufficient; Standing  -     Obtain Informed Consent; Standing  -     Follow Anesthesia Guidelines / Protocol; Standing  -     CBC (No Diff); Standing  -     Case Request    8. Rectal bleeding  -     Case Request; Standing  -     Verify NPO; Standing  -     Verify Bowel Prep Was Successful; Standing  -     Give Tap Water Enema If Bowel Prep Insufficient; Standing  -     Obtain Informed Consent; Standing  -     Follow Anesthesia Guidelines / Protocol; Standing  -     CBC (No Diff); Standing  -     Case Request    9. Change in bowel habits  -     Case Request; Standing  -     Verify NPO; Standing  -     Verify Bowel Prep Was Successful; Standing  -     Give Tap Water Enema If Bowel Prep Insufficient; Standing  -     Obtain Informed Consent; Standing  -     Follow Anesthesia Guidelines / Protocol; Standing  -     CBC (No Diff);  Standing  -     Case Request    10. Hemorrhoids, unspecified hemorrhoid type  -     Case Request; Standing  -     Verify NPO; Standing  -     Verify Bowel Prep Was Successful; Standing  -     Give Tap Water Enema If Bowel Prep Insufficient; Standing  -     Obtain Informed Consent; Standing  -     Follow Anesthesia Guidelines / Protocol; Standing  -     CBC (No Diff); Standing  -     Case Request    Other orders  -     Sod Picosulfate-Mag Ox-Cit Acd (Clenpiq) 10-3.5-12 MG-GM -GM/160ML solution; Take 175 mL by mouth 1 (One) Time for 1 dose. As directed by office.  Dispense: 350 mL; Refill: 0      28-year-old female presenting the office today as a new patient with a history of left upper quadrant abdominal pain, rectal bleeding, nausea, vomiting, positive occult blood and a personal history of colon polyps.  I have recommended that the patient undergo further evaluation with a colonoscopy and EGD.  I have discussed this procedure in detail with the patient.  I have discussed the risks, benefits and alternatives.  I have discussed the risk of anesthesia, bleeding and perforation.  Patient understands these risks, benefits and alternatives and wishes to proceed.  I will schedule her at her earliest convenience.  Patient will continue current regimen of suppositories, omeprazole, Carafate and Zofran for constipation, nausea and vomiting.  Patient agreeable to this plan and will call with any questions or concerns.             Follow Up       Follow Up   Return for Follow up after endoscopy in office.  Patient was given instructions and counseling regarding her condition or for health maintenance advice. Please see specific information pulled into the AVS if appropriate.

## 2024-05-21 ENCOUNTER — OFFICE VISIT (OUTPATIENT)
Dept: GASTROENTEROLOGY | Facility: CLINIC | Age: 29
End: 2024-05-21
Payer: COMMERCIAL

## 2024-05-21 VITALS
HEART RATE: 103 BPM | SYSTOLIC BLOOD PRESSURE: 127 MMHG | DIASTOLIC BLOOD PRESSURE: 84 MMHG | OXYGEN SATURATION: 100 % | BODY MASS INDEX: 56.74 KG/M2 | WEIGHT: 293 LBS

## 2024-05-21 DIAGNOSIS — K21.00 GASTROESOPHAGEAL REFLUX DISEASE WITH ESOPHAGITIS, UNSPECIFIED WHETHER HEMORRHAGE: ICD-10-CM

## 2024-05-21 DIAGNOSIS — R11.2 NAUSEA AND VOMITING, UNSPECIFIED VOMITING TYPE: ICD-10-CM

## 2024-05-21 DIAGNOSIS — R10.84 GENERALIZED ABDOMINAL PAIN: Primary | ICD-10-CM

## 2024-05-21 DIAGNOSIS — R10.12 LEFT UPPER QUADRANT ABDOMINAL PAIN: ICD-10-CM

## 2024-05-21 DIAGNOSIS — R19.4 ALTERED BOWEL HABITS: ICD-10-CM

## 2024-05-21 DIAGNOSIS — R19.4 CHANGE IN BOWEL HABITS: ICD-10-CM

## 2024-05-21 DIAGNOSIS — K62.5 RECTAL BLEEDING: ICD-10-CM

## 2024-05-21 DIAGNOSIS — K59.00 CONSTIPATION, UNSPECIFIED CONSTIPATION TYPE: ICD-10-CM

## 2024-05-21 DIAGNOSIS — R11.0 NAUSEA: ICD-10-CM

## 2024-05-21 DIAGNOSIS — K64.9 HEMORRHOIDS, UNSPECIFIED HEMORRHOID TYPE: ICD-10-CM

## 2024-05-21 RX ORDER — SODIUM PICOSULFATE, MAGNESIUM OXIDE, AND ANHYDROUS CITRIC ACID 10; 3.5; 12 MG/160ML; G/160ML; G/160ML
175 LIQUID ORAL ONCE
Qty: 350 ML | Refills: 0 | Status: SHIPPED | OUTPATIENT
Start: 2024-05-21 | End: 2024-05-21

## 2024-05-21 RX ORDER — INSULIN GLARGINE 100 [IU]/ML
INJECTION, SOLUTION SUBCUTANEOUS
COMMUNITY
Start: 2024-05-06 | End: 2024-05-21 | Stop reason: ALTCHOICE

## 2024-05-22 ENCOUNTER — PATIENT ROUNDING (BHMG ONLY) (OUTPATIENT)
Dept: GASTROENTEROLOGY | Facility: CLINIC | Age: 29
End: 2024-05-22
Payer: COMMERCIAL

## 2024-05-22 ENCOUNTER — TELEPHONE (OUTPATIENT)
Dept: GASTROENTEROLOGY | Facility: CLINIC | Age: 29
End: 2024-05-22
Payer: COMMERCIAL

## 2024-05-22 ENCOUNTER — TELEPHONE (OUTPATIENT)
Dept: GASTROENTEROLOGY | Facility: CLINIC | Age: 29
End: 2024-05-22

## 2024-05-22 RX ORDER — SODIUM PICOSULFATE, MAGNESIUM OXIDE, AND ANHYDROUS CITRIC ACID 12; 3.5; 1 G/175ML; G/175ML; MG/175ML
175 LIQUID ORAL TAKE AS DIRECTED
Qty: 350 ML | Refills: 0 | Status: SHIPPED | OUTPATIENT
Start: 2024-05-22

## 2024-05-22 NOTE — PROGRESS NOTES
5/22/2024      Hello, may I speak with Dorinda Sherwood     My name is Ever. I am calling from [facility] [place]. I show that you had a recent visit with YOKO Card.    Before we get started may I verify your date of birth? 1995    I am calling to officially welcome you to our practice and ask about your recent visit. Is this a good time to talk? No, I did not leave a VM, not on NAZIA    Tell me about your visit with us. What things went well?    We strive to ensure that we protect your safety and privacy. Is there anything we could have done to improve this during your visit?        We're always looking for ways to make our patients' experiences even better. Do you have recommendations on ways we may improve?    Overall were you satisfied with your first visit to our practice?    I appreciate you taking the time to speak with me today. Is there anything else I can do for you?    I am glad to hear that you had a very good visit and I appreciate you taking the time to provide feedback on this call. We would greatly appreciate you filling out a survey if you receive one in the mail, email or text. This is a great opportunity to provide any additional feedback that you may think of after this call as well.       Thank you, and have a great day.

## 2024-05-22 NOTE — TELEPHONE ENCOUNTER
Spoke with the patient and informed her that the bowel prep Clenpiq has been resent to the pharmacy. Patient expressed clear understanding.

## 2024-05-22 NOTE — TELEPHONE ENCOUNTER
Hub staff attempted to follow warm transfer process and was unsuccessful     Caller: Dorinda Sherwood    Relationship to patient: Self    Best call back number: [phone]     Patient is needing: PATIENT HAS SOME QUESTIONS REGARDING THE DOSAGE AMOUNT FOR THE CLENPIQ. PLEASE CALL PATIENT.

## 2024-05-27 ENCOUNTER — APPOINTMENT (OUTPATIENT)
Dept: CT IMAGING | Facility: HOSPITAL | Age: 29
End: 2024-05-27
Payer: COMMERCIAL

## 2024-05-27 ENCOUNTER — HOSPITAL ENCOUNTER (EMERGENCY)
Facility: HOSPITAL | Age: 29
Discharge: HOME | End: 2024-05-27
Attending: EMERGENCY MEDICINE | Admitting: EMERGENCY MEDICINE
Payer: COMMERCIAL

## 2024-05-27 VITALS
RESPIRATION RATE: 18 BRPM | WEIGHT: 293 LBS | DIASTOLIC BLOOD PRESSURE: 76 MMHG | HEIGHT: 62 IN | HEART RATE: 97 BPM | TEMPERATURE: 98.6 F | SYSTOLIC BLOOD PRESSURE: 135 MMHG | BODY MASS INDEX: 53.92 KG/M2 | OXYGEN SATURATION: 99 %

## 2024-05-27 DIAGNOSIS — R68.84 JAW PAIN: Primary | ICD-10-CM

## 2024-05-27 DIAGNOSIS — R22.0 RIGHT FACIAL SWELLING: ICD-10-CM

## 2024-05-27 PROCEDURE — 70486 CT MAXILLOFACIAL W/O DYE: CPT

## 2024-05-27 PROCEDURE — 99284 EMERGENCY DEPT VISIT MOD MDM: CPT

## 2024-05-27 PROCEDURE — 96372 THER/PROPH/DIAG INJ SC/IM: CPT

## 2024-05-27 PROCEDURE — 25010000002 DEXAMETHASONE SODIUM PHOSPHATE 10 MG/ML SOLUTION

## 2024-05-27 RX ORDER — KETOROLAC TROMETHAMINE 30 MG/ML
30 INJECTION, SOLUTION INTRAMUSCULAR; INTRAVENOUS ONCE
Status: DISCONTINUED | OUTPATIENT
Start: 2024-05-27 | End: 2024-05-27

## 2024-05-27 RX ORDER — DEXAMETHASONE SODIUM PHOSPHATE 10 MG/ML
10 INJECTION, SOLUTION INTRAMUSCULAR; INTRAVENOUS ONCE
Status: COMPLETED | OUTPATIENT
Start: 2024-05-27 | End: 2024-05-27

## 2024-05-27 RX ORDER — CLINDAMYCIN HYDROCHLORIDE 300 MG/1
300 CAPSULE ORAL 4 TIMES DAILY
COMMUNITY
Start: 2024-05-26 | End: 2024-05-31

## 2024-05-27 RX ADMIN — BENZOCAINE: 200 GEL DENTAL; ORAL; PERIODONTAL at 13:28

## 2024-05-27 RX ADMIN — DEXAMETHASONE SODIUM PHOSPHATE 10 MG: 10 INJECTION INTRAMUSCULAR; INTRAVENOUS at 13:28
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
## 2024-05-27 NOTE — DISCHARGE INSTRUCTIONS
Imaging of your facial bones today is unremarkable. I have sent a referral to Oral Maxillofacial surgery for further evaluation. They will call you in 1-2 days for an appointment.    Continue taking your clindamycin. Take tylenol and advil for pain.     Follow up with your PCP in 3-5 days especially if symptoms worsen.    Return to the Emergency Department if you develop any uncontrollable fever, intractable pain, nausea, vomiting.

## 2024-05-31 ENCOUNTER — OFFICE VISIT (OUTPATIENT)
Dept: FAMILY MEDICINE CLINIC | Facility: CLINIC | Age: 29
End: 2024-05-31
Payer: COMMERCIAL

## 2024-05-31 VITALS
TEMPERATURE: 98 F | SYSTOLIC BLOOD PRESSURE: 122 MMHG | DIASTOLIC BLOOD PRESSURE: 70 MMHG | HEART RATE: 90 BPM | BODY MASS INDEX: 53.92 KG/M2 | OXYGEN SATURATION: 97 % | HEIGHT: 62 IN | WEIGHT: 293 LBS

## 2024-05-31 DIAGNOSIS — R22.0 RIGHT FACIAL SWELLING: ICD-10-CM

## 2024-05-31 DIAGNOSIS — R68.84 JAW PAIN: Primary | ICD-10-CM

## 2024-05-31 DIAGNOSIS — K02.9 DENTAL CARIES: ICD-10-CM

## 2024-05-31 PROCEDURE — 99214 OFFICE O/P EST MOD 30 MIN: CPT

## 2024-05-31 PROCEDURE — 1160F RVW MEDS BY RX/DR IN RCRD: CPT

## 2024-05-31 PROCEDURE — 3046F HEMOGLOBIN A1C LEVEL >9.0%: CPT

## 2024-05-31 PROCEDURE — 1125F AMNT PAIN NOTED PAIN PRSNT: CPT

## 2024-05-31 PROCEDURE — 1159F MED LIST DOCD IN RCRD: CPT

## 2024-05-31 RX ORDER — DICLOFENAC SODIUM 75 MG/1
75 TABLET, DELAYED RELEASE ORAL 2 TIMES DAILY
Qty: 60 TABLET | Refills: 0 | Status: SHIPPED | OUTPATIENT
Start: 2024-05-31

## 2024-05-31 RX ORDER — SACCHAROMYCES BOULARDII 250 MG
250 CAPSULE ORAL 2 TIMES DAILY
Qty: 60 CAPSULE | Refills: 0 | Status: SHIPPED | OUTPATIENT
Start: 2024-05-31 | End: 2024-06-30

## 2024-05-31 RX ORDER — AMOXICILLIN AND CLAVULANATE POTASSIUM 875; 125 MG/1; MG/1
1 TABLET, FILM COATED ORAL 2 TIMES DAILY
Qty: 14 TABLET | Refills: 0 | Status: SHIPPED | OUTPATIENT
Start: 2024-05-31 | End: 2024-06-07
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
## 2024-06-03 ENCOUNTER — PRIOR AUTHORIZATION (OUTPATIENT)
Dept: FAMILY MEDICINE CLINIC | Facility: CLINIC | Age: 29
End: 2024-06-03
Payer: COMMERCIAL

## 2024-06-03 NOTE — TELEPHONE ENCOUNTER
FLORASTOR, 250 MG, CAPSULE PA DENIAL     GENERIC SUBSTITUTION REQUIRED    We were asked to cover the drug or product listed at the top of this letter under your pharmacy   benefit. Your Medicaid plan does not allow certain drugs or classes of drugs to be covered under your  pharmacy benefit.The requested drug is considered a benefit exclusion in pursuant to the Social   Security Act 1927(d)(2) and 1935(d)(2) or it is an excluded benefit defined per the plan coverage   exclusion listed below:Agents when used for anorexia or weight loss; Agents when used to promote   fertility; Agents when used for cosmetic purposes or hair growth; Agents when used for the treatment   of sexual or erectile dysfunction; Blood/blood plasma products; Bulk chemicals and excipients; Cough  and cold products (unless identified by DMS); Less-than-effective drug efficacy study implementation   (LTE HARDIK) drugs or those identical, related, or similar drugs to the LTE HARDIK drug for all indications   or withdrawn from the market (LTE DEST/IRS code 5 or 6); Diagnostics; Herbal products; Mineral   products; Nutritional/dietary supplements; Other supplies or durable medical equipment (DME) (e.g.   ostomy).

## 2024-06-04 RX ORDER — L. RHAMNOSUS GG/INULIN 20B-200 MG
1 CAPSULE ORAL 2 TIMES DAILY
Qty: 60 CAPSULE | Refills: 0 | Status: SHIPPED | OUTPATIENT
Start: 2024-06-04 | End: 2024-07-04

## 2024-06-04 NOTE — TELEPHONE ENCOUNTER
Attempted to call patient. Her phone had a message that her phone is not accepting calls at this time. Will try again later.

## 2024-06-04 NOTE — TELEPHONE ENCOUNTER
Pt stated that she did not know who we are and do not call and ended the call unable to relay the message

## 2024-06-05 NOTE — TELEPHONE ENCOUNTER
HUB TO RELAY    Alternative probiotics sent in to the patient's pharmacy since Florastor was denied by insurance.  If still not covered, patient can buy an over-the-counter probiotic for gut health.

## 2024-06-10 NOTE — PAT
Reminded of arrival time at   1330pm      , Entrance C of the [facility] hospital. Instructed to bring or have a  over the age of 18 set up to drive you home the day of procedure.    Instructed on clear liquid diet the day before, nothing red or purple. Call with any questions about the prep or if in need of the prep.  Reminded them not to eat or drink anything am of procedure unless its a sip of water with medications.

## 2024-06-14 ENCOUNTER — ANESTHESIA EVENT (OUTPATIENT)
Dept: GASTROENTEROLOGY | Facility: HOSPITAL | Age: 29
End: 2024-06-14
Payer: COMMERCIAL

## 2024-06-14 NOTE — ANESTHESIA PREPROCEDURE EVALUATION
Anesthesia Evaluation     Patient summary reviewed and Nursing notes reviewed   history of anesthetic complications:  prolonged sedation  NPO Solid Status: > 8 hours  NPO Liquid Status: > 2 hours           Airway   Mallampati: III  Neck ROM: full  Small opening and Possible difficult intubation  Dental - normal exam     Pulmonary     breath sounds clear to auscultation  (+) asthma,  Cardiovascular   Exercise tolerance: good (4-7 METS)    ECG reviewed  Rhythm: regular  Rate: normal    (+) hypertension well controlled less than 2 medications, valvular problems/murmurs      Neuro/Psych  (+) headaches, psychiatric history Anxiety, Depression and Bipolar  GI/Hepatic/Renal/Endo    (+) obesity, morbid obesity, GERD well controlled, GI bleeding , diabetes mellitus type 2 well controlled using insulin    Musculoskeletal (-) negative ROS    Abdominal   (+) obese   Substance History - negative use     OB/GYN negative ob/gyn ROS         Other - negative ROS       ROS/Med Hx Other: Atrial septal defect since birth    4/8/24 EKG  HR 99, multiple PVCs, possible left atrial enlargement    12/29/20 ECHO  1. Normal cardiac anatomy.    2. Atrial septum was not well visualized, no apparent ASD seen. However, limited views suggest small atrial level shunt likely present.    3. No apparent ventricular septal defect seen on today's study.    4. Right ventricle is normal in size and the systolic function is normal.    5. Left ventricle is normal in size and the systolic function is normal.    6. The ascending aorta, transverse arch and descending aorta are unobstructed.    7. No pericardial effusion.                   Anesthesia Plan    ASA 3     general   total IV anesthesia  (Total IV Anesthesia    Patient understands anesthesia not responsible for dental damage.  )  intravenous induction     Anesthetic plan, risks, benefits, and alternatives have been provided, discussed and informed consent has been obtained with: patient.    Plan  discussed with CRNA.    CODE STATUS:

## 2024-06-17 ENCOUNTER — HOSPITAL ENCOUNTER (OUTPATIENT)
Facility: HOSPITAL | Age: 29
Setting detail: HOSPITAL OUTPATIENT SURGERY
Discharge: HOME | End: 2024-06-17
Attending: INTERNAL MEDICINE | Admitting: INTERNAL MEDICINE
Payer: COMMERCIAL

## 2024-06-17 ENCOUNTER — ANESTHESIA (OUTPATIENT)
Dept: GASTROENTEROLOGY | Facility: HOSPITAL | Age: 29
End: 2024-06-17
Payer: COMMERCIAL

## 2024-06-17 VITALS
DIASTOLIC BLOOD PRESSURE: 72 MMHG | HEIGHT: 62 IN | OXYGEN SATURATION: 100 % | HEART RATE: 122 BPM | TEMPERATURE: 97.2 F | BODY MASS INDEX: 53.92 KG/M2 | WEIGHT: 293 LBS | RESPIRATION RATE: 18 BRPM | SYSTOLIC BLOOD PRESSURE: 117 MMHG

## 2024-06-17 DIAGNOSIS — K59.00 CONSTIPATION, UNSPECIFIED CONSTIPATION TYPE: ICD-10-CM

## 2024-06-17 DIAGNOSIS — R10.84 GENERALIZED ABDOMINAL PAIN: ICD-10-CM

## 2024-06-17 DIAGNOSIS — R11.0 NAUSEA: ICD-10-CM

## 2024-06-17 DIAGNOSIS — R19.4 ALTERED BOWEL HABITS: ICD-10-CM

## 2024-06-17 DIAGNOSIS — K62.5 RECTAL BLEEDING: ICD-10-CM

## 2024-06-17 DIAGNOSIS — R10.12 LEFT UPPER QUADRANT ABDOMINAL PAIN: ICD-10-CM

## 2024-06-17 DIAGNOSIS — R19.4 CHANGE IN BOWEL HABITS: ICD-10-CM

## 2024-06-17 DIAGNOSIS — R11.2 NAUSEA AND VOMITING, UNSPECIFIED VOMITING TYPE: ICD-10-CM

## 2024-06-17 DIAGNOSIS — K21.00 GASTROESOPHAGEAL REFLUX DISEASE WITH ESOPHAGITIS, UNSPECIFIED WHETHER HEMORRHAGE: ICD-10-CM

## 2024-06-17 DIAGNOSIS — K64.9 HEMORRHOIDS, UNSPECIFIED HEMORRHOID TYPE: ICD-10-CM

## 2024-06-17 LAB
DEPRECATED RDW RBC AUTO: 37.4 FL (ref 37–54)
ERYTHROCYTE [DISTWIDTH] IN BLOOD BY AUTOMATED COUNT: 13 % (ref 12.3–15.4)
GLUCOSE BLDC GLUCOMTR-MCNC: 189 MG/DL (ref 70–99)
HCT VFR BLD AUTO: 38.2 % (ref 34–46.6)
HGB BLD-MCNC: 13 G/DL (ref 12–15.9)
MCH RBC QN AUTO: 27.5 PG (ref 26.6–33)
MCHC RBC AUTO-ENTMCNC: 34 G/DL (ref 31.5–35.7)
MCV RBC AUTO: 80.9 FL (ref 79–97)
PLATELET # BLD AUTO: 259 10*3/MM3 (ref 140–450)
PMV BLD AUTO: 10.5 FL (ref 6–12)
RBC # BLD AUTO: 4.72 10*6/MM3 (ref 3.77–5.28)
WBC NRBC COR # BLD AUTO: 7.91 10*3/MM3 (ref 3.4–10.8)

## 2024-06-17 PROCEDURE — 88305 TISSUE EXAM BY PATHOLOGIST: CPT | Performed by: INTERNAL MEDICINE

## 2024-06-17 PROCEDURE — 25810000003 LACTATED RINGERS PER 1000 ML: Performed by: NURSE ANESTHETIST, CERTIFIED REGISTERED

## 2024-06-17 PROCEDURE — 85027 COMPLETE CBC AUTOMATED: CPT | Performed by: NURSE PRACTITIONER

## 2024-06-17 PROCEDURE — 25010000002 PROPOFOL 10 MG/ML EMULSION: Performed by: NURSE ANESTHETIST, CERTIFIED REGISTERED

## 2024-06-17 PROCEDURE — 82948 REAGENT STRIP/BLOOD GLUCOSE: CPT | Performed by: NURSE ANESTHETIST, CERTIFIED REGISTERED

## 2024-06-17 PROCEDURE — 63710000001 INSULIN REGULAR HUMAN PER 5 UNITS: Performed by: NURSE ANESTHETIST, CERTIFIED REGISTERED

## 2024-06-17 RX ORDER — SODIUM CHLORIDE, SODIUM LACTATE, POTASSIUM CHLORIDE, CALCIUM CHLORIDE 600; 310; 30; 20 MG/100ML; MG/100ML; MG/100ML; MG/100ML
30 INJECTION, SOLUTION INTRAVENOUS CONTINUOUS
Status: DISCONTINUED | OUTPATIENT
Start: 2024-06-17 | End: 2024-06-17 | Stop reason: HOSPADM

## 2024-06-17 RX ORDER — HYDROCORTISONE ACETATE 25 MG/1
25 SUPPOSITORY RECTAL 2 TIMES DAILY
Qty: 12 SUPPOSITORY | Refills: 1 | Status: SHIPPED | OUTPATIENT
Start: 2024-06-17 | End: 2024-06-23

## 2024-06-17 RX ORDER — LUBIPROSTONE 24 UG/1
24 CAPSULE ORAL 2 TIMES DAILY WITH MEALS
Qty: 60 CAPSULE | Refills: 3 | Status: SHIPPED | OUTPATIENT
Start: 2024-06-17

## 2024-06-17 RX ORDER — PROPOFOL 10 MG/ML
VIAL (ML) INTRAVENOUS AS NEEDED
Status: DISCONTINUED | OUTPATIENT
Start: 2024-06-17 | End: 2024-06-17 | Stop reason: SURG

## 2024-06-17 RX ORDER — LIDOCAINE HYDROCHLORIDE 20 MG/ML
INJECTION, SOLUTION EPIDURAL; INFILTRATION; INTRACAUDAL; PERINEURAL AS NEEDED
Status: DISCONTINUED | OUTPATIENT
Start: 2024-06-17 | End: 2024-06-17 | Stop reason: SURG

## 2024-06-17 RX ADMIN — LIDOCAINE HYDROCHLORIDE 100 MG: 20 INJECTION, SOLUTION INTRAVENOUS at 08:34

## 2024-06-17 RX ADMIN — INSULIN HUMAN 6 UNITS: 100 INJECTION, SOLUTION PARENTERAL at 08:08

## 2024-06-17 RX ADMIN — SODIUM CHLORIDE, POTASSIUM CHLORIDE, SODIUM LACTATE AND CALCIUM CHLORIDE 30 ML/HR: 600; 310; 30; 20 INJECTION, SOLUTION INTRAVENOUS at 08:08

## 2024-06-17 RX ADMIN — PROPOFOL 250 MCG/KG/MIN: 10 INJECTION, EMULSION INTRAVENOUS at 08:35

## 2024-06-17 RX ADMIN — PROPOFOL 80 MG: 10 INJECTION, EMULSION INTRAVENOUS at 08:34

## 2024-06-17 NOTE — ANESTHESIA POSTPROCEDURE EVALUATION
Patient: Dorinda Sherwood    Procedure Summary       Date: 06/17/24 Room / Location: [facility] ENDOSCOPY 2 / [facility] ENDOSCOPY    Anesthesia Start: 0831 Anesthesia Stop: 0913    Procedures:       ESOPHAGOGASTRODUODENOSCOPY WITH BIOPSIES      COLONOSCOPY Diagnosis:       Generalized abdominal pain      Left upper quadrant abdominal pain      Altered bowel habits      Constipation, unspecified constipation type      Gastroesophageal reflux disease with esophagitis, unspecified whether hemorrhage      Nausea      Nausea and vomiting, unspecified vomiting type      Rectal bleeding      Change in bowel habits      Hemorrhoids, unspecified hemorrhoid type      (Generalized abdominal pain [R10.84])      (Left upper quadrant abdominal pain [R10.12])      (Altered bowel habits [R19.4])      (Constipation, unspecified constipation type [K59.00])      (Gastroesophageal reflux disease with esophagitis, unspecified whether hemorrhage [K21.00])      (Nausea [R11.0])      (Nausea and vomiting, unspecified vomiting type [R11.2])      (Rectal bleeding [K62.5])      (Change in bowel habits [R19.4])      (Hemorrhoids, unspecified hemorrhoid type [K64.9])    Surgeons: Chance Campbell MD Provider: Emilee Anton CRNA    Anesthesia Type: general ASA Status: 3            Anesthesia Type: general    Vitals  Vitals Value Taken Time   /72 06/17/24 0936   Temp 36.2 °C (97.2 °F) 06/17/24 0932   Pulse 97 06/17/24 0938   Resp 18 06/17/24 0932   SpO2 99 % 06/17/24 0938   Vitals shown include unfiled device data.        Post Anesthesia Care and Evaluation    Post-procedure mental status: acceptable.  Pain management: satisfactory to patient    Airway patency: patent  Anesthetic complications: No anesthetic complications    Cardiovascular status: acceptable  Respiratory status: acceptable    Comments: Per chart review

## 2024-07-08 ENCOUNTER — TELEPHONE (OUTPATIENT)
Dept: FAMILY MEDICINE CLINIC | Facility: CLINIC | Age: 29
End: 2024-07-08
Payer: COMMERCIAL

## 2024-07-08 NOTE — TELEPHONE ENCOUNTER
Detail Level: Generalized
Hub staff attempted to follow warm transfer process and was unsuccessful     Caller: Dorinda Sherwood    Relationship to patient: Self    Best call back number: [phone]    Patient is needing: PATIENT CALLED IN AND STATED THAT SHE WAS RETURNING A MISSED CALL. PLEASE CALL BACK ANYTIME, OKAY TO LEAVE VM.          
Detail Level: Detailed

## 2024-07-08 NOTE — TELEPHONE ENCOUNTER
"Patient called the office and relayed symptoms she is experiencing.  Due to the severity of her symptoms per Dr. Cazares patient was advised to go to the ER.  Patient stated she didn't want to go to the ER.  She asked if Dr. Cazares could just prescribe her some medicine.  I advised her per Dr. Cazares she can not do that and once again was advised to go to the ER.  The patient stated \"ok\".  Dr. Cazares advised of patient's response.  "

## 2024-07-11 ENCOUNTER — TELEPHONE (OUTPATIENT)
Dept: FAMILY MEDICINE CLINIC | Facility: CLINIC | Age: 29
End: 2024-07-11
Payer: COMMERCIAL

## 2024-07-11 NOTE — TELEPHONE ENCOUNTER
Unsuccessful contact.  Straight to voicemail stating person unable to receive phone calls at this time.
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
## 2024-07-11 NOTE — TELEPHONE ENCOUNTER
Unsuccessful contact.  Phone message states that patient is unable to receive phone calls at this time.  Significant other is listed as an alternate phone number but he is not listed on her release form therefore I did not reach out to him.

## 2024-08-28 ENCOUNTER — TELEPHONE (OUTPATIENT)
Dept: OBSTETRICS AND GYNECOLOGY | Facility: CLINIC | Age: 29
End: 2024-08-28
Payer: COMMERCIAL

## 2024-08-28 NOTE — TELEPHONE ENCOUNTER
Patient contacted and states she did go to an  and then [facility] for care. Called medical records and the ER and they have nothing showing she was seen at [facility]. Patient advised she needs to get those records wherever she went to and bring them to the office for review.
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
## 2024-09-19 ENCOUNTER — TELEPHONE (OUTPATIENT)
Dept: GASTROENTEROLOGY | Facility: CLINIC | Age: 29
End: 2024-09-19
Payer: COMMERCIAL

## 2024-09-20 ENCOUNTER — TELEPHONE (OUTPATIENT)
Dept: GASTROENTEROLOGY | Facility: CLINIC | Age: 29
End: 2024-09-20
Payer: COMMERCIAL

## 2024-11-01 ENCOUNTER — APPOINTMENT (OUTPATIENT)
Dept: GENERAL RADIOLOGY | Facility: HOSPITAL | Age: 29
End: 2024-11-01
Payer: MEDICAID

## 2024-11-01 ENCOUNTER — HOSPITAL ENCOUNTER (EMERGENCY)
Facility: HOSPITAL | Age: 29
Discharge: HOME | End: 2024-11-01
Attending: EMERGENCY MEDICINE
Payer: MEDICAID

## 2024-11-01 VITALS
WEIGHT: 293 LBS | OXYGEN SATURATION: 99 % | DIASTOLIC BLOOD PRESSURE: 71 MMHG | BODY MASS INDEX: 53.92 KG/M2 | SYSTOLIC BLOOD PRESSURE: 118 MMHG | HEIGHT: 62 IN | TEMPERATURE: 98.2 F | RESPIRATION RATE: 18 BRPM | HEART RATE: 116 BPM

## 2024-11-01 DIAGNOSIS — R05.1 ACUTE COUGH: ICD-10-CM

## 2024-11-01 DIAGNOSIS — B37.31 VAGINAL CANDIDIASIS: Primary | ICD-10-CM

## 2024-11-01 LAB
BACTERIA UR QL AUTO: ABNORMAL /HPF
BILIRUB UR QL STRIP: NEGATIVE
C TRACH RRNA CVX QL NAA+PROBE: NOT DETECTED
CANDIDA SPECIES: POSITIVE
CLARITY UR: ABNORMAL
COLOR UR: ABNORMAL
FLUAV SUBTYP SPEC NAA+PROBE: NOT DETECTED
FLUBV RNA ISLT QL NAA+PROBE: NOT DETECTED
GARDNERELLA VAGINALIS: NEGATIVE
GLUCOSE UR STRIP-MCNC: ABNORMAL MG/DL
HGB UR QL STRIP.AUTO: NEGATIVE
HYALINE CASTS UR QL AUTO: ABNORMAL /LPF
KETONES UR QL STRIP: ABNORMAL
LEUKOCYTE ESTERASE UR QL STRIP.AUTO: ABNORMAL
N GONORRHOEA RRNA SPEC QL NAA+PROBE: NOT DETECTED
NITRITE UR QL STRIP: NEGATIVE
PH UR STRIP.AUTO: 5.5 [PH] (ref 5–8)
PROT UR QL STRIP: ABNORMAL
RBC # UR STRIP: ABNORMAL /HPF
REF LAB TEST METHOD: ABNORMAL
RSV RNA NPH QL NAA+NON-PROBE: NOT DETECTED
S PYO AG THROAT QL: NEGATIVE
SARS-COV-2 RNA RESP QL NAA+PROBE: NOT DETECTED
SP GR UR STRIP: >1.03 (ref 1–1.03)
SQUAMOUS #/AREA URNS HPF: ABNORMAL /HPF
T VAGINALIS DNA VAG QL PROBE+SIG AMP: NEGATIVE
UROBILINOGEN UR QL STRIP: ABNORMAL
WBC # UR STRIP: ABNORMAL /HPF
YEAST URNS QL MICRO: ABNORMAL /HPF

## 2024-11-01 PROCEDURE — 87880 STREP A ASSAY W/OPTIC: CPT | Performed by: EMERGENCY MEDICINE

## 2024-11-01 PROCEDURE — 87480 CANDIDA DNA DIR PROBE: CPT | Performed by: EMERGENCY MEDICINE

## 2024-11-01 PROCEDURE — 99283 EMERGENCY DEPT VISIT LOW MDM: CPT

## 2024-11-01 PROCEDURE — 87660 TRICHOMONAS VAGIN DIR PROBE: CPT | Performed by: EMERGENCY MEDICINE

## 2024-11-01 PROCEDURE — 71046 X-RAY EXAM CHEST 2 VIEWS: CPT

## 2024-11-01 PROCEDURE — 87637 SARSCOV2&INF A&B&RSV AMP PRB: CPT | Performed by: EMERGENCY MEDICINE

## 2024-11-01 PROCEDURE — 87591 N.GONORRHOEAE DNA AMP PROB: CPT | Performed by: EMERGENCY MEDICINE

## 2024-11-01 PROCEDURE — 87491 CHLMYD TRACH DNA AMP PROBE: CPT | Performed by: EMERGENCY MEDICINE

## 2024-11-01 PROCEDURE — 87081 CULTURE SCREEN ONLY: CPT | Performed by: EMERGENCY MEDICINE

## 2024-11-01 PROCEDURE — 87510 GARDNER VAG DNA DIR PROBE: CPT | Performed by: EMERGENCY MEDICINE

## 2024-11-01 PROCEDURE — 81001 URINALYSIS AUTO W/SCOPE: CPT | Performed by: EMERGENCY MEDICINE

## 2024-11-01 RX ORDER — BENZONATATE 100 MG/1
100 CAPSULE ORAL 3 TIMES DAILY PRN
Qty: 12 CAPSULE | Refills: 0 | Status: SHIPPED | OUTPATIENT
Start: 2024-11-01

## 2024-11-01 NOTE — DISCHARGE INSTRUCTIONS
You are negative for COVID, flu, RSV, and strep in the emergency department today.  Your chest x-ray does not show any pneumonia.  Utilize the miconazole suppositories as prescribed nightly for the next 7 days to improve your yeast infection.  Take Tessalon Perles as needed for cough.  If any of the vaginal swabs result positive you will receive a phone call either this afternoon or tomorrow about the results depending on the time of the result.  You may also be these results on [employer].

## 2024-11-01 NOTE — ED PROVIDER NOTES
"Time: 1:04 PM EDT  Date of encounter:  2024  Independent Historian/Clinical History and Information was obtained by:   Patient    History is limited by: N/A    Chief Complaint: Vaginal itching      History of Present Illness:  Patient is a 29 y.o. year old female who presents to the emergency department for evaluation of vaginal itching.  Patient states she has been having vaginal itching for approximately the last month.  She states that her vaginal area is \"raw\".  She states that she had her mother look at the area and is very red and inflamed and has white patches.  Patient states she has been trying to utilize over-the-counter medications for the past month to improve her symptoms because of her insurance issues however there is been no improvement.  Patient is not having associated dysuria and believes that she has had a couple episodes of hematuria as well.  Patient admits to back pain.  Patient has secondary complaint of cough that has been present x 1 week with associated headache, myalgias, ear pain.  States that she was exposed to COVID and her kids are sick with similar symptoms.      Patient Care Team  Primary Care Provider: Yeni Cazares MD    Past Medical History:     Allergies   Allergen Reactions    Tramadol-Acetaminophen Itching     Past Medical History:   Diagnosis Date    Anesthesia     slow to wake up postop, anxiety postop    Anxiety     AR (allergic rhinitis)     ASD (atrial septal defect)     had since birth- asymptomatic- see's dr falk     Asthma     no inhalers    Bipolar disorder     Colon polyp     Depression     Diabetes mellitus     type ii- bg runs around 200-300    Gallstones currently    GERD (gastroesophageal reflux disease)     Hypertension     Migraine     Ovarian cyst     PTSD (post-traumatic stress disorder)     Trauma      Past Surgical History:   Procedure Laterality Date     SECTION      CHOLECYSTECTOMY N/A 2021    Procedure: CHOLECYSTECTOMY " LAPAROSCOPIC;  Surgeon: Robert Araya MD;  [place] [facility] [place] [facility] [place]  Service: General;  Laterality: N/A;    COLONOSCOPY  2017    COLONOSCOPY N/A 6/17/2024    Procedure: COLONOSCOPY;  Surgeon: Chance Campbell MD;  Location: [facility] ENDOSCOPY;  Service: Gastroenterology;  Laterality: N/A;  ANAL FISSURE    ENDOSCOPY      ENDOSCOPY N/A 6/17/2024    Procedure: ESOPHAGOGASTRODUODENOSCOPY WITH BIOPSIES;  Surgeon: Chance Campbell MD;  Location: [facility] ENDOSCOPY;  Service: Gastroenterology;  Laterality: N/A;  SMALL HIATAL HERNIA  ESOPHAGITIS    TUBAL ABDOMINAL LIGATION      WISDOM TOOTH EXTRACTION       Family History   Problem Relation Age of Onset    Heart disease Father     Heart disease Other     Heart disease Other     Heart disease Other     Diabetes type II Other     Colon cancer Neg Hx        Home Medications:  Prior to Admission medications    Medication Sig Start Date End Date Taking? Authorizing Provider   Advair Diskus 500-50 MCG/ACT DISKUS INHALE ONE PUFF BY MOUTH EVERY TWELVE HOURS (RINSE MOUTH AFTER EACH USE) 10/3/23 11/1/24  Brock Govea MD   albuterol sulfate  (90 Base) MCG/ACT inhaler Inhale 2 puffs 4 (Four) Times a Day As Needed for Wheezing for up to 1 dose. 9/22/23 11/1/24  Yuniel Epperson MD   Allergy Relief 5 MG tablet Take 1 tablet by mouth Daily.  11/1/24  Brock Govea MD   atenolol (TENORMIN) 25 MG tablet TAKE ONE tablet (25 MG total) by MOUTH ONE TIME A DAY  11/1/24  Brock Govea MD   Azelastine-Fluticasone 137-50 MCG/ACT suspension   11/1/24  Brock Govea MD B-D UF III MINI PEN NEEDLES 31G X 5 MM misc 1 each by Other route 5 (Five) Times a Day. 3/1/23 11/1/24  Arcelia Mohan APRN   buPROPion XL (WELLBUTRIN XL) 150 MG 24 hr tablet Take 1 tablet by mouth Daily. 6/16/23 11/1/24  Brock Govea MD   busPIRone (BUSPAR) 5 MG tablet Take 1 tablet by mouth 3 (Three) Times a Day.  11/1/24  Brock Govea MD   Continuous  "Blood Gluc  (Dexcom G6 ) device USE AS DIRECTED 365 DAYS 3/27/23 11/1/24  Brock Govea MD   Continuous Blood Gluc Sensor (Dexcom G6 Sensor) Every 10 (Ten) Days. 3/14/23 11/1/24  Arcelia Mohan APRN   Continuous Blood Gluc Transmit (Dexcom G6 Transmitter) misc use as directed ONE every 3 months 5/18/23 11/1/24  Brock Govea MD   diclofenac (VOLTAREN) 75 MG EC tablet Take 1 tablet by mouth 2 (Two) Times a Day. 5/31/24 11/1/24  Janice Bonilla APRN   Easy Touch Insulin Syringe 28G X 1/2\" 1 ML misc Inject subcutaneously daily 6/17/23 11/1/24  Brock Govea MD   glycerin adult 2 g suppository Insert 1 suppository into the rectum As Needed for Constipation. 5/15/24 11/1/24  Laureano Nguyen MD   insulin aspart (novoLOG) 100 UNIT/ML injection Inject 18 Units under the skin into the appropriate area as directed 3 Times a Day With Meals. Adjust dose based on glucose levels and carbohydrate intake as directed  11/1/24  Brock Govea MD   Insulin Disposable Pump (Omnipod DASH Pods, Gen 4,) misc 1 each Every 3 (Three) Days. 3/27/23 11/1/24  Arcelia Mohan APRN   Insulin Pen Needle (Pen Needles) 32G X 5 MM misc 1 each 4 (Four) Times a Day. 3/1/23 11/1/24  Arcelia Mohan APRN   lubiprostone (Amitiza) 24 MCG capsule Take 1 capsule by mouth 2 (Two) Times a Day With Meals. 6/17/24 11/1/24  Chance Campbell MD   metFORMIN (GLUCOPHAGE) 1000 MG tablet Take 1 tablet by mouth Every 12 (Twelve) Hours. 3/14/24 11/1/24  Brock Govea MD   metoprolol succinate XL (TOPROL-XL) 25 MG 24 hr tablet Take 1 tablet by mouth Daily. 1/20/23 11/1/24  Brock Govea MD   montelukast (SINGULAIR) 10 MG tablet Take 1 tablet by mouth every night at bedtime. 2/22/23 11/1/24  Provider, MD Brock   NovoLOG 100 UNIT/ML injection INJECT 100 UNITS DAILY PER PUMP 6/1/23 11/1/24  Provider, MD Brock   nystatin 470796 UNIT/GM powder Apply  topically to the " "appropriate area as directed 2 (Two) Times a Day. to affected area 3/8/24 11/1/24  Provider, MD Brock   omeprazole (priLOSEC) 40 MG capsule Take 1 capsule by mouth Daily. 5/15/24 11/1/24  Laureano Nguyen MD        Social History:   Social History     Tobacco Use    Smoking status: Never     Passive exposure: Never    Smokeless tobacco: Never   Vaping Use    Vaping status: Never Used   Substance Use Topics    Alcohol use: Never    Drug use: Never         Review of Systems:  Review of Systems   Constitutional:  Positive for chills. Negative for fever.   HENT:  Positive for ear pain and sore throat.    Respiratory:  Positive for cough.    Genitourinary:  Positive for dysuria, hematuria, vaginal discharge and vaginal pain.   Musculoskeletal:  Positive for myalgias.   Neurological:  Positive for headaches.        Physical Exam:  /67 (BP Location: Right arm, Patient Position: Sitting)   Pulse (!) 123   Temp 98.3 °F (36.8 °C) (Oral)   Resp 20   Ht 157.5 cm (62\")   Wt (!) 141 kg (310 lb 13.6 oz)   LMP 10/01/2024 (Exact Date)   SpO2 98%   BMI 56.85 kg/m²     Physical Exam  Vitals and nursing note reviewed. Exam conducted with a chaperone present (Corrine Potts RN).   Constitutional:       Appearance: Normal appearance.      Comments: Morbid obesity   HENT:      Head: Normocephalic and atraumatic.      Right Ear: Tympanic membrane, ear canal and external ear normal.      Left Ear: Tympanic membrane, ear canal and external ear normal.      Nose: Nose normal.      Mouth/Throat:      Mouth: Mucous membranes are moist.      Pharynx: Oropharynx is clear. No oropharyngeal exudate or posterior oropharyngeal erythema.   Eyes:      Conjunctiva/sclera: Conjunctivae normal.   Cardiovascular:      Rate and Rhythm: Normal rate and regular rhythm.      Heart sounds: Normal heart sounds.   Pulmonary:      Effort: Pulmonary effort is normal.      Breath sounds: Normal breath sounds.   Abdominal:      General: Abdomen is " flat. There is no distension.   Genitourinary:     Labia:         Right: Rash present. No tenderness, lesion or injury.         Left: Rash present. No tenderness, lesion or injury.       Vagina: No signs of injury and foreign body. Vaginal discharge, erythema and tenderness present. No bleeding, lesions or prolapsed vaginal walls.      Cervix: Normal.   Musculoskeletal:         General: Normal range of motion.      Cervical back: Normal range of motion and neck supple.   Skin:     General: Skin is warm and dry.   Neurological:      General: No focal deficit present.      Mental Status: She is alert and oriented to person, place, and time.   Psychiatric:         Mood and Affect: Mood normal.         Behavior: Behavior normal.         Thought Content: Thought content normal.         Judgment: Judgment normal.                Procedures:  Procedures      Medical Decision Making:    Comorbidities that affect care:    Diabetes mellitus, PTSD, obesity, GERD, hypertension, asthma, bipolar disorder    External Notes reviewed:    None      The following orders were placed and all results were independently analyzed by me:  Orders Placed This Encounter   Procedures    COVID-19, FLU A/B, RSV PCR 1 HR TAT - Swab, Nasopharynx    Rapid Strep A Screen - Swab, Throat    Chlamydia trachomatis, Neisseria gonorrhoeae, PCR - Swab, Cervix    Gardnerella vaginalis, Trichomonas vaginalis, Candida albicans, DNA - Swab, Vagina    Beta Strep Culture, Throat - Swab, Throat    XR Chest 2 View    Urinalysis With Culture If Indicated - Urine, Clean Catch    Urinalysis, Microscopic Only - Urine, Clean Catch    Repeat vitals prior to discharge  Misc Nursing Order (Specify)       Medications Given in the Emergency Department:  Medications - No data to display     ED Course:         Labs:    Lab Results (last 24 hours)       Procedure Component Value Units Date/Time    COVID-19, FLU A/B, RSV PCR 1 HR TAT - Swab, Nasopharynx [[id number]]  (Normal)  Collected: 11/01/24 1300    Specimen: Swab from Nasopharynx Updated: 11/01/24 1355     COVID19 Not Detected     Influenza A PCR Not Detected     Influenza B PCR Not Detected     RSV, PCR Not Detected    Narrative:      Fact sheet for providers: https://www.fda.gov/media/077984/download    Fact sheet for patients: https://www.fda.gov/media/893087/download    Test performed by PCR.    Rapid Strep A Screen - Swab, Throat [[id number]]  (Normal) Collected: 11/01/24 1300    Specimen: Swab from Throat Updated: 11/01/24 1345     Strep A Ag Negative    Beta Strep Culture, Throat - Swab, Throat [[id number]] Collected: 11/01/24 1300    Specimen: Swab from Throat Updated: 11/01/24 1345    Urinalysis With Culture If Indicated - Urine, Clean Catch [[id number]]  (Abnormal) Collected: 11/01/24 1312    Specimen: Urine, Clean Catch Updated: 11/01/24 1353     Color, UA Dark Yellow     Appearance, UA Cloudy     pH, UA 5.5     Specific Gravity, UA >1.030     Glucose, UA >=1000 mg/dL (3+)     Ketones, UA 40 mg/dL (2+)     Bilirubin, UA Negative     Blood, UA Negative     Protein, UA 30 mg/dL (1+)     Leuk Esterase, UA Small (1+)     Nitrite, UA Negative     Urobilinogen, UA 1.0 E.U./dL    Narrative:      In absence of clinical symptoms, the presence of pyuria, bacteria, and/or nitrites on the urinalysis result does not correlate with infection.    Urinalysis, Microscopic Only - Urine, Clean Catch [[id number]]  (Abnormal) Collected: 11/01/24 1312    Specimen: Urine, Clean Catch Updated: 11/01/24 1430     RBC, UA 0-2 /HPF      WBC, UA 3-5 /HPF      Comment: Urine culture not indicated.        Bacteria, UA 1+ /HPF      Squamous Epithelial Cells, UA 3-6 /HPF      Yeast, UA Small/1+ Yeast /HPF      Hyaline Casts, UA None Seen /LPF      Methodology Manual Light Microscopy    Chlamydia trachomatis, Neisseria gonorrhoeae, PCR - Swab, Cervix [[id number]] Collected: 11/01/24 1319    Specimen: Swab from Cervix Updated: 11/01/24 1322    Gardnerella  vaginalis, Trichomonas vaginalis, Candida albicans, DNA - Swab, Vagina [[id number]] Collected: 11/01/24 1319    Specimen: Swab from Vagina Updated: 11/01/24 1322             Imaging:    XR Chest 2 View    Result Date: 11/1/2024  XR CHEST 2 VW Date of Exam: 11/1/2024 1:30 PM EDT Indication: chest congestion cough Comparison: 2/5/2024 Findings: Heart size and pulmonary vessels are within normal limits. The lungs are clear. There are no pleural effusions. Bony structures are unremarkable.     Impression: 1. No acute cardiopulmonary disease. Electronically Signed: Fransisco Hall MD  11/1/2024 1:35 PM EDT  Workstation ID: [id number]       Differential Diagnosis and Discussion:    Vaginal Discharge: Differential diagnosis includes but is not limited to bacterial vaginosis, candidiasis, trichomonas vaginitis, cervicitis, rectovaginal fistula, irritants and allergens, foreign body, pelvic inflammatory disease.    All labs were reviewed and interpreted by me.  All X-rays impressions were independently interpreted by me.    MDM  Number of Diagnoses or Management Options  Acute cough  Vaginal candidiasis  Diagnosis management comments: Patient presented to the emergency department today for evaluation of vaginal itching and had secondary complaint of URI symptoms.  Vaginal swabs were obtained and still pending at time of discharge.  Based on physical exam I will begin patient on treatment for candidiasis with miconazole vaginal suppositories.  Urinalysis was obtained but is nitrate negative and is not consistent with urinary tract infection.  Patient is negative for COVID, flu, RSV, and strep.  Chest x-ray had no acute findings.  Will begin patient on Tessalon Perles to improve cough.  Patient given return to the emergency department guidelines.       Amount and/or Complexity of Data Reviewed  Clinical lab tests: reviewed and ordered  Tests in the radiology section of CPT®: reviewed and ordered    Risk of Complications,  Morbidity, and/or Mortality  Presenting problems: moderate  Diagnostic procedures: low  Management options: low    Patient Progress  Patient progress: stable     Patient Care Considerations:    ANTIBIOTICS: I considered prescribing antibiotics as an outpatient however no bacterial focus of infection was found.      Consultants/Shared Management Plan:    None    Social Determinants of Health:    Patient is independent, reliable, and has access to care.       Disposition and Care Coordination:    Discharged: The patient is suitable and stable for discharge with no need for consideration of admission.    I have explained the patient´s condition, diagnoses and treatment plan based on the information available to me at this time. I have answered questions and addressed any concerns. The patient has a good  understanding of the patient´s diagnosis, condition, and treatment plan as can be expected at this point. The vital signs have been stable. The patient´s condition is stable and appropriate for discharge from the emergency department.      The patient will pursue further outpatient evaluation with the primary care physician or other designated or consulting physician as outlined in the discharge instructions. They are agreeable to this plan of care and follow-up instructions have been explained in detail. The patient has received these instructions in written format and has expressed an understanding of the discharge instructions. The patient is aware that any significant change in condition or worsening of symptoms should prompt an immediate return to this or the closest emergency department or call to 911.  I have explained discharge medications and the need for follow up with the patient/caretakers. This was also printed in the discharge instructions. Patient was discharged with the following medications and follow up:      Medication List        New Prescriptions      benzonatate 100 MG capsule  Commonly known as:  TESSALON  Take 1 capsule by mouth 3 (Three) Times a Day As Needed for Cough.     miconazole 100 MG vaginal suppository  Commonly known as: MICOTIN  Insert 1 suppository into the vagina Every Night.               Where to Get Your Medications        These medications were sent to [id number] - [place], [place] - [address] - [phone]  - [phone] FX  [address] [facility], [place] [address]      Phone: [phone]   benzonatate 100 MG capsule  miconazole 100 MG vaginal suppository      Yeni Cazares MD  [address]  [address]  [place] [address]  [phone]             Final diagnoses:   Vaginal candidiasis   Acute cough        ED Disposition       ED Disposition   Discharge    Condition   Stable    Comment   --               This medical record created using voice recognition software.             Josue Naik PA-C  11/01/24 1447

## 2024-11-03 LAB — BACTERIA SPEC AEROBE CULT: NORMAL

## 2024-11-06 ENCOUNTER — HOSPITAL ENCOUNTER (EMERGENCY)
Facility: HOSPITAL | Age: 29
Discharge: HOME | End: 2024-11-07
Attending: EMERGENCY MEDICINE | Admitting: EMERGENCY MEDICINE
Payer: MEDICAID

## 2024-11-06 ENCOUNTER — APPOINTMENT (OUTPATIENT)
Dept: GENERAL RADIOLOGY | Facility: HOSPITAL | Age: 29
End: 2024-11-06
Payer: MEDICAID

## 2024-11-06 DIAGNOSIS — J20.9 ACUTE BRONCHITIS, UNSPECIFIED ORGANISM: Primary | ICD-10-CM

## 2024-11-06 LAB
ALBUMIN SERPL-MCNC: 4 G/DL (ref 3.5–5.2)
ALBUMIN/GLOB SERPL: 1.3 G/DL
ALP SERPL-CCNC: 83 U/L (ref 39–117)
ALT SERPL W P-5'-P-CCNC: 13 U/L (ref 1–33)
ANION GAP SERPL CALCULATED.3IONS-SCNC: 11.9 MMOL/L (ref 5–15)
AST SERPL-CCNC: 11 U/L (ref 1–32)
BASOPHILS # BLD AUTO: 0.06 10*3/MM3 (ref 0–0.2)
BASOPHILS NFR BLD AUTO: 0.5 % (ref 0–1.5)
BILIRUB SERPL-MCNC: <0.2 MG/DL (ref 0–1.2)
BILIRUB UR QL STRIP: NEGATIVE
BUN SERPL-MCNC: 10 MG/DL (ref 6–20)
BUN/CREAT SERPL: 18.9 (ref 7–25)
CALCIUM SPEC-SCNC: 10 MG/DL (ref 8.6–10.5)
CHLORIDE SERPL-SCNC: 98 MMOL/L (ref 98–107)
CLARITY UR: CLEAR
CO2 SERPL-SCNC: 24.1 MMOL/L (ref 22–29)
COLOR UR: YELLOW
CREAT SERPL-MCNC: 0.53 MG/DL (ref 0.57–1)
D-LACTATE SERPL-SCNC: 3.5 MMOL/L (ref 0.5–2)
DEPRECATED RDW RBC AUTO: 38.1 FL (ref 37–54)
EGFRCR SERPLBLD CKD-EPI 2021: 127.8 ML/MIN/1.73
EOSINOPHIL # BLD AUTO: 0.11 10*3/MM3 (ref 0–0.4)
EOSINOPHIL NFR BLD AUTO: 1 % (ref 0.3–6.2)
ERYTHROCYTE [DISTWIDTH] IN BLOOD BY AUTOMATED COUNT: 13.2 % (ref 12.3–15.4)
FLUAV SUBTYP SPEC NAA+PROBE: NOT DETECTED
FLUBV RNA ISLT QL NAA+PROBE: NOT DETECTED
GEN 5 2HR TROPONIN T REFLEX: <6 NG/L
GLOBULIN UR ELPH-MCNC: 3.1 GM/DL
GLUCOSE SERPL-MCNC: 317 MG/DL (ref 65–99)
GLUCOSE UR STRIP-MCNC: ABNORMAL MG/DL
HCT VFR BLD AUTO: 39.6 % (ref 34–46.6)
HGB BLD-MCNC: 13 G/DL (ref 12–15.9)
HGB UR QL STRIP.AUTO: NEGATIVE
HOLD SPECIMEN: NORMAL
HOLD SPECIMEN: NORMAL
IMM GRANULOCYTES # BLD AUTO: 0.15 10*3/MM3 (ref 0–0.05)
IMM GRANULOCYTES NFR BLD AUTO: 1.3 % (ref 0–0.5)
KETONES UR QL STRIP: ABNORMAL
LEUKOCYTE ESTERASE UR QL STRIP.AUTO: NEGATIVE
LIPASE SERPL-CCNC: 25 U/L (ref 13–60)
LYMPHOCYTES # BLD AUTO: 3.39 10*3/MM3 (ref 0.7–3.1)
LYMPHOCYTES NFR BLD AUTO: 29.8 % (ref 19.6–45.3)
MAGNESIUM SERPL-MCNC: 1.8 MG/DL (ref 1.6–2.6)
MCH RBC QN AUTO: 26.2 PG (ref 26.6–33)
MCHC RBC AUTO-ENTMCNC: 32.8 G/DL (ref 31.5–35.7)
MCV RBC AUTO: 79.8 FL (ref 79–97)
MONOCYTES # BLD AUTO: 0.7 10*3/MM3 (ref 0.1–0.9)
MONOCYTES NFR BLD AUTO: 6.2 % (ref 5–12)
NEUTROPHILS NFR BLD AUTO: 6.95 10*3/MM3 (ref 1.7–7)
NEUTROPHILS NFR BLD AUTO: 61.2 % (ref 42.7–76)
NITRITE UR QL STRIP: NEGATIVE
NRBC BLD AUTO-RTO: 0 /100 WBC (ref 0–0.2)
NT-PROBNP SERPL-MCNC: <36 PG/ML (ref 0–450)
PH UR STRIP.AUTO: 5.5 [PH] (ref 5–8)
PLATELET # BLD AUTO: 306 10*3/MM3 (ref 140–450)
PMV BLD AUTO: 10 FL (ref 6–12)
POTASSIUM SERPL-SCNC: 4.2 MMOL/L (ref 3.5–5.2)
PROT SERPL-MCNC: 7.1 G/DL (ref 6–8.5)
PROT UR QL STRIP: NEGATIVE
RBC # BLD AUTO: 4.96 10*6/MM3 (ref 3.77–5.28)
RSV RNA NPH QL NAA+NON-PROBE: NOT DETECTED
SARS-COV-2 RNA RESP QL NAA+PROBE: NOT DETECTED
SODIUM SERPL-SCNC: 134 MMOL/L (ref 136–145)
SP GR UR STRIP: >1.03 (ref 1–1.03)
TROPONIN T DELTA: NORMAL
TROPONIN T SERPL HS-MCNC: <6 NG/L
UROBILINOGEN UR QL STRIP: ABNORMAL
WBC NRBC COR # BLD AUTO: 11.36 10*3/MM3 (ref 3.4–10.8)
WHOLE BLOOD HOLD COAG: NORMAL
WHOLE BLOOD HOLD SPECIMEN: NORMAL

## 2024-11-06 PROCEDURE — 25810000003 SODIUM CHLORIDE 0.9 % SOLUTION: Performed by: EMERGENCY MEDICINE

## 2024-11-06 PROCEDURE — 87040 BLOOD CULTURE FOR BACTERIA: CPT | Performed by: EMERGENCY MEDICINE

## 2024-11-06 PROCEDURE — 99284 EMERGENCY DEPT VISIT MOD MDM: CPT

## 2024-11-06 PROCEDURE — 81003 URINALYSIS AUTO W/O SCOPE: CPT

## 2024-11-06 PROCEDURE — 83690 ASSAY OF LIPASE: CPT | Performed by: EMERGENCY MEDICINE

## 2024-11-06 PROCEDURE — 71045 X-RAY EXAM CHEST 1 VIEW: CPT

## 2024-11-06 PROCEDURE — 94664 DEMO&/EVAL PT USE INHALER: CPT

## 2024-11-06 PROCEDURE — 93005 ELECTROCARDIOGRAM TRACING: CPT | Performed by: EMERGENCY MEDICINE

## 2024-11-06 PROCEDURE — 83880 ASSAY OF NATRIURETIC PEPTIDE: CPT | Performed by: EMERGENCY MEDICINE

## 2024-11-06 PROCEDURE — 84484 ASSAY OF TROPONIN QUANT: CPT | Performed by: EMERGENCY MEDICINE

## 2024-11-06 PROCEDURE — 36415 COLL VENOUS BLD VENIPUNCTURE: CPT | Performed by: EMERGENCY MEDICINE

## 2024-11-06 PROCEDURE — 83605 ASSAY OF LACTIC ACID: CPT | Performed by: EMERGENCY MEDICINE

## 2024-11-06 PROCEDURE — 83735 ASSAY OF MAGNESIUM: CPT | Performed by: EMERGENCY MEDICINE

## 2024-11-06 PROCEDURE — 94799 UNLISTED PULMONARY SVC/PX: CPT

## 2024-11-06 PROCEDURE — 87637 SARSCOV2&INF A&B&RSV AMP PRB: CPT | Performed by: NURSE PRACTITIONER

## 2024-11-06 PROCEDURE — 80053 COMPREHEN METABOLIC PANEL: CPT | Performed by: EMERGENCY MEDICINE

## 2024-11-06 PROCEDURE — 94640 AIRWAY INHALATION TREATMENT: CPT

## 2024-11-06 PROCEDURE — 85025 COMPLETE CBC W/AUTO DIFF WBC: CPT | Performed by: EMERGENCY MEDICINE

## 2024-11-06 RX ORDER — ASPIRIN 81 MG/1
324 TABLET, CHEWABLE ORAL ONCE
Status: DISCONTINUED | OUTPATIENT
Start: 2024-11-06 | End: 2024-11-07 | Stop reason: HOSPADM

## 2024-11-06 RX ORDER — SODIUM CHLORIDE 0.9 % (FLUSH) 0.9 %
10 SYRINGE (ML) INJECTION AS NEEDED
Status: DISCONTINUED | OUTPATIENT
Start: 2024-11-06 | End: 2024-11-07 | Stop reason: HOSPADM

## 2024-11-06 RX ORDER — IPRATROPIUM BROMIDE AND ALBUTEROL SULFATE 2.5; .5 MG/3ML; MG/3ML
3 SOLUTION RESPIRATORY (INHALATION) ONCE
Status: COMPLETED | OUTPATIENT
Start: 2024-11-07 | End: 2024-11-06

## 2024-11-06 RX ADMIN — SODIUM CHLORIDE 1000 ML: 9 INJECTION, SOLUTION INTRAVENOUS at 22:15

## 2024-11-06 RX ADMIN — IPRATROPIUM BROMIDE AND ALBUTEROL SULFATE 3 ML: .5; 3 SOLUTION RESPIRATORY (INHALATION) at 23:56

## 2024-11-07 VITALS
HEIGHT: 62 IN | OXYGEN SATURATION: 96 % | TEMPERATURE: 98 F | BODY MASS INDEX: 53.92 KG/M2 | HEART RATE: 110 BPM | SYSTOLIC BLOOD PRESSURE: 146 MMHG | DIASTOLIC BLOOD PRESSURE: 84 MMHG | WEIGHT: 293 LBS | RESPIRATION RATE: 20 BRPM

## 2024-11-07 LAB
D-LACTATE SERPL-SCNC: 2.2 MMOL/L (ref 0.5–2)
QT INTERVAL: 318 MS
QTC INTERVAL: 433 MS

## 2024-11-07 RX ADMIN — AMOXICILLIN AND CLAVULANATE POTASSIUM 1 TABLET: 875; 125 TABLET, FILM COATED ORAL at 00:18

## 2024-11-07 NOTE — ED PROVIDER NOTES
Time: 8:12 PM EST  Date of encounter:  2024  Independent Historian/Clinical History and Information was obtained by:   Patient and EMS    History is limited by: N/A    Chief Complaint   Patient presents with    URI    Shortness of Breath         History of Present Illness:  Patient is a 29 y.o. year old female who presents to the emergency department for evaluation of worsening URI and shortness of breath.  Patient was seen last Friday here and was prescribed medications for upper respiratory and yeast infection but states she never got them filled and now she is feeling worse.  She is having sharp stabbing pain in her chest that radiates down into her left arm and is having cough and wheezing she states the oxygen that they put on her in the ambulance and the aspirin does seem to help slightly. Hx of asthma.    Patient Care Team  Primary Care Provider: Yeni Cazares MD    Past Medical History:     Allergies   Allergen Reactions    Tramadol-Acetaminophen Itching     Past Medical History:   Diagnosis Date    Anesthesia     slow to wake up postop, anxiety postop    Anxiety     AR (allergic rhinitis)     ASD (atrial septal defect)     had since birth- asymptomatic- see's dr falk     Asthma     no inhalers    Bipolar disorder     Colon polyp     Depression     Diabetes mellitus     type ii- bg runs around 200-300    Gallstones currently    GERD (gastroesophageal reflux disease)     Hypertension     Migraine     Ovarian cyst     PTSD (post-traumatic stress disorder)     Trauma      Past Surgical History:   Procedure Laterality Date     SECTION      CHOLECYSTECTOMY N/A 2021    Procedure: CHOLECYSTECTOMY LAPAROSCOPIC;  Surgeon: Robert Araya MD;  Location: [facility] OR OSC;  Service: General;  Laterality: N/A;    COLONOSCOPY  2017    COLONOSCOPY N/A 2024    Procedure: COLONOSCOPY;  Surgeon: Chance Campbell MD;  Location: [facility] ENDOSCOPY;  Service: Gastroenterology;   "Laterality: N/A;  ANAL FISSURE    ENDOSCOPY      ENDOSCOPY N/A 6/17/2024    Procedure: ESOPHAGOGASTRODUODENOSCOPY WITH BIOPSIES;  Surgeon: Chance Campbell MD;  Location: [facility] ENDOSCOPY;  Service: Gastroenterology;  Laterality: N/A;  SMALL HIATAL HERNIA  ESOPHAGITIS    TUBAL ABDOMINAL LIGATION      WISDOM TOOTH EXTRACTION       Family History   Problem Relation Age of Onset    Heart disease Father     Heart disease Other     Heart disease Other     Heart disease Other     Diabetes type II Other     Colon cancer Neg Hx        Home Medications:  Prior to Admission medications    Medication Sig Start Date End Date Taking? Authorizing Provider   benzonatate (TESSALON) 100 MG capsule Take 1 capsule by mouth 3 (Three) Times a Day As Needed for Cough. 11/1/24   Josue Naik PA-C   miconazole (MICOTIN) 100 MG vaginal suppository Insert 1 suppository into the vagina Every Night. 11/1/24   Josue Naik PA-C        Social History:   Social History     Tobacco Use    Smoking status: Never     Passive exposure: Never    Smokeless tobacco: Never   Vaping Use    Vaping status: Never Used   Substance Use Topics    Alcohol use: Never    Drug use: Never         Review of Systems:  Review of Systems   Constitutional:  Negative for chills and fever.   HENT:  Positive for congestion. Negative for ear pain.    Eyes:  Negative for pain.   Respiratory:  Positive for cough, shortness of breath and wheezing.    Cardiovascular:  Positive for chest pain.   Gastrointestinal:  Negative for abdominal pain, diarrhea, nausea and vomiting.   Genitourinary:  Negative for dysuria.   Musculoskeletal:  Negative for arthralgias.   Skin:  Negative for rash.   Neurological:  Negative for headaches.        Physical Exam:  /97 (BP Location: Left arm, Patient Position: Sitting)   Pulse 111   Temp 97.7 °F (36.5 °C) (Oral)   Resp 18   Ht 157.5 cm (62\")   Wt (!) 146 kg (321 lb 10.4 oz)   LMP 10/01/2024 (Exact Date)   SpO2 97%   " BMI 58.83 kg/m²         Physical Exam  Vitals and nursing note reviewed.   Constitutional:       Appearance: Normal appearance.   HENT:      Head: Normocephalic and atraumatic.      Nose: Nose normal.   Eyes:      Extraocular Movements: Extraocular movements intact.      Conjunctiva/sclera: Conjunctivae normal.      Pupils: Pupils are equal, round, and reactive to light.   Cardiovascular:      Rate and Rhythm: Normal rate and regular rhythm.      Pulses: Normal pulses.      Heart sounds: Normal heart sounds.   Pulmonary:      Effort: Pulmonary effort is normal.      Breath sounds: Wheezing present.   Abdominal:      General: Abdomen is flat.      Palpations: Abdomen is soft.   Musculoskeletal:         General: Normal range of motion.      Cervical back: Normal range of motion and neck supple.   Skin:     General: Skin is warm and dry.   Neurological:      General: No focal deficit present.      Mental Status: She is alert and oriented to person, place, and time.   Psychiatric:         Mood and Affect: Mood normal.         Behavior: Behavior normal.                      Procedures:  Procedures      Medical Decision Making:      Comorbidities that affect care:    Asthma, HTN, obesity    External Notes reviewed:    Previous ED Note: Reviewed ED note on 11/1/24.      The following orders were placed and all results were independently analyzed by me:  Orders Placed This Encounter   Procedures    Blood Culture - Blood,    Blood Culture - Blood,    COVID-19, FLU A/B, RSV PCR 1 HR TAT - Swab, Nasopharynx    XR Chest 1 View    Gilbert Draw    High Sensitivity Troponin T    Comprehensive Metabolic Panel    Lipase    BNP    Magnesium    Lactic Acid, Plasma    CBC Auto Differential    High Sensitivity Troponin T 2Hr    STAT Lactic Acid, Reflex    Urinalysis With Culture If Indicated - Urine, Clean Catch    STAT Lactic Acid, Reflex    NPO Diet NPO Type: Strict NPO    Undress & Gown    Continuous Pulse Oximetry    Oxygen Therapy-  Nasal Cannula; Titrate 1-6 LPM Per SpO2; 90 - 95%    ECG 12 Lead ED Triage Standing Order; Chest Pain    Insert Peripheral IV    CBC & Differential    Green Top (Gel)    Lavender Top    Gold Top - SST    Light Blue Top       Medications Given in the Emergency Department:  Medications   sodium chloride 0.9 % flush 10 mL (has no administration in time range)   aspirin chewable tablet 324 mg (has no administration in time range)   amoxicillin-clavulanate (AUGMENTIN) 875-125 MG per tablet 1 tablet (has no administration in time range)   sodium chloride 0.9 % bolus 1,000 mL (0 mL Intravenous Stopped 11/6/24 2249)   ipratropium-albuterol (DUO-NEB) nebulizer solution 3 mL (3 mL Nebulization Given 11/6/24 2356)        ED Course:    The patient was initially evaluated in the triage area where orders were placed. The patient was later dispositioned by RUBY Rivers.      The patient was advised to stay for completion of workup which includes but is not limited to communication of labs and radiological results, reassessment and plan. The patient was advised that leaving prior to disposition by a provider could result in critical findings that are not communicated to the patient.     ED Course as of 11/07/24 0007   Wed Nov 06, 2024 2354 BP: 137/103 mmHg  Temp 98.5F [MV]   Thu Nov 07, 2024   0006 Lactate(!!): 2.2 [MV]      ED Course User Index  [MV] Kingsley Saucedo PA       Labs:    Lab Results (last 24 hours)       Procedure Component Value Units Date/Time    COVID-19, FLU A/B, RSV PCR 1 HR TAT - Swab, Nasopharynx [[id number]]  (Normal) Collected: 11/06/24 2020    Specimen: Swab from Nasopharynx Updated: 11/06/24 2110     COVID19 Not Detected     Influenza A PCR Not Detected     Influenza B PCR Not Detected     RSV, PCR Not Detected    Narrative:      Fact sheet for providers: https://www.fda.gov/media/627178/download    Fact sheet for patients: https://www.fda.gov/media/081161/download    Test performed by PCR.    High  Sensitivity Troponin T [[id number]]  (Normal) Collected: 11/06/24 2034    Specimen: Blood from Hand, Right Updated: 11/06/24 2104     HS Troponin T <6 ng/L     Narrative:      High Sensitive Troponin T Reference Range:  <14.0 ng/L- Negative Female for AMI  <22.0 ng/L- Negative Male for AMI  >=14 - Abnormal Female indicating possible myocardial injury.  >=22 - Abnormal Male indicating possible myocardial injury.   Clinicians would have to utilize clinical acumen, EKG, Troponin, and serial changes to determine if it is an Acute Myocardial Infarction or myocardial injury due to an underlying chronic condition.         CBC & Differential [[id number]]  (Abnormal) Collected: 11/06/24 2034    Specimen: Blood from Hand, Right Updated: 11/06/24 2042    Narrative:      The following orders were created for panel order CBC & Differential.  Procedure                               Abnormality         Status                     ---------                               -----------         ------                     CBC Auto Differential[[id number]]        Abnormal            Final result                 Please view results for these tests on the individual orders.    Comprehensive Metabolic Panel [[id number]]  (Abnormal) Collected: 11/06/24 2034    Specimen: Blood from Hand, Right Updated: 11/06/24 2104     Glucose 317 mg/dL      BUN 10 mg/dL      Creatinine 0.53 mg/dL      Sodium 134 mmol/L      Potassium 4.2 mmol/L      Chloride 98 mmol/L      CO2 24.1 mmol/L      Calcium 10.0 mg/dL      Total Protein 7.1 g/dL      Albumin 4.0 g/dL      ALT (SGPT) 13 U/L      AST (SGOT) 11 U/L      Alkaline Phosphatase 83 U/L      Total Bilirubin <0.2 mg/dL      Globulin 3.1 gm/dL      A/G Ratio 1.3 g/dL      BUN/Creatinine Ratio 18.9     Anion Gap 11.9 mmol/L      eGFR 127.8 mL/min/1.73     Narrative:      GFR Normal >60  Chronic Kidney Disease <60  Kidney Failure <15      Lipase [[id number]]  (Normal) Collected: 11/06/24 2034    Specimen: Blood from  Hand, Right Updated: 11/06/24 2104     Lipase 25 U/L     BNP [[id number]]  (Normal) Collected: 11/06/24 2034    Specimen: Blood from Hand, Right Updated: 11/06/24 2102     proBNP <36.0 pg/mL     Narrative:      This assay is used as an aid in the diagnosis of individuals suspected of having heart failure. It can be used as an aid in the diagnosis of acute decompensated heart failure (ADHF) in patients presenting with signs and symptoms of ADHF to the emergency department (ED). In addition, NT-proBNP of <300 pg/mL indicates ADHF is not likely.    Age Range Result Interpretation  NT-proBNP Concentration (pg/mL:      <50             Positive            >450                   Gray                 300-450                    Negative             <300    50-75           Positive            >900                  Gray                300-900                  Negative            <300      >75             Positive            >1800                  Gray                300-1800                  Negative            <300    Magnesium [[id number]]  (Normal) Collected: 11/06/24 2034    Specimen: Blood from Hand, Right Updated: 11/06/24 2104     Magnesium 1.8 mg/dL     Blood Culture - Blood, Hand, Right [[id number]] Collected: 11/06/24 2034    Specimen: Blood from Hand, Right Updated: 11/06/24 2039    Lactic Acid, Plasma [[id number]]  (Abnormal) Collected: 11/06/24 2034    Specimen: Blood from Hand, Right Updated: 11/06/24 2201     Lactate 3.5 mmol/L     CBC Auto Differential [[id number]]  (Abnormal) Collected: 11/06/24 2034    Specimen: Blood from Hand, Right Updated: 11/06/24 2042     WBC 11.36 10*3/mm3      RBC 4.96 10*6/mm3      Hemoglobin 13.0 g/dL      Hematocrit 39.6 %      MCV 79.8 fL      MCH 26.2 pg      MCHC 32.8 g/dL      RDW 13.2 %      RDW-SD 38.1 fl      MPV 10.0 fL      Platelets 306 10*3/mm3      Neutrophil % 61.2 %      Lymphocyte % 29.8 %      Monocyte % 6.2 %      Eosinophil % 1.0 %      Basophil % 0.5 %      Immature  Grans % 1.3 %      Neutrophils, Absolute 6.95 10*3/mm3      Lymphocytes, Absolute 3.39 10*3/mm3      Monocytes, Absolute 0.70 10*3/mm3      Eosinophils, Absolute 0.11 10*3/mm3      Basophils, Absolute 0.06 10*3/mm3      Immature Grans, Absolute 0.15 10*3/mm3      nRBC 0.0 /100 WBC     Blood Culture - Blood, Arm, Right [[id number]] Collected: 11/06/24 2219    Specimen: Blood from Arm, Right Updated: 11/06/24 2226    High Sensitivity Troponin T 2Hr [[id number]] Collected: 11/06/24 2219    Specimen: Blood from Arm, Right Updated: 11/06/24 2247     HS Troponin T <6 ng/L      Troponin T Delta --     Comment: Unable to calculate.       Narrative:      High Sensitive Troponin T Reference Range:  <14.0 ng/L- Negative Female for AMI  <22.0 ng/L- Negative Male for AMI  >=14 - Abnormal Female indicating possible myocardial injury.  >=22 - Abnormal Male indicating possible myocardial injury.   Clinicians would have to utilize clinical acumen, EKG, Troponin, and serial changes to determine if it is an Acute Myocardial Infarction or myocardial injury due to an underlying chronic condition.         STAT Lactic Acid, Reflex [[id number]]  (Abnormal) Collected: 11/06/24 2249    Specimen: Blood Updated: 11/07/24 0006     Lactate 2.2 mmol/L     Urinalysis With Culture If Indicated - Urine, Clean Catch [[id number]]  (Abnormal) Collected: 11/06/24 2250    Specimen: Urine, Clean Catch Updated: 11/06/24 2259     Color, UA Yellow     Appearance, UA Clear     pH, UA 5.5     Specific Gravity, UA >1.030     Glucose, UA >=1000 mg/dL (3+)     Ketones, UA Trace     Bilirubin, UA Negative     Blood, UA Negative     Protein, UA Negative     Leuk Esterase, UA Negative     Nitrite, UA Negative     Urobilinogen, UA 0.2 E.U./dL    Narrative:      In absence of clinical symptoms, the presence of pyuria, bacteria, and/or nitrites on the urinalysis result does not correlate with infection.  Urine microscopic not indicated.             Imaging:    XR Chest 1  "View    Result Date: 11/6/2024  XR CHEST 1 VW-  Date of exam: 11/6/2024, 8:40 P.M.  Indications: Chest pain (\"stabbing\"); left upper extremity radicular pain; dizziness; SOA/SOB/shortness of air/shortness of breath; h/o URI; + leukocytosis.  Comparison: 11/1/2024.  FINDINGS: A single AP (or PA) upright portable chest radiograph was performed. The study is habitus-limited. No cardiac enlargement is seen. No acute infiltrate is appreciated. No pleural effusion or pneumothorax is identified. Degenerative changes involve the imaged spine and possibly the bilateral shoulders. There are slightly lower lung volumes. Otherwise, no significant interval change is seen since the prior study (or studies).       No acute infiltrate is appreciated.    Portions of this note were completed with a voice recognition program.  Electronically Signed By-Junito Blackman MD On:11/6/2024 9:19 PM         Differential Diagnosis and Discussion:      Dyspnea: Differential diagnosis includes but is not limited to metabolic acidosis, neurological disorders, psychogenic, asthma, pneumothorax, upper airway obstruction, COPD, pneumonia, noncardiogenic pulmonary edema, interstitial lung disease, anemia, congestive heart failure, and pulmonary embolism    All labs were reviewed and interpreted by me.  All X-rays impressions were independently interpreted by me.  EKG was interpreted by supervising attending.    MDM     Amount and/or Complexity of Data Reviewed  Clinical lab tests: reviewed  Tests in the radiology section of CPT®: reviewed  Decide to obtain previous medical records or to obtain history from someone other than the patient: yes    Risk of Complications, Morbidity, and/or Mortality  Presenting problems: moderate  Diagnostic procedures: low  Management options: low    Patient Progress  Patient progress: stable    Patient presents to the emergency department for evaluation of worsening URI and shortness of breath.  Patient was seen last " Friday here and was prescribed medications for upper respiratory and yeast infection but states she never got them filled and now she is feeling worse.  She is having sharp stabbing pain in her chest that radiates down into her left arm and is having cough and wheezing she states the oxygen that they put on her in the ambulance and the aspirin does seem to help slightly. Hx of asthma.    CBC shows a mildly elevated white count of 11.36.  Rest of the CBC is unremarkable.  The patient´s CMP that was reviewed and interpretted by me shows no abnormalities of critical concern. Of note, the patient´s sodium and potassium are acceptable. The patient´s liver enzymes are unremarkable. The patient´s renal function (creatinine) is preserved. The patient has a normal anion gap.  Glucose is elevated at 317.    Magnesium, lipase, troponin, BNP are within normal limits.  Delta troponin negative.    Initial lactate is elevated at 3.5.  Started the patient on fluids. Repeat lactate 2.2.    COVID, Flu, RSV swabs negative.    UA is negative for any infection.    Started the patient on DuoNebs and Augmentin.              Patient Care Considerations:    SEPSIS was considered but is NOT present in the emergency department as SIRS criteria is not present.      Consultants/Shared Management Plan:    None    Social Determinants of Health:    Patient is independent, reliable, and has access to care.       Disposition and Care Coordination:    Discharged: The patient is suitable and stable for discharge with no need for consideration of admission.    I have explained the patient´s condition, diagnoses and treatment plan based on the information available to me at this time. I have answered questions and addressed any concerns. The patient has a good  understanding of the patient´s diagnosis, condition, and treatment plan as can be expected at this point. The vital signs have been stable. The patient´s condition is stable and appropriate for  discharge from the emergency department.      The patient will pursue further outpatient evaluation with the primary care physician or other designated or consulting physician as outlined in the discharge instructions. They are agreeable to this plan of care and follow-up instructions have been explained in detail. The patient has received these instructions in written format and has expressed an understanding of the discharge instructions. The patient is aware that any significant change in condition or worsening of symptoms should prompt an immediate return to this or the closest emergency department or call to 911.  I have explained discharge medications and the need for follow up with the patient/caretakers. This was also printed in the discharge instructions. Patient was discharged with the following medications and follow up:      Medication List        New Prescriptions      amoxicillin-clavulanate 875-125 MG per tablet  Commonly known as: AUGMENTIN  Take 1 tablet by mouth 2 (Two) Times a Day for 7 days.               Where to Get Your Medications        These medications were sent to [facility]/pharmacy #[id number] - [place], [place] - [address] - [phone] [facility] [phone]  [address] [place] [address]      Hours: 24-hours Phone: [phone]   amoxicillin-clavulanate 875-125 MG per tablet      No follow-up provider specified.     Final diagnoses:   Acute bronchitis, unspecified organism        ED Disposition       ED Disposition   Discharge    Condition   Stable    Comment   --               This medical record created using voice recognition software.             Kingsley Saucedo PA  11/07/24 0007

## 2024-11-07 NOTE — ED TRIAGE NOTES
"Pt to ED from home per [facility] with reports of recent dx of URI and new onset intermittent midsternal stabbing chest pain that radiates to left arm today.  Pt received 324mg ASA during transport per EMS.    Pt states she was seen in ED and dx with \"breathing infection and east infection, you know, infection down there and I'm just getting worse\".      Pt states today she is having increasing cough, congestion, and shortness of breath.  Pt states she does not currently have insurance and was unable to get meds filled.  "

## 2024-11-07 NOTE — DISCHARGE INSTRUCTIONS
Your exam and symptoms are consistent with Acute Bronchitis today. You are being discharged home with Augmentin. Take this medication as prescribed.    Follow up with your PCP in 3-5 days especially if symptoms worsen.    Return to the Emergency Department if you develop any uncontrollable fever, intractable pain, nausea, vomiting.

## 2024-11-11 LAB
BACTERIA SPEC AEROBE CULT: NORMAL
BACTERIA SPEC AEROBE CULT: NORMAL

## 2024-12-18 ENCOUNTER — OFFICE VISIT (OUTPATIENT)
Dept: INTERNAL MEDICINE | Facility: CLINIC | Age: 29
End: 2024-12-18
Payer: COMMERCIAL

## 2024-12-18 VITALS
TEMPERATURE: 97.9 F | HEIGHT: 62 IN | WEIGHT: 293 LBS | DIASTOLIC BLOOD PRESSURE: 78 MMHG | SYSTOLIC BLOOD PRESSURE: 124 MMHG | BODY MASS INDEX: 53.92 KG/M2 | HEART RATE: 108 BPM | OXYGEN SATURATION: 100 % | RESPIRATION RATE: 18 BRPM

## 2024-12-18 DIAGNOSIS — Z76.89 ESTABLISHING CARE WITH NEW DOCTOR, ENCOUNTER FOR: Primary | ICD-10-CM

## 2024-12-18 DIAGNOSIS — E66.01 MORBID OBESITY WITH BMI OF 50.0-59.9, ADULT: ICD-10-CM

## 2024-12-18 DIAGNOSIS — E11.65 UNCONTROLLED TYPE 2 DIABETES MELLITUS WITH HYPERGLYCEMIA: ICD-10-CM

## 2024-12-18 DIAGNOSIS — Z11.59 ENCOUNTER FOR HEPATITIS C SCREENING TEST FOR LOW RISK PATIENT: ICD-10-CM

## 2024-12-18 DIAGNOSIS — J30.9 ALLERGIC RHINITIS, UNSPECIFIED SEASONALITY, UNSPECIFIED TRIGGER: ICD-10-CM

## 2024-12-18 DIAGNOSIS — K21.00 GASTROESOPHAGEAL REFLUX DISEASE WITH ESOPHAGITIS WITHOUT HEMORRHAGE: ICD-10-CM

## 2024-12-18 DIAGNOSIS — F33.41 RECURRENT MAJOR DEPRESSIVE DISORDER, IN PARTIAL REMISSION: ICD-10-CM

## 2024-12-18 DIAGNOSIS — Z00.00 ANNUAL PHYSICAL EXAM: ICD-10-CM

## 2024-12-18 DIAGNOSIS — J45.20 MILD INTERMITTENT ASTHMA WITHOUT COMPLICATION: ICD-10-CM

## 2024-12-18 DIAGNOSIS — B37.31 RECURRENT CANDIDIASIS OF VAGINA: ICD-10-CM

## 2024-12-18 DIAGNOSIS — I10 PRIMARY HYPERTENSION: ICD-10-CM

## 2024-12-18 DIAGNOSIS — F41.1 GENERALIZED ANXIETY DISORDER: ICD-10-CM

## 2024-12-18 PROBLEM — F79 INTELLECTUAL DISABILITY: Status: ACTIVE | Noted: 2023-03-13

## 2024-12-18 PROBLEM — E03.9 HYPOTHYROIDISM: Status: ACTIVE | Noted: 2024-12-18

## 2024-12-18 PROBLEM — E78.5 HYPERLIPIDEMIA: Status: ACTIVE | Noted: 2024-12-18

## 2024-12-18 PROBLEM — F31.30 BIPOLAR DISORDER, MOST RECENT EPISODE DEPRESSED: Status: ACTIVE | Noted: 2023-03-13

## 2024-12-18 LAB
ALBUMIN SERPL-MCNC: 3.8 G/DL (ref 3.5–5.2)
ALBUMIN UR-MCNC: 1.3 MG/DL
ALBUMIN/GLOB SERPL: 1.2 G/DL
ALP SERPL-CCNC: 84 U/L (ref 39–117)
ALT SERPL W P-5'-P-CCNC: 26 U/L (ref 1–33)
ANION GAP SERPL CALCULATED.3IONS-SCNC: 11.1 MMOL/L (ref 5–15)
AST SERPL-CCNC: 25 U/L (ref 1–32)
BASOPHILS # BLD AUTO: 0.05 10*3/MM3 (ref 0–0.2)
BASOPHILS NFR BLD AUTO: 0.6 % (ref 0–1.5)
BILIRUB SERPL-MCNC: <0.2 MG/DL (ref 0–1.2)
BUN SERPL-MCNC: 11 MG/DL (ref 6–20)
BUN/CREAT SERPL: 23.4 (ref 7–25)
CALCIUM SPEC-SCNC: 9.1 MG/DL (ref 8.6–10.5)
CHLORIDE SERPL-SCNC: 98 MMOL/L (ref 98–107)
CHOLEST SERPL-MCNC: 183 MG/DL (ref 0–200)
CO2 SERPL-SCNC: 26.9 MMOL/L (ref 22–29)
CREAT SERPL-MCNC: 0.47 MG/DL (ref 0.57–1)
DEPRECATED RDW RBC AUTO: 39.3 FL (ref 37–54)
EGFRCR SERPLBLD CKD-EPI 2021: 131.5 ML/MIN/1.73
EOSINOPHIL # BLD AUTO: 0.13 10*3/MM3 (ref 0–0.4)
EOSINOPHIL NFR BLD AUTO: 1.6 % (ref 0.3–6.2)
ERYTHROCYTE [DISTWIDTH] IN BLOOD BY AUTOMATED COUNT: 13.4 % (ref 12.3–15.4)
GLOBULIN UR ELPH-MCNC: 3.3 GM/DL
GLUCOSE SERPL-MCNC: 154 MG/DL (ref 65–99)
HBA1C MFR BLD: 10.1 % (ref 4.8–5.6)
HCT VFR BLD AUTO: 41 % (ref 34–46.6)
HCV AB SER QL: NORMAL
HDLC SERPL-MCNC: 31 MG/DL (ref 40–60)
HGB BLD-MCNC: 13.7 G/DL (ref 12–15.9)
IMM GRANULOCYTES # BLD AUTO: 0.09 10*3/MM3 (ref 0–0.05)
IMM GRANULOCYTES NFR BLD AUTO: 1.1 % (ref 0–0.5)
LDLC SERPL CALC-MCNC: 118 MG/DL (ref 0–100)
LDLC/HDLC SERPL: 3.65 {RATIO}
LYMPHOCYTES # BLD AUTO: 2.7 10*3/MM3 (ref 0.7–3.1)
LYMPHOCYTES NFR BLD AUTO: 34.2 % (ref 19.6–45.3)
MCH RBC QN AUTO: 27.1 PG (ref 26.6–33)
MCHC RBC AUTO-ENTMCNC: 33.4 G/DL (ref 31.5–35.7)
MCV RBC AUTO: 81 FL (ref 79–97)
MONOCYTES # BLD AUTO: 0.68 10*3/MM3 (ref 0.1–0.9)
MONOCYTES NFR BLD AUTO: 8.6 % (ref 5–12)
NEUTROPHILS NFR BLD AUTO: 4.24 10*3/MM3 (ref 1.7–7)
NEUTROPHILS NFR BLD AUTO: 53.9 % (ref 42.7–76)
NRBC BLD AUTO-RTO: 0 /100 WBC (ref 0–0.2)
PLATELET # BLD AUTO: 300 10*3/MM3 (ref 140–450)
PMV BLD AUTO: 10.9 FL (ref 6–12)
POTASSIUM SERPL-SCNC: 4.6 MMOL/L (ref 3.5–5.2)
PROT SERPL-MCNC: 7.1 G/DL (ref 6–8.5)
RBC # BLD AUTO: 5.06 10*6/MM3 (ref 3.77–5.28)
SODIUM SERPL-SCNC: 136 MMOL/L (ref 136–145)
TRIGL SERPL-MCNC: 194 MG/DL (ref 0–150)
TSH SERPL DL<=0.05 MIU/L-ACNC: 1.35 UIU/ML (ref 0.27–4.2)
VLDLC SERPL-MCNC: 34 MG/DL (ref 5–40)
WBC NRBC COR # BLD AUTO: 7.89 10*3/MM3 (ref 3.4–10.8)

## 2024-12-18 PROCEDURE — 3074F SYST BP LT 130 MM HG: CPT | Performed by: NURSE PRACTITIONER

## 2024-12-18 PROCEDURE — 90472 IMMUNIZATION ADMIN EACH ADD: CPT | Performed by: NURSE PRACTITIONER

## 2024-12-18 PROCEDURE — 99417 PROLNG OP E/M EACH 15 MIN: CPT | Performed by: NURSE PRACTITIONER

## 2024-12-18 PROCEDURE — 90480 ADMN SARSCOV2 VAC 1/ONLY CMP: CPT | Performed by: NURSE PRACTITIONER

## 2024-12-18 PROCEDURE — 90656 IIV3 VACC NO PRSV 0.5 ML IM: CPT | Performed by: NURSE PRACTITIONER

## 2024-12-18 PROCEDURE — 85025 COMPLETE CBC W/AUTO DIFF WBC: CPT | Performed by: NURSE PRACTITIONER

## 2024-12-18 PROCEDURE — 99215 OFFICE O/P EST HI 40 MIN: CPT | Performed by: NURSE PRACTITIONER

## 2024-12-18 PROCEDURE — 86803 HEPATITIS C AB TEST: CPT | Performed by: NURSE PRACTITIONER

## 2024-12-18 PROCEDURE — 1126F AMNT PAIN NOTED NONE PRSNT: CPT | Performed by: NURSE PRACTITIONER

## 2024-12-18 PROCEDURE — 80061 LIPID PANEL: CPT | Performed by: NURSE PRACTITIONER

## 2024-12-18 PROCEDURE — 80053 COMPREHEN METABOLIC PANEL: CPT | Performed by: NURSE PRACTITIONER

## 2024-12-18 PROCEDURE — 83036 HEMOGLOBIN GLYCOSYLATED A1C: CPT | Performed by: NURSE PRACTITIONER

## 2024-12-18 PROCEDURE — 90471 IMMUNIZATION ADMIN: CPT | Performed by: NURSE PRACTITIONER

## 2024-12-18 PROCEDURE — 82043 UR ALBUMIN QUANTITATIVE: CPT | Performed by: NURSE PRACTITIONER

## 2024-12-18 PROCEDURE — 3078F DIAST BP <80 MM HG: CPT | Performed by: NURSE PRACTITIONER

## 2024-12-18 PROCEDURE — 3046F HEMOGLOBIN A1C LEVEL >9.0%: CPT | Performed by: NURSE PRACTITIONER

## 2024-12-18 PROCEDURE — 90677 PCV20 VACCINE IM: CPT | Performed by: NURSE PRACTITIONER

## 2024-12-18 PROCEDURE — 91320 SARSCV2 VAC 30MCG TRS-SUC IM: CPT | Performed by: NURSE PRACTITIONER

## 2024-12-18 PROCEDURE — 84443 ASSAY THYROID STIM HORMONE: CPT | Performed by: NURSE PRACTITIONER

## 2024-12-18 RX ORDER — PANTOPRAZOLE SODIUM 40 MG/1
40 TABLET, DELAYED RELEASE ORAL DAILY
Qty: 90 TABLET | Refills: 0 | Status: SHIPPED | OUTPATIENT
Start: 2024-12-18

## 2024-12-18 RX ORDER — SERTRALINE HYDROCHLORIDE 25 MG/1
25 TABLET, FILM COATED ORAL DAILY
Qty: 90 TABLET | Refills: 0 | Status: SHIPPED | OUTPATIENT
Start: 2024-12-18

## 2024-12-18 RX ORDER — CETIRIZINE HYDROCHLORIDE 10 MG/1
10 TABLET ORAL DAILY
Qty: 90 TABLET | Refills: 3 | Status: SHIPPED | OUTPATIENT
Start: 2024-12-18

## 2024-12-18 RX ORDER — LISINOPRIL 10 MG/1
10 TABLET ORAL DAILY
Qty: 90 TABLET | Refills: 0 | Status: SHIPPED | OUTPATIENT
Start: 2024-12-18

## 2024-12-18 RX ORDER — FLUCONAZOLE 150 MG/1
TABLET ORAL
Qty: 3 TABLET | Refills: 0 | Status: SHIPPED | OUTPATIENT
Start: 2024-12-18 | End: 2025-03-27

## 2024-12-18 RX ORDER — MONTELUKAST SODIUM 10 MG/1
10 TABLET ORAL NIGHTLY
Qty: 90 TABLET | Refills: 1 | Status: SHIPPED | OUTPATIENT
Start: 2024-12-18

## 2024-12-18 NOTE — LETTER
[facility]  Vaccine Consent Form    Patient Name:  Dorinda Sherwood  Patient :  1995     Vaccine(s) Ordered    Fluzone >6mos  Pneumococcal Conjugate Vaccine 20-Valent (PCV20)  COVID-19 (Pfizer) 12yrs+ (COMIRNATY)        Screening Checklist  The following questions should be completed prior to vaccination. If you answer “yes” to any question, it does not necessarily mean you should not be vaccinated. It just means we may need to clarify or ask more questions. If a question is unclear, please ask your healthcare provider to explain it.    Yes No   Any fever or moderate to severe illness today (mild illness and/or antibiotic treatment are not contraindications)?     Do you have a history of a serious reaction to any previous vaccinations, such as anaphylaxis, encephalopathy within 7 days, Guillain-[place] syndrome within 6 weeks, seizure?     Have you received any live vaccine(s) (e.g MMR, BLAS) or any other vaccines in the last month (to ensure duplicate doses aren't given)?     Do you have an anaphylactic allergy to latex (DTaP, DTaP-IPV, Hep A, Hep B, MenB, RV, Td, Tdap), baker’s yeast (Hep B, HPV), polysorbates (RSV, nirsevimab, PCV 20, Rotavirrus, Tdap, Shingrix), or gelatin (BLAS, MMR)?     Do you have an anaphylactic allergy to neomycin (Rabies, BLAS, MMR, IPV, Hep A), polymyxin B (IPV), or streptomycin (IPV)?      Any cancer, leukemia, AIDS, or other immune system disorder? (BLAS, MMR, RV)     Do you have a parent, brother, or sister with an immune system problem (if immune competence of vaccine recipient clinically verified, can proceed)? (MMR, BLAS)     Any recent steroid treatments for >2 weeks, chemotherapy, or radiation treatment? (BLAS, MMR)     Have you received antibody-containing blood transfusions or IVIG in the past 11 months (recommended interval is dependent on product)? (MMR, BLAS)     Have you taken antiviral drugs (acyclovir, famciclovir, valacyclovir for BLAS) in the last 24 or 48 hours,  "respectively?      Are you pregnant or planning to become pregnant within 1 month? (BLAS, MMR, HPV, IPV, MenB, Abrexvy; For Hep B- refer to Engerix-B; For RSV - Abrysvo is indicated for 32-36 weeks of pregnancy from September to January)     For infants, have you ever been told your child has had intussusception or a medical emergency involving obstruction of the intestine (Rotavirus)? If not for an infant, can skip this question.         *Ordering Physicians/APC should be consulted if \"yes\" is checked by the patient or guardian above.  I have received, read, and understand the Vaccine Information Statement (VIS) for each vaccine ordered.  I have considered my or my child's health status as well as the health status of my close contacts.  I have taken the opportunity to discuss my vaccine questions with my or my child's health care provider.   I have requested that the ordered vaccine(s) be given to me or my child.  I understand the benefits and risks of the vaccines.  I understand that I should remain in the clinic for 15 minutes after receiving the vaccine(s).  _________________________________________________________  Signature of Patient or Parent/Legal Guardian ____________________  Date   "

## 2024-12-18 NOTE — PROGRESS NOTES
Chief Complaint  Establish Care (Establish care with new provider ), Vaginitis (Yeast infection on legs ), and Pneumonia    Subjective        Dorinda Sherwood presents to [facility] [place] for establishment of care, follow-up.    Patient was previously seen in our office by one of my colleagues, she did transfer care to another office for the last couple of years, is wanting to transfer back to our office and see a new provider.  Patient does reports she lost her insurance, she has recently obtained new insurance, so needing to get started back on her previous medications, which she has been out of for several months now.    Chart review completed, which includes labs, previous visit notes, medication reconciliation.  Patient with uncontrolled type 2 diabetes, last A1c was greater than 10, her latest metabolic panel done in early November had a glucose level of greater then 300.  She does not do any testing regularly.  She previously had a Dexcom, she was on an insulin pump as well.  She was seen by a diabetes care doctor in [place], but has not been seen in several months.  Even prior to that, it does not appear she was well-controlled at any point in time.  She was on metformin, which we will restart.  She suffers from depression, anxiety, she was previously on Zoloft, she would like to get that restarted.  She has battled with her weight, she was doing better when she was getting treatment, but weight has gone back up.  She she has not had a full panel of labs since springtime.  She does suffer from GERD, is on a PPI, she would like to get that restarted.  Her blood pressure has been elevated in the past, they were getting ready to start her on medication before she stopped following up.  She is also concerned about recurrent vaginal candidiasis, she has been treated several times over the last few months.  She has not had any success using the 1-2 dose recommendations of Diflucan.  The  "rash continues.  She is concerned that her diabetes is contributing to that.    Objective   Vital Signs:   /78 (BP Location: Left arm, Patient Position: Sitting, Cuff Size: Large Adult)   Pulse 108   Temp 97.9 °F (36.6 °C) (Temporal)   Resp 18   Ht 157.5 cm (62.01\")   Wt (!) 145 kg (319 lb 12.8 oz)   SpO2 100%   BMI 58.48 kg/m²     Physical Exam  Vitals and nursing note reviewed.   Constitutional:       Appearance: Normal appearance. She is obese.   Cardiovascular:      Rate and Rhythm: Normal rate.   Pulmonary:      Effort: Pulmonary effort is normal.   Neurological:      Mental Status: She is alert.   Psychiatric:         Mood and Affect: Mood normal.         Thought Content: Thought content normal.        Result Review :  {The following data was reviewed by YOKO Collier on 12/18/24                Diagnoses and all orders for this visit:    1. Establishing care with new doctor, encounter for (Primary)    2. Annual physical exam  Assessment & Plan:  Screening labs reviewed/ordered  Counseling provided regarding age appropriate screenings and immunizations, healthy diet and exercise.       Orders:  -     CBC & Differential  -     Comprehensive Metabolic Panel  -     Lipid Panel  -     TSH    3. Uncontrolled type 2 diabetes mellitus with hyperglycemia  -     metFORMIN (GLUCOPHAGE) 500 MG tablet; Take 1 tablet by mouth Daily With Breakfast for 7 days, THEN 1 tablet 2 (Two) Times a Day With Meals for 7 days, THEN 2 tablets 2 (Two) Times a Day With Meals for 90 days.  Dispense: 381 tablet; Refill: 0  -     CBC & Differential  -     Comprehensive Metabolic Panel  -     MicroAlbumin, Urine, Random - Urine, Random Void  -     Hemoglobin A1c  -     Fluzone >6mos  -     Pneumococcal Conjugate Vaccine 20-Valent (PCV20)  -     COVID-19 (Pfizer) 12yrs+ (COMIRNATY)  -     Ambulatory Referral to [facility] - [facility]    4. Morbid obesity with BMI of 50.0-59.9, adult  -     CBC & Differential  -     " Comprehensive Metabolic Panel  -     Lipid Panel  -     TSH  -     Hemoglobin A1c    5. Recurrent candidiasis of vagina  -     fluconazole (Diflucan) 150 MG tablet; Take 1 tablet by mouth Every 72 (Seventy-Two) Hours for 9 days, THEN 1 tablet 1 (One) Time Per Week for 90 days.  Dispense: 3 tablet; Refill: 0    6. Allergic rhinitis, unspecified seasonality, unspecified trigger  -     cetirizine (zyrTEC) 10 MG tablet; Take 1 tablet by mouth Daily.  Dispense: 90 tablet; Refill: 3  -     montelukast (Singulair) 10 MG tablet; Take 1 tablet by mouth Every Night.  Dispense: 90 tablet; Refill: 1    7. Mild intermittent asthma without complication    8. Recurrent major depressive disorder, in partial remission  -     sertraline (Zoloft) 25 MG tablet; Take 1 tablet by mouth Daily.  Dispense: 90 tablet; Refill: 0    9. Generalized anxiety disorder  -     sertraline (Zoloft) 25 MG tablet; Take 1 tablet by mouth Daily.  Dispense: 90 tablet; Refill: 0    10. Primary hypertension  -     lisinopril (PRINIVIL,ZESTRIL) 10 MG tablet; Take 1 tablet by mouth Daily.  Dispense: 90 tablet; Refill: 0  -     CBC & Differential  -     Comprehensive Metabolic Panel    11. Gastroesophageal reflux disease with esophagitis without hemorrhage  -     pantoprazole (PROTONIX) 40 MG EC tablet; Take 1 tablet by mouth Daily.  Dispense: 90 tablet; Refill: 0    12. Encounter for hepatitis C screening test for low risk patient  -     Hepatitis C Antibody    Patient has not had good follow-up over the last several months, we did discuss the importance of regular follow-up to maintain her medications and her care.  She would like to reestablish with a diabetes care provider, we will work on getting her referred to our diabetes care provider in our office that is here 1 day a week.  I will go ahead and restart her metformin, discussed appropriate dosing and increasing, which instructions were provided.  Will restart her acid reflux medication, previously on  omeprazole, which was not effective, will try pantoprazole.  We discussed her blood pressure, just mildly elevated, however with her diabetes, lisinopril would also be beneficial.  She has not been on a statin, but we will obtain labs and see if that is something that would also be recommended.  ADA recommendations are statin use.  Will restart her anxiety and depression medication, she reports overall doing decent, but definitely wants to get restarted.   She reports a history of bipolar disease, she has a mental health provider, cannot recall the name, but is planning on reestablishing.  She suffers from allergies, asthma, she would like to restart her Zyrtec and Singulair, prescription sent.  She has inhaler to use as needed.  Would strongly recommend diet, exercise as tolerated.  Weight loss would be very beneficial I think we should entertain a GLP-1 after restarting her metformin, which could help with both.  We will plan for follow-up in 90 days    I spent greater than 60 minutes caring for Dorinda on this date of service. This time includes time spent by me in the following activities:preparing for the visit, reviewing tests, obtaining and/or reviewing a separately obtained history, performing a medically appropriate examination and/or evaluation , counseling and educating the patient/family/caregiver, ordering medications, tests, or procedures, referring and communicating with other health care professionals , and documenting information in the medical record  Follow Up   Return in about 3 months (around 3/18/2025) for Recheck.  Patient was given instructions and counseling regarding her condition or for health maintenance advice. Please see specific information pulled into the AVS if appropriate.     YOKO Collier  12/18/2024  This note was electronically signed.

## 2025-01-15 ENCOUNTER — APPOINTMENT (OUTPATIENT)
Dept: GENERAL RADIOLOGY | Facility: HOSPITAL | Age: 30
End: 2025-01-15
Payer: COMMERCIAL

## 2025-01-15 ENCOUNTER — HOSPITAL ENCOUNTER (EMERGENCY)
Facility: HOSPITAL | Age: 30
Discharge: HOME | End: 2025-01-15
Attending: EMERGENCY MEDICINE
Payer: COMMERCIAL

## 2025-01-15 VITALS
HEIGHT: 62 IN | WEIGHT: 293 LBS | RESPIRATION RATE: 18 BRPM | TEMPERATURE: 98.1 F | OXYGEN SATURATION: 99 % | DIASTOLIC BLOOD PRESSURE: 82 MMHG | HEART RATE: 85 BPM | BODY MASS INDEX: 53.92 KG/M2 | SYSTOLIC BLOOD PRESSURE: 143 MMHG

## 2025-01-15 DIAGNOSIS — S93.402A SPRAIN OF LEFT ANKLE, UNSPECIFIED LIGAMENT, INITIAL ENCOUNTER: Primary | ICD-10-CM

## 2025-01-15 PROCEDURE — 96372 THER/PROPH/DIAG INJ SC/IM: CPT

## 2025-01-15 PROCEDURE — 99283 EMERGENCY DEPT VISIT LOW MDM: CPT

## 2025-01-15 PROCEDURE — 25010000002 KETOROLAC TROMETHAMINE PER 15 MG

## 2025-01-15 PROCEDURE — 73610 X-RAY EXAM OF ANKLE: CPT

## 2025-01-15 RX ORDER — KETOROLAC TROMETHAMINE 30 MG/ML
30 INJECTION, SOLUTION INTRAMUSCULAR; INTRAVENOUS ONCE
Status: COMPLETED | OUTPATIENT
Start: 2025-01-15 | End: 2025-01-15

## 2025-01-15 RX ORDER — IBUPROFEN 800 MG/1
800 TABLET, FILM COATED ORAL EVERY 8 HOURS PRN
Qty: 15 TABLET | Refills: 0 | Status: SHIPPED | OUTPATIENT
Start: 2025-01-15 | End: 2025-01-20

## 2025-01-15 RX ADMIN — KETOROLAC TROMETHAMINE 30 MG: 30 INJECTION, SOLUTION INTRAMUSCULAR; INTRAVENOUS at 19:43

## 2025-01-16 ENCOUNTER — TELEPHONE (OUTPATIENT)
Dept: INTERNAL MEDICINE | Facility: CLINIC | Age: 30
End: 2025-01-16

## 2025-01-16 NOTE — TELEPHONE ENCOUNTER
Called patient to follow up on referral to [facility]. Left voicemail for patient to call office back.

## 2025-01-16 NOTE — ED PROVIDER NOTES
Time: 8:17 PM EST  Date of encounter:  1/15/2025  Independent Historian/Clinical History and Information was obtained by:   Patient    History is limited by: N/A    Chief Complaint: Left ankle pain      History of Present Illness:  Patient is a 29 y.o. year old female who presents to the emergency department for evaluation of left ankle pain that began yesterday after slipping on the ice and falling.  Patient states she went to urgent care and right cleft and had an x-ray performed and was told she had a fracture and they placed her in a boot.  Patient states her PCP states that she should have been splinted.  Patient denies numbness/tingling in toes.      Patient Care Team  Primary Care Provider: Abel Moreno APRN    Past Medical History:     Allergies   Allergen Reactions    Tramadol-Acetaminophen Itching     Past Medical History:   Diagnosis Date    Anesthesia     slow to wake up postop, anxiety postop    Anxiety     AR (allergic rhinitis)     ASD (atrial septal defect)     had since birth- asymptomatic- see's dr falk     Asthma     no inhalers    Bipolar disorder     Colon polyp     Depression     Diabetes mellitus     type ii- bg runs around 200-300    Gallstones currently    GERD (gastroesophageal reflux disease)     Hypertension     Migraine     Ovarian cyst     PTSD (post-traumatic stress disorder)     Trauma      Past Surgical History:   Procedure Laterality Date     SECTION      CHOLECYSTECTOMY N/A 2021    Procedure: CHOLECYSTECTOMY LAPAROSCOPIC;  Surgeon: Robert Araya MD;  Location: [facility] OR [facility] [place];  Service: General;  Laterality: N/A;    COLONOSCOPY  2017    COLONOSCOPY N/A 2024    Procedure: COLONOSCOPY;  Surgeon: Chance Campbell MD;  Location: [facility] ENDOSCOPY;  Service: Gastroenterology;  Laterality: N/A;  ANAL FISSURE    ENDOSCOPY      ENDOSCOPY N/A 2024    Procedure: ESOPHAGOGASTRODUODENOSCOPY WITH BIOPSIES;  Surgeon: Chance Campbell MD;   Location: [facility] ENDOSCOPY;  Service: Gastroenterology;  Laterality: N/A;  SMALL HIATAL HERNIA  ESOPHAGITIS    TUBAL ABDOMINAL LIGATION      WISDOM TOOTH EXTRACTION       Family History   Problem Relation Age of Onset    Heart disease Father     Heart disease Other     Heart disease Other     Heart disease Other     Diabetes type II Other     Colon cancer Neg Hx        Home Medications:  Prior to Admission medications    Medication Sig Start Date End Date Taking? Authorizing Provider   cetirizine (zyrTEC) 10 MG tablet Take 1 tablet by mouth Daily. 12/18/24   Abel Moreno APRN   fluconazole (Diflucan) 150 MG tablet Take 1 tablet by mouth Every 72 (Seventy-Two) Hours for 9 days, THEN 1 tablet 1 (One) Time Per Week for 90 days. 12/18/24 3/27/25  Abel Moreno APRN   lisinopril (PRINIVIL,ZESTRIL) 10 MG tablet Take 1 tablet by mouth Daily. 12/18/24   Abel Moreno APRN   metFORMIN (GLUCOPHAGE) 500 MG tablet Take 1 tablet by mouth Daily With Breakfast for 7 days, THEN 1 tablet 2 (Two) Times a Day With Meals for 7 days, THEN 2 tablets 2 (Two) Times a Day With Meals for 90 days. 12/18/24 4/1/25  Abel Moreno APRN   montelukast (Singulair) 10 MG tablet Take 1 tablet by mouth Every Night. 12/18/24   Abel Moreno APRN   pantoprazole (PROTONIX) 40 MG EC tablet Take 1 tablet by mouth Daily. 12/18/24   Abel Moreno APRN   sertraline (Zoloft) 25 MG tablet Take 1 tablet by mouth Daily. 12/18/24   Abel Moreno APRN        Social History:   Social History     Tobacco Use    Smoking status: Never     Passive exposure: Never    Smokeless tobacco: Never   Vaping Use    Vaping status: Never Used   Substance Use Topics    Alcohol use: Never    Drug use: Never         Review of Systems:  Review of Systems   Constitutional:  Negative for chills and fever.   HENT:  Negative for congestion, rhinorrhea and sore throat.    Eyes:  Negative for pain and visual disturbance.   Respiratory:  Negative for apnea, cough, chest tightness and  "shortness of breath.    Cardiovascular:  Negative for chest pain and palpitations.   Gastrointestinal:  Negative for abdominal pain, diarrhea, nausea and vomiting.   Genitourinary:  Negative for difficulty urinating and dysuria.   Musculoskeletal:  Positive for arthralgias and gait problem. Negative for joint swelling and myalgias.   Skin:  Negative for color change.   Neurological:  Negative for seizures and headaches.   Psychiatric/Behavioral: Negative.     All other systems reviewed and are negative.       Physical Exam:  /82   Pulse 85   Temp 98.1 °F (36.7 °C) (Oral)   Resp 18   Ht 157.5 cm (62\")   Wt (!) 145 kg (319 lb 10.7 oz)   LMP 12/13/2024 (Exact Date)   SpO2 99%   BMI 58.47 kg/m²     Physical Exam  Vitals and nursing note reviewed.   Constitutional:       General: She is not in acute distress.     Appearance: Normal appearance. She is not toxic-appearing.   HENT:      Head: Normocephalic and atraumatic.      Jaw: There is normal jaw occlusion.   Eyes:      General: Lids are normal.      Extraocular Movements: Extraocular movements intact.      Conjunctiva/sclera: Conjunctivae normal.      Pupils: Pupils are equal, round, and reactive to light.   Cardiovascular:      Rate and Rhythm: Normal rate and regular rhythm.      Pulses: Normal pulses.      Heart sounds: Normal heart sounds.   Pulmonary:      Effort: Pulmonary effort is normal. No respiratory distress.      Breath sounds: Normal breath sounds. No wheezing or rhonchi.   Abdominal:      General: Abdomen is flat.      Palpations: Abdomen is soft.      Tenderness: There is no abdominal tenderness. There is no guarding or rebound.   Musculoskeletal:         General: Tenderness (Mild appreciated upon palpation to left lateral malleolus) present. Normal range of motion.      Cervical back: Normal range of motion and neck supple.      Right lower leg: No edema.      Left lower leg: No edema.   Skin:     General: Skin is warm and dry. "   Neurological:      Mental Status: She is alert and oriented to person, place, and time. Mental status is at baseline.   Psychiatric:         Mood and Affect: Mood normal.                    Medical Decision Making:      Comorbidities that affect care:    Asthma, diabetes    External Notes reviewed:          The following orders were placed and all results were independently analyzed by me:  Orders Placed This Encounter   Procedures    [facility] 08.  CAM Boot; Yes; Yes; sprain; Yes    XR Ankle 3+ View Left       Medications Given in the Emergency Department:  Medications   ketorolac (TORADOL) injection 30 mg (30 mg Intramuscular Given 1/15/25 1943)        ED Course:         Labs:    Lab Results (last 24 hours)       ** No results found for the last 24 hours. **             Imaging:    XR Ankle 3+ View Left    Result Date: 1/15/2025  XR ANKLE 3+ VW LEFT Date of Exam: 1/15/2025 7:57 PM EST Indication: fall, pain Comparison: None available. Findings: No acute fracture or malalignment.The ankle mortise and syndesmosis appear congruent. Joint spaces appear grossly preserved without significant degenerative change. Calcaneal enthesopathy. No focal soft tissue abnormalities appreciated.     Impression: No acute fracture or malalignment. Electronically Signed: Ikre Phillips  1/15/2025 8:20 PM EST  Workstation ID: [id number]       Differential Diagnosis and Discussion:    Extremity Pain: Differential diagnosis includes but is not limited to soft tissue sprain, tendonitis, tendon injury, dislocation, fracture, deep vein thrombosis, arterial insufficiency, osteoarthritis, bursitis, and ligamentous damage.    PROCEDURES:    X-ray were performed in the emergency department and all X-ray impressions were independently interpreted by me.    No orders to display       Procedures    MDM       X-ray left ankle shows no acute osseous abnormality.  Patient has left ankle sprain.  Patient has a walking boot already, we will  send patient home with crutches.  I instructed patient to return to ED if she develops any new or worsening symptoms.  Otherwise follow-up with PCP.  Patient states she understands and agrees with plan of care.              Patient Care Considerations:          Consultants/Shared Management Plan:    None    Social Determinants of Health:    Patient is independent, reliable, and has access to care.       Disposition and Care Coordination:    Discharged: The patient is suitable and stable for discharge with no need for consideration of admission.    I have explained the patient´s condition, diagnoses and treatment plan based on the information available to me at this time. I have answered questions and addressed any concerns. The patient has a good  understanding of the patient´s diagnosis, condition, and treatment plan as can be expected at this point. The vital signs have been stable. The patient´s condition is stable and appropriate for discharge from the emergency department.      The patient will pursue further outpatient evaluation with the primary care physician or other designated or consulting physician as outlined in the discharge instructions. They are agreeable to this plan of care and follow-up instructions have been explained in detail. The patient has received these instructions in written format and has expressed an understanding of the discharge instructions. The patient is aware that any significant change in condition or worsening of symptoms should prompt an immediate return to this or the closest emergency department or call to 911.  I have explained discharge medications and the need for follow up with the patient/caretakers. This was also printed in the discharge instructions. Patient was discharged with the following medications and follow up:      Medication List        New Prescriptions      ibuprofen 800 MG tablet  Commonly known as: ADVIL,MOTRIN  Take 1 tablet by mouth Every 8 (Eight) Hours  As Needed for Moderate Pain for up to 5 days.               Where to Get Your Medications        These medications were sent to [facility] - [place], [place] - [address] - [phone]  - [phone] FX  [address], [facility] [address]      Phone: [phone]   ibuprofen 800 MG tablet      Abel Moreno APRN  [address]  [address]  [place] [facility] [place] [id number] [phone]    Call in 1 day  To schedule follow-up       Final diagnoses:   Sprain of left ankle, unspecified ligament, initial encounter        ED Disposition       ED Disposition   Discharge    Condition   Stable    Comment   --               This medical record created using voice recognition software.             Fransisco Sexton PA-C  01/15/25 2029     No

## 2025-01-17 ENCOUNTER — OFFICE VISIT (OUTPATIENT)
Dept: INTERNAL MEDICINE | Facility: CLINIC | Age: 30
End: 2025-01-17
Payer: COMMERCIAL

## 2025-01-17 VITALS
BODY MASS INDEX: 53.92 KG/M2 | OXYGEN SATURATION: 98 % | HEART RATE: 90 BPM | DIASTOLIC BLOOD PRESSURE: 80 MMHG | HEIGHT: 62 IN | TEMPERATURE: 97.4 F | WEIGHT: 293 LBS | RESPIRATION RATE: 16 BRPM | SYSTOLIC BLOOD PRESSURE: 130 MMHG

## 2025-01-17 DIAGNOSIS — N89.8 VAGINAL IRRITATION: ICD-10-CM

## 2025-01-17 DIAGNOSIS — S93.402D SPRAIN OF LEFT ANKLE, UNSPECIFIED LIGAMENT, SUBSEQUENT ENCOUNTER: Primary | ICD-10-CM

## 2025-01-17 RX ORDER — INSULIN ASPART 100 [IU]/ML
10 INJECTION, SOLUTION INTRAVENOUS; SUBCUTANEOUS
COMMUNITY
Start: 2025-01-06

## 2025-01-17 RX ORDER — ONDANSETRON 4 MG/1
4 TABLET, ORALLY DISINTEGRATING ORAL EVERY 8 HOURS PRN
COMMUNITY
Start: 2025-01-13

## 2025-01-17 RX ORDER — INSULIN HUMAN 4-8-12(60)
4 KIT INHALATION DAILY
COMMUNITY
Start: 2025-01-04

## 2025-01-17 RX ORDER — DULAGLUTIDE 4.5 MG/.5ML
0.5 INJECTION, SOLUTION SUBCUTANEOUS DAILY
COMMUNITY
Start: 2024-12-21

## 2025-01-17 RX ORDER — FLUCONAZOLE 150 MG/1
150 TABLET ORAL ONCE
Qty: 2 TABLET | Refills: 0 | Status: SHIPPED | OUTPATIENT
Start: 2025-01-17 | End: 2025-01-17

## 2025-01-17 RX ORDER — DAPAGLIFLOZIN 5 MG/1
5 TABLET, FILM COATED ORAL EVERY MORNING
COMMUNITY
Start: 2024-12-21

## 2025-01-17 RX ORDER — NAPROXEN 500 MG/1
500 TABLET ORAL 2 TIMES DAILY WITH MEALS
Qty: 60 TABLET | Refills: 0 | Status: SHIPPED | OUTPATIENT
Start: 2025-01-17

## 2025-01-17 RX ORDER — NYSTATIN 100000 U/G
1 OINTMENT TOPICAL 4 TIMES DAILY
COMMUNITY
Start: 2025-01-13

## 2025-01-17 NOTE — ASSESSMENT & PLAN NOTE
Diflucan to pharmacy. She will return to clinic for repeat testing if symptoms do not improve.

## 2025-01-17 NOTE — PROGRESS NOTES
"Chief Complaint  broken ankle    Subjective      Dorinda Sherwood is a 29 y.o. female who presents to [facility]     Patient states she went to Urgent Care earlier this week after a fall on the ice. She was told she had broken her ankle in three places and was put in a boot. Patient states the boot is too big and the pain became unbearable. She was evaluated in the ER two days ago, given crutches and treated with high dose ibuprofen. Patient states the ibuprofen will help with the pain but makes her sleepy. She is unable to use the crutches because the boot feels too big. She would like a referral to orthopedics.     Patient states she was also diagnosed with a vaginal yeast infection at Urgent Care and treated with a topical ointment. States this never works for her and she always needs the diflucan to clear a yeast infection. She also thinks this is starting to develop under her breasts.     Objective   Vital Signs:   Vitals:    01/17/25 0951   BP: 130/80   BP Location: Right arm   Patient Position: Sitting   Cuff Size: Adult   Pulse: 90   Resp: 16   Temp: 97.4 °F (36.3 °C)   TempSrc: Temporal   SpO2: 98%   Weight: (!) 145 kg (319 lb 12.8 oz)   Height: 157.5 cm (62\")   PainSc: 10-Worst pain ever   PainLoc: Foot     Body mass index is 58.49 kg/m².    Wt Readings from Last 3 Encounters:   01/17/25 (!) 145 kg (319 lb 12.8 oz)   01/15/25 (!) 145 kg (319 lb 10.7 oz)   12/18/24 (!) 145 kg (319 lb 12.8 oz)     BP Readings from Last 3 Encounters:   01/17/25 130/80   01/15/25 143/82   12/18/24 124/78       Health Maintenance   Topic Date Due    Hepatitis B (1 of 3 - 19+ 3-dose series) Never done    PAP SMEAR  03/23/2024    BMI FOLLOWUP  04/08/2025    HEMOGLOBIN A1C  06/18/2025    DIABETIC EYE EXAM  07/15/2025    ANNUAL PHYSICAL  12/18/2025    DIABETIC FOOT EXAM  12/18/2025    LIPID PANEL  12/18/2025    TDAP/TD VACCINES (2 - Td or Tdap) 09/30/2028    HEPATITIS C SCREENING  " Completed    COVID-19 Vaccine  Completed    Pneumococcal Vaccine 0-64  Completed    INFLUENZA VACCINE  Completed    URINE MICROALBUMIN  Discontinued       Physical Exam  Constitutional:       Appearance: Normal appearance.   HENT:      Head: Normocephalic and atraumatic.      Nose: Nose normal.      Mouth/Throat:      Mouth: Mucous membranes are moist.      Pharynx: Oropharynx is clear.   Eyes:      Extraocular Movements: Extraocular movements intact.      Conjunctiva/sclera: Conjunctivae normal.      Pupils: Pupils are equal, round, and reactive to light.   Cardiovascular:      Rate and Rhythm: Normal rate and regular rhythm.      Heart sounds: Normal heart sounds.   Pulmonary:      Effort: Pulmonary effort is normal.      Breath sounds: Normal breath sounds.   Skin:     General: Skin is warm and dry.   Neurological:      General: No focal deficit present.      Mental Status: She is alert and oriented to person, place, and time.   Psychiatric:         Mood and Affect: Mood normal.         Behavior: Behavior normal.         Thought Content: Thought content normal.          Result Review :  The following data was reviewed by: YOKO Thacker on 01/17/2025:         Procedures          Assessment & Plan  Sprain of left ankle, unspecified ligament, subsequent encounter  Reviewed xray with patient that was without evidence of fracture. Will send naproxen for pain control. Encouraged patient to continue to limit weight bearing and the importance of consistency with wearing the boot. Continue ice/heat, elevation. Provided smaller sized boot in clinic today. Referral to orthopedics for further evaluation per patient request. She will call or return to clinic sooner with concerns.   Orders:    Ambulatory Referral to Orthopedic Surgery    Vaginal irritation  Diflucan to pharmacy. She will return to clinic for repeat testing if symptoms do not improve.            FOLLOW UP  Return if symptoms worsen or fail to  improve.  Patient was given instructions and counseling regarding her condition or for health maintenance advice. Please see specific information pulled into the AVS if appropriate.       Miriam Thomas, APRN  01/17/25  12:29 EST    CURRENT & DISCONTINUED MEDICATIONS  Current Outpatient Medications   Medication Instructions    Afrezza 4 Units, Daily    cetirizine (ZYRTEC) 10 mg, Oral, Daily    Farxiga 5 mg, Every Morning    fluconazole (DIFLUCAN) 150 mg, Oral, Once, Repeat in three days if not improved    ibuprofen (ADVIL,MOTRIN) 800 mg, Oral, Every 8 Hours PRN    Insulin Aspart (NOVOLOG) 10 Units, 3 Times Daily Before Meals    lisinopril (PRINIVIL,ZESTRIL) 10 mg, Oral, Daily    metFORMIN (GLUCOPHAGE) 500 MG tablet Take 1 tablet by mouth Daily With Breakfast for 7 days, THEN 1 tablet 2 (Two) Times a Day With Meals for 7 days, THEN 2 tablets 2 (Two) Times a Day With Meals for 90 days.    montelukast (SINGULAIR) 10 mg, Oral, Nightly    naproxen (NAPROSYN) 500 mg, Oral, 2 Times Daily With Meals    nystatin (MYCOSTATIN) 250869 UNIT/GM ointment 1 Application, 4 Times Daily    ondansetron ODT (ZOFRAN-ODT) 4 mg, Every 8 Hours PRN    pantoprazole (PROTONIX) 40 mg, Oral, Daily    sertraline (ZOLOFT) 25 mg, Oral, Daily    Trulicity 4.5 MG/0.5ML solution auto-injector 0.5 mL, Daily       Medications Discontinued During This Encounter   Medication Reason    fluconazole (Diflucan) 150 MG tablet

## 2025-02-11 DIAGNOSIS — F33.41 RECURRENT MAJOR DEPRESSIVE DISORDER, IN PARTIAL REMISSION: ICD-10-CM

## 2025-02-11 DIAGNOSIS — K21.00 GASTROESOPHAGEAL REFLUX DISEASE WITH ESOPHAGITIS WITHOUT HEMORRHAGE: ICD-10-CM

## 2025-02-11 DIAGNOSIS — F41.1 GENERALIZED ANXIETY DISORDER: ICD-10-CM

## 2025-02-11 DIAGNOSIS — I10 PRIMARY HYPERTENSION: ICD-10-CM

## 2025-02-11 RX ORDER — PANTOPRAZOLE SODIUM 40 MG/1
40 TABLET, DELAYED RELEASE ORAL DAILY
Qty: 90 TABLET | Refills: 0 | Status: SHIPPED | OUTPATIENT
Start: 2025-02-11

## 2025-02-11 RX ORDER — SERTRALINE HYDROCHLORIDE 25 MG/1
25 TABLET, FILM COATED ORAL DAILY
Qty: 90 TABLET | Refills: 0 | Status: SHIPPED | OUTPATIENT
Start: 2025-02-11

## 2025-02-12 RX ORDER — LISINOPRIL 10 MG/1
10 TABLET ORAL DAILY
Qty: 30 TABLET | Refills: 0 | Status: SHIPPED | OUTPATIENT
Start: 2025-02-12

## 2025-04-08 ENCOUNTER — NURSE TRIAGE (OUTPATIENT)
Dept: CALL CENTER | Facility: HOSPITAL | Age: 30
End: 2025-04-08
Payer: COMMERCIAL

## 2025-04-08 ENCOUNTER — APPOINTMENT (OUTPATIENT)
Dept: CT IMAGING | Facility: HOSPITAL | Age: 30
End: 2025-04-08
Payer: COMMERCIAL

## 2025-04-08 ENCOUNTER — HOSPITAL ENCOUNTER (OUTPATIENT)
Facility: HOSPITAL | Age: 30
Setting detail: OBSERVATION
LOS: 1 days | Discharge: HOME | End: 2025-04-11
Attending: EMERGENCY MEDICINE | Admitting: STUDENT IN AN ORGANIZED HEALTH CARE EDUCATION/TRAINING PROGRAM
Payer: COMMERCIAL

## 2025-04-08 DIAGNOSIS — R73.9 HYPERGLYCEMIA: Primary | ICD-10-CM

## 2025-04-08 PROBLEM — R55 SYNCOPE: Status: ACTIVE | Noted: 2025-04-08

## 2025-04-08 LAB
ALBUMIN SERPL-MCNC: 3.8 G/DL (ref 3.5–5.2)
ALBUMIN/GLOB SERPL: 1.2 G/DL
ALP SERPL-CCNC: 97 U/L (ref 39–117)
ALT SERPL W P-5'-P-CCNC: 18 U/L (ref 1–33)
ANION GAP SERPL CALCULATED.3IONS-SCNC: 17.5 MMOL/L (ref 5–15)
AST SERPL-CCNC: 15 U/L (ref 1–32)
BACTERIA UR QL AUTO: ABNORMAL /HPF
BASOPHILS # BLD AUTO: 0.05 10*3/MM3 (ref 0–0.2)
BASOPHILS NFR BLD AUTO: 0.5 % (ref 0–1.5)
BILIRUB SERPL-MCNC: <0.2 MG/DL (ref 0–1.2)
BILIRUB UR QL STRIP: NEGATIVE
BUN SERPL-MCNC: 10 MG/DL (ref 6–20)
BUN/CREAT SERPL: 17.5 (ref 7–25)
CALCIUM SPEC-SCNC: 8.9 MG/DL (ref 8.6–10.5)
CHLORIDE SERPL-SCNC: 94 MMOL/L (ref 98–107)
CLARITY UR: CLEAR
CO2 SERPL-SCNC: 21.5 MMOL/L (ref 22–29)
COLOR UR: YELLOW
CREAT SERPL-MCNC: 0.57 MG/DL (ref 0.57–1)
DEPRECATED RDW RBC AUTO: 38.2 FL (ref 37–54)
EGFRCR SERPLBLD CKD-EPI 2021: 125.6 ML/MIN/1.73
EOSINOPHIL # BLD AUTO: 0.12 10*3/MM3 (ref 0–0.4)
EOSINOPHIL NFR BLD AUTO: 1.2 % (ref 0.3–6.2)
ERYTHROCYTE [DISTWIDTH] IN BLOOD BY AUTOMATED COUNT: 12.6 % (ref 12.3–15.4)
GEN 5 1HR TROPONIN T REFLEX: <6 NG/L
GLOBULIN UR ELPH-MCNC: 3.2 GM/DL
GLUCOSE BLDC GLUCOMTR-MCNC: 357 MG/DL (ref 70–99)
GLUCOSE BLDC GLUCOMTR-MCNC: 494 MG/DL (ref 70–99)
GLUCOSE SERPL-MCNC: 478 MG/DL (ref 65–99)
GLUCOSE UR STRIP-MCNC: ABNORMAL MG/DL
HCT VFR BLD AUTO: 42.5 % (ref 34–46.6)
HGB BLD-MCNC: 14.1 G/DL (ref 12–15.9)
HGB UR QL STRIP.AUTO: ABNORMAL
HOLD SPECIMEN: NORMAL
HOLD SPECIMEN: NORMAL
HYALINE CASTS UR QL AUTO: ABNORMAL /LPF
IMM GRANULOCYTES # BLD AUTO: 0.08 10*3/MM3 (ref 0–0.05)
IMM GRANULOCYTES NFR BLD AUTO: 0.8 % (ref 0–0.5)
KETONES UR QL STRIP: ABNORMAL
LEUKOCYTE ESTERASE UR QL STRIP.AUTO: ABNORMAL
LYMPHOCYTES # BLD AUTO: 2.89 10*3/MM3 (ref 0.7–3.1)
LYMPHOCYTES NFR BLD AUTO: 28.9 % (ref 19.6–45.3)
MAGNESIUM SERPL-MCNC: 1.7 MG/DL (ref 1.6–2.6)
MCH RBC QN AUTO: 27.6 PG (ref 26.6–33)
MCHC RBC AUTO-ENTMCNC: 33.2 G/DL (ref 31.5–35.7)
MCV RBC AUTO: 83.3 FL (ref 79–97)
MONOCYTES # BLD AUTO: 0.57 10*3/MM3 (ref 0.1–0.9)
MONOCYTES NFR BLD AUTO: 5.7 % (ref 5–12)
NEUTROPHILS NFR BLD AUTO: 6.3 10*3/MM3 (ref 1.7–7)
NEUTROPHILS NFR BLD AUTO: 62.9 % (ref 42.7–76)
NITRITE UR QL STRIP: NEGATIVE
NRBC BLD AUTO-RTO: 0 /100 WBC (ref 0–0.2)
PH UR STRIP.AUTO: 5.5 [PH] (ref 5–8)
PLATELET # BLD AUTO: 305 10*3/MM3 (ref 140–450)
PMV BLD AUTO: 10.8 FL (ref 6–12)
POTASSIUM SERPL-SCNC: 4 MMOL/L (ref 3.5–5.2)
PROT SERPL-MCNC: 7 G/DL (ref 6–8.5)
PROT UR QL STRIP: NEGATIVE
RBC # BLD AUTO: 5.1 10*6/MM3 (ref 3.77–5.28)
RBC # UR STRIP: ABNORMAL /HPF
REF LAB TEST METHOD: ABNORMAL
SODIUM SERPL-SCNC: 133 MMOL/L (ref 136–145)
SP GR UR STRIP: >1.03 (ref 1–1.03)
SQUAMOUS #/AREA URNS HPF: ABNORMAL /HPF
TROPONIN T NUMERIC DELTA: NORMAL
TROPONIN T SERPL HS-MCNC: <6 NG/L
UROBILINOGEN UR QL STRIP: ABNORMAL
WBC # UR STRIP: ABNORMAL /HPF
WBC NRBC COR # BLD AUTO: 10.01 10*3/MM3 (ref 3.4–10.8)
WHOLE BLOOD HOLD COAG: NORMAL
WHOLE BLOOD HOLD SPECIMEN: NORMAL

## 2025-04-08 PROCEDURE — 36415 COLL VENOUS BLD VENIPUNCTURE: CPT

## 2025-04-08 PROCEDURE — 85025 COMPLETE CBC W/AUTO DIFF WBC: CPT | Performed by: EMERGENCY MEDICINE

## 2025-04-08 PROCEDURE — 80053 COMPREHEN METABOLIC PANEL: CPT | Performed by: EMERGENCY MEDICINE

## 2025-04-08 PROCEDURE — 70450 CT HEAD/BRAIN W/O DYE: CPT

## 2025-04-08 PROCEDURE — 96374 THER/PROPH/DIAG INJ IV PUSH: CPT

## 2025-04-08 PROCEDURE — 81001 URINALYSIS AUTO W/SCOPE: CPT | Performed by: EMERGENCY MEDICINE

## 2025-04-08 PROCEDURE — 25010000002 KETOROLAC TROMETHAMINE PER 15 MG: Performed by: EMERGENCY MEDICINE

## 2025-04-08 PROCEDURE — 25010000002 ONDANSETRON PER 1 MG: Performed by: EMERGENCY MEDICINE

## 2025-04-08 PROCEDURE — 83036 HEMOGLOBIN GLYCOSYLATED A1C: CPT | Performed by: STUDENT IN AN ORGANIZED HEALTH CARE EDUCATION/TRAINING PROGRAM

## 2025-04-08 PROCEDURE — 96375 TX/PRO/DX INJ NEW DRUG ADDON: CPT

## 2025-04-08 PROCEDURE — 99285 EMERGENCY DEPT VISIT HI MDM: CPT

## 2025-04-08 PROCEDURE — 82948 REAGENT STRIP/BLOOD GLUCOSE: CPT | Performed by: STUDENT IN AN ORGANIZED HEALTH CARE EDUCATION/TRAINING PROGRAM

## 2025-04-08 PROCEDURE — 63710000001 INSULIN REGULAR HUMAN PER 5 UNITS: Performed by: EMERGENCY MEDICINE

## 2025-04-08 PROCEDURE — 93005 ELECTROCARDIOGRAM TRACING: CPT | Performed by: EMERGENCY MEDICINE

## 2025-04-08 PROCEDURE — 25810000003 SODIUM CHLORIDE 0.9 % SOLUTION: Performed by: EMERGENCY MEDICINE

## 2025-04-08 PROCEDURE — 83735 ASSAY OF MAGNESIUM: CPT | Performed by: EMERGENCY MEDICINE

## 2025-04-08 PROCEDURE — G0378 HOSPITAL OBSERVATION PER HR: HCPCS

## 2025-04-08 PROCEDURE — 81025 URINE PREGNANCY TEST: CPT | Performed by: INTERNAL MEDICINE

## 2025-04-08 PROCEDURE — 84484 ASSAY OF TROPONIN QUANT: CPT | Performed by: EMERGENCY MEDICINE

## 2025-04-08 PROCEDURE — 82948 REAGENT STRIP/BLOOD GLUCOSE: CPT

## 2025-04-08 PROCEDURE — 99291 CRITICAL CARE FIRST HOUR: CPT

## 2025-04-08 RX ORDER — INSULIN LISPRO 100 [IU]/ML
2-9 INJECTION, SOLUTION INTRAVENOUS; SUBCUTANEOUS
Status: DISCONTINUED | OUTPATIENT
Start: 2025-04-09 | End: 2025-04-09

## 2025-04-08 RX ORDER — ONDANSETRON 2 MG/ML
4 INJECTION INTRAMUSCULAR; INTRAVENOUS ONCE
Status: COMPLETED | OUTPATIENT
Start: 2025-04-08 | End: 2025-04-08

## 2025-04-08 RX ORDER — BISACODYL 5 MG/1
5 TABLET, DELAYED RELEASE ORAL DAILY PRN
Status: DISCONTINUED | OUTPATIENT
Start: 2025-04-08 | End: 2025-04-11 | Stop reason: HOSPADM

## 2025-04-08 RX ORDER — ALBUTEROL SULFATE 0.83 MG/ML
2.5 SOLUTION RESPIRATORY (INHALATION) EVERY 6 HOURS PRN
Status: DISCONTINUED | OUTPATIENT
Start: 2025-04-08 | End: 2025-04-11 | Stop reason: HOSPADM

## 2025-04-08 RX ORDER — DEXTROSE MONOHYDRATE 25 G/50ML
25 INJECTION, SOLUTION INTRAVENOUS
Status: DISCONTINUED | OUTPATIENT
Start: 2025-04-08 | End: 2025-04-11 | Stop reason: HOSPADM

## 2025-04-08 RX ORDER — SODIUM CHLORIDE 0.9 % (FLUSH) 0.9 %
10 SYRINGE (ML) INJECTION EVERY 12 HOURS SCHEDULED
Status: DISCONTINUED | OUTPATIENT
Start: 2025-04-09 | End: 2025-04-11 | Stop reason: HOSPADM

## 2025-04-08 RX ORDER — KETOROLAC TROMETHAMINE 30 MG/ML
30 INJECTION, SOLUTION INTRAMUSCULAR; INTRAVENOUS ONCE
Status: COMPLETED | OUTPATIENT
Start: 2025-04-08 | End: 2025-04-08

## 2025-04-08 RX ORDER — SODIUM CHLORIDE 9 MG/ML
40 INJECTION, SOLUTION INTRAVENOUS AS NEEDED
Status: DISCONTINUED | OUTPATIENT
Start: 2025-04-08 | End: 2025-04-11 | Stop reason: HOSPADM

## 2025-04-08 RX ORDER — AMOXICILLIN 250 MG
2 CAPSULE ORAL 2 TIMES DAILY PRN
Status: DISCONTINUED | OUTPATIENT
Start: 2025-04-08 | End: 2025-04-11 | Stop reason: HOSPADM

## 2025-04-08 RX ORDER — BISACODYL 10 MG
10 SUPPOSITORY, RECTAL RECTAL DAILY PRN
Status: DISCONTINUED | OUTPATIENT
Start: 2025-04-08 | End: 2025-04-11 | Stop reason: HOSPADM

## 2025-04-08 RX ORDER — ALBUTEROL SULFATE 0.83 MG/ML
2.5 SOLUTION RESPIRATORY (INHALATION) EVERY 4 HOURS PRN
COMMUNITY
Start: 2025-03-22

## 2025-04-08 RX ORDER — NITROGLYCERIN 0.4 MG/1
0.4 TABLET SUBLINGUAL
Status: DISCONTINUED | OUTPATIENT
Start: 2025-04-08 | End: 2025-04-11 | Stop reason: HOSPADM

## 2025-04-08 RX ORDER — SODIUM CHLORIDE 0.9 % (FLUSH) 0.9 %
10 SYRINGE (ML) INJECTION AS NEEDED
Status: DISCONTINUED | OUTPATIENT
Start: 2025-04-08 | End: 2025-04-11 | Stop reason: HOSPADM

## 2025-04-08 RX ORDER — ALBUTEROL SULFATE 90 UG/1
2 INHALANT RESPIRATORY (INHALATION) 4 TIMES DAILY PRN
COMMUNITY
Start: 2024-12-14

## 2025-04-08 RX ORDER — NICOTINE POLACRILEX 4 MG
15 LOZENGE BUCCAL
Status: DISCONTINUED | OUTPATIENT
Start: 2025-04-08 | End: 2025-04-11 | Stop reason: HOSPADM

## 2025-04-08 RX ORDER — POLYETHYLENE GLYCOL 3350 17 G/17G
17 POWDER, FOR SOLUTION ORAL DAILY PRN
Status: DISCONTINUED | OUTPATIENT
Start: 2025-04-08 | End: 2025-04-11 | Stop reason: HOSPADM

## 2025-04-08 RX ORDER — HEPARIN SODIUM 5000 [USP'U]/ML
5000 INJECTION, SOLUTION INTRAVENOUS; SUBCUTANEOUS EVERY 8 HOURS SCHEDULED
Status: DISCONTINUED | OUTPATIENT
Start: 2025-04-09 | End: 2025-04-11 | Stop reason: HOSPADM

## 2025-04-08 RX ORDER — IBUPROFEN 600 MG/1
1 TABLET ORAL
Status: DISCONTINUED | OUTPATIENT
Start: 2025-04-08 | End: 2025-04-11 | Stop reason: HOSPADM

## 2025-04-08 RX ORDER — SODIUM CHLORIDE 9 MG/ML
150 INJECTION, SOLUTION INTRAVENOUS CONTINUOUS
Status: ACTIVE | OUTPATIENT
Start: 2025-04-09 | End: 2025-04-09

## 2025-04-08 RX ADMIN — KETOROLAC TROMETHAMINE 30 MG: 30 INJECTION, SOLUTION INTRAMUSCULAR; INTRAVENOUS at 21:27

## 2025-04-08 RX ADMIN — SODIUM CHLORIDE 1000 ML: 9 INJECTION, SOLUTION INTRAVENOUS at 21:27

## 2025-04-08 RX ADMIN — INSULIN HUMAN 14 UNITS: 100 INJECTION, SOLUTION PARENTERAL at 20:53

## 2025-04-08 RX ADMIN — ONDANSETRON 4 MG: 2 INJECTION INTRAMUSCULAR; INTRAVENOUS at 19:05

## 2025-04-08 RX ADMIN — SODIUM CHLORIDE 1000 ML: 9 INJECTION, SOLUTION INTRAVENOUS at 20:42

## 2025-04-08 NOTE — TELEPHONE ENCOUNTER
"Patient states that her BG has been running over 400 and 500s and she passed out already from this. She said that her \"sugar\" doctor wants her to be admitted to [facility], but he is not associated with  and he is not able too get her admitted. She wants to know if her PCP, Abel BABCOCK, can get  [facility] to admit her to the hospital.   Triage completed and I called the patient's PCP office. They said that he is not in the office until 4/21 and the only provider there now is in with patients.   I told her that her PCP is not able to get her admitted and I once again advised her to call 911. She said that that 911 wont get her because she has children. She said that her mom is supposed to be on her way to take her to the ED. She wanted to know if they will admit her. I told her that I cannot say, that will be up to the provider and their assessment.   Reason for Disposition   Blood glucose > 500 mg/dL (27.8 mmol/L)    Additional Information   Negative: Unconscious or difficult to awaken   Negative: Acting confused (e.g., disoriented, slurred speech)   Negative: Very weak (e.g., can't stand)   Negative: Sounds like a life-threatening emergency to the triager   Negative: [1] Vomiting AND [2] signs of dehydration (e.g., very dry mouth, lightheaded, dark urine)   Negative: [1] Blood glucose > 240 mg/dL (13.3 mmol/L) AND [2] rapid breathing    Answer Assessment - Initial Assessment Questions  1. BLOOD GLUCOSE: \"What is your blood glucose level?\"       Her BG has been running over 400-500   2. ONSET: \"When did you check the blood glucose?\"      Several days   3. USUAL RANGE: \"What is your glucose level usually?\" (e.g., usual fasting morning value, usual evening value)      High   4. KETONES: \"Do you check for ketones (urine or blood test strips)?\" If Yes, ask: \"What does the test show now?\"       NA  5. TYPE 1 or 2:  \"Do you know what type of diabetes you have?\"  (e.g., Type 1, Type 2, Gestational; doesn't know)     " "  Type 2  6. INSULIN: \"Do you take insulin?\" \"What type of insulin(s) do you use? What is the mode of delivery? (syringe, pen; injection or pump)?\"       yes  7. DIABETES PILLS: \"Do you take any pills for your diabetes?\" If Yes, ask: \"Have you missed taking any pills recently?\"      NA  8. OTHER SYMPTOMS: \"Do you have any symptoms?\" (e.g., fever, frequent urination, difficulty breathing, dizziness, weakness, vomiting)      Says that she has passed out   9. PREGNANCY: \"Is there any chance you are pregnant?\" \"When was your last menstrual period?\"      No    Protocols used: Diabetes - High Blood Sugar-ADULT-AH    "

## 2025-04-08 NOTE — ED PROVIDER NOTES
Time: 7:16 PM EDT  Date of encounter:  2025  Independent Historian/Clinical History and Information was obtained by:   Patient    History is limited by: N/A    Chief Complaint: Syncope      History of Present Illness:  Patient is a 29 y.o. year old female who presents to the emergency department for evaluation of syncope.  The patient reports that she has been unable to take her diabetes medications.  Patient denies chest pain and shortness of breath.  Patient has no nausea or vomiting.  Patient denies cough and hemoptysis.  Patient has had no dysuria but does report urinary frequency.      Patient Care Team  Primary Care Provider: Abel Moreno APRN    Past Medical History:     Allergies   Allergen Reactions    Tramadol-Acetaminophen Itching     Past Medical History:   Diagnosis Date    Anesthesia     slow to wake up postop, anxiety postop    Anxiety     AR (allergic rhinitis)     ASD (atrial septal defect)     had since birth- asymptomatic- see's dr falk     Asthma     no inhalers    Bipolar disorder     Colon polyp     Depression     Diabetes mellitus     type ii- bg runs around 200-300    Gallstones currently    GERD (gastroesophageal reflux disease)     Hypertension     Migraine     Ovarian cyst     PTSD (post-traumatic stress disorder)     Trauma      Past Surgical History:   Procedure Laterality Date     SECTION      CHOLECYSTECTOMY N/A 2021    Procedure: CHOLECYSTECTOMY LAPAROSCOPIC;  Surgeon: Robert Araya MD;  Location: [facility] OR [facility];  Service: General;  Laterality: N/A;    COLONOSCOPY      COLONOSCOPY N/A 2024    Procedure: COLONOSCOPY;  Surgeon: Chance Campbell MD;  Location: [facility] ENDOSCOPY;  Service: Gastroenterology;  Laterality: N/A;  ANAL FISSURE    ENDOSCOPY      ENDOSCOPY N/A 2024    Procedure: ESOPHAGOGASTRODUODENOSCOPY WITH BIOPSIES;  Surgeon: Chance Campbell MD;  Location: [facility] ENDOSCOPY;  Service: Gastroenterology;  Laterality:  N/A;  SMALL HIATAL HERNIA  ESOPHAGITIS    TUBAL ABDOMINAL LIGATION      WISDOM TOOTH EXTRACTION       Family History   Problem Relation Age of Onset    Heart disease Father     Heart disease Other     Heart disease Other     Heart disease Other     Diabetes type II Other     Colon cancer Neg Hx        Home Medications:  Prior to Admission medications    Medication Sig Start Date End Date Taking? Authorizing Provider   Afrezza 60x4 &60x8 & 60x12 UNIT powder Inhale 4 Units Daily. 1/4/25   Brock Govea MD   cetirizine (zyrTEC) 10 MG tablet Take 1 tablet by mouth Daily. 12/18/24   Abel Moreno APRN   Farxiga 5 MG tablet tablet Take 1 tablet by mouth Every Morning. 12/21/24   Brock Govea MD   Insulin Aspart (novoLOG) 100 UNIT/ML injection Inject 10 Units under the skin into the appropriate area as directed 3 (Three) Times a Day Before Meals. 1/6/25   Brock Govea MD   lisinopril (PRINIVIL,ZESTRIL) 10 MG tablet Take 1 tablet by mouth Daily. 2/12/25   Abel Moreno APRN   metFORMIN (GLUCOPHAGE) 500 MG tablet Take 1 tablet by mouth Daily With Breakfast for 7 days, THEN 1 tablet 2 (Two) Times a Day With Meals for 7 days, THEN 2 tablets 2 (Two) Times a Day With Meals for 90 days. 12/18/24 4/1/25  Abel Moreno APRN   montelukast (Singulair) 10 MG tablet Take 1 tablet by mouth Every Night. 12/18/24   Abel Moreno APRN   naproxen (Naprosyn) 500 MG tablet Take 1 tablet by mouth 2 (Two) Times a Day With Meals. 1/17/25   Miriam Thomas APRN   nystatin (MYCOSTATIN) 374553 UNIT/GM ointment Apply 1 Application topically to the appropriate area as directed 4 (Four) Times a Day. 1/13/25   Brock Govea MD   ondansetron ODT (ZOFRAN-ODT) 4 MG disintegrating tablet Place 1 tablet on the tongue Every 8 (Eight) Hours As Needed for Nausea. 1/13/25   Brock Govea MD   pantoprazole (PROTONIX) 40 MG EC tablet Take 1 tablet by mouth Daily. 2/11/25   Abel Moreno APRN   sertraline (ZOLOFT) 25  "MG tablet Take 1 tablet by mouth Daily. 2/11/25   Josh JEANNIE RomanLUKE Manzoulicity 4.5 MG/0.5ML solution auto-injector Inject 0.5 mL under the skin into the appropriate area as directed Daily. 12/21/24   Provider, MD Brock        Social History:   Social History     Tobacco Use    Smoking status: Never     Passive exposure: Never    Smokeless tobacco: Never   Vaping Use    Vaping status: Never Used   Substance Use Topics    Alcohol use: Never    Drug use: Never         Review of Systems:  Review of Systems   Constitutional:  Negative for chills and fever.   HENT:  Negative for congestion, rhinorrhea and sore throat.    Eyes:  Negative for pain and visual disturbance.   Respiratory:  Negative for apnea, cough, chest tightness and shortness of breath.    Cardiovascular:  Negative for chest pain and palpitations.   Gastrointestinal:  Negative for abdominal pain, diarrhea, nausea and vomiting.   Genitourinary:  Negative for difficulty urinating and dysuria.   Musculoskeletal:  Negative for joint swelling and myalgias.   Skin:  Negative for color change.   Neurological:  Positive for syncope. Negative for seizures and headaches.   Psychiatric/Behavioral: Negative.     All other systems reviewed and are negative.       Physical Exam:  /71 (BP Location: Left arm, Patient Position: Lying)   Pulse 114   Temp 98 °F (36.7 °C) (Oral)   Resp 18   Ht 157.5 cm (62\")   Wt (!) 141 kg (310 lb 10.1 oz)   LMP 03/08/2025 (Approximate)   SpO2 96%   BMI 56.81 kg/m²     Physical Exam  Vitals and nursing note reviewed.   Constitutional:       General: She is not in acute distress.     Appearance: Normal appearance. She is not toxic-appearing.   HENT:      Head: Normocephalic and atraumatic.      Jaw: There is normal jaw occlusion.   Eyes:      General: Lids are normal.      Extraocular Movements: Extraocular movements intact.      Conjunctiva/sclera: Conjunctivae normal.      Pupils: Pupils are equal, round, and reactive " to light.   Cardiovascular:      Rate and Rhythm: Normal rate and regular rhythm.      Pulses: Normal pulses.      Heart sounds: Normal heart sounds.   Pulmonary:      Effort: Pulmonary effort is normal. No respiratory distress.      Breath sounds: Normal breath sounds. No wheezing or rhonchi.   Abdominal:      General: Abdomen is flat.      Palpations: Abdomen is soft.      Tenderness: There is no abdominal tenderness. There is no guarding or rebound.   Musculoskeletal:         General: Normal range of motion.      Cervical back: Normal range of motion and neck supple.      Right lower leg: No edema.      Left lower leg: No edema.   Skin:     General: Skin is warm and dry.   Neurological:      Mental Status: She is alert and oriented to person, place, and time. Mental status is at baseline.   Psychiatric:         Mood and Affect: Mood normal.                    Medical Decision Making:      Comorbidities that affect care:    Diabetes    External Notes reviewed:    Previous ED Note: Patient was last seen in the ED for foot pain.      The following orders were placed and all results were independently analyzed by me:  Orders Placed This Encounter   Procedures    [place] Draw    Comprehensive Metabolic Panel    Magnesium    High Sensitivity Troponin T    CBC Auto Differential    Urinalysis With Microscopic If Indicated (No Culture) - Urine, Clean Catch    Urinalysis, Microscopic Only - Urine, Clean Catch    High Sensitivity Troponin T 1Hr    Hemoglobin A1c    NPO Diet NPO Type: Strict NPO    Undress & Gown    Continuous Pulse Oximetry    Vital Signs    Orthostatic Blood Pressure    Straight Cath    Inpatient Hospitalist Consult    Oxygen Therapy- Nasal Cannula; Titrate 1-6 LPM Per SpO2; 90 - 95%    POC Glucose Once    POC Glucose Once    ECG 12 Lead Syncope    Insert Peripheral IV    Inpatient Admission    CBC & Differential    Green Top (Gel)    Lavender Top    Gold Top - SST    Light Blue Top       Medications Given  in the Emergency Department:  Medications   sodium chloride 0.9 % flush 10 mL (has no administration in time range)   sodium chloride 0.9 % bolus 1,000 mL (1,000 mL Intravenous New Bag 4/8/25 2042)   sodium chloride 0.9 % bolus 1,000 mL (has no administration in time range)   ondansetron (ZOFRAN) injection 4 mg (4 mg Intravenous Given 4/8/25 1905)   insulin regular (humuLIN R,novoLIN R) injection 14 Units (14 Units Intravenous Given 4/8/25 2053)        ED Course:         Labs:    Lab Results (last 24 hours)       Procedure Component Value Units Date/Time    POC Glucose Once [[id number]]  (Abnormal) Collected: 04/08/25 1816    Specimen: Blood Updated: 04/08/25 1818     Glucose 494 mg/dL      Comment: Serial Number: [id number]:  [id number]       CBC & Differential [[id number]]  (Abnormal) Collected: 04/08/25 1853    Specimen: Blood from Arm, Right Updated: 04/08/25 1930    Narrative:      The following orders were created for panel order CBC & Differential.  Procedure                               Abnormality         Status                     ---------                               -----------         ------                     CBC Auto Differential[[id number]]        Abnormal            Final result                 Please view results for these tests on the individual orders.    Comprehensive Metabolic Panel [[id number]]  (Abnormal) Collected: 04/08/25 1853    Specimen: Blood from Arm, Right Updated: 04/08/25 2015     Glucose 478 mg/dL      BUN 10 mg/dL      Creatinine 0.57 mg/dL      Sodium 133 mmol/L      Potassium 4.0 mmol/L      Comment: Slight hemolysis detected by analyzer. Result may be falsely elevated.        Chloride 94 mmol/L      CO2 21.5 mmol/L      Calcium 8.9 mg/dL      Total Protein 7.0 g/dL      Albumin 3.8 g/dL      ALT (SGPT) 18 U/L      AST (SGOT) 15 U/L      Comment: Slight hemolysis detected by analyzer. Result may be falsely elevated.        Alkaline Phosphatase 97 U/L      Total Bilirubin  <0.2 mg/dL      Globulin 3.2 gm/dL      A/G Ratio 1.2 g/dL      BUN/Creatinine Ratio 17.5     Anion Gap 17.5 mmol/L      eGFR 125.6 mL/min/1.73     Narrative:      GFR Categories in Chronic Kidney Disease (CKD)      GFR Category          GFR (mL/min/1.73)    Interpretation  G1                     90 or greater         Normal or high (1)  G2                      60-89                Mild decrease (1)  G3a                   45-59                Mild to moderate decrease  G3b                   30-44                Moderate to severe decrease  G4                    15-29                Severe decrease  G5                    14 or less           Kidney failure          (1)In the absence of evidence of kidney disease, neither GFR category G1 or G2 fulfill the criteria for CKD.    eGFR calculation 2021 CKD-EPI creatinine equation, which does not include race as a factor    Magnesium [[id number]]  (Normal) Collected: 04/08/25 1853    Specimen: Blood from Arm, Right Updated: 04/08/25 2015     Magnesium 1.7 mg/dL     High Sensitivity Troponin T [[id number]]  (Normal) Collected: 04/08/25 1853    Specimen: Blood from Arm, Right Updated: 04/08/25 2001     HS Troponin T <6 ng/L     Narrative:      High Sensitive Troponin T Reference Range:  <14.0 ng/L- Negative Female for AMI  <22.0 ng/L- Negative Male for AMI  >=14 - Abnormal Female indicating possible myocardial injury.  >=22 - Abnormal Male indicating possible myocardial injury.   Clinicians would have to utilize clinical acumen, EKG, Troponin, and serial changes to determine if it is an Acute Myocardial Infarction or myocardial injury due to an underlying chronic condition.         CBC Auto Differential [[id number]]  (Abnormal) Collected: 04/08/25 1853    Specimen: Blood from Arm, Right Updated: 04/08/25 1930     WBC 10.01 10*3/mm3      RBC 5.10 10*6/mm3      Hemoglobin 14.1 g/dL      Hematocrit 42.5 %      MCV 83.3 fL      MCH 27.6 pg      MCHC 33.2 g/dL      RDW 12.6 %       RDW-SD 38.2 fl      MPV 10.8 fL      Platelets 305 10*3/mm3      Neutrophil % 62.9 %      Lymphocyte % 28.9 %      Monocyte % 5.7 %      Eosinophil % 1.2 %      Basophil % 0.5 %      Immature Grans % 0.8 %      Neutrophils, Absolute 6.30 10*3/mm3      Lymphocytes, Absolute 2.89 10*3/mm3      Monocytes, Absolute 0.57 10*3/mm3      Eosinophils, Absolute 0.12 10*3/mm3      Basophils, Absolute 0.05 10*3/mm3      Immature Grans, Absolute 0.08 10*3/mm3      nRBC 0.0 /100 WBC     Hemoglobin A1c [[id number]] Collected: 04/08/25 1853    Specimen: Blood from Arm, Right Updated: 04/08/25 2048    Urinalysis With Microscopic If Indicated (No Culture) - Urine, Clean Catch [[id number]]  (Abnormal) Collected: 04/08/25 1924    Specimen: Urine, Clean Catch Updated: 04/08/25 1933     Color, UA Yellow     Appearance, UA Clear     pH, UA 5.5     Specific Gravity, UA >1.030     Glucose, UA >=1000 mg/dL (3+)     Ketones, UA 15 mg/dL (1+)     Bilirubin, UA Negative     Blood, UA Trace     Protein, UA Negative     Leuk Esterase, UA Small (1+)     Nitrite, UA Negative     Urobilinogen, UA 0.2 E.U./dL    Urinalysis, Microscopic Only - Urine, Clean Catch [[id number]]  (Abnormal) Collected: 04/08/25 1924    Specimen: Urine, Clean Catch Updated: 04/08/25 1933     RBC, UA 0-2 /HPF      WBC, UA 11-20 /HPF      Bacteria, UA Trace /HPF      Squamous Epithelial Cells, UA 3-6 /HPF      Hyaline Casts, UA 0-2 /LPF      Methodology Automated Microscopy    High Sensitivity Troponin T 1Hr [[id number]] Collected: 04/08/25 2021    Specimen: Blood Updated: 04/08/25 2051     HS Troponin T <6 ng/L      Troponin T Numeric Delta --     Comment: Unable to calculate.       Narrative:      High Sensitive Troponin T Reference Range:  <14.0 ng/L- Negative Female for AMI  <22.0 ng/L- Negative Male for AMI  >=14 - Abnormal Female indicating possible myocardial injury.  >=22 - Abnormal Male indicating possible myocardial injury.   Clinicians would have to utilize clinical  acumen, EKG, Troponin, and serial changes to determine if it is an Acute Myocardial Infarction or myocardial injury due to an underlying chronic condition.                  Imaging:    No Radiology Exams Resulted Within Past 24 Hours      Differential Diagnosis and Discussion:    Metabolic: Differential diagnosis includes but is not limited to hypertension, hyperglycemia, hyperkalemia, hypocalcemia, metabolic acidosis, hypokalemia, hypoglycemia, malnutrition, hypothyroidism, hyperthyroidism, and adrenal insufficiency.     PROCEDURES:    Labs were collected in the emergency department and all labs were reviewed and interpreted by me.  X-ray were performed in the emergency department and all X-ray impressions were independently interpreted by me.  An EKG was performed and the EKG was interpreted by me.    ECG 12 Lead Syncope   Preliminary Result   HEART LALF=785  bpm   RR Tkdxniac=681  ms   HI Lzaikxkp=747  ms   P Horizontal Axis=8  deg   P Front Axis=69  deg   QRSD Cnkuuvqj=324  ms   QT Zzbrryve=746  ms   ERaQ=107  ms   QRS Axis=65  deg   T Wave Axis=27  deg   - OTHERWISE NORMAL ECG -   Sinus tachycardia   Consider right atrial enlargement   Date and Time of Study:2025-04-08 18:27:46          Procedures    MDM     The patient´s CBC that was reviewed and interpreted by me shows no abnormalities of critical concern. Of note, there is no anemia requiring a blood transfusion and the platelet count is acceptable.  The patient´s CMP that was reviewed and interpretted by me shows no abnormalities of critical concern. Of note, the patient´s sodium and potassium are acceptable. The patient´s liver enzymes are unremarkable. The patient´s renal function (creatinine) is preserved. The patient has a slightly elevated anion gap.  Blood glucose is 478.  Patient was given insulin and fluids in the emergency department.  Patient remained tachycardic.      Total Critical Care time of 40 minutes. Total critical care time documented  does not include time spent on separately billed procedures for services of nurses or physician assistants. I personally saw and examined the patient. I have reviewed all diagnostic interpretations and treatment plans as written. I was present for the key portions of any procedures performed and the inclusive time noted in any critical care statement. Critical care time includes patient management by me, time spent at the patients bedside,  time to review lab and imaging results, discussing patient care, documentation in the medical record, and time spent with family or caregiver.          Patient Care Considerations:    None      Consultants/Shared Management Plan:    Case was discussed with Dr. Dandy who agrees with admission.    Social Determinants of Health:    Patient is independent, reliable, and has access to care.       Disposition and Care Coordination:    Admit:   Through independent evaluation of the patient's history, physical, and imperical data, the patient meets criteria for inpatient admission to the hospital.        Final diagnoses:   Hyperglycemia        ED Disposition       ED Disposition   Decision to Admit    Condition   --    Comment   Level of Care: Remote Telemetry [26]   Diagnosis: Syncope [[id number]]   Certification: I Certify That Inpatient Hospital Services Are Medically Necessary For Greater Than 2 Midnights                 This medical record created using voice recognition software.             Ramirez Coley MD  04/08/25 2100

## 2025-04-09 LAB
ALBUMIN SERPL-MCNC: 3.3 G/DL (ref 3.5–5.2)
ALBUMIN/GLOB SERPL: 1.1 G/DL
ALP SERPL-CCNC: 76 U/L (ref 39–117)
ALT SERPL W P-5'-P-CCNC: 14 U/L (ref 1–33)
ANION GAP SERPL CALCULATED.3IONS-SCNC: 12.9 MMOL/L (ref 5–15)
AST SERPL-CCNC: 11 U/L (ref 1–32)
B-HCG UR QL: NEGATIVE
BASOPHILS # BLD AUTO: 0.07 10*3/MM3 (ref 0–0.2)
BASOPHILS NFR BLD AUTO: 0.7 % (ref 0–1.5)
BILIRUB SERPL-MCNC: <0.2 MG/DL (ref 0–1.2)
BUN SERPL-MCNC: 7 MG/DL (ref 6–20)
BUN/CREAT SERPL: 15.9 (ref 7–25)
CALCIUM SPEC-SCNC: 8.4 MG/DL (ref 8.6–10.5)
CHLORIDE SERPL-SCNC: 101 MMOL/L (ref 98–107)
CO2 SERPL-SCNC: 21.1 MMOL/L (ref 22–29)
CREAT SERPL-MCNC: 0.44 MG/DL (ref 0.57–1)
DEPRECATED RDW RBC AUTO: 38.9 FL (ref 37–54)
EGFRCR SERPLBLD CKD-EPI 2021: 133.6 ML/MIN/1.73
EOSINOPHIL # BLD AUTO: 0.16 10*3/MM3 (ref 0–0.4)
EOSINOPHIL NFR BLD AUTO: 1.7 % (ref 0.3–6.2)
ERYTHROCYTE [DISTWIDTH] IN BLOOD BY AUTOMATED COUNT: 12.5 % (ref 12.3–15.4)
GLOBULIN UR ELPH-MCNC: 2.9 GM/DL
GLUCOSE BLDC GLUCOMTR-MCNC: 229 MG/DL (ref 70–99)
GLUCOSE BLDC GLUCOMTR-MCNC: 231 MG/DL (ref 70–99)
GLUCOSE BLDC GLUCOMTR-MCNC: 232 MG/DL (ref 70–99)
GLUCOSE BLDC GLUCOMTR-MCNC: 237 MG/DL (ref 70–99)
GLUCOSE BLDC GLUCOMTR-MCNC: 259 MG/DL (ref 70–99)
GLUCOSE BLDC GLUCOMTR-MCNC: 282 MG/DL (ref 70–99)
GLUCOSE BLDC GLUCOMTR-MCNC: 299 MG/DL (ref 70–99)
GLUCOSE BLDC GLUCOMTR-MCNC: 306 MG/DL (ref 70–99)
GLUCOSE BLDC GLUCOMTR-MCNC: 315 MG/DL (ref 70–99)
GLUCOSE SERPL-MCNC: 342 MG/DL (ref 65–99)
HBA1C MFR BLD: 11 % (ref 4.8–5.6)
HCT VFR BLD AUTO: 38.7 % (ref 34–46.6)
HGB BLD-MCNC: 12.4 G/DL (ref 12–15.9)
IMM GRANULOCYTES # BLD AUTO: 0.06 10*3/MM3 (ref 0–0.05)
IMM GRANULOCYTES NFR BLD AUTO: 0.6 % (ref 0–0.5)
LYMPHOCYTES # BLD AUTO: 3.92 10*3/MM3 (ref 0.7–3.1)
LYMPHOCYTES NFR BLD AUTO: 41.8 % (ref 19.6–45.3)
MAGNESIUM SERPL-MCNC: 1.8 MG/DL (ref 1.6–2.6)
MCH RBC QN AUTO: 27.5 PG (ref 26.6–33)
MCHC RBC AUTO-ENTMCNC: 32 G/DL (ref 31.5–35.7)
MCV RBC AUTO: 85.8 FL (ref 79–97)
MONOCYTES # BLD AUTO: 0.69 10*3/MM3 (ref 0.1–0.9)
MONOCYTES NFR BLD AUTO: 7.4 % (ref 5–12)
NEUTROPHILS NFR BLD AUTO: 4.47 10*3/MM3 (ref 1.7–7)
NEUTROPHILS NFR BLD AUTO: 47.8 % (ref 42.7–76)
NRBC BLD AUTO-RTO: 0 /100 WBC (ref 0–0.2)
PHOSPHATE SERPL-MCNC: 4.1 MG/DL (ref 2.5–4.5)
PLATELET # BLD AUTO: 246 10*3/MM3 (ref 140–450)
PMV BLD AUTO: 10.5 FL (ref 6–12)
POTASSIUM SERPL-SCNC: 3.9 MMOL/L (ref 3.5–5.2)
PROT SERPL-MCNC: 6.2 G/DL (ref 6–8.5)
RBC # BLD AUTO: 4.51 10*6/MM3 (ref 3.77–5.28)
SODIUM SERPL-SCNC: 135 MMOL/L (ref 136–145)
WBC NRBC COR # BLD AUTO: 9.37 10*3/MM3 (ref 3.4–10.8)

## 2025-04-09 PROCEDURE — 80053 COMPREHEN METABOLIC PANEL: CPT | Performed by: STUDENT IN AN ORGANIZED HEALTH CARE EDUCATION/TRAINING PROGRAM

## 2025-04-09 PROCEDURE — 25010000002 DIPHENHYDRAMINE PER 50 MG: Performed by: PHYSICIAN ASSISTANT

## 2025-04-09 PROCEDURE — 96372 THER/PROPH/DIAG INJ SC/IM: CPT

## 2025-04-09 PROCEDURE — 82948 REAGENT STRIP/BLOOD GLUCOSE: CPT

## 2025-04-09 PROCEDURE — 96376 TX/PRO/DX INJ SAME DRUG ADON: CPT

## 2025-04-09 PROCEDURE — 25010000002 ONDANSETRON PER 1 MG: Performed by: PHYSICIAN ASSISTANT

## 2025-04-09 PROCEDURE — 25010000002 ACETAMINOPHEN 10 MG/ML SOLUTION: Performed by: PHYSICIAN ASSISTANT

## 2025-04-09 PROCEDURE — 63710000001 INSULIN LISPRO (HUMAN) PER 5 UNITS: Performed by: STUDENT IN AN ORGANIZED HEALTH CARE EDUCATION/TRAINING PROGRAM

## 2025-04-09 PROCEDURE — 25810000003 SODIUM CHLORIDE 0.9 % SOLUTION: Performed by: STUDENT IN AN ORGANIZED HEALTH CARE EDUCATION/TRAINING PROGRAM

## 2025-04-09 PROCEDURE — 63710000001 INSULIN GLARGINE PER 5 UNITS: Performed by: STUDENT IN AN ORGANIZED HEALTH CARE EDUCATION/TRAINING PROGRAM

## 2025-04-09 PROCEDURE — 84100 ASSAY OF PHOSPHORUS: CPT | Performed by: STUDENT IN AN ORGANIZED HEALTH CARE EDUCATION/TRAINING PROGRAM

## 2025-04-09 PROCEDURE — 25010000002 HEPARIN (PORCINE) PER 1000 UNITS: Performed by: STUDENT IN AN ORGANIZED HEALTH CARE EDUCATION/TRAINING PROGRAM

## 2025-04-09 PROCEDURE — 83735 ASSAY OF MAGNESIUM: CPT | Performed by: STUDENT IN AN ORGANIZED HEALTH CARE EDUCATION/TRAINING PROGRAM

## 2025-04-09 PROCEDURE — 25010000002 METOCLOPRAMIDE PER 10 MG: Performed by: PHYSICIAN ASSISTANT

## 2025-04-09 PROCEDURE — 85025 COMPLETE CBC W/AUTO DIFF WBC: CPT | Performed by: STUDENT IN AN ORGANIZED HEALTH CARE EDUCATION/TRAINING PROGRAM

## 2025-04-09 PROCEDURE — G0378 HOSPITAL OBSERVATION PER HR: HCPCS

## 2025-04-09 PROCEDURE — 82948 REAGENT STRIP/BLOOD GLUCOSE: CPT | Performed by: STUDENT IN AN ORGANIZED HEALTH CARE EDUCATION/TRAINING PROGRAM

## 2025-04-09 PROCEDURE — 96375 TX/PRO/DX INJ NEW DRUG ADDON: CPT

## 2025-04-09 RX ORDER — ONDANSETRON 2 MG/ML
4 INJECTION INTRAMUSCULAR; INTRAVENOUS EVERY 4 HOURS PRN
Status: DISCONTINUED | OUTPATIENT
Start: 2025-04-09 | End: 2025-04-11 | Stop reason: HOSPADM

## 2025-04-09 RX ORDER — INSULIN LISPRO 100 [IU]/ML
2-9 INJECTION, SOLUTION INTRAVENOUS; SUBCUTANEOUS
Status: DISCONTINUED | OUTPATIENT
Start: 2025-04-09 | End: 2025-04-11 | Stop reason: HOSPADM

## 2025-04-09 RX ORDER — ACETAMINOPHEN 10 MG/ML
1000 INJECTION, SOLUTION INTRAVENOUS ONCE
Status: COMPLETED | OUTPATIENT
Start: 2025-04-09 | End: 2025-04-09

## 2025-04-09 RX ORDER — BUTALBITAL, ACETAMINOPHEN AND CAFFEINE 50; 325; 40 MG/1; MG/1; MG/1
1 TABLET ORAL EVERY 4 HOURS PRN
Status: DISCONTINUED | OUTPATIENT
Start: 2025-04-09 | End: 2025-04-11 | Stop reason: HOSPADM

## 2025-04-09 RX ORDER — DIPHENHYDRAMINE HYDROCHLORIDE 50 MG/ML
25 INJECTION, SOLUTION INTRAMUSCULAR; INTRAVENOUS ONCE
Status: COMPLETED | OUTPATIENT
Start: 2025-04-09 | End: 2025-04-09

## 2025-04-09 RX ORDER — METOCLOPRAMIDE HYDROCHLORIDE 5 MG/ML
5 INJECTION INTRAMUSCULAR; INTRAVENOUS ONCE
Status: COMPLETED | OUTPATIENT
Start: 2025-04-09 | End: 2025-04-09

## 2025-04-09 RX ORDER — OXYCODONE HYDROCHLORIDE 5 MG/1
5 TABLET ORAL EVERY 8 HOURS PRN
Refills: 0 | Status: DISCONTINUED | OUTPATIENT
Start: 2025-04-09 | End: 2025-04-11 | Stop reason: HOSPADM

## 2025-04-09 RX ORDER — FLUCONAZOLE 100 MG/1
150 TABLET ORAL ONCE
Status: COMPLETED | OUTPATIENT
Start: 2025-04-09 | End: 2025-04-09

## 2025-04-09 RX ADMIN — SODIUM CHLORIDE 150 ML/HR: 9 INJECTION, SOLUTION INTRAVENOUS at 00:43

## 2025-04-09 RX ADMIN — SENNOSIDES AND DOCUSATE SODIUM 2 TABLET: 50; 8.6 TABLET ORAL at 00:56

## 2025-04-09 RX ADMIN — Medication 10 ML: at 20:10

## 2025-04-09 RX ADMIN — ONDANSETRON 4 MG: 2 INJECTION INTRAMUSCULAR; INTRAVENOUS at 13:49

## 2025-04-09 RX ADMIN — OXYCODONE 5 MG: 5 TABLET ORAL at 20:10

## 2025-04-09 RX ADMIN — INSULIN GLARGINE 15 UNITS: 100 INJECTION, SOLUTION SUBCUTANEOUS at 20:09

## 2025-04-09 RX ADMIN — METOCLOPRAMIDE 5 MG: 5 INJECTION, SOLUTION INTRAMUSCULAR; INTRAVENOUS at 00:42

## 2025-04-09 RX ADMIN — DIPHENHYDRAMINE HYDROCHLORIDE 25 MG: 50 INJECTION, SOLUTION INTRAMUSCULAR; INTRAVENOUS at 00:42

## 2025-04-09 RX ADMIN — HEPARIN SODIUM 5000 UNITS: 5000 INJECTION INTRAVENOUS; SUBCUTANEOUS at 20:10

## 2025-04-09 RX ADMIN — INSULIN LISPRO 4 UNITS: 100 INJECTION, SOLUTION INTRAVENOUS; SUBCUTANEOUS at 17:29

## 2025-04-09 RX ADMIN — FLUCONAZOLE 150 MG: 100 TABLET ORAL at 03:30

## 2025-04-09 RX ADMIN — HEPARIN SODIUM 5000 UNITS: 5000 INJECTION INTRAVENOUS; SUBCUTANEOUS at 05:37

## 2025-04-09 RX ADMIN — INSULIN LISPRO 4 UNITS: 100 INJECTION, SOLUTION INTRAVENOUS; SUBCUTANEOUS at 08:59

## 2025-04-09 RX ADMIN — INSULIN LISPRO 4 UNITS: 100 INJECTION, SOLUTION INTRAVENOUS; SUBCUTANEOUS at 12:44

## 2025-04-09 RX ADMIN — INSULIN GLARGINE 15 UNITS: 100 INJECTION, SOLUTION SUBCUTANEOUS at 01:20

## 2025-04-09 RX ADMIN — ACETAMINOPHEN 1000 MG: 10 INJECTION INTRAVENOUS at 00:42

## 2025-04-09 RX ADMIN — INSULIN LISPRO 6 UNITS: 100 INJECTION, SOLUTION INTRAVENOUS; SUBCUTANEOUS at 01:20

## 2025-04-09 RX ADMIN — POLYETHYLENE GLYCOL 3350 17 G: 17 POWDER, FOR SOLUTION ORAL at 00:56

## 2025-04-09 RX ADMIN — INSULIN LISPRO 4 UNITS: 100 INJECTION, SOLUTION INTRAVENOUS; SUBCUTANEOUS at 20:09

## 2025-04-09 RX ADMIN — ONDANSETRON 4 MG: 2 INJECTION INTRAMUSCULAR; INTRAVENOUS at 05:47

## 2025-04-09 RX ADMIN — Medication 10 ML: at 00:43

## 2025-04-09 RX ADMIN — BUTALBITAL, ACETAMINOPHEN AND CAFFEINE 1 TABLET: 325; 50; 40 TABLET ORAL at 10:12

## 2025-04-09 NOTE — PROGRESS NOTES
[facility]   Hospitalist Progress Note  Date: 2025  Patient Name: Dorinda Sherwood  : 1995  MRN: [id number]  Date of admission: 2025  Room/Bed: [facility]/      Subjective   Subjective     Chief Complaint: Syncope     Summary:Dorinda Sherwood is a 29 y.o. female who presented with a few episodes of syncope.  She had a miscarriage 1 month ago.  Today she was doing household work, picking up something from under the bed, syncopized and fell.  This happened another time or 2.  She came to the ED where head CT was negative.  Admission was requested.  She was given IV fluids.    Interval Followup: Patient complains of headache in the back of her head, it is reproducible and tender to palpation.  CT head showed no acute findings.  She has had no further syncopal episodes, is not dizzy.        Objective   Objective     Vitals:   Temp:  [97.7 °F (36.5 °C)-98.4 °F (36.9 °C)] 97.7 °F (36.5 °C)  Heart Rate:  [] 86  Resp:  [16-20] 20  BP: (118-157)/() 123/79    Physical Exam   General: Awake, alert, NAD  HENT: NCAT, MMM  Eyes: pupils equal, no scleral icterus  Cardiovascular: RRR, no murmurs   Pulmonary: CTA bilaterally; no wheezes; no conversational dyspnea  Gastrointestinal: S/ND/NT, +BS  Musculoskeletal: No gross deformities  Skin: No jaundice, no rash on exposed skin appreciated  Neuro: CN II through XII grossly intact; speech clear; no tremor  Psych: Mood and affect appropriate  : No Goetz catheter; no suprapubic tenderness    Result Review    Result Review:  I have personally reviewed these results:  [x]  Laboratory      Lab 25  1853   WBC 9.37 10.01   HEMOGLOBIN 12.4 14.1   HEMATOCRIT 38.7 42.5   PLATELETS 246 305   NEUTROS ABS 4.47 6.30   IMMATURE GRANS (ABS) 0.06* 0.08*   LYMPHS ABS 3.92* 2.89   MONOS ABS 0.69 0.57   EOS ABS 0.16 0.12   MCV 85.8 83.3         Lab 25  0424 25  1853   SODIUM 135* 133*   POTASSIUM 3.9 4.0   CHLORIDE 101 94*   CO2 21.1* 21.5*    ANION GAP 12.9 17.5*   BUN 7 10   CREATININE 0.44* 0.57   EGFR 133.6 125.6   GLUCOSE 342* 478*   CALCIUM 8.4* 8.9   MAGNESIUM 1.8 1.7   PHOSPHORUS 4.1  --          Lab 04/09/25  0424 04/08/25  1853   TOTAL PROTEIN 6.2 7.0   ALBUMIN 3.3* 3.8   GLOBULIN 2.9 3.2   ALT (SGPT) 14 18   AST (SGOT) 11 15   BILIRUBIN <0.2 <0.2   ALK PHOS 76 97         Lab 04/08/25 2021 04/08/25  1853   HSTROP T <6 <6                 Brief Urine Lab Results  (Last result in the past 365 days)        Color   Clarity   Blood   Leuk Est   Nitrite   Protein   CREAT   Urine HCG        04/08/25 1924 Yellow   Clear   Trace   Small (1+)   Negative   Negative                 [x]  Microbiology   Microbiology Results (last 10 days)       ** No results found for the last 240 hours. **          [x]  Radiology  CT Head Without Contrast  Result Date: 4/8/2025  Impression: No acute intracranial findings. Electronically Signed: Iker Phillips  4/8/2025 10:37 PM EDT  Workstation ID: [id number]    []  EKG/Telemetry   []  Cardiology/Vascular   []  Pathology  []  Old records  []  Other:    Assessment & Plan   Assessment / Plan     Assessment:  Syncope and fall, likely orthostatic hypotension  Concern for orthostatic hypotension  Hyperglycemia  Uncontrolled hyperglycemia  Noncompliance with insulin  Hypertension  Type 2 diabetes mellitus  Anxiety  Bipolar disorder  GERD  Headache    Plan:  Continue IVF for now  Hold BP meds   CT head negative  Given recurrent syncope will check echo and MRI  She does not seem to have a migraine, she has a localized point of tenderness on the upper back part of her head.  I do not feel a hematoma.  It seems musculoskeletal.  She can have pain medication for this.  Still will get MRI and echo given her syncopal episodes.       Discussed with RN.    VTE Prophylaxis:  Pharmacologic VTE prophylaxis orders are present.        CODE STATUS:   Code Status (Patient has no pulse and is not breathing): CPR (Attempt to Resuscitate)  Medical  Interventions (Patient has pulse or is breathing): Full Support  Level Of Support Discussed With: Patient      Electronically signed by Hairs Oliva MD, 4/9/2025, 09:49 EDT.

## 2025-04-09 NOTE — CONSULTS
"Consult placed per Insulin Teaching. Met with patient at bedside. Discussed current A1c 11% of  with an estimated average glucose of 269 mg/dl. Patient states she sees Dr. Theodore, the \"Sugar Doctor\" for her diabetes needs. She has had multiple appointments [facility], but has canceled them.    She states her provider wants her connected to an insulin pump prior to discharge. She used to wear an Omnipod, but the controller broke and was unable to get another. She has a Medtronic insulin pump at her house, but has not received any training on it. I explained that I will be unable to connect her to an insulin pump prior to discharge. I explained that the training is three hours and done outpatient. Patient is persistent in obtaining a pump.    She states she has used insulin pens in the past but they \"don't work\" for her. I explained that they have to be taken at their scheduled time and dosed appropriately. I also explained that this is the only diabetes management we are able to offer her upon discharge. I explained that she will likely be discharged with Lantus for long acting insulin and Humalog for meal time insulin. She states she cannot take Metformin. I encouraged her to call Dr. Theodore's office and try to get an appointment as soon as possible after discharge. Of note, she does wear a Dexcom G7 CGM for blood sugar management. No further needs or questions at this time.     Recommend on discharge:  Rx for insulin pen needles  Rx for Humalog Kwikpen insulin pen  Rx for Lantus Solostar insulin pen  "
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
## 2025-04-09 NOTE — PLAN OF CARE
Goal Outcome Evaluation:  Plan of Care Reviewed With: patient        Progress: no change  Outcome Evaluation: Pt was new ED admission this shift. VSS. Pt c/o pain/discomfort this shift, administered prn pain med per MAR. Started on continuous IV fluids per MAR. Monitoring blood sugar hourly per orders until end of shift d/t being hyperglycemic. Falls precaution maintained. No new issues or new needs noted at this time.

## 2025-04-09 NOTE — PLAN OF CARE
Goal Outcome Evaluation:              Outcome Evaluation: patient c/o headache throughout the shift, meds given per orders with little affect, SBA x 1 to commode due to syncope.attending physician aware of ongoing headache.

## 2025-04-10 ENCOUNTER — APPOINTMENT (OUTPATIENT)
Dept: MRI IMAGING | Facility: HOSPITAL | Age: 30
End: 2025-04-10
Payer: COMMERCIAL

## 2025-04-10 ENCOUNTER — APPOINTMENT (OUTPATIENT)
Dept: CARDIOLOGY | Facility: HOSPITAL | Age: 30
End: 2025-04-10
Payer: COMMERCIAL

## 2025-04-10 LAB
AORTIC DIMENSIONLESS INDEX: 0.8 (DI)
AV MEAN PRESS GRAD SYS DOP V1V2: 3 MMHG
AV VMAX SYS DOP: 122 CM/SEC
BH CV ECHO MEAS - AO MAX PG: 6 MMHG
BH CV ECHO MEAS - AO ROOT DIAM: 3 CM
BH CV ECHO MEAS - AO V2 VTI: 23.6 CM
BH CV ECHO MEAS - AVA(I,D): 2.5 CM2
BH CV ECHO MEAS - EDV(CUBED): 117.6 ML
BH CV ECHO MEAS - EDV(MOD-SP2): 144 ML
BH CV ECHO MEAS - EDV(MOD-SP4): 131 ML
BH CV ECHO MEAS - EF(MOD-SP2): 50.8 %
BH CV ECHO MEAS - EF(MOD-SP4): 61.1 %
BH CV ECHO MEAS - ESV(CUBED): 39.3 ML
BH CV ECHO MEAS - ESV(MOD-SP2): 70.8 ML
BH CV ECHO MEAS - ESV(MOD-SP4): 51 ML
BH CV ECHO MEAS - FS: 30.6 %
BH CV ECHO MEAS - IVS/LVPW: 0.91 CM
BH CV ECHO MEAS - IVSD: 1 CM
BH CV ECHO MEAS - LA DIMENSION: 3.6 CM
BH CV ECHO MEAS - LAT PEAK E' VEL: 16.6 CM/SEC
BH CV ECHO MEAS - LV DIASTOLIC VOL/BSA (35-75): 57.3 CM2
BH CV ECHO MEAS - LV MASS(C)D: 188.1 GRAMS
BH CV ECHO MEAS - LV MAX PG: 3.5 MMHG
BH CV ECHO MEAS - LV MEAN PG: 2 MMHG
BH CV ECHO MEAS - LV SYSTOLIC VOL/BSA (12-30): 22.3 CM2
BH CV ECHO MEAS - LV V1 MAX: 93.8 CM/SEC
BH CV ECHO MEAS - LV V1 VTI: 18.9 CM
BH CV ECHO MEAS - LVIDD: 4.9 CM
BH CV ECHO MEAS - LVIDS: 3.4 CM
BH CV ECHO MEAS - LVOT AREA: 3.1 CM2
BH CV ECHO MEAS - LVOT DIAM: 2 CM
BH CV ECHO MEAS - LVPWD: 1.1 CM
BH CV ECHO MEAS - MED PEAK E' VEL: 13.8 CM/SEC
BH CV ECHO MEAS - MV A MAX VEL: 102 CM/SEC
BH CV ECHO MEAS - MV DEC SLOPE: 773.5 CM/SEC2
BH CV ECHO MEAS - MV DEC TIME: 0.18 SEC
BH CV ECHO MEAS - MV E MAX VEL: 136 CM/SEC
BH CV ECHO MEAS - MV E/A: 1.33
BH CV ECHO MEAS - MV MAX PG: 10 MMHG
BH CV ECHO MEAS - MV MEAN PG: 4 MMHG
BH CV ECHO MEAS - MV P1/2T: 61.7 MSEC
BH CV ECHO MEAS - MV V2 VTI: 39.9 CM
BH CV ECHO MEAS - MVA(P1/2T): 3.6 CM2
BH CV ECHO MEAS - MVA(VTI): 1.49 CM2
BH CV ECHO MEAS - RAP SYSTOLE: 3 MMHG
BH CV ECHO MEAS - RVDD: 2.7 CM
BH CV ECHO MEAS - RVSP: 24.3 MMHG
BH CV ECHO MEAS - SV(LVOT): 59.4 ML
BH CV ECHO MEAS - SV(MOD-SP2): 73.2 ML
BH CV ECHO MEAS - SV(MOD-SP4): 80 ML
BH CV ECHO MEAS - SVI(LVOT): 26 ML/M2
BH CV ECHO MEAS - SVI(MOD-SP2): 32 ML/M2
BH CV ECHO MEAS - SVI(MOD-SP4): 35 ML/M2
BH CV ECHO MEAS - TR MAX PG: 21.3 MMHG
BH CV ECHO MEAS - TR MAX VEL: 231 CM/SEC
BH CV ECHO MEASUREMENTS AVERAGE E/E' RATIO: 8.95
GLUCOSE BLDC GLUCOMTR-MCNC: 242 MG/DL (ref 70–99)
GLUCOSE BLDC GLUCOMTR-MCNC: 245 MG/DL (ref 70–99)
GLUCOSE BLDC GLUCOMTR-MCNC: 257 MG/DL (ref 70–99)
GLUCOSE BLDC GLUCOMTR-MCNC: 323 MG/DL (ref 70–99)
LEFT ATRIUM VOLUME INDEX: 19.9 ML/M2
LV EF BIPLANE MOD: 55.1 %

## 2025-04-10 PROCEDURE — 25010000002 SULFUR HEXAFLUORIDE MICROSPH 60.7-25 MG RECONSTITUTED SUSPENSION: Performed by: INTERNAL MEDICINE

## 2025-04-10 PROCEDURE — G0378 HOSPITAL OBSERVATION PER HR: HCPCS

## 2025-04-10 PROCEDURE — 63710000001 INSULIN GLARGINE PER 5 UNITS: Performed by: STUDENT IN AN ORGANIZED HEALTH CARE EDUCATION/TRAINING PROGRAM

## 2025-04-10 PROCEDURE — 25010000002 KETOROLAC TROMETHAMINE PER 15 MG: Performed by: STUDENT IN AN ORGANIZED HEALTH CARE EDUCATION/TRAINING PROGRAM

## 2025-04-10 PROCEDURE — 25510000002 GADOBENATE DIMEGLUMINE 529 MG/ML SOLUTION: Performed by: INTERNAL MEDICINE

## 2025-04-10 PROCEDURE — 82948 REAGENT STRIP/BLOOD GLUCOSE: CPT | Performed by: STUDENT IN AN ORGANIZED HEALTH CARE EDUCATION/TRAINING PROGRAM

## 2025-04-10 PROCEDURE — 25010000002 HEPARIN (PORCINE) PER 1000 UNITS: Performed by: STUDENT IN AN ORGANIZED HEALTH CARE EDUCATION/TRAINING PROGRAM

## 2025-04-10 PROCEDURE — 93306 TTE W/DOPPLER COMPLETE: CPT | Performed by: INTERNAL MEDICINE

## 2025-04-10 PROCEDURE — 25010000002 ONDANSETRON PER 1 MG: Performed by: PHYSICIAN ASSISTANT

## 2025-04-10 PROCEDURE — 63710000001 INSULIN LISPRO (HUMAN) PER 5 UNITS: Performed by: STUDENT IN AN ORGANIZED HEALTH CARE EDUCATION/TRAINING PROGRAM

## 2025-04-10 PROCEDURE — 93306 TTE W/DOPPLER COMPLETE: CPT

## 2025-04-10 PROCEDURE — 70553 MRI BRAIN STEM W/O & W/DYE: CPT

## 2025-04-10 PROCEDURE — 82948 REAGENT STRIP/BLOOD GLUCOSE: CPT

## 2025-04-10 PROCEDURE — 96376 TX/PRO/DX INJ SAME DRUG ADON: CPT

## 2025-04-10 PROCEDURE — A9577 INJ MULTIHANCE: HCPCS | Performed by: INTERNAL MEDICINE

## 2025-04-10 PROCEDURE — 96372 THER/PROPH/DIAG INJ SC/IM: CPT

## 2025-04-10 RX ORDER — KETOROLAC TROMETHAMINE 30 MG/ML
15 INJECTION, SOLUTION INTRAMUSCULAR; INTRAVENOUS ONCE
Status: COMPLETED | OUTPATIENT
Start: 2025-04-10 | End: 2025-04-10

## 2025-04-10 RX ADMIN — INSULIN GLARGINE 15 UNITS: 100 INJECTION, SOLUTION SUBCUTANEOUS at 20:18

## 2025-04-10 RX ADMIN — OXYCODONE 5 MG: 5 TABLET ORAL at 17:11

## 2025-04-10 RX ADMIN — INSULIN LISPRO 4 UNITS: 100 INJECTION, SOLUTION INTRAVENOUS; SUBCUTANEOUS at 08:58

## 2025-04-10 RX ADMIN — BUTALBITAL, ACETAMINOPHEN AND CAFFEINE 1 TABLET: 325; 50; 40 TABLET ORAL at 20:52

## 2025-04-10 RX ADMIN — Medication 10 ML: at 20:18

## 2025-04-10 RX ADMIN — Medication 10 ML: at 08:58

## 2025-04-10 RX ADMIN — ONDANSETRON 4 MG: 2 INJECTION INTRAMUSCULAR; INTRAVENOUS at 20:18

## 2025-04-10 RX ADMIN — HEPARIN SODIUM 5000 UNITS: 5000 INJECTION INTRAVENOUS; SUBCUTANEOUS at 05:59

## 2025-04-10 RX ADMIN — HEPARIN SODIUM 5000 UNITS: 5000 INJECTION INTRAVENOUS; SUBCUTANEOUS at 22:39

## 2025-04-10 RX ADMIN — SULFUR HEXAFLUORIDE 5 ML: KIT at 11:40

## 2025-04-10 RX ADMIN — OXYCODONE 5 MG: 5 TABLET ORAL at 04:43

## 2025-04-10 RX ADMIN — INSULIN LISPRO 6 UNITS: 100 INJECTION, SOLUTION INTRAVENOUS; SUBCUTANEOUS at 17:11

## 2025-04-10 RX ADMIN — INSULIN LISPRO 4 UNITS: 100 INJECTION, SOLUTION INTRAVENOUS; SUBCUTANEOUS at 12:40

## 2025-04-10 RX ADMIN — GADOBENATE DIMEGLUMINE 20 ML: 529 INJECTION, SOLUTION INTRAVENOUS at 07:29

## 2025-04-10 RX ADMIN — BUTALBITAL, ACETAMINOPHEN AND CAFFEINE 1 TABLET: 325; 50; 40 TABLET ORAL at 01:12

## 2025-04-10 RX ADMIN — INSULIN LISPRO 7 UNITS: 100 INJECTION, SOLUTION INTRAVENOUS; SUBCUTANEOUS at 20:26

## 2025-04-10 RX ADMIN — KETOROLAC TROMETHAMINE 15 MG: 30 INJECTION, SOLUTION INTRAMUSCULAR; INTRAVENOUS at 22:36
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
## 2025-04-10 NOTE — PLAN OF CARE
Goal Outcome Evaluation:  Plan of Care Reviewed With: patient        Progress: no change    Pt is A&Ox4 this shift. VSS. Pt c/o pain/discomfort, administered pain meds per MAR. Monitoring blood sugar per orders. No new issues or new needs noted at this time.

## 2025-04-10 NOTE — PROGRESS NOTES
[facility]   Hospitalist Progress Note  Date: 4/10/2025  Patient Name: Dorinda Sherwood  : 1995  MRN: [id number]  Date of admission: 2025  Room/Bed: [facility]/      Subjective   Subjective     Chief Complaint: Syncope     Summary:Dorinda Sherwood is a 29 y.o. female who presented with a few episodes of syncope.  She had a miscarriage 1 month ago.  Today she was doing household work, picking up something from under the bed, syncopized and fell.  This happened another time or 2.  She came to the ED where head CT was negative.  Admission was requested.  She was given IV fluids.    Interval Followup: overall better; still some headache in the back. Reports depression but no SI/HI        Objective   Objective     Vitals:   Temp:  [97.5 °F (36.4 °C)-97.9 °F (36.6 °C)] 97.7 °F (36.5 °C)  Heart Rate:  [] 87  Resp:  [18-22] 18  BP: (119-147)/(65-99) 132/65    Physical Exam   General: Awake, alert, NAD  HENT: NCAT, MMM  Eyes: pupils equal, no scleral icterus  Cardiovascular: RRR, no murmurs   Pulmonary: CTA bilaterally; no wheezes; no conversational dyspnea  Gastrointestinal: S/ND/NT, +BS  Musculoskeletal: No gross deformities  Skin: No jaundice, no rash on exposed skin appreciated  Neuro: CN II through XII grossly intact; speech clear; no tremor  Psych: Mood and affect appropriate  : No Goetz catheter; no suprapubic tenderness    Result Review    Result Review:  I have personally reviewed these results:  [x]  Laboratory      Lab 25  1853   WBC 9.37 10.01   HEMOGLOBIN 12.4 14.1   HEMATOCRIT 38.7 42.5   PLATELETS 246 305   NEUTROS ABS 4.47 6.30   IMMATURE GRANS (ABS) 0.06* 0.08*   LYMPHS ABS 3.92* 2.89   MONOS ABS 0.69 0.57   EOS ABS 0.16 0.12   MCV 85.8 83.3         Lab 25  0424 25  1853   SODIUM 135* 133*   POTASSIUM 3.9 4.0   CHLORIDE 101 94*   CO2 21.1* 21.5*   ANION GAP 12.9 17.5*   BUN 7 10   CREATININE 0.44* 0.57   EGFR 133.6 125.6   GLUCOSE 342* 478*   CALCIUM  8.4* 8.9   MAGNESIUM 1.8 1.7   PHOSPHORUS 4.1  --    HEMOGLOBIN A1C  --  11.00*         Lab 04/09/25  0424 04/08/25  1853   TOTAL PROTEIN 6.2 7.0   ALBUMIN 3.3* 3.8   GLOBULIN 2.9 3.2   ALT (SGPT) 14 18   AST (SGOT) 11 15   BILIRUBIN <0.2 <0.2   ALK PHOS 76 97         Lab 04/08/25 2021 04/08/25  1853   HSTROP T <6 <6                 Brief Urine Lab Results  (Last result in the past 365 days)        Color   Clarity   Blood   Leuk Est   Nitrite   Protein   CREAT   Urine HCG        04/08/25 1924               Negative       04/08/25 1924 Yellow   Clear   Trace   Small (1+)   Negative   Negative                 [x]  Microbiology   Microbiology Results (last 10 days)       ** No results found for the last 240 hours. **          [x]  Radiology  MRI Brain With & Without Contrast  Result Date: 4/10/2025  Impression: Unremarkable MRI of the brain No evidence of abnormal enhancement Electronically Signed: Maximiliano Gentile MD  4/10/2025 7:55 AM EDT  Workstation ID: [id number]    CT Head Without Contrast  Result Date: 4/8/2025  Impression: No acute intracranial findings. Electronically Signed: Iker Phillips  4/8/2025 10:37 PM EDT  Workstation ID: [id number]    []  EKG/Telemetry   []  Cardiology/Vascular   []  Pathology  []  Old records  []  Other:    Assessment & Plan   Assessment / Plan     Assessment:  Syncope and fall, likely orthostatic hypotension  Concern for orthostatic hypotension  Hyperglycemia  Uncontrolled hyperglycemia  Noncompliance with insulin  Hypertension  Type 2 diabetes mellitus  Anxiety  Bipolar disorder  GERD  Headache    Plan:  Continue IVF for now  Hold BP meds   MRI brain neg  Echo okay  Not entirely clear to me what she is actually taking for her diabetes.  I'll have diabetic educator see her tomorrow. I'd like to get that more defined before we dc her since she came in with sugars in the 500 range.         Discussed with RN.    VTE Prophylaxis:  Pharmacologic VTE prophylaxis orders are  present.        CODE STATUS:   Code Status (Patient has no pulse and is not breathing): CPR (Attempt to Resuscitate)  Medical Interventions (Patient has pulse or is breathing): Full Support  Level Of Support Discussed With: Patient      Electronically signed by Haris Oliva MD, 4/10/2025, 18:42 EDT.

## 2025-04-10 NOTE — PLAN OF CARE
Goal Outcome Evaluation:           Awaiting echo results to be reviewed by physician. Blood glucose checked per ordrs and sliding scale insulin given per orders.

## 2025-04-10 NOTE — PROGRESS NOTES
Respiratory Therapist Broncho-Pulmonary Hygiene Progress Note      Patient Name:  Dorinda Sherwood  YOB: 1995    Dorinda Sherwood meets the qualification for Level 1 of the Bronco-Pulmonary Hygiene Protocol. This was based on my daily patient assessment and includes review of chest x-ray results, cough ability and quality, oxygenation, secretions or risk for secretion development and patient mobility.     Broncho-Pulmonary Hygiene Assessment:    Level of Movement: Actively changing positions without assistance  Alert/ oriented/ cooperative    Breath Sounds: Clear to slightly diminished    Cough: Strong, effective    Chest X-Ray: Possible signs of consolidation and/or atelectasis or clear.     Sputum Productions: None or small amount of thin or watery secretions with effective cough    History and Physical: None    SpO2 to Oxygen Need: greater than 92% on room air or  less than 3L nasal canula    Current SpO2 is: 98% on RA    Based on this information, I have completed the following interventions: Teach/Instruct patient on cough and deep breathe      Electronically signed by Jenise Thompson RRT, 04/10/25, 9:55 AM EDT.

## 2025-04-11 ENCOUNTER — READMISSION MANAGEMENT (OUTPATIENT)
Dept: CALL CENTER | Facility: HOSPITAL | Age: 30
End: 2025-04-11
Payer: COMMERCIAL

## 2025-04-11 ENCOUNTER — DOCUMENTATION (OUTPATIENT)
Dept: INTERNAL MEDICINE | Facility: CLINIC | Age: 30
End: 2025-04-11
Payer: COMMERCIAL

## 2025-04-11 VITALS
BODY MASS INDEX: 53.92 KG/M2 | HEART RATE: 77 BPM | RESPIRATION RATE: 18 BRPM | SYSTOLIC BLOOD PRESSURE: 123 MMHG | DIASTOLIC BLOOD PRESSURE: 78 MMHG | WEIGHT: 293 LBS | OXYGEN SATURATION: 97 % | TEMPERATURE: 97.7 F | HEIGHT: 62 IN

## 2025-04-11 LAB
ALBUMIN SERPL-MCNC: 3.8 G/DL (ref 3.5–5.2)
ANION GAP SERPL CALCULATED.3IONS-SCNC: 10.6 MMOL/L (ref 5–15)
BASOPHILS # BLD AUTO: 0.05 10*3/MM3 (ref 0–0.2)
BASOPHILS NFR BLD AUTO: 0.8 % (ref 0–1.5)
BUN SERPL-MCNC: 8 MG/DL (ref 6–20)
BUN/CREAT SERPL: 19.5 (ref 7–25)
CALCIUM SPEC-SCNC: 8.8 MG/DL (ref 8.6–10.5)
CHLORIDE SERPL-SCNC: 96 MMOL/L (ref 98–107)
CO2 SERPL-SCNC: 27.4 MMOL/L (ref 22–29)
CREAT SERPL-MCNC: 0.41 MG/DL (ref 0.57–1)
DEPRECATED RDW RBC AUTO: 37 FL (ref 37–54)
EGFRCR SERPLBLD CKD-EPI 2021: 135.9 ML/MIN/1.73
EOSINOPHIL # BLD AUTO: 0.13 10*3/MM3 (ref 0–0.4)
EOSINOPHIL NFR BLD AUTO: 2 % (ref 0.3–6.2)
ERYTHROCYTE [DISTWIDTH] IN BLOOD BY AUTOMATED COUNT: 12.3 % (ref 12.3–15.4)
FOLATE SERPL-MCNC: 11 NG/ML (ref 4.78–24.2)
GLUCOSE BLDC GLUCOMTR-MCNC: 224 MG/DL (ref 70–99)
GLUCOSE BLDC GLUCOMTR-MCNC: 230 MG/DL (ref 70–99)
GLUCOSE BLDC GLUCOMTR-MCNC: 230 MG/DL (ref 70–99)
GLUCOSE SERPL-MCNC: 232 MG/DL (ref 65–99)
HCT VFR BLD AUTO: 37.8 % (ref 34–46.6)
HGB BLD-MCNC: 12.5 G/DL (ref 12–15.9)
IMM GRANULOCYTES # BLD AUTO: 0.04 10*3/MM3 (ref 0–0.05)
IMM GRANULOCYTES NFR BLD AUTO: 0.6 % (ref 0–0.5)
LYMPHOCYTES # BLD AUTO: 2.21 10*3/MM3 (ref 0.7–3.1)
LYMPHOCYTES NFR BLD AUTO: 33.2 % (ref 19.6–45.3)
MAGNESIUM SERPL-MCNC: 1.8 MG/DL (ref 1.6–2.6)
MCH RBC QN AUTO: 27.2 PG (ref 26.6–33)
MCHC RBC AUTO-ENTMCNC: 33.1 G/DL (ref 31.5–35.7)
MCV RBC AUTO: 82.4 FL (ref 79–97)
MONOCYTES # BLD AUTO: 0.5 10*3/MM3 (ref 0.1–0.9)
MONOCYTES NFR BLD AUTO: 7.5 % (ref 5–12)
NEUTROPHILS NFR BLD AUTO: 3.73 10*3/MM3 (ref 1.7–7)
NEUTROPHILS NFR BLD AUTO: 55.9 % (ref 42.7–76)
NRBC BLD AUTO-RTO: 0 /100 WBC (ref 0–0.2)
PHOSPHATE SERPL-MCNC: 4.4 MG/DL (ref 2.5–4.5)
PLATELET # BLD AUTO: 246 10*3/MM3 (ref 140–450)
PMV BLD AUTO: 10.2 FL (ref 6–12)
POTASSIUM SERPL-SCNC: 3.9 MMOL/L (ref 3.5–5.2)
RBC # BLD AUTO: 4.59 10*6/MM3 (ref 3.77–5.28)
SODIUM SERPL-SCNC: 134 MMOL/L (ref 136–145)
TSH SERPL DL<=0.05 MIU/L-ACNC: 1.8 UIU/ML (ref 0.27–4.2)
VIT B12 BLD-MCNC: 298 PG/ML (ref 211–946)
WBC NRBC COR # BLD AUTO: 6.66 10*3/MM3 (ref 3.4–10.8)

## 2025-04-11 PROCEDURE — 80069 RENAL FUNCTION PANEL: CPT | Performed by: INTERNAL MEDICINE

## 2025-04-11 PROCEDURE — 82607 VITAMIN B-12: CPT | Performed by: INTERNAL MEDICINE

## 2025-04-11 PROCEDURE — 82948 REAGENT STRIP/BLOOD GLUCOSE: CPT

## 2025-04-11 PROCEDURE — 25010000002 ONDANSETRON PER 1 MG: Performed by: PHYSICIAN ASSISTANT

## 2025-04-11 PROCEDURE — 85025 COMPLETE CBC W/AUTO DIFF WBC: CPT | Performed by: INTERNAL MEDICINE

## 2025-04-11 PROCEDURE — 63710000001 INSULIN LISPRO (HUMAN) PER 5 UNITS: Performed by: STUDENT IN AN ORGANIZED HEALTH CARE EDUCATION/TRAINING PROGRAM

## 2025-04-11 PROCEDURE — 84443 ASSAY THYROID STIM HORMONE: CPT | Performed by: INTERNAL MEDICINE

## 2025-04-11 PROCEDURE — 25010000002 HEPARIN (PORCINE) PER 1000 UNITS: Performed by: STUDENT IN AN ORGANIZED HEALTH CARE EDUCATION/TRAINING PROGRAM

## 2025-04-11 PROCEDURE — G0378 HOSPITAL OBSERVATION PER HR: HCPCS

## 2025-04-11 PROCEDURE — 82746 ASSAY OF FOLIC ACID SERUM: CPT | Performed by: INTERNAL MEDICINE

## 2025-04-11 PROCEDURE — 25010000002 KETOROLAC TROMETHAMINE PER 15 MG: Performed by: INTERNAL MEDICINE

## 2025-04-11 PROCEDURE — 82948 REAGENT STRIP/BLOOD GLUCOSE: CPT | Performed by: STUDENT IN AN ORGANIZED HEALTH CARE EDUCATION/TRAINING PROGRAM

## 2025-04-11 PROCEDURE — 96376 TX/PRO/DX INJ SAME DRUG ADON: CPT

## 2025-04-11 PROCEDURE — 96372 THER/PROPH/DIAG INJ SC/IM: CPT

## 2025-04-11 PROCEDURE — 83735 ASSAY OF MAGNESIUM: CPT | Performed by: INTERNAL MEDICINE

## 2025-04-11 RX ORDER — MULTIVITAMIN WITH IRON
1000 TABLET ORAL DAILY
Status: DISCONTINUED | OUTPATIENT
Start: 2025-04-11 | End: 2025-04-11 | Stop reason: HOSPADM

## 2025-04-11 RX ORDER — KETOROLAC TROMETHAMINE 30 MG/ML
15 INJECTION, SOLUTION INTRAMUSCULAR; INTRAVENOUS ONCE AS NEEDED
Status: COMPLETED | OUTPATIENT
Start: 2025-04-11 | End: 2025-04-11

## 2025-04-11 RX ORDER — VENLAFAXINE HYDROCHLORIDE 75 MG/1
75 CAPSULE, EXTENDED RELEASE ORAL DAILY
Qty: 30 CAPSULE | Refills: 0 | Status: SHIPPED | OUTPATIENT
Start: 2025-04-11 | End: 2025-05-11

## 2025-04-11 RX ORDER — GLUCOSAMINE HCL/CHONDROITIN SU 500-400 MG
1 CAPSULE ORAL
Qty: 150 EACH | Refills: 0 | Status: SHIPPED | OUTPATIENT
Start: 2025-04-11 | End: 2025-05-11

## 2025-04-11 RX ORDER — INSULIN GLARGINE 100 [IU]/ML
20 INJECTION, SOLUTION SUBCUTANEOUS NIGHTLY
Qty: 15 ML | Refills: 0 | Status: SHIPPED | OUTPATIENT
Start: 2025-04-11 | End: 2025-06-25

## 2025-04-11 RX ORDER — INSULIN ASPART 100 [IU]/ML
10 INJECTION, SOLUTION INTRAVENOUS; SUBCUTANEOUS
Qty: 1 EACH | Refills: 0 | Status: SHIPPED | OUTPATIENT
Start: 2025-04-11

## 2025-04-11 RX ADMIN — CYANOCOBALAMIN TAB 500 MCG 1000 MCG: 500 TAB at 15:09

## 2025-04-11 RX ADMIN — ONDANSETRON 4 MG: 2 INJECTION INTRAMUSCULAR; INTRAVENOUS at 11:57

## 2025-04-11 RX ADMIN — INSULIN LISPRO 4 UNITS: 100 INJECTION, SOLUTION INTRAVENOUS; SUBCUTANEOUS at 08:36

## 2025-04-11 RX ADMIN — INSULIN LISPRO 4 UNITS: 100 INJECTION, SOLUTION INTRAVENOUS; SUBCUTANEOUS at 17:48

## 2025-04-11 RX ADMIN — INSULIN LISPRO 4 UNITS: 100 INJECTION, SOLUTION INTRAVENOUS; SUBCUTANEOUS at 12:36

## 2025-04-11 RX ADMIN — OXYCODONE 5 MG: 5 TABLET ORAL at 08:36

## 2025-04-11 RX ADMIN — KETOROLAC TROMETHAMINE 15 MG: 30 INJECTION, SOLUTION INTRAMUSCULAR; INTRAVENOUS at 13:16

## 2025-04-11 RX ADMIN — HEPARIN SODIUM 5000 UNITS: 5000 INJECTION INTRAVENOUS; SUBCUTANEOUS at 13:27

## 2025-04-11 RX ADMIN — Medication 10 ML: at 08:36

## 2025-04-11 RX ADMIN — HEPARIN SODIUM 5000 UNITS: 5000 INJECTION INTRAVENOUS; SUBCUTANEOUS at 05:36

## 2025-04-11 RX ADMIN — BUTALBITAL, ACETAMINOPHEN AND CAFFEINE 1 TABLET: 325; 50; 40 TABLET ORAL at 05:36

## 2025-04-11 NOTE — DISCHARGE SUMMARY
[facility]         HOSPITALIST  DISCHARGE SUMMARY    Patient Name: Dorinda Sherwood  : 1995  MRN: [id number]    Date of Admission: 2025  Date of Discharge:  2025  Primary Care Physician: Abel Moreno APRN  Admitting: Medicine    Final diagnoses:  Syncope and fall, likely orthostatic hypotension  Concern for orthostatic hypotension  Hyperglycemia  Uncontrolled hyperglycemia  Noncompliance with insulin  Hypertension  Type 2 diabetes mellitus  Anxiety  Bipolar disorder  GERD  Headache        Hospital Course     Hospital Course:  Dorinda Sherwood is a 29 y.o. female who presented with a few episodes of syncope.  She had a miscarriage 1 month ago.  Today she was doing household work, picking up something from under the bed, syncopized and fell.  This happened another time or 2.  She came to the ED where head CT was negative.  Admission was requested.  She was given IV fluids.  MRI brain was negative.  2D echocardiogram was negative.  She complains of some worsening depression and feels that her Zoloft is not working.  She has no suicidal thoughts or homicidal thoughts or anything of that nature but does feel like she has had some worsening depression.  We discussed options, her PCP is out of town so I will change her from Zoloft to Effexor 75 mg daily.  We discussed risks and benefits of this.  Patient is to seek medical attention if she has any worsening depression or suicidal thoughts.  She did have a headache.  This did not seem like a migraine.  She had a focalized point of tenderness on the back of her head but workup was negative.  We can try some NSAIDs at home.  She had no further dizziness or syncopal episodes.  In regards to her blood sugar, blood sugar was uncontrolled.  4-500 prior to admission.  We have her on Lantus at this point.  The patient has requested to transition her diabetes care to the diabetes clinic here at [facility].  I will make a referral there.   In the meantime, she wishes to stop her inhaled insulin.  We will send her home on the Lantus for now, this will likely need to be adjusted and she may want to transition to a different insulin pump but this can be navigated through the outpatient setting.      DISCHARGE Follow Up Recommendations for labs and diagnostics: Follow-up with PCP, diabetes clinic      Day of Discharge     Vital Signs:  Temp:  [97.5 °F (36.4 °C)-98.6 °F (37 °C)] 97.7 °F (36.5 °C)  Heart Rate:  [74-91] 74  Resp:  [18] 18  BP: ()/(54-77) 120/66  Physical Exam: No distress S1-S2 chest clear no neurological deficits      Discharge Details        Discharge Medications        New Medications        Instructions Start Date   Alcohol Swabs 70 % pads   1 each, Not Applicable, 5 Times Daily      cyanocobalamin 1000 MCG tablet  Commonly known as: VITAMIN B-12   1,000 mcg, Oral, Daily      insulin glargine 100 UNIT/ML injection  Commonly known as: LANTUS, SEMGLEE   20 Units, Subcutaneous, Nightly      Insulin Pen Needle 32G X 4 MM misc   1 each, Not Applicable, 5 Times Daily      venlafaxine XR 75 MG 24 hr capsule  Commonly known as: Effexor XR   75 mg, Oral, Daily             Continue These Medications        Instructions Start Date   albuterol sulfate  (90 Base) MCG/ACT inhaler  Commonly known as: PROVENTIL HFA;VENTOLIN HFA;PROAIR HFA   2 puffs, 4 Times Daily PRN      albuterol (2.5 MG/3ML) 0.083% nebulizer solution  Commonly known as: PROVENTIL   2.5 mg, Every 4 Hours PRN      cetirizine 10 MG tablet  Commonly known as: zyrTEC   10 mg, Oral, Daily      Insulin Aspart 100 UNIT/ML injection  Commonly known as: novoLOG   10 Units, 3 Times Daily Before Meals      montelukast 10 MG tablet  Commonly known as: Singulair   10 mg, Oral, Nightly      pantoprazole 40 MG EC tablet  Commonly known as: PROTONIX   40 mg, Oral, Daily             Stop These Medications      Afrezza 60x4 &60x8 & 60x12 UNIT powder  Generic drug: Insulin Regular Human      Farxiga 5 MG tablet tablet  Generic drug: dapagliflozin     lisinopril 10 MG tablet  Commonly known as: PRINIVIL,ZESTRIL     sertraline 25 MG tablet  Commonly known as: ZOLOFT     Trulicity 4.5 MG/0.5ML solution auto-injector  Generic drug: Dulaglutide              Allergies   Allergen Reactions    Tramadol Anaphylaxis and Hives       Discharge Disposition:  Home or Self Care    Diet:  Hospital:  Diet Order   Procedures    Diet: Cardiac, Diabetic; Healthy Heart (2-3 Na+); Consistent Carbohydrate; Fluid Consistency: Thin (IDDSI 0)       Discharge Activity:       CODE STATUS:  Code Status and Medical Interventions: CPR (Attempt to Resuscitate); Full Support   Ordered at: 04/08/25 2230     Code Status (Patient has no pulse and is not breathing):    CPR (Attempt to Resuscitate)     Medical Interventions (Patient has pulse or is breathing):    Full Support     Level Of Support Discussed With:    Patient         Future Appointments   Date Time Provider Department Center   4/24/2025  9:00 AM Abel Moreno APRN [facility]       Additional Instructions for the Follow-ups that You Need to Schedule       Ambulatory Referral to [facility] - [place]   As directed      Diagnosis (search or select): Type 2 diabetes mellitus with hyperglycemia [980909]   Services Requested: Provider Consultation   Provider orders: Evaluate for and manage Insulin Pump Therapy                Pertinent  and/or Most Recent Results     PROCEDURES:   None    LAB RESULTS:      Lab 04/11/25  0550 04/09/25 0424 04/08/25  1853   WBC 6.66 9.37 10.01   HEMOGLOBIN 12.5 12.4 14.1   HEMATOCRIT 37.8 38.7 42.5   PLATELETS 246 246 305   NEUTROS ABS 3.73 4.47 6.30   IMMATURE GRANS (ABS) 0.04 0.06* 0.08*   LYMPHS ABS 2.21 3.92* 2.89   MONOS ABS 0.50 0.69 0.57   EOS ABS 0.13 0.16 0.12   MCV 82.4 85.8 83.3         Lab 04/11/25  0550 04/09/25  0424 04/08/25  1853   SODIUM 134* 135* 133*   POTASSIUM 3.9 3.9 4.0   CHLORIDE 96* 101 94*   CO2 27.4 21.1*  21.5*   ANION GAP 10.6 12.9 17.5*   BUN 8 7 10   CREATININE 0.41* 0.44* 0.57   EGFR 135.9 133.6 125.6   GLUCOSE 232* 342* 478*   CALCIUM 8.8 8.4* 8.9   MAGNESIUM 1.8 1.8 1.7   PHOSPHORUS 4.4 4.1  --    HEMOGLOBIN A1C  --   --  11.00*   TSH 1.800  --   --          Lab 04/11/25  0550 04/09/25  0424 04/08/25  1853   TOTAL PROTEIN  --  6.2 7.0   ALBUMIN 3.8 3.3* 3.8   GLOBULIN  --  2.9 3.2   ALT (SGPT)  --  14 18   AST (SGOT)  --  11 15   BILIRUBIN  --  <0.2 <0.2   ALK PHOS  --  76 97         Lab 04/08/25 2021 04/08/25  1853   HSTROP T <6 <6             Lab 04/11/25  0550   FOLATE 11.00   VITAMIN B 12 298         Brief Urine Lab Results  (Last result in the past 365 days)        Color   Clarity   Blood   Leuk Est   Nitrite   Protein   CREAT   Urine HCG        04/08/25 1924               Negative       04/08/25 1924 Yellow   Clear   Trace   Small (1+)   Negative   Negative                 Microbiology Results (last 10 days)       ** No results found for the last 240 hours. **            MRI Brain With & Without Contrast  Result Date: 4/10/2025  Impression: Unremarkable MRI of the brain No evidence of abnormal enhancement Electronically Signed: Maximiliano Gentile MD  4/10/2025 7:55 AM EDT  Workstation ID: [id number]    CT Head Without Contrast  Result Date: 4/8/2025  Impression: No acute intracranial findings. Electronically Signed: Iker Phillips  4/8/2025 10:37 PM EDT  Workstation ID: [id number]               Results for orders placed during the hospital encounter of 04/08/25    Adult Transthoracic Echo Complete W/ Cont if Necessary Per Protocol    Interpretation Summary    Left ventricular systolic function is normal. Calculated left ventricular EF = 55.1%    Left ventricular diastolic function was normal.    Estimated right ventricular systolic pressure from tricuspid regurgitation is normal (<35 mmHg).    No evidence of significant valvular disease      Labs Pending at Discharge:        Time spent on Discharge including  face to face service:  ~35 minutes    Electronically signed by Haris Oliva MD, 04/11/25, 3:03 PM EDT.

## 2025-04-11 NOTE — CONSULTS
Followed up with patient at bedside. Discussed medication regimen for discharge. Explained that she will be leaving with Humalog meal time insulin and provided her with a larger copy of the sliding scale. Also explained administering 15 units of Lantus daily. Patient is accepting and understanding. She states she will need insulin pen supplies and would like to use Meds to Beds.     Encouraged her to schedule a follow up with her diabetes provider soon after discharge so she can receive insulin pump instructions. No further needs or questions at this time. Will continue to assist and support as needed.     Recommend on discharge:  Rx for insulin pen needles (pended)  Rx for Humalog Kwikpen insulin pen  Rx for Lantus Solostar insulin pen

## 2025-04-11 NOTE — PLAN OF CARE
Goal Outcome Evaluation:   Pt axox4, c/o of a headache, medicated per mar. Brendan given this shift for nausea. Pt is discharging this shift to home. Meds to bed being delivered. No other concerns noted.

## 2025-04-11 NOTE — OUTREACH NOTE
Prep Survey      Flowsheet Row Responses   [facility] patient discharged from? [facility]   Is LACE score < 7 ? No   Eligibility [facility]   Date of Admission 04/08/25   Date of Discharge 04/11/25   Discharge Disposition Home or Self Care   Discharge diagnosis syncope and fall, likely orthostatic hypotension   Does the patient have one of the following disease processes/diagnoses(primary or secondary)? Other   Does the patient have Home health ordered? No   Is there a DME ordered? No   Prep survey completed? Yes            Catarina LAUGHLIN - Registered Nurse

## 2025-04-11 NOTE — PLAN OF CARE
Goal Outcome Evaluation:      Pt transferred with staff assistance to bedside commode. Pt c/o 10/10 headache pain throughout shift. Administered PRN Fioricet and Roxicodone throughout shift

## 2025-04-12 NOTE — PROGRESS NOTES
Patient Called after-hours call center needing short acting insulin. She reports recently discharged from the hospital and getting long acting insulin but no meal time prescribed. The patient reports that she normally takes Humalog sliding scale insulin with meals 3 times a day as needed up to 10 units. She request that this be sent to her pharmacy along with pen needles until she can follow-up with her diabetes specialist. Patient had no additional concerns at this time.?

## 2025-04-14 ENCOUNTER — TRANSITIONAL CARE MANAGEMENT TELEPHONE ENCOUNTER (OUTPATIENT)
Dept: CALL CENTER | Facility: HOSPITAL | Age: 30
End: 2025-04-14
Payer: COMMERCIAL

## 2025-04-14 LAB
QT INTERVAL: 331 MS
QTC INTERVAL: 457 MS

## 2025-04-14 NOTE — OUTREACH NOTE
Call Center TCM Note      Flowsheet Row Responses   [facility] [place] facility patient discharged from? [facility]   Does the patient have one of the following disease processes/diagnoses(primary or secondary)? Other   TCM attempt successful? No   Unsuccessful attempts Attempt 2   Call Status Left message            DEENA Prado Registered Nurse    4/14/2025, 12:48 EDT

## 2025-04-14 NOTE — OUTREACH NOTE
Call Center TCM Note      Flowsheet Row Responses   [facility] facility patient discharged from? [facility]   Does the patient have one of the following disease processes/diagnoses(primary or secondary)? Other   TCM attempt successful? No  [no names listed on verbal release]   Unsuccessful attempts Attempt 1            DEENA Prado Registered Nurse    4/14/2025, 09:10 EDT

## 2025-04-15 ENCOUNTER — TRANSITIONAL CARE MANAGEMENT TELEPHONE ENCOUNTER (OUTPATIENT)
Dept: CALL CENTER | Facility: HOSPITAL | Age: 30
End: 2025-04-15
Payer: COMMERCIAL

## 2025-04-15 NOTE — OUTREACH NOTE
Call Center TCM Note      Flowsheet Row Responses   [facility] facility patient discharged from? [facility]   Does the patient have one of the following disease processes/diagnoses(primary or secondary)? Other   TCM attempt successful? No  [no names on verbal release]   Unsuccessful attempts Attempt 3            DEENA Prado Registered Nurse    4/15/2025, 09:13 EDT

## 2025-04-24 ENCOUNTER — OFFICE VISIT (OUTPATIENT)
Dept: INTERNAL MEDICINE | Facility: CLINIC | Age: 30
End: 2025-04-24
Payer: COMMERCIAL

## 2025-04-24 VITALS
DIASTOLIC BLOOD PRESSURE: 78 MMHG | RESPIRATION RATE: 14 BRPM | OXYGEN SATURATION: 98 % | TEMPERATURE: 98.3 F | SYSTOLIC BLOOD PRESSURE: 122 MMHG | WEIGHT: 293 LBS | BODY MASS INDEX: 53.92 KG/M2 | HEART RATE: 104 BPM | HEIGHT: 62 IN

## 2025-04-24 DIAGNOSIS — E66.01 MORBID OBESITY WITH BMI OF 50.0-59.9, ADULT: ICD-10-CM

## 2025-04-24 DIAGNOSIS — R55 SYNCOPE, UNSPECIFIED SYNCOPE TYPE: ICD-10-CM

## 2025-04-24 DIAGNOSIS — J30.9 ALLERGIC RHINITIS, UNSPECIFIED SEASONALITY, UNSPECIFIED TRIGGER: ICD-10-CM

## 2025-04-24 DIAGNOSIS — E11.65 UNCONTROLLED TYPE 2 DIABETES MELLITUS WITH HYPERGLYCEMIA: ICD-10-CM

## 2025-04-24 DIAGNOSIS — E78.2 MIXED HYPERLIPIDEMIA: ICD-10-CM

## 2025-04-24 DIAGNOSIS — Z09 HOSPITAL DISCHARGE FOLLOW-UP: Primary | ICD-10-CM

## 2025-04-24 DIAGNOSIS — G43.909 MIGRAINE WITHOUT STATUS MIGRAINOSUS, NOT INTRACTABLE, UNSPECIFIED MIGRAINE TYPE: ICD-10-CM

## 2025-04-24 DIAGNOSIS — F41.1 GENERALIZED ANXIETY DISORDER: ICD-10-CM

## 2025-04-24 DIAGNOSIS — F33.41 RECURRENT MAJOR DEPRESSIVE DISORDER, IN PARTIAL REMISSION: ICD-10-CM

## 2025-04-24 DIAGNOSIS — I10 PRIMARY HYPERTENSION: ICD-10-CM

## 2025-04-24 DIAGNOSIS — K21.00 GASTROESOPHAGEAL REFLUX DISEASE WITH ESOPHAGITIS WITHOUT HEMORRHAGE: ICD-10-CM

## 2025-04-24 LAB
ALBUMIN SERPL-MCNC: 4 G/DL (ref 3.5–5.2)
ALBUMIN UR-MCNC: <1.2 MG/DL
ALBUMIN/GLOB SERPL: 1.4 G/DL
ALP SERPL-CCNC: 90 U/L (ref 39–117)
ALT SERPL W P-5'-P-CCNC: 19 U/L (ref 1–33)
ANION GAP SERPL CALCULATED.3IONS-SCNC: 11.4 MMOL/L (ref 5–15)
AST SERPL-CCNC: 15 U/L (ref 1–32)
BILIRUB SERPL-MCNC: 0.2 MG/DL (ref 0–1.2)
BUN SERPL-MCNC: 7 MG/DL (ref 6–20)
BUN/CREAT SERPL: 13.2 (ref 7–25)
CALCIUM SPEC-SCNC: 8.8 MG/DL (ref 8.6–10.5)
CHLORIDE SERPL-SCNC: 95 MMOL/L (ref 98–107)
CO2 SERPL-SCNC: 25.6 MMOL/L (ref 22–29)
CREAT SERPL-MCNC: 0.53 MG/DL (ref 0.57–1)
CREAT UR-MCNC: 38.1 MG/DL
EGFRCR SERPLBLD CKD-EPI 2021: 127.8 ML/MIN/1.73
GLOBULIN UR ELPH-MCNC: 2.8 GM/DL
GLUCOSE SERPL-MCNC: 275 MG/DL (ref 65–99)
MICROALBUMIN/CREAT UR: NORMAL MG/G{CREAT}
POTASSIUM SERPL-SCNC: 4.5 MMOL/L (ref 3.5–5.2)
PROT SERPL-MCNC: 6.8 G/DL (ref 6–8.5)
SODIUM SERPL-SCNC: 132 MMOL/L (ref 136–145)

## 2025-04-24 PROCEDURE — 82570 ASSAY OF URINE CREATININE: CPT | Performed by: NURSE PRACTITIONER

## 2025-04-24 PROCEDURE — 80053 COMPREHEN METABOLIC PANEL: CPT | Performed by: NURSE PRACTITIONER

## 2025-04-24 PROCEDURE — 82043 UR ALBUMIN QUANTITATIVE: CPT | Performed by: NURSE PRACTITIONER

## 2025-04-24 RX ORDER — SUMATRIPTAN 50 MG/1
TABLET, FILM COATED ORAL
Qty: 9 TABLET | Refills: 0 | Status: SHIPPED | OUTPATIENT
Start: 2025-04-24

## 2025-04-24 RX ORDER — INSULIN GLARGINE 100 [IU]/ML
25 INJECTION, SOLUTION SUBCUTANEOUS NIGHTLY
Qty: 8 ML | Refills: 0 | Status: SHIPPED | OUTPATIENT
Start: 2025-04-24 | End: 2025-05-24

## 2025-04-24 RX ORDER — FLUTICASONE PROPIONATE 50 MCG
2 SPRAY, SUSPENSION (ML) NASAL DAILY
Qty: 18.2 G | Refills: 6 | Status: SHIPPED | OUTPATIENT
Start: 2025-04-24

## 2025-04-24 RX ORDER — PANTOPRAZOLE SODIUM 40 MG/1
40 TABLET, DELAYED RELEASE ORAL DAILY
Qty: 90 TABLET | Refills: 0 | Status: SHIPPED | OUTPATIENT
Start: 2025-04-24

## 2025-04-24 NOTE — PROGRESS NOTES
Transitional Care Follow Up Visit  Subjective     Dorinda is a 29 y.o. female who presents for a transitional care management visit.    Within 48 business hours after discharge our office contacted her via telephone to coordinate her care and needs.      I reviewed and discussed the details of that call along with the discharge summary, hospital problems, inpatient lab results, inpatient diagnostic studies, and consultation reports with Dorinda.     Current outpatient and discharge medications have been reconciled for the patient.  Reviewed by: YOKO Collier          4/11/2025     7:03 PM   Date of TCM Phone Call   [facility]   Date of Admission 4/8/2025   Date of Discharge 4/11/2025   Discharge Disposition Home or Self Care       Risk for Readmission (LACE) Score: 9 (4/11/2025  6:00 AM)    Patient presents to the office today for hospital discharge follow-up visit.  Patient was hospitalized on 4/8/2025, discharged on 4/11/2025 at local hospital, [facility].  Her original problem was syncope with migraine.  She was having some episodes where she was at home, she would get a migraine headache, and she would have an episode of dizziness and passing out.  Workup within the hospital was negative, this included labs, CT, MRI.  Echocardiogram was also completed, negative.  She was complaining of worsening depression, so medication was adjusted, started on venlafaxine, which she reports is working quite well.  She has chronic uncontrolled type 2 diabetes, her A1c has been greater than 10 for quite some time.  She just recently established with me in December 2025.  We had originally referred her to local diabetes care provider, however she had to cancel that appointment and has not been able to reschedule.  She was seen the sugar doctor located here locally, but no records have ever been obtained from that office.  Unsure of exactly what regimen she was on before.  In the hospital, they  "did switch her to long-acting insulin, Lantus, she started 20 units nightly, she has mealtime corrective insulin on sliding scale.  She has been doing this regimen since discharge.  Her blood sugars are still ranging primarily between 200 and 300.  She has had a few greater than 400.  She still has some episodes of dizziness, these are still associated with migraines.  She has had migraines before, but reports they were only during her pregnancies.  She has not been on migraine prescription medication before.  She was using over-the-counter medication previously.      Course During Hospital Stay The following information was reviewed by: YOKO Collier on 04/24/2025: Emergency room provider notes, admission history and physical, labs, imaging, consultation notes, discharge summary     The following portions of the patient's history were reviewed and updated as appropriate: allergies, current medications, past family history, past medical history, past social history, past surgical history, and problem list.     Vitals:    04/24/25 0908   BP: 122/78   BP Location: Left arm   Patient Position: Sitting   Cuff Size: Adult   Pulse: 104   Resp: 14   Temp: 98.3 °F (36.8 °C)   TempSrc: Temporal   SpO2: 98%   Weight: (!) 136 kg (300 lb 6.4 oz)   Height: 157.5 cm (62.01\")   PainSc: 0-No pain       Review of Systems    Objective   Physical Exam  Vitals and nursing note reviewed.   Constitutional:       Appearance: Normal appearance. She is obese.   HENT:      Head: Normocephalic and atraumatic.   Cardiovascular:      Rate and Rhythm: Normal rate and regular rhythm. Rhythm irregular.   Pulmonary:      Effort: Pulmonary effort is normal.      Breath sounds: Normal breath sounds.   Neurological:      General: No focal deficit present.      Mental Status: She is alert.   Psychiatric:         Mood and Affect: Mood normal.         Thought Content: Thought content normal.           Assessment & Plan     Diagnoses and all orders " for this visit:    1. Hospital discharge follow-up (Primary)    2. Uncontrolled type 2 diabetes mellitus with hyperglycemia  -     Insulin Glargine (Lantus SoloStar) 100 UNIT/ML injection pen; Inject 25 Units under the skin into the appropriate area as directed Every Night for 30 days.  Dispense: 8 mL; Refill: 0  -     Microalbumin / Creatinine Urine Ratio - Urine, Clean Catch  -     Comprehensive Metabolic Panel    3. Morbid obesity with BMI of 50.0-59.9, adult    4. Mixed hyperlipidemia    5. Primary hypertension    6. Gastroesophageal reflux disease with esophagitis without hemorrhage  -     pantoprazole (PROTONIX) 40 MG EC tablet; Take 1 tablet by mouth Daily.  Dispense: 90 tablet; Refill: 0    7. Allergic rhinitis, unspecified seasonality, unspecified trigger  -     fluticasone (FLONASE) 50 MCG/ACT nasal spray; Administer 2 sprays into the nostril(s) as directed by provider Daily.  Dispense: 18.2 g; Refill: 6    8. Recurrent major depressive disorder, in partial remission    9. Generalized anxiety disorder    10. Syncope, unspecified syncope type  -     Ambulatory Referral to Neurology    11. Migraine without status migrainosus, not intractable, unspecified migraine type  -     Ambulatory Referral to Neurology  -     SUMAtriptan (IMITREX) 50 MG tablet; Take one tablet at onset of headache. May repeat dose one time in 2 hours if headache not relieved.  Dispense: 9 tablet; Refill: 0    Patient with multiple comorbidities including uncontrolled type 2 diabetes, hypertension, hyperlipidemia, severe morbid obesity, with recent hospitalization for syncope, which all records reviewed today, her echocardiogram, CT of head, MRI of the brain, labs overall unremarkable except indicating poorly controlled type 2 diabetes.  We did adjust her long-acting insulin, she will increase to 25 units nightly, we will work on getting her a new appointment with the diabetes care clinic, as she has not had good routine follow-up with  her previous diabetes doctor.  She will continue with the sliding scale.  We will recheck metabolic panel and screen for microalbumin today.  Encouraged healthy eating, diet, exercise.  Refilled other medications as needed.  Will start her on fluticasone for chronic allergic rhinitis, she is on Singulair and cetirizine, still having some breakthrough symptoms.  Her syncope appears to be mild, but associated with migraines.  I have sent in a prescription for triptan, also referral to neurology for further evaluation and recommendations.  Follow-up here in 3 months    Follow Up     No follow-ups on file.

## 2025-04-29 ENCOUNTER — TELEPHONE (OUTPATIENT)
Dept: INTERNAL MEDICINE | Facility: CLINIC | Age: 30
End: 2025-04-29
Payer: COMMERCIAL

## 2025-04-29 NOTE — TELEPHONE ENCOUNTER
"Returning patient call re: \"needs insulin, needles, sugar is over 400 right now\".     Patient states her blood sugars have been running very high \"over 400\", states blood sugar of 420 recently. Patient states she has her meal coverage insulin in a vial but needles. States her long acting insulin is a pen. Patient also reports nausea and vomiting today. States she is not able to keep anything down. Also reports being 9 weeks pregnant.     Patient states she was hospitalized this month for very high blood sugar.    Given report of dehydration and very high blood sugars, recommend that patient present to the ER for urgent evaluation to r/o out dka. Patient  states she will be going to [facility].     "

## 2025-04-30 ENCOUNTER — TELEPHONE (OUTPATIENT)
Dept: INTERNAL MEDICINE | Facility: CLINIC | Age: 30
End: 2025-04-30
Payer: COMMERCIAL

## 2025-04-30 NOTE — TELEPHONE ENCOUNTER
"0825 - Called patient's home to check status and if she had went to the ER / ED as recommended per on-call provider late yesterday afternoon.  Patient had called into office with c/o blood sugar being over 400; nausea and vomiting and being 9 weeks pregnant.      LVM for patient to return call to office.    0827 - Called patient's emergency contact number, Germaine Zhu; no answer and was not able to leave  due to a fast busy signal.      Spoke with YOKO Collier and provided update.  Will attempt to reach patient again later this morning.    GERDA Lima  [facility], [place] office     __________________________________________________________      0940 - Received message from GERDA MANCILLA in office; patient called office back and confirmed that she did not go to ED / ER; patient confirmed her blood sugar dropped back down to \"130's range\" last night.  Patient told nurse that Imitrex was \"not working\"; voiced c/o being weak, tired and headache.  Patient told nurse she was pregnant with twins.      Notified provider of update.    GERDA Lima  [facility], [place] office   "

## 2025-04-30 NOTE — TELEPHONE ENCOUNTER
"Called patient's home and spoke with her in reference to Diabetes appt scheduled with NELIA Grullon for tomorrow, in our [place] office at 11:00am.  Patient confirmed she would be keeping that appointment.  Patient confirmed she is still having episodes of passing out, headache and weakness.  Inquired if she went to the ER / ED as provider had recommended yesterday afternoon.  Pt denied stating that her brother has some insulin needles that would fit her insulin PEN so she used that last night.  Patient confirmed her BS was still going \"up and down and was in the 400's again today.\"  Patient confirmed she was 8 weeks pregnant (had previously told staff she was 9 weeks pregnant) and is being followed by the high risk clinic at [facility] for OB.  Patient confirmed she could not get to [facility] by personal vehicle and EMS would not transport her there.  She stated that [facility] doesn't do anything for her - she confirmed they give her some insulin, sometimes give her IV fluids and then discharge her home; she is never admitted to the hospital.  Told her that I would speak with YOKO Collier as well as YOKO Grullon when she comes in for her appointment tomorrow.  Told her that if needed we can get some lab work completed while in office as well.    Candice Sam RN BSN  [facility], [place] office   "

## 2025-05-01 ENCOUNTER — OFFICE VISIT (OUTPATIENT)
Age: 30
End: 2025-05-01
Payer: COMMERCIAL

## 2025-05-01 ENCOUNTER — CLINICAL SUPPORT (OUTPATIENT)
Dept: INTERNAL MEDICINE | Facility: CLINIC | Age: 30
End: 2025-05-01
Payer: COMMERCIAL

## 2025-05-01 VITALS
DIASTOLIC BLOOD PRESSURE: 80 MMHG | HEIGHT: 62 IN | BODY MASS INDEX: 53.92 KG/M2 | SYSTOLIC BLOOD PRESSURE: 140 MMHG | RESPIRATION RATE: 8 BRPM | OXYGEN SATURATION: 98 % | HEART RATE: 109 BPM | WEIGHT: 293 LBS

## 2025-05-01 DIAGNOSIS — Z01.89 ENCOUNTER FOR LABORATORY TEST: Primary | ICD-10-CM

## 2025-05-01 DIAGNOSIS — I10 PRIMARY HYPERTENSION: ICD-10-CM

## 2025-05-01 DIAGNOSIS — E78.5 DYSLIPIDEMIA, GOAL LDL BELOW 70: ICD-10-CM

## 2025-05-01 DIAGNOSIS — E11.65 TYPE 2 DIABETES MELLITUS WITH HYPERGLYCEMIA, WITH LONG-TERM CURRENT USE OF INSULIN: Primary | ICD-10-CM

## 2025-05-01 DIAGNOSIS — Z79.4 TYPE 2 DIABETES MELLITUS WITH HYPERGLYCEMIA, WITH LONG-TERM CURRENT USE OF INSULIN: Primary | ICD-10-CM

## 2025-05-01 DIAGNOSIS — Z97.8 USES SELF-APPLIED CONTINUOUS GLUCOSE MONITORING DEVICE: ICD-10-CM

## 2025-05-01 DIAGNOSIS — Z01.89 ENCOUNTER FOR LABORATORY TEST: ICD-10-CM

## 2025-05-01 LAB
B-HCG UR QL: NEGATIVE
EXPIRATION DATE: ABNORMAL
EXPIRATION DATE: NORMAL
HBA1C MFR BLD: 10.7 % (ref 4.5–5.7)
INTERNAL NEGATIVE CONTROL: NEGATIVE
INTERNAL POSITIVE CONTROL: POSITIVE
Lab: ABNORMAL
Lab: NORMAL

## 2025-05-01 PROCEDURE — 36415 COLL VENOUS BLD VENIPUNCTURE: CPT | Performed by: NURSE PRACTITIONER

## 2025-05-01 PROCEDURE — 81025 URINE PREGNANCY TEST: CPT | Performed by: NURSE PRACTITIONER

## 2025-05-01 PROCEDURE — 84702 CHORIONIC GONADOTROPIN TEST: CPT | Performed by: NURSE PRACTITIONER

## 2025-05-01 RX ORDER — ACYCLOVIR 400 MG/1
1 TABLET ORAL
Qty: 3 EACH | Refills: 2 | Status: SHIPPED | OUTPATIENT
Start: 2025-05-01

## 2025-05-01 RX ORDER — INSULIN ASPART 100 [IU]/ML
INJECTION, SOLUTION INTRAVENOUS; SUBCUTANEOUS
Qty: 30 ML | Refills: 0 | Status: SHIPPED | OUTPATIENT
Start: 2025-05-01

## 2025-05-01 RX ORDER — INSULIN GLARGINE 100 [IU]/ML
35 INJECTION, SOLUTION SUBCUTANEOUS NIGHTLY
Qty: 15 ML | Refills: 2 | Status: SHIPPED | OUTPATIENT
Start: 2025-05-01

## 2025-05-01 NOTE — PROGRESS NOTES
Patient came into office for appointment with Diabetes Management this morning; while in office, patient had labs collected.  Notified provider of urine pregnancy test.       Venipuncture Blood Specimen Collection  Venipuncture performed in Left Hand by Candice Sam RN with good hemostasis. Patient tolerated the procedure well without complications. Patient left urine for POCT pregnancy test as well.   05/01/25   Candice Sam RN

## 2025-05-01 NOTE — PROGRESS NOTES
Chief Complaint  Follow-up (Med Mgt, A1C Eval)    Referred By: No ref. provider found    Subjective          Patient or patient representative verbalized consent for the use of Ambient Listening during the visit with  YOKO Germain for chart documentation. 5/1/2025  11:15 EDT    Dorinda Sherwood presents to [facility] for diabetes medication management    History of Present Illness    History of Present Illness  The patient presents for evaluation of diabetes.    The chief complaint is fluctuating blood glucose levels. She is currently utilizing the Dexcom G7 for glucose monitoring. Previous treatment included the OmniPod, but due to its ineffectiveness, she was transitioned to a different insulin pump. However, the pump is not currently in use as it was misplaced. Her fiancé has suggested following the instructions provided with the insulin for mealtime administration. Uncertainty exists regarding the specific type of pump she possesses. Diabetes management was previously overseen by Dr. Theodore.     Recent episodes of hypoglycemia have been reported, with blood glucose levels dropping to around 100, accompanied by severe migraines. Persistent neuropathic symptoms, including numbness, tingling, and shooting pains in the extremities, are present. Blurred vision necessitates the constant use of corrective eyewear. Additionally, intermittent pain in unspecified locations is reported.    Blood pressure issues are noted. Migraines are present, and she was referred to a brain doctor for further evaluation.      Visit type:  follow-up  Diabetes type:  Type 2  Current diabetes status/concerns/issues:  Highly variable blood glucose values  Other health concerns:  hypertension, migraines, asthma, allergies  Current Diabetes symptoms:    Polyuria: Yes     Polydipsia: Yes     Polyphagia: Yes     Blurred vision: Yes     Excessive fatigue: Yes    Known Diabetes complications:  Neuropathy:  "Numbness, Tingling, Pain, Burning, and Shooting Pain; Location: Feet and Bilateral  Renal: Normal eGFR per current labs and Microalbuminuria - NEGATIVE  Eyes: No current eye exam available in record; Location: N/A  Amputation/Wounds: None  GI: Constipation, Reflux, and Indigestion  Cardiovascular: Hypertension, Hyperlipidemia, Hypertriglyceridemia, and MI (History of)  ED: N/A  Other: None  Hypoglycemia:  None reported at this time  Hypoglycemia Symptoms:  No hypoglycemia at this time  Current diabetes treatment:  NovoLog 10 units 3 times daily with meals, Lantus 25 units nightly  Blood glucose device:  Dexcom CGM  Blood glucose monitoring frequency:  Continuous per CGM  Blood glucose range/average:  The 14-day sensor report shows an average glucose of 314mg/dL, with 2% in target range ( mgdL), 15% in the high range (181-250 mg/dL), 83% in the very high range (>250 mg/dL), 0% in the low range (54-70 mg/dL) and 0% in the very low range (<54 mg/dL).   Glucose Source: Device Reviewed  Diet:  \"Eat what I want\"/No diet plan, Number of meals each day - 2-3; Number of snacks each day - occasional  Activity/Exercise:  None    Past Medical History:   Diagnosis Date    Anesthesia     slow to wake up postop, anxiety postop    Anxiety     AR (allergic rhinitis)     ASD (atrial septal defect)     had since birth- asymptomatic- see's dr falk     Asthma     no inhalers    Bipolar disorder     Colon polyp     Depression     Diabetes mellitus     type ii- bg runs around 200-300    Gallstones currently    GERD (gastroesophageal reflux disease)     Hypertension     Migraine     Ovarian cyst     PTSD (post-traumatic stress disorder)     Trauma      Past Surgical History:   Procedure Laterality Date     SECTION      CHOLECYSTECTOMY N/A 2021    Procedure: CHOLECYSTECTOMY LAPAROSCOPIC;  Surgeon: Robert Araya MD;  [facility];  Service: General;  Laterality: N/A;    COLONOSCOPY      " COLONOSCOPY N/A 6/17/2024    Procedure: COLONOSCOPY;  Surgeon: Chance Campbell MD;  Location: [facility] END[facility];  Service: Gastroenterology;  Laterality: N/A;  ANAL FISSURE    ENDOSCOPY      ENDOSCOPY N/A 6/17/2024    Procedure: ESOPHAGOGASTRODUODENOSCOPY WITH BIOPSIES;  Surgeon: Chance Campbell MD;  Location: [facility] ENDOSCOPY;  Service: Gastroenterology;  Laterality: N/A;  SMALL HIATAL HERNIA  ESOPHAGITIS    TUBAL ABDOMINAL LIGATION      WISDOM TOOTH EXTRACTION       Family History   Problem Relation Age of Onset    Heart disease Father     Heart disease Other     Heart disease Other     Heart disease Other     Diabetes type II Other     Colon cancer Neg Hx      Social History     Socioeconomic History    Marital status:     Number of children: 2   Tobacco Use    Smoking status: Never     Passive exposure: Never    Smokeless tobacco: Never   Vaping Use    Vaping status: Never Used   Substance and Sexual Activity    Alcohol use: Never    Drug use: Never    Sexual activity: Defer     Allergies   Allergen Reactions    Tramadol Anaphylaxis and Hives       Current Outpatient Medications:     albuterol (PROVENTIL) (2.5 MG/3ML) 0.083% nebulizer solution, Take 2.5 mg by nebulization Every 4 (Four) Hours As Needed for Wheezing or Shortness of Air., Disp: , Rfl:     albuterol sulfate  (90 Base) MCG/ACT inhaler, Inhale 2 puffs 4 (Four) Times a Day As Needed for Wheezing or Shortness of Air., Disp: , Rfl:     Alcohol Swabs 70 % pads, Use 1 each 5 (Five) Times a Day for 30 days., Disp: 150 each, Rfl: 0    cetirizine (zyrTEC) 10 MG tablet, Take 1 tablet by mouth Daily., Disp: 90 tablet, Rfl: 3    cyanocobalamin (VITAMIN B-12) 1000 MCG tablet, Take 1 tablet by mouth Daily for 30 days., Disp: 30 tablet, Rfl: 0    fluticasone (FLONASE) 50 MCG/ACT nasal spray, Administer 2 sprays into the nostril(s) as directed by provider Daily., Disp: 18.2 g, Rfl: 6    Insulin Aspart (novoLOG) 100 UNIT/ML injection,  "Inject 10 Units under the skin into the appropriate area as directed 3 (Three) Times a Day Before Meals., Disp: 1 each, Rfl: 0    Insulin Glargine (Lantus SoloStar) 100 UNIT/ML injection pen, Inject 35 Units under the skin into the appropriate area as directed Every Night., Disp: 15 mL, Rfl: 2    Insulin Pen Needle 31G X 8 MM misc, Use 1 each 5 (Five) Times a Day for 30 days., Disp: 150 each, Rfl: 0    montelukast (Singulair) 10 MG tablet, Take 1 tablet by mouth Every Night., Disp: 90 tablet, Rfl: 1    pantoprazole (PROTONIX) 40 MG EC tablet, Take 1 tablet by mouth Daily., Disp: 90 tablet, Rfl: 0    SUMAtriptan (IMITREX) 50 MG tablet, Take one tablet at onset of headache. May repeat dose one time in 2 hours if headache not relieved., Disp: 9 tablet, Rfl: 0    venlafaxine XR (Effexor XR) 75 MG 24 hr capsule, Take 1 capsule by mouth Daily for 30 days., Disp: 30 capsule, Rfl: 0    Continuous Glucose Sensor (Dexcom G7 Sensor) misc, Use 1 each Every 10 (Ten) Days., Disp: 3 each, Rfl: 2    insulin aspart (NovoLOG FlexPen) 100 UNIT/ML solution pen-injector sc pen, Administer 10 units plus your sliding scale for a maximum of 20 units with each meal up to 4 times daily.  Maximum daily dose of 80 units., Disp: 30 mL, Rfl: 0    Objective     Vitals:    05/01/25 1112   BP: 140/80   BP Location: Left arm   Patient Position: Sitting   Cuff Size: Adult   Pulse: 109   Resp: 8   SpO2: 98%   Weight: 135 kg (297 lb 1.6 oz)   Height: 157.5 cm (62.01\")     Body mass index is 54.32 kg/m².    Physical Exam  Constitutional:       Appearance: Normal appearance. She is obese.      Comments: Severe Obesity (BMI >= 40) Pt Current BMI = 54.32      HENT:      Head: Normocephalic and atraumatic.      Right Ear: External ear normal.      Left Ear: External ear normal.      Nose: Nose normal.   Eyes:      Extraocular Movements: Extraocular movements intact.      Conjunctiva/sclera: Conjunctivae normal.   Pulmonary:      Effort: Pulmonary effort is " normal.   Musculoskeletal:         General: Normal range of motion.      Cervical back: Normal range of motion.   Skin:     General: Skin is warm and dry.   Neurological:      General: No focal deficit present.      Mental Status: She is alert and oriented to person, place, and time. Mental status is at baseline.   Psychiatric:         Mood and Affect: Mood normal.         Behavior: Behavior normal.         Thought Content: Thought content normal.         Judgment: Judgment normal.         Result Review :   The following data was reviewed by: YOKO Germain on 05/01/2025:    Most Recent A1C          5/1/2025    11:15   HGBA1C Most Recent   Hemoglobin A1C 10.7        A1C Last 3 Results          12/18/2024    08:44 4/8/2025    18:53 5/1/2025    11:15   HGBA1C Last 3 Results   Hemoglobin A1C 10.10  11.00  10.7      A1c collected in the office today is 10.7%, indicating Uncontrolled Type II diabetes.  This result is down from the prior result of 11% collected on 4/8/2025    Glucose   Date Value Ref Range Status   04/11/2025 224 (H) 70 - 99 mg/dL Final     Comment:     Serial Number: [id number]:  [id number]     Creatinine   Date Value Ref Range Status   04/24/2025 0.53 (L) 0.57 - 1.00 mg/dL Final   04/11/2025 0.41 (L) 0.57 - 1.00 mg/dL Final     eGFR   Date Value Ref Range Status   04/24/2025 127.8 >60.0 mL/min/1.73 Final   04/11/2025 135.9 >60.0 mL/min/1.73 Final     Labs collected on 4/24/2025 show Normal values    Microalbumin, Urine   Date Value Ref Range Status   04/24/2025 <1.2 mg/dL Final   12/18/2024 1.3 mg/dL Final     Creatinine, Urine   Date Value Ref Range Status   04/24/2025 38.1 mg/dL Final     Microalbumin/Creatinine Ratio   Date Value Ref Range Status   04/24/2025   Final     Comment:     Unable to calculate     Urine microalbuminuria collected on 4/24/2025 is negative for microalbuminuria    Total Cholesterol   Date Value Ref Range Status   12/18/2024 183 0 - 200 mg/dL Final   04/08/2024 198  0 - 200 mg/dL Final     Triglycerides   Date Value Ref Range Status   12/18/2024 194 (H) 0 - 150 mg/dL Final   04/08/2024 254 (H) 0 - 150 mg/dL Final     HDL Cholesterol   Date Value Ref Range Status   12/18/2024 31 (L) 40 - 60 mg/dL Final   04/08/2024 33 (L) 40 - 60 mg/dL Final     LDL Cholesterol    Date Value Ref Range Status   12/18/2024 118 (H) 0 - 100 mg/dL Final   04/08/2024 120 (H) 0 - 100 mg/dL Final     Lipid panel collected on 12/18/2024 shows Hyperlipidemia, Hypertriglyceridemia, and low HDL            Diagnoses and all orders for this visit:    1. Type 2 diabetes mellitus with hyperglycemia, with long-term current use of insulin (Primary)  -     POC Glycosylated Hemoglobin (Hb A1C)  -     Insulin Glargine (Lantus SoloStar) 100 UNIT/ML injection pen; Inject 35 Units under the skin into the appropriate area as directed Every Night.  Dispense: 15 mL; Refill: 2  -     insulin aspart (NovoLOG FlexPen) 100 UNIT/ML solution pen-injector sc pen; Administer 10 units plus your sliding scale for a maximum of 20 units with each meal up to 4 times daily.  Maximum daily dose of 80 units.  Dispense: 30 mL; Refill: 0  -     Continuous Glucose Sensor (Dexcom G7 Sensor) misc; Use 1 each Every 10 (Ten) Days.  Dispense: 3 each; Refill: 2    2. Primary hypertension    3. Dyslipidemia, goal LDL below 70    4. Uses self-applied continuous glucose monitoring device        Assessment & Plan  1. Diabetes Mellitus: Inadequately controlled  - Average blood glucose level over the past 14 days is 314 mg/dL.  - Within the target range of 70 to 180 mg/dL only 2% of the time; goal is 70% or more.  - Blood glucose levels are within the target range between midnight and 4 AM, but remain high outside of these hours.  - Lantus dosage increased to 35 units once daily.  - Implementing a more intensified sliding scale for mealtime insulin: 10 units with each meal, plus additional units based on blood glucose levels.  - Continue to follow the  sliding scale up to a maximum of 20 units with each meal, with a maximum daily dose of 80 units of NovoLog.    The patient will monitor her blood glucose levels per continuous glucose monitor.  If she develops problematic hyperglycemia or hypoglycemia or adverse drug reactions, she will contact the office for further instructions.        Follow Up     Return in about 4 weeks (around 5/29/2025) for CGM Follow-Up, Medication Management.    Patient was given instructions and counseling regarding her condition or for health maintenance advice. Please see specific information pulled into the AVS if appropriate.     Robert Funes, APRLUKE  05/01/2025    Dictated Utilizing Dragon Dictation.  Please note that portions of this note were completed with a voice recognition program.  Part of this note may be an electronic transcription/translation of spoken language to printed text using the Dragon Dictation System.

## 2025-05-01 NOTE — PATIENT INSTRUCTIONS
Take Novolog insulin to correct glucose levels over 125 mg/dl as follows:    Take 10 units of Novolog plus the following sliding scale with each meal.    If blood glucose is less than 125 mg/dl - 0 units  If blood glucose is between 126 and 150 -  1 units  If blood glucose is between 151 and 175 - 2 units  If blood glucose is between 176 and 200 - 3 units  If blood glucose is between 201 and 225 - 4 units  If blood glucose is between 226 and 250 - 5 units  If blood glucose is between 251 and 275 - 6 units  If blood glucose is between 276 and 300 - 7 units  If blood glucose is between 301 and 325 - 8 units  If blood glucose is between 326 and 350 - 9 units  If blood glucose is 351 or above - 10 units     Lantus:    Increase Lantus to 35 units once daily.

## 2025-05-02 LAB — HCG INTACT+B SERPL-ACNC: <1 MIU/ML

## 2025-05-05 ENCOUNTER — TELEPHONE (OUTPATIENT)
Dept: INTERNAL MEDICINE | Facility: CLINIC | Age: 30
End: 2025-05-05
Payer: COMMERCIAL

## 2025-05-05 NOTE — TELEPHONE ENCOUNTER
"Called patient at home and discussed her urine and blood pregnancy test results with her.  Confirmed that the urine test was negative as well as the blood test was negative.  Confirmed with the patient that she was NOT pregnant.  Patient inquired if a home test could be inaccurate.  Explained that there are instances of \"false positives\" that can occur, however she completed the blood test and it is accurate.  Inquired if she had taken another pregnancy test and she stated \"not yet.\"  Confirmed with her that according to the labs completed in office, she was not pregnant at this time.  Patient verbalized understanding.    Candice Sam RN BSN  [facility], [place] office   "

## 2025-05-09 ENCOUNTER — TELEPHONE (OUTPATIENT)
Dept: INTERNAL MEDICINE | Facility: CLINIC | Age: 30
End: 2025-05-09

## 2025-05-09 NOTE — TELEPHONE ENCOUNTER
Caller: Dorinda Sherwood    Relationship: Self    Best call back number: [phone]    Which medication are you concerned about: VENLAFAXINE 75MG     Who prescribed you this medication: YOKO VIERA     When did you start taking this medication: IN APRIL     What are your concerns: DOES NOT FEEL LIKE ITS WORKING, FEELS LIKE SHE IS SEEING OR HEARING THINGS THAT ARE NOT THERE, MEMORY CONCERNS     How long have you had these concerns: THE PAST 2 WEEKS

## 2025-05-09 NOTE — TELEPHONE ENCOUNTER
"Called patient, patient stated that the Venlafaxine was working fine for her but she needs to be put on something else. Patient said that her daughters school is saying that she has called them multiple times in the past few weeks but patient does not remember doing it, patient stated that when she went to the daughters school she had a panic attack because she felt like she was 'in a corner\". Patient states that her anxiety is also acting up a lot. Patient denies any memory issues but she did mention she thinks she has been diagnosed in the past with schizophrenia. Patient is on the schedule to see Mindy on 5/12/2025  "

## 2025-05-16 NOTE — TELEPHONE ENCOUNTER
Patient no showed appointment on Monday with Mindy, called patient today to check on her, she stated her son was having seizures that day and she was in the ER with him. Patient stated her daughter has an appointment on 5/20/2025 with Abel and asked if she could be seen as well, patient scheduled for 8:15 am on 5/20/2025

## 2025-05-24 ENCOUNTER — DOCUMENTATION (OUTPATIENT)
Dept: INTERNAL MEDICINE | Facility: CLINIC | Age: 30
End: 2025-05-24
Payer: COMMERCIAL

## 2025-05-24 RX ORDER — VENLAFAXINE HYDROCHLORIDE 75 MG/1
75 CAPSULE, EXTENDED RELEASE ORAL DAILY
Qty: 30 CAPSULE | Refills: 0 | Status: SHIPPED | OUTPATIENT
Start: 2025-05-24 | End: 2025-06-23

## 2025-05-24 RX ORDER — BUSPIRONE HYDROCHLORIDE 5 MG/1
5 TABLET ORAL 3 TIMES DAILY PRN
Qty: 30 TABLET | Refills: 0 | Status: SHIPPED | OUTPATIENT
Start: 2025-05-24

## 2025-05-27 DIAGNOSIS — Z79.4 TYPE 2 DIABETES MELLITUS WITH HYPERGLYCEMIA, WITH LONG-TERM CURRENT USE OF INSULIN: ICD-10-CM

## 2025-05-27 DIAGNOSIS — E11.65 TYPE 2 DIABETES MELLITUS WITH HYPERGLYCEMIA, WITH LONG-TERM CURRENT USE OF INSULIN: ICD-10-CM

## 2025-05-28 RX ORDER — INSULIN ASPART 100 [IU]/ML
INJECTION, SOLUTION INTRAVENOUS; SUBCUTANEOUS
Qty: 30 ML | Refills: 0 | Status: SHIPPED | OUTPATIENT
Start: 2025-05-28

## 2025-06-10 ENCOUNTER — TELEPHONE (OUTPATIENT)
Dept: INTERNAL MEDICINE | Facility: CLINIC | Age: 30
End: 2025-06-10
Payer: COMMERCIAL

## 2025-06-10 NOTE — TELEPHONE ENCOUNTER
Left voicemail to see if patient is intending on keeping the appt scheduled for tomorrow and also the appt. Scheduled for jul 24th.

## 2025-06-11 ENCOUNTER — OFFICE VISIT (OUTPATIENT)
Dept: INTERNAL MEDICINE | Facility: CLINIC | Age: 30
End: 2025-06-11
Payer: COMMERCIAL

## 2025-06-11 VITALS
HEIGHT: 62 IN | WEIGHT: 293 LBS | BODY MASS INDEX: 53.92 KG/M2 | TEMPERATURE: 98.3 F | HEART RATE: 115 BPM | SYSTOLIC BLOOD PRESSURE: 110 MMHG | DIASTOLIC BLOOD PRESSURE: 70 MMHG | RESPIRATION RATE: 12 BRPM | OXYGEN SATURATION: 98 %

## 2025-06-11 DIAGNOSIS — F41.1 GENERALIZED ANXIETY DISORDER: ICD-10-CM

## 2025-06-11 DIAGNOSIS — J30.9 ALLERGIC RHINITIS, UNSPECIFIED SEASONALITY, UNSPECIFIED TRIGGER: Primary | ICD-10-CM

## 2025-06-11 DIAGNOSIS — F33.41 RECURRENT MAJOR DEPRESSIVE DISORDER, IN PARTIAL REMISSION: ICD-10-CM

## 2025-06-11 DIAGNOSIS — R05.1 ACUTE COUGH: ICD-10-CM

## 2025-06-11 DIAGNOSIS — G47.00 INSOMNIA, UNSPECIFIED TYPE: ICD-10-CM

## 2025-06-11 PROCEDURE — 3046F HEMOGLOBIN A1C LEVEL >9.0%: CPT | Performed by: NURSE PRACTITIONER

## 2025-06-11 PROCEDURE — 1126F AMNT PAIN NOTED NONE PRSNT: CPT | Performed by: NURSE PRACTITIONER

## 2025-06-11 PROCEDURE — 99214 OFFICE O/P EST MOD 30 MIN: CPT | Performed by: NURSE PRACTITIONER

## 2025-06-11 PROCEDURE — 3074F SYST BP LT 130 MM HG: CPT | Performed by: NURSE PRACTITIONER

## 2025-06-11 PROCEDURE — 3078F DIAST BP <80 MM HG: CPT | Performed by: NURSE PRACTITIONER

## 2025-06-11 RX ORDER — TRAZODONE HYDROCHLORIDE 50 MG/1
25 TABLET ORAL NIGHTLY
Qty: 30 TABLET | Refills: 0 | Status: SHIPPED | OUTPATIENT
Start: 2025-06-11

## 2025-06-11 RX ORDER — VENLAFAXINE HYDROCHLORIDE 75 MG/1
75 CAPSULE, EXTENDED RELEASE ORAL DAILY
Qty: 30 CAPSULE | Refills: 0 | Status: SHIPPED | OUTPATIENT
Start: 2025-06-11 | End: 2025-07-11

## 2025-06-11 RX ORDER — BUSPIRONE HYDROCHLORIDE 5 MG/1
5 TABLET ORAL 3 TIMES DAILY PRN
Qty: 30 TABLET | Refills: 0 | Status: SHIPPED | OUTPATIENT
Start: 2025-06-11

## 2025-06-11 NOTE — PROGRESS NOTES
"Chief Complaint  Anxiety, Depression, and Cough (Cough that is dry from mowing grass/)    Subjective        Dorinda Sherwood presents to [facility] for follow-up for anxiety, depression, cough.    Patient reports that she has been dealing with anxiety, she was started on venlafaxine, which she would like to continue as she does feel like it is helping.  She was also prescribed buspirone, she has been using and does feel like it has helped some.  She is having difficult time with sleep and would like to see if there is anything that could help.  She has tried over-the-counter melatonin.    She has been having a cough, she does have what appears to be moderate to severe allergies.  This cough and congestion happen after she mows the lawn, will happen almost immediately after mowing in the last 2 to 3 days.  She is on allergy medication and has inhalers to use as needed.    Objective   Vital Signs:   /70 (BP Location: Left arm, Patient Position: Sitting, Cuff Size: Large Adult)   Pulse 115   Temp 98.3 °F (36.8 °C) (Temporal)   Resp 12   Ht 157.5 cm (62.01\")   Wt (!) 140 kg (308 lb 9.6 oz)   SpO2 98%   BMI 56.43 kg/m²     Physical Exam  Vitals and nursing note reviewed.   Constitutional:       Appearance: Normal appearance. She is obese.   HENT:      Head: Normocephalic and atraumatic.      Right Ear: External ear normal.      Left Ear: External ear normal.   Cardiovascular:      Rate and Rhythm: Normal rate and regular rhythm.   Pulmonary:      Effort: Pulmonary effort is normal.      Breath sounds: Normal breath sounds.   Neurological:      Mental Status: She is alert.   Psychiatric:         Mood and Affect: Mood normal.        Result Review :  {The following data was reviewed by YOKO Collier on 06/11/25                Diagnoses and all orders for this visit:    1. Allergic rhinitis, unspecified seasonality, unspecified trigger (Primary)    2. Acute cough    3. " Generalized anxiety disorder  -     busPIRone (BUSPAR) 5 MG tablet; Take 1 tablet by mouth 3 (Three) Times a Day As Needed (Anxiety).  Dispense: 30 tablet; Refill: 0  -     venlafaxine XR (Effexor XR) 75 MG 24 hr capsule; Take 1 capsule by mouth Daily for 30 days.  Dispense: 30 capsule; Refill: 0    4. Recurrent major depressive disorder, in partial remission  -     venlafaxine XR (Effexor XR) 75 MG 24 hr capsule; Take 1 capsule by mouth Daily for 30 days.  Dispense: 30 capsule; Refill: 0    5. Insomnia, unspecified type  -     traZODone (DESYREL) 50 MG tablet; Take 0.5 tablets by mouth Every Night.  Dispense: 30 tablet; Refill: 0    Patient with significant allergies, I did recommend that she start wearing a mask when mowing the lawn, as it does sound like she is having allergic reaction related to the grasses.  Patient verbalized understanding.  Also recommended she continue taking her medications as prescribed, she may need to be referred to allergist if these measures do not help.  Otherwise, would recommend she find someone else to mow the yard.    Patient's anxiety and depression seem to be improving slightly, we will maintain her medications, refill today.  She is having some trouble with sleep, so I did recommend the addition of low-dose trazodone, half tablet nightly.  Can follow-up with her new PCP when needed.      Follow Up   No follow-ups on file.  Patient was given instructions and counseling regarding her condition or for health maintenance advice. Please see specific information pulled into the AVS if appropriate.     Abel Moreno, YOKO  6/11/2025  This note was electronically signed.

## 2025-07-08 RX ORDER — INSULIN ASPART 100 [IU]/ML
10 INJECTION, SOLUTION INTRAVENOUS; SUBCUTANEOUS
Qty: 15 ML | Refills: 1 | Status: SHIPPED | OUTPATIENT
Start: 2025-07-08

## 2025-07-08 NOTE — TELEPHONE ENCOUNTER
Rx Refill Note  Requested Prescriptions     Pending Prescriptions Disp Refills    Insulin Aspart (novoLOG) 100 UNIT/ML injection 1 each 0     Sig: Inject 10 Units under the skin into the appropriate area as directed 3 (Three) Times a Day Before Meals.      Last office visit with prescribing clinician: 3-1-23    Next office visit with prescribing clinician: 5-1-25    RECEIVED PAPER REQUEST. Hasn't had a office visit with us in awhile.     Briana Lee MA  07/08/25, 10:07 EDT

## 2025-07-15 ENCOUNTER — OFFICE VISIT (OUTPATIENT)
Dept: FAMILY MEDICINE CLINIC | Facility: CLINIC | Age: 30
End: 2025-07-15
Payer: COMMERCIAL

## 2025-07-15 VITALS
BODY MASS INDEX: 53.92 KG/M2 | SYSTOLIC BLOOD PRESSURE: 112 MMHG | TEMPERATURE: 97 F | HEIGHT: 62 IN | OXYGEN SATURATION: 96 % | HEART RATE: 107 BPM | DIASTOLIC BLOOD PRESSURE: 70 MMHG | WEIGHT: 293 LBS

## 2025-07-15 DIAGNOSIS — J45.20 MILD INTERMITTENT ASTHMA WITHOUT COMPLICATION: ICD-10-CM

## 2025-07-15 DIAGNOSIS — J30.9 ALLERGIC RHINITIS, UNSPECIFIED SEASONALITY, UNSPECIFIED TRIGGER: ICD-10-CM

## 2025-07-15 DIAGNOSIS — F33.41 RECURRENT MAJOR DEPRESSIVE DISORDER, IN PARTIAL REMISSION: ICD-10-CM

## 2025-07-15 DIAGNOSIS — F41.1 GENERALIZED ANXIETY DISORDER: ICD-10-CM

## 2025-07-15 DIAGNOSIS — Z79.4 TYPE 2 DIABETES MELLITUS WITH HYPERGLYCEMIA, WITH LONG-TERM CURRENT USE OF INSULIN: ICD-10-CM

## 2025-07-15 DIAGNOSIS — G47.00 INSOMNIA, UNSPECIFIED TYPE: ICD-10-CM

## 2025-07-15 DIAGNOSIS — Z00.00 ENCOUNTER FOR MEDICAL EXAMINATION TO ESTABLISH CARE: Primary | ICD-10-CM

## 2025-07-15 DIAGNOSIS — E11.65 TYPE 2 DIABETES MELLITUS WITH HYPERGLYCEMIA, WITH LONG-TERM CURRENT USE OF INSULIN: ICD-10-CM

## 2025-07-15 PROBLEM — R93.1 ABNORMAL ECHOCARDIOGRAM: Status: ACTIVE | Noted: 2018-01-26

## 2025-07-15 PROBLEM — L73.2 HYDRADENITIS: Status: ACTIVE | Noted: 2019-06-20

## 2025-07-15 PROBLEM — E66.813 CLASS 3 SEVERE OBESITY DUE TO EXCESS CALORIES WITH SERIOUS COMORBIDITY AND BODY MASS INDEX (BMI) OF 50.0 TO 59.9 IN ADULT: Status: ACTIVE | Noted: 2019-09-09

## 2025-07-15 PROBLEM — I49.9 ARRHYTHMIA: Status: ACTIVE | Noted: 2017-12-13

## 2025-07-15 PROCEDURE — 3046F HEMOGLOBIN A1C LEVEL >9.0%: CPT | Performed by: STUDENT IN AN ORGANIZED HEALTH CARE EDUCATION/TRAINING PROGRAM

## 2025-07-15 PROCEDURE — 1160F RVW MEDS BY RX/DR IN RCRD: CPT | Performed by: STUDENT IN AN ORGANIZED HEALTH CARE EDUCATION/TRAINING PROGRAM

## 2025-07-15 PROCEDURE — 1126F AMNT PAIN NOTED NONE PRSNT: CPT | Performed by: STUDENT IN AN ORGANIZED HEALTH CARE EDUCATION/TRAINING PROGRAM

## 2025-07-15 PROCEDURE — 3078F DIAST BP <80 MM HG: CPT | Performed by: STUDENT IN AN ORGANIZED HEALTH CARE EDUCATION/TRAINING PROGRAM

## 2025-07-15 PROCEDURE — 1159F MED LIST DOCD IN RCRD: CPT | Performed by: STUDENT IN AN ORGANIZED HEALTH CARE EDUCATION/TRAINING PROGRAM

## 2025-07-15 PROCEDURE — 3074F SYST BP LT 130 MM HG: CPT | Performed by: STUDENT IN AN ORGANIZED HEALTH CARE EDUCATION/TRAINING PROGRAM

## 2025-07-15 PROCEDURE — 99214 OFFICE O/P EST MOD 30 MIN: CPT | Performed by: STUDENT IN AN ORGANIZED HEALTH CARE EDUCATION/TRAINING PROGRAM

## 2025-07-15 RX ORDER — MONTELUKAST SODIUM 10 MG/1
10 TABLET ORAL NIGHTLY
Qty: 90 TABLET | Refills: 1 | Status: SHIPPED | OUTPATIENT
Start: 2025-07-15

## 2025-07-15 RX ORDER — INSULIN ASPART 100 [IU]/ML
10 INJECTION, SOLUTION INTRAVENOUS; SUBCUTANEOUS
Qty: 15 ML | Refills: 1 | Status: SHIPPED | OUTPATIENT
Start: 2025-07-15

## 2025-07-15 RX ORDER — VENLAFAXINE HYDROCHLORIDE 75 MG/1
75 CAPSULE, EXTENDED RELEASE ORAL DAILY
Qty: 30 CAPSULE | Refills: 0 | Status: SHIPPED | OUTPATIENT
Start: 2025-07-15 | End: 2025-08-14

## 2025-07-15 RX ORDER — TRAZODONE HYDROCHLORIDE 50 MG/1
25 TABLET ORAL NIGHTLY
Qty: 30 TABLET | Refills: 0 | Status: SHIPPED | OUTPATIENT
Start: 2025-07-15

## 2025-07-15 RX ORDER — ALBUTEROL SULFATE AND BUDESONIDE 90; 80 UG/1; UG/1
90 AEROSOL, METERED RESPIRATORY (INHALATION) EVERY 6 HOURS PRN
Qty: 10.7 G | Refills: 3 | Status: SHIPPED | OUTPATIENT
Start: 2025-07-15

## 2025-07-15 RX ORDER — ACYCLOVIR 400 MG/1
1 TABLET ORAL
Qty: 3 EACH | Refills: 2 | Status: SHIPPED | OUTPATIENT
Start: 2025-07-15

## 2025-07-15 RX ORDER — ACYCLOVIR 400 MG/1
1 TABLET ORAL
Qty: 3 EACH | Refills: 2 | Status: SHIPPED | OUTPATIENT
Start: 2025-07-15 | End: 2025-07-15 | Stop reason: SDUPTHER

## 2025-07-15 RX ORDER — ALBUTEROL SULFATE 0.83 MG/ML
2.5 SOLUTION RESPIRATORY (INHALATION) EVERY 4 HOURS PRN
Qty: 3 ML | Refills: 2 | Status: SHIPPED | OUTPATIENT
Start: 2025-07-15

## 2025-07-15 RX ORDER — BUSPIRONE HYDROCHLORIDE 5 MG/1
5 TABLET ORAL 3 TIMES DAILY PRN
Qty: 30 TABLET | Refills: 0 | Status: SHIPPED | OUTPATIENT
Start: 2025-07-15

## 2025-07-15 RX ORDER — INSULIN GLARGINE 100 [IU]/ML
INJECTION, SOLUTION SUBCUTANEOUS
COMMUNITY
Start: 2025-07-12

## 2025-07-15 NOTE — PROGRESS NOTES
Chief Complaint  Wheezing    Subjective          Dorinda Sherwood presents to [facility]  Wheezing           History of Present Illness  The patient presents for evaluation of asthma, anxiety, depression, and diabetes.    She has been experiencing asthma symptoms since 06/01/2025. Her current treatment includes a rescue inhaler, but she does not use a daily inhaler. She was previously on Singulair, but this was discontinued by her family doctor. She also has a nebulizer at home but lacks the solution. She does not take any medication for allergies, although she was previously on Zyrtec.    She is currently taking BuSpar as needed for anxiety, which she reports as being effective. She is also on venlafaxine for depression and trazodone for sleep. She is requesting refills for these medications.    She has diabetes and is currently using long-acting and short-acting insulin. She is under the care of Dr. Theodore for her diabetes management. She is scheduled to receive an insulin pump next month and is requesting refills for her insulin.    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      Current Outpatient Medications   Medication Instructions    albuterol (PROVENTIL) 2.5 mg, Nebulization, Every 4 Hours PRN    Albuterol-Budesonide (Airsupra) 90-80 MCG/ACT aerosol 90 Act, Inhalation, Every 6 Hours PRN    busPIRone (BUSPAR) 5 mg, Oral, 3 Times Daily PRN    Continuous Glucose Sensor (Dexcom G7 Sensor) misc 1 each, Not Applicable, Every 10 Days    Insulin Aspart (NOVOLOG) 10 Units, Subcutaneous, 3 Times Daily Before Meals    Lantus 100 UNIT/ML injection     montelukast (SINGULAIR) 10 mg, Oral, Nightly    Tirzepatide 2.5 mg, Subcutaneous, Weekly    traZODone (DESYREL) 25 mg, Oral, Nightly    venlafaxine XR (EFFEXOR XR) 75 mg, Oral, Daily       The following portions of the patient's history were reviewed and updated as appropriate: allergies, current medications, past  "family history, past medical history, past social history, past surgical history, and problem list.    Objective   Vital Signs:   /70   Pulse 107   Temp 97 °F (36.1 °C)   Ht 157.5 cm (62.01\")   Wt (!) 142 kg (312 lb)   SpO2 96%   BMI 57.05 kg/m²     BP Readings from Last 3 Encounters:   07/15/25 112/70   06/11/25 110/70   05/01/25 140/80     Wt Readings from Last 3 Encounters:   07/15/25 (!) 142 kg (312 lb)   06/11/25 (!) 140 kg (308 lb 9.6 oz)   05/01/25 135 kg (297 lb 1.6 oz)     Class 3 Severe Obesity (BMI >=40). Obesity-related health conditions include the following: none. Obesity is newly identified. BMI is is above average; BMI management plan is completed. We discussed low calorie, low carb based diet program, portion control, increasing exercise, joining a fitness center or start home based exercise program, and pharmacologic options including GLP-1.     Physical Exam  Constitutional:       Appearance: Normal appearance. She is obese.   HENT:      Head: Normocephalic.      Nose: Nose normal.      Mouth/Throat:      Mouth: Mucous membranes are moist.   Eyes:      Pupils: Pupils are equal, round, and reactive to light.   Cardiovascular:      Rate and Rhythm: Normal rate and regular rhythm.   Pulmonary:      Effort: Pulmonary effort is normal.      Breath sounds: Examination of the right-lower field reveals decreased breath sounds. Examination of the left-lower field reveals decreased breath sounds. Decreased breath sounds present.   Abdominal:      General: Abdomen is flat.   Musculoskeletal:         General: Normal range of motion.      Cervical back: Normal range of motion.   Skin:     General: Skin is warm and dry.   Neurological:      General: No focal deficit present.      Mental Status: She is alert.   Psychiatric:         Mood and Affect: Mood normal.         Thought Content: Thought content normal.            Result Review :   The following data was reviewed by: Zaid Osman MD on " 07/15/2025:  Common labs          4/11/2025    05:50 4/24/2025    10:11 5/1/2025    11:15   Common Labs   Glucose 232  275     BUN 8  7     Creatinine 0.41  0.53     Sodium 134  132     Potassium 3.9  4.5     Chloride 96  95     Calcium 8.8  8.8     Albumin 3.8  4.0     Total Bilirubin  0.2     Alkaline Phosphatase  90     AST (SGOT)  15     ALT (SGPT)  19     WBC 6.66      Hemoglobin 12.5      Hematocrit 37.8      Platelets 246      Hemoglobin A1C   10.7    Microalbumin, Urine  <1.2              Lab Results   Component Value Date    SARSANTIGEN Not Detected 12/17/2023    COVID19 Not Detected 11/06/2024    RAPFLUA Negative 09/18/2023    RAPFLUB Negative 09/18/2023    FLUAAG Not Detected 12/17/2023    FLU Negative 12/06/2022    FLU Negative 12/06/2022    FLUBAG Not Detected 12/17/2023    RAPSCRN Negative 12/17/2023    STREPAAG Negative 11/01/2024    RSV Not Detected 11/06/2024    POCPREGUR Negative 05/01/2025    BILIRUBINUR Negative 04/08/2025       Procedures        Assessment and Plan    Diagnoses and all orders for this visit:    1. Encounter for medical examination to establish care (Primary)    2. Allergic rhinitis, unspecified seasonality, unspecified trigger  -     montelukast (Singulair) 10 MG tablet; Take 1 tablet by mouth Every Night.  Dispense: 90 tablet; Refill: 1    3. Mild intermittent asthma without complication  -     montelukast (Singulair) 10 MG tablet; Take 1 tablet by mouth Every Night.  Dispense: 90 tablet; Refill: 1  -     albuterol (PROVENTIL) (2.5 MG/3ML) 0.083% nebulizer solution; Take 2.5 mg by nebulization Every 4 (Four) Hours As Needed for Wheezing or Shortness of Air.  Dispense: 3 mL; Refill: 2    4. Type 2 diabetes mellitus with hyperglycemia, with long-term current use of insulin  -     Tirzepatide 2.5 MG/0.5ML solution auto-injector; Inject 2.5 mg under the skin into the appropriate area as directed 1 (One) Time Per Week.  Dispense: 2 mL; Refill: 0  -     Discontinue: Continuous  Glucose Sensor (Dexcom G7 Sensor) misc; Use 1 each Every 10 (Ten) Days.  Dispense: 3 each; Refill: 2  -     Continuous Glucose Sensor (Dexcom G7 Sensor) misc; Use 1 each Every 10 (Ten) Days.  Dispense: 3 each; Refill: 2    5. Generalized anxiety disorder  -     venlafaxine XR (Effexor XR) 75 MG 24 hr capsule; Take 1 capsule by mouth Daily for 30 days.  Dispense: 30 capsule; Refill: 0  -     busPIRone (BUSPAR) 5 MG tablet; Take 1 tablet by mouth 3 (Three) Times a Day As Needed (Anxiety).  Dispense: 30 tablet; Refill: 0    6. Recurrent major depressive disorder, in partial remission  -     venlafaxine XR (Effexor XR) 75 MG 24 hr capsule; Take 1 capsule by mouth Daily for 30 days.  Dispense: 30 capsule; Refill: 0    7. Insomnia, unspecified type  -     traZODone (DESYREL) 50 MG tablet; Take 0.5 tablets by mouth Every Night.  Dispense: 30 tablet; Refill: 0    Other orders  -     Albuterol-Budesonide (Airsupra) 90-80 MCG/ACT aerosol; Inhale 90 Act Every 6 (Six) Hours As Needed (wheezing).  Dispense: 10.7 g; Refill: 3  -     Insulin Aspart (novoLOG) 100 UNIT/ML injection; Inject 10 Units under the skin into the appropriate area as directed 3 (Three) Times a Day Before Meals.  Dispense: 15 mL; Refill: 1          Assessment & Plan  1. Intermittent asthma.  - Symptoms indicative of intermittent asthma, with no need for steroids or antibiotics at this time.  - Physical exam reveals no wheezing but significant tightness.  - Singulair has been reintroduced into the treatment plan.  - Airsupra inhaler prescribed; nebulizer solution will be sent to the pharmacy.    2. Anxiety.  - Reports that BuSpar is effective in managing her anxiety.  - BuSpar refills provided.    3. Depression.  - Refills for venlafaxine and trazodone provided.    4. Diabetes mellitus.  - A1c level recorded as 11 in 04/2025; patient has gained 12 pounds since the last visit in 04/2025.  - Mounjaro initiated today to help manage diabetes and reduce insulin  usage.  - Advised to inform Dr. Theodore about the initiation of Mounjaro.  - Insulin refills provided.      Medications Discontinued During This Encounter   Medication Reason    Insulin Glargine (Lantus SoloStar) 100 UNIT/ML injection pen Duplicate order    pantoprazole (PROTONIX) 40 MG EC tablet     fluticasone (FLONASE) 50 MCG/ACT nasal spray     SUMAtriptan (IMITREX) 50 MG tablet     cetirizine (zyrTEC) 10 MG tablet     albuterol sulfate  (90 Base) MCG/ACT inhaler     montelukast (Singulair) 10 MG tablet Reorder    albuterol (PROVENTIL) (2.5 MG/3ML) 0.083% nebulizer solution Reorder    traZODone (DESYREL) 50 MG tablet Reorder    busPIRone (BUSPAR) 5 MG tablet Reorder    venlafaxine XR (Effexor XR) 75 MG 24 hr capsule Reorder    Continuous Glucose Sensor (Dexcom G7 Sensor) misc Reorder    Continuous Glucose Sensor (Dexcom G7 Sensor) misc Reorder    Insulin Aspart FlexPen 100 UNIT/ML solution pen-injector     Insulin Aspart (novoLOG) 100 UNIT/ML injection Reorder          Follow Up   Return in about 4 weeks (around 8/12/2025).  Patient was given instructions and counseling regarding her condition or for health maintenance advice. Please see specific information pulled into the AVS if appropriate.       Zaid Osman MD  07/15/25  08:39 EDT      Patient or patient representative verbalized consent for the use of Ambient Listening during the visit with  Zaid Osman MD for chart documentation. 7/15/2025  08:39 EDT

## 2025-07-25 ENCOUNTER — TELEPHONE (OUTPATIENT)
Dept: FAMILY MEDICINE CLINIC | Facility: CLINIC | Age: 30
End: 2025-07-25
Payer: COMMERCIAL

## 2025-08-14 ENCOUNTER — OFFICE VISIT (OUTPATIENT)
Dept: FAMILY MEDICINE CLINIC | Facility: CLINIC | Age: 30
End: 2025-08-14
Payer: COMMERCIAL

## 2025-08-14 VITALS
TEMPERATURE: 98 F | HEART RATE: 112 BPM | HEIGHT: 62 IN | WEIGHT: 293 LBS | BODY MASS INDEX: 53.92 KG/M2 | OXYGEN SATURATION: 98 %

## 2025-08-14 DIAGNOSIS — R60.0 BILATERAL LOWER EXTREMITY EDEMA: ICD-10-CM

## 2025-08-14 DIAGNOSIS — E11.65 TYPE 2 DIABETES MELLITUS WITH HYPERGLYCEMIA, WITH LONG-TERM CURRENT USE OF INSULIN: ICD-10-CM

## 2025-08-14 DIAGNOSIS — Z79.4 TYPE 2 DIABETES MELLITUS WITH HYPERGLYCEMIA, WITH LONG-TERM CURRENT USE OF INSULIN: ICD-10-CM

## 2025-08-14 DIAGNOSIS — F41.1 GENERALIZED ANXIETY DISORDER: ICD-10-CM

## 2025-08-14 DIAGNOSIS — F33.41 RECURRENT MAJOR DEPRESSIVE DISORDER, IN PARTIAL REMISSION: ICD-10-CM

## 2025-08-14 DIAGNOSIS — R59.0 LYMPHADENOPATHY, CERVICAL: Primary | ICD-10-CM

## 2025-08-14 LAB
ALBUMIN SERPL-MCNC: 4.2 G/DL (ref 3.5–5.2)
ALBUMIN/GLOB SERPL: 1.3 G/DL
ALP SERPL-CCNC: 94 U/L (ref 39–117)
ALT SERPL W P-5'-P-CCNC: 22 U/L (ref 1–33)
ANION GAP SERPL CALCULATED.3IONS-SCNC: 12.8 MMOL/L (ref 5–15)
AST SERPL-CCNC: 21 U/L (ref 1–32)
BASOPHILS # BLD AUTO: 0.07 10*3/MM3 (ref 0–0.2)
BASOPHILS NFR BLD AUTO: 0.8 % (ref 0–1.5)
BILIRUB SERPL-MCNC: 0.2 MG/DL (ref 0–1.2)
BUN SERPL-MCNC: 7 MG/DL (ref 6–20)
BUN/CREAT SERPL: 14.3 (ref 7–25)
CALCIUM SPEC-SCNC: 9.3 MG/DL (ref 8.6–10.5)
CHLORIDE SERPL-SCNC: 98 MMOL/L (ref 98–107)
CO2 SERPL-SCNC: 25.2 MMOL/L (ref 22–29)
CREAT SERPL-MCNC: 0.49 MG/DL (ref 0.57–1)
DEPRECATED RDW RBC AUTO: 36.5 FL (ref 37–54)
EGFRCR SERPLBLD CKD-EPI 2021: 129.4 ML/MIN/1.73
EOSINOPHIL # BLD AUTO: 0.11 10*3/MM3 (ref 0–0.4)
EOSINOPHIL NFR BLD AUTO: 1.2 % (ref 0.3–6.2)
ERYTHROCYTE [DISTWIDTH] IN BLOOD BY AUTOMATED COUNT: 12.3 % (ref 12.3–15.4)
GLOBULIN UR ELPH-MCNC: 3.3 GM/DL
GLUCOSE SERPL-MCNC: 278 MG/DL (ref 65–99)
HBA1C MFR BLD: 9.1 % (ref 4.8–5.6)
HCT VFR BLD AUTO: 39.4 % (ref 34–46.6)
HGB BLD-MCNC: 13.2 G/DL (ref 12–15.9)
IMM GRANULOCYTES # BLD AUTO: 0.13 10*3/MM3 (ref 0–0.05)
IMM GRANULOCYTES NFR BLD AUTO: 1.4 % (ref 0–0.5)
LYMPHOCYTES # BLD AUTO: 2.81 10*3/MM3 (ref 0.7–3.1)
LYMPHOCYTES NFR BLD AUTO: 30.9 % (ref 19.6–45.3)
MCH RBC QN AUTO: 27.6 PG (ref 26.6–33)
MCHC RBC AUTO-ENTMCNC: 33.5 G/DL (ref 31.5–35.7)
MCV RBC AUTO: 82.3 FL (ref 79–97)
MONOCYTES # BLD AUTO: 0.62 10*3/MM3 (ref 0.1–0.9)
MONOCYTES NFR BLD AUTO: 6.8 % (ref 5–12)
NEUTROPHILS NFR BLD AUTO: 5.34 10*3/MM3 (ref 1.7–7)
NEUTROPHILS NFR BLD AUTO: 58.9 % (ref 42.7–76)
NRBC BLD AUTO-RTO: 0 /100 WBC (ref 0–0.2)
PLATELET # BLD AUTO: 319 10*3/MM3 (ref 140–450)
PMV BLD AUTO: 10.1 FL (ref 6–12)
POTASSIUM SERPL-SCNC: 4.9 MMOL/L (ref 3.5–5.2)
PROT SERPL-MCNC: 7.5 G/DL (ref 6–8.5)
RBC # BLD AUTO: 4.79 10*6/MM3 (ref 3.77–5.28)
SODIUM SERPL-SCNC: 136 MMOL/L (ref 136–145)
WBC NRBC COR # BLD AUTO: 9.08 10*3/MM3 (ref 3.4–10.8)

## 2025-08-14 PROCEDURE — 99214 OFFICE O/P EST MOD 30 MIN: CPT | Performed by: STUDENT IN AN ORGANIZED HEALTH CARE EDUCATION/TRAINING PROGRAM

## 2025-08-14 PROCEDURE — 3046F HEMOGLOBIN A1C LEVEL >9.0%: CPT | Performed by: STUDENT IN AN ORGANIZED HEALTH CARE EDUCATION/TRAINING PROGRAM

## 2025-08-14 PROCEDURE — 83036 HEMOGLOBIN GLYCOSYLATED A1C: CPT | Performed by: STUDENT IN AN ORGANIZED HEALTH CARE EDUCATION/TRAINING PROGRAM

## 2025-08-14 PROCEDURE — 85025 COMPLETE CBC W/AUTO DIFF WBC: CPT | Performed by: STUDENT IN AN ORGANIZED HEALTH CARE EDUCATION/TRAINING PROGRAM

## 2025-08-14 PROCEDURE — 1159F MED LIST DOCD IN RCRD: CPT | Performed by: STUDENT IN AN ORGANIZED HEALTH CARE EDUCATION/TRAINING PROGRAM

## 2025-08-14 PROCEDURE — 80053 COMPREHEN METABOLIC PANEL: CPT | Performed by: STUDENT IN AN ORGANIZED HEALTH CARE EDUCATION/TRAINING PROGRAM

## 2025-08-14 PROCEDURE — 1160F RVW MEDS BY RX/DR IN RCRD: CPT | Performed by: STUDENT IN AN ORGANIZED HEALTH CARE EDUCATION/TRAINING PROGRAM

## 2025-08-14 PROCEDURE — 1126F AMNT PAIN NOTED NONE PRSNT: CPT | Performed by: STUDENT IN AN ORGANIZED HEALTH CARE EDUCATION/TRAINING PROGRAM

## 2025-08-14 RX ORDER — SEMAGLUTIDE 1.34 MG/ML
INJECTION, SOLUTION SUBCUTANEOUS
COMMUNITY
Start: 2025-08-13

## 2025-08-14 RX ORDER — DAPAGLIFLOZIN 10 MG/1
TABLET, FILM COATED ORAL
COMMUNITY
Start: 2025-08-13

## 2025-08-14 RX ORDER — BUSPIRONE HYDROCHLORIDE 5 MG/1
5 TABLET ORAL 3 TIMES DAILY PRN
Qty: 30 TABLET | Refills: 0 | Status: SHIPPED | OUTPATIENT
Start: 2025-08-14

## 2025-08-14 RX ORDER — VENLAFAXINE HYDROCHLORIDE 75 MG/1
75 CAPSULE, EXTENDED RELEASE ORAL DAILY
Qty: 30 CAPSULE | Refills: 0 | Status: SHIPPED | OUTPATIENT
Start: 2025-08-14 | End: 2025-09-13

## 2025-08-14 RX ORDER — PEN NEEDLE, DIABETIC 29 G X1/2"
NEEDLE, DISPOSABLE MISCELLANEOUS
COMMUNITY
Start: 2025-08-12

## 2025-08-26 ENCOUNTER — TELEPHONE (OUTPATIENT)
Dept: FAMILY MEDICINE CLINIC | Facility: CLINIC | Age: 30
End: 2025-08-26

## 2025-08-26 ENCOUNTER — OFFICE VISIT (OUTPATIENT)
Dept: FAMILY MEDICINE CLINIC | Facility: CLINIC | Age: 30
End: 2025-08-26
Payer: COMMERCIAL

## 2025-08-26 VITALS
TEMPERATURE: 97 F | HEART RATE: 94 BPM | OXYGEN SATURATION: 99 % | DIASTOLIC BLOOD PRESSURE: 70 MMHG | HEIGHT: 62 IN | BODY MASS INDEX: 53.92 KG/M2 | SYSTOLIC BLOOD PRESSURE: 110 MMHG | WEIGHT: 293 LBS

## 2025-08-26 DIAGNOSIS — R05.1 ACUTE COUGH: ICD-10-CM

## 2025-08-26 DIAGNOSIS — M79.671 RIGHT FOOT PAIN: ICD-10-CM

## 2025-08-26 DIAGNOSIS — F41.1 GENERALIZED ANXIETY DISORDER: ICD-10-CM

## 2025-08-26 DIAGNOSIS — J06.9 VIRAL URI WITH COUGH: ICD-10-CM

## 2025-08-26 DIAGNOSIS — R11.0 NAUSEA: ICD-10-CM

## 2025-08-26 DIAGNOSIS — Z79.4 TYPE 2 DIABETES MELLITUS WITH HYPERGLYCEMIA, WITH LONG-TERM CURRENT USE OF INSULIN: Primary | ICD-10-CM

## 2025-08-26 DIAGNOSIS — E11.65 TYPE 2 DIABETES MELLITUS WITH HYPERGLYCEMIA, WITH LONG-TERM CURRENT USE OF INSULIN: Primary | ICD-10-CM

## 2025-08-26 DIAGNOSIS — R60.0 BILATERAL LOWER EXTREMITY EDEMA: ICD-10-CM

## 2025-08-26 PROCEDURE — 99214 OFFICE O/P EST MOD 30 MIN: CPT | Performed by: STUDENT IN AN ORGANIZED HEALTH CARE EDUCATION/TRAINING PROGRAM

## 2025-08-26 PROCEDURE — 1159F MED LIST DOCD IN RCRD: CPT | Performed by: STUDENT IN AN ORGANIZED HEALTH CARE EDUCATION/TRAINING PROGRAM

## 2025-08-26 PROCEDURE — 3078F DIAST BP <80 MM HG: CPT | Performed by: STUDENT IN AN ORGANIZED HEALTH CARE EDUCATION/TRAINING PROGRAM

## 2025-08-26 PROCEDURE — 3074F SYST BP LT 130 MM HG: CPT | Performed by: STUDENT IN AN ORGANIZED HEALTH CARE EDUCATION/TRAINING PROGRAM

## 2025-08-26 PROCEDURE — 1126F AMNT PAIN NOTED NONE PRSNT: CPT | Performed by: STUDENT IN AN ORGANIZED HEALTH CARE EDUCATION/TRAINING PROGRAM

## 2025-08-26 PROCEDURE — 3046F HEMOGLOBIN A1C LEVEL >9.0%: CPT | Performed by: STUDENT IN AN ORGANIZED HEALTH CARE EDUCATION/TRAINING PROGRAM

## 2025-08-26 PROCEDURE — 1160F RVW MEDS BY RX/DR IN RCRD: CPT | Performed by: STUDENT IN AN ORGANIZED HEALTH CARE EDUCATION/TRAINING PROGRAM

## 2025-08-26 RX ORDER — CETIRIZINE HYDROCHLORIDE 10 MG/1
10 TABLET ORAL DAILY
COMMUNITY

## 2025-08-26 RX ORDER — NYSTATIN 100000 [USP'U]/ML
SUSPENSION ORAL
COMMUNITY

## 2025-08-26 RX ORDER — ATENOLOL 25 MG/1
TABLET ORAL
COMMUNITY

## 2025-08-26 RX ORDER — AZELASTINE HYDROCHLORIDE, FLUTICASONE PROPIONATE 137; 50 UG/1; UG/1
SPRAY, METERED NASAL
COMMUNITY

## 2025-08-26 RX ORDER — FAMOTIDINE 40 MG/1
TABLET, FILM COATED ORAL
COMMUNITY

## 2025-08-26 RX ORDER — ONDANSETRON 4 MG/1
4 TABLET, ORALLY DISINTEGRATING ORAL EVERY 12 HOURS PRN
Qty: 14 TABLET | Refills: 0 | Status: SHIPPED | OUTPATIENT
Start: 2025-08-26 | End: 2025-08-29 | Stop reason: SDUPTHER

## 2025-08-26 RX ORDER — BROMPHENIRAMINE MALEATE, PSEUDOEPHEDRINE HYDROCHLORIDE, AND DEXTROMETHORPHAN HYDROBROMIDE 2; 30; 10 MG/5ML; MG/5ML; MG/5ML
5 SYRUP ORAL 3 TIMES DAILY PRN
Qty: 118 ML | Refills: 0 | Status: SHIPPED | OUTPATIENT
Start: 2025-08-26

## 2025-08-29 RX ORDER — ONDANSETRON 4 MG/1
4 TABLET, ORALLY DISINTEGRATING ORAL EVERY 12 HOURS PRN
Qty: 14 TABLET | Refills: 0 | Status: SHIPPED | OUTPATIENT
Start: 2025-08-29

## (undated) DEVICE — ADHS SKIN SURG TISS VISC PREMIERPRO EXOFIN HI/VISC FAST/DRY

## (undated) DEVICE — GENERAL LAPAROSCOPY-LF: Brand: MEDLINE INDUSTRIES, INC.

## (undated) DEVICE — ENDOPATH XCEL WITH OPTIVIEW TECHNOLOGY BLADELESS TROCARS WITH STABILITY SLEEVES: Brand: ENDOPATH XCEL OPTIVIEW

## (undated) DEVICE — SUT VIC 0 UR6 27IN VCP603H

## (undated) DEVICE — SUT VIC PLS CTD BR 0 TIE 18IN VIL

## (undated) DEVICE — SYR LUERLOK 30CC

## (undated) DEVICE — GOWN,REINFORCE,POLY,SIRUS,BREATH SLV,XLG: Brand: MEDLINE

## (undated) DEVICE — LAPAROSCOPIC ELECTRODE WITH A 3/32" PIN CONNECTOR, L-HOOK 5 MM X 27 CM: Brand: CONMED

## (undated) DEVICE — 3M™ IOBAN™ 2 ANTIMICROBIAL INCISE DRAPE 6650EZ: Brand: IOBAN™ 2

## (undated) DEVICE — CONN JET HYDRA H20 AUXILIARY DISP

## (undated) DEVICE — LAPAROSCOPIC SCISSORS: Brand: EPIX LAPAROSCOPIC SCISSORS

## (undated) DEVICE — SOL IRRG H2O PL/BG 1000ML STRL

## (undated) DEVICE — TROCARS: Brand: KII® BALLOON BLUNT TIP SYSTEM

## (undated) DEVICE — INTENDED FOR TISSUE SEPARATION, AND OTHER PROCEDURES THAT REQUIRE A SHARP SURGICAL BLADE TO PUNCTURE OR CUT.: Brand: BARD-PARKER ® CARBON RIB-BACK BLADES

## (undated) DEVICE — Device

## (undated) DEVICE — GLV SURG SENSICARE SLT PF LF 7.5 STRL

## (undated) DEVICE — APPL CHLORAPREP HI/LITE 26ML ORNG

## (undated) DEVICE — LAPAROSCOPIC SMOKE EVACUATION SYSTEM ACTIVE AND PASSIVE: Brand: VALLEYLAB

## (undated) DEVICE — BLCK/BITE BLOX WO/DENTL/RIM W/STRAP 54F

## (undated) DEVICE — LINER SURG CANSTR SXN S/RIGD 1500CC

## (undated) DEVICE — ENDOPATH XCEL WITH OPTIVIEW TECHNOLOGY UNIVERSAL TROCAR STABILITY SLEEVES: Brand: ENDOPATH XCEL OPTIVIEW

## (undated) DEVICE — SLV SCD LEG COMFORT KENDALLSCD MD REPROC

## (undated) DEVICE — SOLIDIFIER LIQLOC PLS 1500CC BT

## (undated) DEVICE — VISUALIZATION SYSTEM: Brand: CLEARIFY

## (undated) DEVICE — SINGLE-USE BIOPSY FORCEPS: Brand: RADIAL JAW 4

## (undated) DEVICE — SUT MNCRYL 4/0 PS2 18 IN

## (undated) DEVICE — 2, DISPOSABLE SUCTION/IRRIGATOR WITHOUT DISPOSABLE TIP: Brand: STRYKEFLOW

## (undated) DEVICE — Device: Brand: DEFENDO AIR/WATER/SUCTION AND BIOPSY VALVE

## (undated) DEVICE — TISSUE RETRIEVAL SYSTEM: Brand: INZII RETRIEVAL SYSTEM